# Patient Record
Sex: FEMALE | Race: WHITE | Employment: OTHER | URBAN - METROPOLITAN AREA
[De-identification: names, ages, dates, MRNs, and addresses within clinical notes are randomized per-mention and may not be internally consistent; named-entity substitution may affect disease eponyms.]

---

## 2018-03-26 ENCOUNTER — HOSPITAL ENCOUNTER (OUTPATIENT)
Dept: MAMMOGRAPHY | Age: 78
Discharge: HOME | End: 2018-03-28
Payer: MEDICARE

## 2018-03-26 DIAGNOSIS — Z12.39 BREAST CANCER SCREENING: ICD-10-CM

## 2018-03-26 DIAGNOSIS — C50.919 MALIGNANT NEOPLASM OF FEMALE BREAST, UNSPECIFIED ESTROGEN RECEPTOR STATUS, UNSPECIFIED LATERALITY, UNSPECIFIED SITE OF BREAST (HCC): ICD-10-CM

## 2018-03-26 PROCEDURE — 77063 BREAST TOMOSYNTHESIS BI: CPT

## 2018-12-06 ENCOUNTER — HOSPITAL ENCOUNTER (OUTPATIENT)
Age: 78
Discharge: HOME | End: 2018-12-08
Payer: MEDICARE

## 2018-12-06 LAB
ALBUMIN SERPL-MCNC: 3.4 G/DL (ref 3.5–5.2)
ALP BLD-CCNC: 128 U/L (ref 35–104)
ALT SERPL-CCNC: 17 U/L (ref 0–32)
ANION GAP SERPL CALCULATED.3IONS-SCNC: 14 MMOL/L (ref 7–16)
AST SERPL-CCNC: 32 U/L (ref 0–31)
BASOPHILS ABSOLUTE: 0.03 E9/L (ref 0–0.2)
BASOPHILS RELATIVE PERCENT: 0.7 % (ref 0–2)
BILIRUB SERPL-MCNC: 0.6 MG/DL (ref 0–1.2)
BUN BLDV-MCNC: 15 MG/DL (ref 8–23)
CALCIUM SERPL-MCNC: 9 MG/DL (ref 8.6–10.2)
CHLORIDE BLD-SCNC: 106 MMOL/L (ref 98–107)
CHOLESTEROL, TOTAL: 162 MG/DL (ref 0–199)
CO2: 22 MMOL/L (ref 22–29)
CREAT SERPL-MCNC: 0.9 MG/DL (ref 0.5–1)
EOSINOPHILS ABSOLUTE: 0.12 E9/L (ref 0.05–0.5)
EOSINOPHILS RELATIVE PERCENT: 2.7 % (ref 0–6)
GFR AFRICAN AMERICAN: >60
GFR NON-AFRICAN AMERICAN: >60 ML/MIN/1.73
GLUCOSE BLD-MCNC: 159 MG/DL (ref 74–99)
HCT VFR BLD CALC: 44.5 % (ref 34–48)
HDLC SERPL-MCNC: 52 MG/DL
HEMOGLOBIN: 14.7 G/DL (ref 11.5–15.5)
IMMATURE GRANULOCYTES #: 0.02 E9/L
IMMATURE GRANULOCYTES %: 0.4 % (ref 0–5)
LDL CHOLESTEROL CALCULATED: 96 MG/DL (ref 0–99)
LYMPHOCYTES ABSOLUTE: 1.25 E9/L (ref 1.5–4)
LYMPHOCYTES RELATIVE PERCENT: 27.7 % (ref 20–42)
MCH RBC QN AUTO: 31.9 PG (ref 26–35)
MCHC RBC AUTO-ENTMCNC: 33 % (ref 32–34.5)
MCV RBC AUTO: 96.5 FL (ref 80–99.9)
MONOCYTES ABSOLUTE: 0.42 E9/L (ref 0.1–0.95)
MONOCYTES RELATIVE PERCENT: 9.3 % (ref 2–12)
NEUTROPHILS ABSOLUTE: 2.67 E9/L (ref 1.8–7.3)
NEUTROPHILS RELATIVE PERCENT: 59.2 % (ref 43–80)
PDW BLD-RTO: 12.8 FL (ref 11.5–15)
PLATELET # BLD: 151 E9/L (ref 130–450)
PMV BLD AUTO: 10.6 FL (ref 7–12)
POTASSIUM SERPL-SCNC: 4.4 MMOL/L (ref 3.5–5)
RBC # BLD: 4.61 E12/L (ref 3.5–5.5)
SODIUM BLD-SCNC: 142 MMOL/L (ref 132–146)
TOTAL PROTEIN: 7.3 G/DL (ref 6.4–8.3)
TRIGL SERPL-MCNC: 71 MG/DL (ref 0–149)
TSH SERPL DL<=0.05 MIU/L-ACNC: 2.11 UIU/ML (ref 0.27–4.2)
VLDLC SERPL CALC-MCNC: 14 MG/DL
WBC # BLD: 4.5 E9/L (ref 4.5–11.5)

## 2018-12-06 PROCEDURE — 84443 ASSAY THYROID STIM HORMONE: CPT

## 2018-12-06 PROCEDURE — 80061 LIPID PANEL: CPT

## 2018-12-06 PROCEDURE — 80053 COMPREHEN METABOLIC PANEL: CPT

## 2018-12-06 PROCEDURE — 85025 COMPLETE CBC W/AUTO DIFF WBC: CPT

## 2019-04-03 ENCOUNTER — HOSPITAL ENCOUNTER (OUTPATIENT)
Dept: MAMMOGRAPHY | Age: 79
Discharge: HOME | End: 2019-04-05
Payer: MEDICARE

## 2019-04-03 DIAGNOSIS — Z12.31 SCREENING MAMMOGRAM, ENCOUNTER FOR: ICD-10-CM

## 2019-04-03 PROCEDURE — 77067 SCR MAMMO BI INCL CAD: CPT

## 2019-05-06 ENCOUNTER — HOSPITAL ENCOUNTER (OUTPATIENT)
Age: 79
Discharge: HOME | End: 2019-05-08
Payer: MEDICARE

## 2019-05-06 LAB
BASOPHILS ABSOLUTE: 0.05 E9/L (ref 0–0.2)
BASOPHILS RELATIVE PERCENT: 1 % (ref 0–2)
C-REACTIVE PROTEIN: 0.1 MG/DL (ref 0–0.4)
EOSINOPHILS ABSOLUTE: 0.12 E9/L (ref 0.05–0.5)
EOSINOPHILS RELATIVE PERCENT: 2.3 % (ref 0–6)
HCT VFR BLD CALC: 44.9 % (ref 34–48)
HEMOGLOBIN: 14.9 G/DL (ref 11.5–15.5)
IMMATURE GRANULOCYTES #: 0.01 E9/L
IMMATURE GRANULOCYTES %: 0.2 % (ref 0–5)
LYMPHOCYTES ABSOLUTE: 1.49 E9/L (ref 1.5–4)
LYMPHOCYTES RELATIVE PERCENT: 29.2 % (ref 20–42)
MCH RBC QN AUTO: 32.6 PG (ref 26–35)
MCHC RBC AUTO-ENTMCNC: 33.2 % (ref 32–34.5)
MCV RBC AUTO: 98.2 FL (ref 80–99.9)
MONOCYTES ABSOLUTE: 0.59 E9/L (ref 0.1–0.95)
MONOCYTES RELATIVE PERCENT: 11.5 % (ref 2–12)
NEUTROPHILS ABSOLUTE: 2.85 E9/L (ref 1.8–7.3)
NEUTROPHILS RELATIVE PERCENT: 55.8 % (ref 43–80)
PDW BLD-RTO: 12.9 FL (ref 11.5–15)
PLATELET # BLD: 194 E9/L (ref 130–450)
PMV BLD AUTO: 10.9 FL (ref 7–12)
RBC # BLD: 4.57 E12/L (ref 3.5–5.5)
WBC # BLD: 5.1 E9/L (ref 4.5–11.5)

## 2019-05-06 PROCEDURE — 80053 COMPREHEN METABOLIC PANEL: CPT

## 2019-05-06 PROCEDURE — 85025 COMPLETE CBC W/AUTO DIFF WBC: CPT

## 2019-05-06 PROCEDURE — 86140 C-REACTIVE PROTEIN: CPT

## 2019-05-06 PROCEDURE — 84439 ASSAY OF FREE THYROXINE: CPT

## 2019-05-06 PROCEDURE — 80061 LIPID PANEL: CPT

## 2019-05-06 PROCEDURE — 86592 SYPHILIS TEST NON-TREP QUAL: CPT

## 2019-05-06 PROCEDURE — 82306 VITAMIN D 25 HYDROXY: CPT

## 2019-05-06 PROCEDURE — 84443 ASSAY THYROID STIM HORMONE: CPT

## 2019-05-06 PROCEDURE — 86703 HIV-1/HIV-2 1 RESULT ANTBDY: CPT

## 2019-05-06 PROCEDURE — 85651 RBC SED RATE NONAUTOMATED: CPT

## 2019-05-07 LAB
ALBUMIN SERPL-MCNC: 3.5 G/DL (ref 3.5–5.2)
ALP BLD-CCNC: 108 U/L (ref 35–104)
ALT SERPL-CCNC: 13 U/L (ref 0–32)
ANION GAP SERPL CALCULATED.3IONS-SCNC: 21 MMOL/L (ref 7–16)
AST SERPL-CCNC: 36 U/L (ref 0–31)
BILIRUB SERPL-MCNC: 0.9 MG/DL (ref 0–1.2)
BUN BLDV-MCNC: 18 MG/DL (ref 8–23)
CALCIUM SERPL-MCNC: 9.4 MG/DL (ref 8.6–10.2)
CHLORIDE BLD-SCNC: 101 MMOL/L (ref 98–107)
CO2: 24 MMOL/L (ref 22–29)
CREAT SERPL-MCNC: 1.3 MG/DL (ref 0.5–1)
GFR AFRICAN AMERICAN: 48
GFR NON-AFRICAN AMERICAN: 40 ML/MIN/1.73
GLUCOSE BLD-MCNC: 124 MG/DL (ref 74–99)
POTASSIUM SERPL-SCNC: 4.1 MMOL/L (ref 3.5–5)
SEDIMENTATION RATE, ERYTHROCYTE: 58 MM/HR (ref 0–20)
SODIUM BLD-SCNC: 146 MMOL/L (ref 132–146)
T4 FREE: 1.89 NG/DL (ref 0.93–1.7)
TOTAL PROTEIN: 7.9 G/DL (ref 6.4–8.3)
TSH SERPL DL<=0.05 MIU/L-ACNC: 1.74 UIU/ML (ref 0.27–4.2)

## 2019-06-09 ENCOUNTER — APPOINTMENT (OUTPATIENT)
Dept: CT IMAGING | Age: 79
DRG: 443 | End: 2019-06-09
Payer: MEDICARE

## 2019-06-09 ENCOUNTER — APPOINTMENT (OUTPATIENT)
Dept: GENERAL RADIOLOGY | Age: 79
DRG: 443 | End: 2019-06-09
Payer: MEDICARE

## 2019-06-09 ENCOUNTER — HOSPITAL ENCOUNTER (INPATIENT)
Age: 79
LOS: 2 days | Discharge: HOME | DRG: 443 | End: 2019-06-11
Attending: EMERGENCY MEDICINE | Admitting: HOSPITALIST
Payer: MEDICARE

## 2019-06-09 DIAGNOSIS — K76.82 HEPATIC ENCEPHALOPATHY: Primary | ICD-10-CM

## 2019-06-09 DIAGNOSIS — I10 POORLY-CONTROLLED HYPERTENSION: ICD-10-CM

## 2019-06-09 PROBLEM — G93.40 ENCEPHALOPATHY: Status: ACTIVE | Noted: 2019-06-09

## 2019-06-09 LAB
ALBUMIN SERPL-MCNC: 3.5 G/DL (ref 3.5–5.2)
ALP BLD-CCNC: 165 U/L (ref 35–104)
ALT SERPL-CCNC: 19 U/L (ref 0–32)
AMMONIA: 108 UMOL/L (ref 11–51)
ANION GAP SERPL CALCULATED.3IONS-SCNC: 11 MMOL/L (ref 7–16)
APTT: 33.1 SEC (ref 24.5–35.1)
AST SERPL-CCNC: 35 U/L (ref 0–31)
BACTERIA: ABNORMAL /HPF
BASOPHILS ABSOLUTE: 0.02 E9/L (ref 0–0.2)
BASOPHILS RELATIVE PERCENT: 0.5 % (ref 0–2)
BILIRUB SERPL-MCNC: 0.7 MG/DL (ref 0–1.2)
BILIRUBIN URINE: NEGATIVE
BLOOD, URINE: ABNORMAL
BUN BLDV-MCNC: 19 MG/DL (ref 8–23)
CALCIUM SERPL-MCNC: 9.6 MG/DL (ref 8.6–10.2)
CHLORIDE BLD-SCNC: 107 MMOL/L (ref 98–107)
CHP ED QC CHECK: YES
CLARITY: CLEAR
CO2: 27 MMOL/L (ref 22–29)
COLOR: YELLOW
CREAT SERPL-MCNC: 1 MG/DL (ref 0.5–1)
EKG ATRIAL RATE: 95 BPM
EKG P AXIS: 28 DEGREES
EKG Q-T INTERVAL: 354 MS
EKG QRS DURATION: 80 MS
EKG QTC CALCULATION (BAZETT): 444 MS
EKG R AXIS: 14 DEGREES
EKG T AXIS: 6 DEGREES
EKG VENTRICULAR RATE: 95 BPM
EOSINOPHILS ABSOLUTE: 0.08 E9/L (ref 0.05–0.5)
EOSINOPHILS RELATIVE PERCENT: 2 % (ref 0–6)
EPITHELIAL CELLS, UA: ABNORMAL /HPF
GFR AFRICAN AMERICAN: >60
GFR NON-AFRICAN AMERICAN: 54 ML/MIN/1.73
GLUCOSE BLD-MCNC: 194 MG/DL (ref 74–99)
GLUCOSE BLD-MCNC: 214 MG/DL
GLUCOSE URINE: 100 MG/DL
HCT VFR BLD CALC: 43 % (ref 34–48)
HEMOGLOBIN: 14.6 G/DL (ref 11.5–15.5)
IMMATURE GRANULOCYTES #: 0.02 E9/L
IMMATURE GRANULOCYTES %: 0.5 % (ref 0–5)
INR BLD: 1.2
KETONES, URINE: NEGATIVE MG/DL
LEUKOCYTE ESTERASE, URINE: ABNORMAL
LYMPHOCYTES ABSOLUTE: 0.75 E9/L (ref 1.5–4)
LYMPHOCYTES RELATIVE PERCENT: 18.6 % (ref 20–42)
MAGNESIUM: 1.9 MG/DL (ref 1.6–2.6)
MCH RBC QN AUTO: 32.2 PG (ref 26–35)
MCHC RBC AUTO-ENTMCNC: 34 % (ref 32–34.5)
MCV RBC AUTO: 94.7 FL (ref 80–99.9)
METER GLUCOSE: 214 MG/DL (ref 74–99)
MONOCYTES ABSOLUTE: 0.34 E9/L (ref 0.1–0.95)
MONOCYTES RELATIVE PERCENT: 8.4 % (ref 2–12)
NEUTROPHILS ABSOLUTE: 2.83 E9/L (ref 1.8–7.3)
NEUTROPHILS RELATIVE PERCENT: 70 % (ref 43–80)
NITRITE, URINE: NEGATIVE
PDW BLD-RTO: 11.9 FL (ref 11.5–15)
PH UA: 6.5 (ref 5–9)
PLATELET # BLD: 111 E9/L (ref 130–450)
PMV BLD AUTO: 10 FL (ref 7–12)
POTASSIUM SERPL-SCNC: 3.9 MMOL/L (ref 3.5–5)
PROTEIN UA: ABNORMAL MG/DL
PROTHROMBIN TIME: 13.5 SEC (ref 9.3–12.4)
RBC # BLD: 4.54 E12/L (ref 3.5–5.5)
RBC UA: ABNORMAL /HPF (ref 0–2)
SODIUM BLD-SCNC: 145 MMOL/L (ref 132–146)
SPECIFIC GRAVITY UA: 1.02 (ref 1–1.03)
T4 FREE: 1.36 NG/DL (ref 0.93–1.7)
TOTAL PROTEIN: 7.7 G/DL (ref 6.4–8.3)
TROPONIN: <0.01 NG/ML (ref 0–0.03)
TSH SERPL DL<=0.05 MIU/L-ACNC: 1.03 UIU/ML (ref 0.27–4.2)
UROBILINOGEN, URINE: 1 E.U./DL
WBC # BLD: 4 E9/L (ref 4.5–11.5)
WBC UA: ABNORMAL /HPF (ref 0–5)

## 2019-06-09 PROCEDURE — 74176 CT ABD & PELVIS W/O CONTRAST: CPT

## 2019-06-09 PROCEDURE — 82962 GLUCOSE BLOOD TEST: CPT

## 2019-06-09 PROCEDURE — 84439 ASSAY OF FREE THYROXINE: CPT

## 2019-06-09 PROCEDURE — 93010 ELECTROCARDIOGRAM REPORT: CPT | Performed by: INTERNAL MEDICINE

## 2019-06-09 PROCEDURE — 84484 ASSAY OF TROPONIN QUANT: CPT

## 2019-06-09 PROCEDURE — 36415 COLL VENOUS BLD VENIPUNCTURE: CPT

## 2019-06-09 PROCEDURE — 6370000000 HC RX 637 (ALT 250 FOR IP): Performed by: HOSPITALIST

## 2019-06-09 PROCEDURE — 99285 EMERGENCY DEPT VISIT HI MDM: CPT

## 2019-06-09 PROCEDURE — 85025 COMPLETE CBC W/AUTO DIFF WBC: CPT

## 2019-06-09 PROCEDURE — 80053 COMPREHEN METABOLIC PANEL: CPT

## 2019-06-09 PROCEDURE — 6360000004 HC RX CONTRAST MEDICATION: Performed by: RADIOLOGY

## 2019-06-09 PROCEDURE — 85730 THROMBOPLASTIN TIME PARTIAL: CPT

## 2019-06-09 PROCEDURE — 99223 1ST HOSP IP/OBS HIGH 75: CPT | Performed by: HOSPITALIST

## 2019-06-09 PROCEDURE — 1200000000 HC SEMI PRIVATE

## 2019-06-09 PROCEDURE — 6360000002 HC RX W HCPCS: Performed by: HOSPITALIST

## 2019-06-09 PROCEDURE — 70450 CT HEAD/BRAIN W/O DYE: CPT

## 2019-06-09 PROCEDURE — 6370000000 HC RX 637 (ALT 250 FOR IP): Performed by: NURSE PRACTITIONER

## 2019-06-09 PROCEDURE — 82140 ASSAY OF AMMONIA: CPT

## 2019-06-09 PROCEDURE — 87088 URINE BACTERIA CULTURE: CPT

## 2019-06-09 PROCEDURE — 85610 PROTHROMBIN TIME: CPT

## 2019-06-09 PROCEDURE — 2580000003 HC RX 258: Performed by: HOSPITALIST

## 2019-06-09 PROCEDURE — 93005 ELECTROCARDIOGRAM TRACING: CPT | Performed by: NURSE PRACTITIONER

## 2019-06-09 PROCEDURE — 83735 ASSAY OF MAGNESIUM: CPT

## 2019-06-09 PROCEDURE — 71045 X-RAY EXAM CHEST 1 VIEW: CPT

## 2019-06-09 PROCEDURE — 84443 ASSAY THYROID STIM HORMONE: CPT

## 2019-06-09 PROCEDURE — 81001 URINALYSIS AUTO W/SCOPE: CPT

## 2019-06-09 RX ORDER — MONTELUKAST SODIUM 10 MG/1
10 TABLET ORAL NIGHTLY
Status: DISCONTINUED | OUTPATIENT
Start: 2019-06-09 | End: 2019-06-11 | Stop reason: HOSPADM

## 2019-06-09 RX ORDER — LEVOTHYROXINE SODIUM 0.05 MG/1
50 TABLET ORAL DAILY
COMMUNITY

## 2019-06-09 RX ORDER — LACTULOSE 10 G/15ML
15 SOLUTION ORAL ONCE
Status: COMPLETED | OUTPATIENT
Start: 2019-06-09 | End: 2019-06-09

## 2019-06-09 RX ORDER — FUROSEMIDE 40 MG/1
40 TABLET ORAL 2 TIMES DAILY
COMMUNITY
End: 2021-01-01

## 2019-06-09 RX ORDER — ANASTROZOLE 1 MG/1
1 TABLET ORAL DAILY
Status: DISCONTINUED | OUTPATIENT
Start: 2019-06-09 | End: 2019-06-11 | Stop reason: HOSPADM

## 2019-06-09 RX ORDER — LEVOTHYROXINE SODIUM 0.05 MG/1
50 TABLET ORAL DAILY
Status: DISCONTINUED | OUTPATIENT
Start: 2019-06-10 | End: 2019-06-11 | Stop reason: HOSPADM

## 2019-06-09 RX ORDER — FUROSEMIDE 40 MG/1
40 TABLET ORAL 2 TIMES DAILY
Status: DISCONTINUED | OUTPATIENT
Start: 2019-06-09 | End: 2019-06-11 | Stop reason: HOSPADM

## 2019-06-09 RX ORDER — PANTOPRAZOLE SODIUM 40 MG/1
40 TABLET, DELAYED RELEASE ORAL
Status: DISCONTINUED | OUTPATIENT
Start: 2019-06-10 | End: 2019-06-11 | Stop reason: HOSPADM

## 2019-06-09 RX ORDER — OMEPRAZOLE 20 MG/1
40 CAPSULE, DELAYED RELEASE ORAL DAILY
COMMUNITY

## 2019-06-09 RX ORDER — SODIUM CHLORIDE 9 MG/ML
INJECTION, SOLUTION INTRAVENOUS CONTINUOUS
Status: DISCONTINUED | OUTPATIENT
Start: 2019-06-09 | End: 2019-06-10

## 2019-06-09 RX ORDER — SODIUM CHLORIDE 0.9 % (FLUSH) 0.9 %
10 SYRINGE (ML) INJECTION PRN
Status: DISCONTINUED | OUTPATIENT
Start: 2019-06-09 | End: 2019-06-11 | Stop reason: HOSPADM

## 2019-06-09 RX ORDER — ONDANSETRON 2 MG/ML
4 INJECTION INTRAMUSCULAR; INTRAVENOUS EVERY 6 HOURS PRN
Status: DISCONTINUED | OUTPATIENT
Start: 2019-06-09 | End: 2019-06-11 | Stop reason: HOSPADM

## 2019-06-09 RX ORDER — LACTULOSE 10 G/15ML
20 SOLUTION ORAL
Status: DISCONTINUED | OUTPATIENT
Start: 2019-06-09 | End: 2019-06-10

## 2019-06-09 RX ORDER — BIOTIN 1 MG
TABLET ORAL
COMMUNITY

## 2019-06-09 RX ORDER — SODIUM CHLORIDE 0.9 % (FLUSH) 0.9 %
10 SYRINGE (ML) INJECTION EVERY 12 HOURS SCHEDULED
Status: DISCONTINUED | OUTPATIENT
Start: 2019-06-09 | End: 2019-06-11 | Stop reason: HOSPADM

## 2019-06-09 RX ORDER — MELOXICAM 15 MG/1
15 TABLET ORAL DAILY
Status: ON HOLD | COMMUNITY
End: 2019-06-11 | Stop reason: HOSPADM

## 2019-06-09 RX ORDER — LISINOPRIL 20 MG/1
20 TABLET ORAL DAILY
Status: DISCONTINUED | OUTPATIENT
Start: 2019-06-09 | End: 2019-06-11 | Stop reason: HOSPADM

## 2019-06-09 RX ADMIN — ENOXAPARIN SODIUM 40 MG: 40 INJECTION SUBCUTANEOUS at 18:10

## 2019-06-09 RX ADMIN — ANASTROZOLE 1 MG: 1 TABLET ORAL at 20:33

## 2019-06-09 RX ADMIN — LISINOPRIL 20 MG: 20 TABLET ORAL at 18:09

## 2019-06-09 RX ADMIN — IOHEXOL 50 ML: 240 INJECTION, SOLUTION INTRATHECAL; INTRAVASCULAR; INTRAVENOUS; ORAL at 21:15

## 2019-06-09 RX ADMIN — LACTULOSE 20 G: 20 SOLUTION ORAL at 21:26

## 2019-06-09 RX ADMIN — MONTELUKAST 10 MG: 10 TABLET, FILM COATED ORAL at 21:26

## 2019-06-09 RX ADMIN — SODIUM CHLORIDE: 9 INJECTION, SOLUTION INTRAVENOUS at 18:10

## 2019-06-09 RX ADMIN — LACTULOSE 15.33 G: 20 SOLUTION ORAL at 15:49

## 2019-06-09 RX ADMIN — FUROSEMIDE 40 MG: 40 TABLET ORAL at 18:09

## 2019-06-09 ASSESSMENT — PAIN SCALES - GENERAL: PAINLEVEL_OUTOF10: 0

## 2019-06-09 NOTE — ED PROVIDER NOTES
ED Attending  CC: Sharla     Department of Emergency Medicine   ED  Provider Note  Admit Date/RoomTime: 6/9/2019 11:42 AM  ED Room: 8003/6791-65  MRN: [id number]  Chief Complaint:   Shaking (Intermittent for past week, worse today, c/o poor balance)       History of Present Illness   Source of history provided by:  patient and relative(s). History/Exam Limitations: none. Mayur Espinosa is a 70 y.o. female who has a past medical history of:   Past Medical History:   Diagnosis Date    Anxiety     Cancer [facility]) 2/11    right breast    COPD (chronic obstructive [facility] disease) ([facility] [address]     Hypertension     presents to the ED by private vehicle for shakiness for 1 week. This has become more bothersome and progressively becoming more frequent. She has been having issues with gait instability for at least 6 months and has been falling frequently. She has not suffered any significant head injuries or other traumatic injuries as a result. Last fall that resulted in the patient hitting the ground was over one week ago. She states the shakiness is present at rest but does seem to worsen with intention most of the time. The shakiness is in all 4 extremities, not worse in any particular extremity or any unilateral distribution. Patient states that she has had no changes in strength or sensation to the extremities but does have a sensation of generalized fatigue and weakness. She denies any headache, neck pain or stiffness. She voices no acute changes in her vision in regards to blurriness, loss or double in. She denies any acute changes in her speech or her ability to express things that she wishes to say. She denies any change in her hearing. No issues with swallowing. The ambulance and issues are discussed above. She states that she was recently taken off of verapamil due to lower extremity edema and weight gain, took 3 days worth of increased Lasix, and now this has resolved.  No other medication changes, addition soreness continuation the reported. Denies any significant change in intake of fluid or food recently. She denies any symptoms of illness at this time in regards to fever, chills, anorexia. She has not been treated recently for infection with antibiotics or antivirals. She denies any pains in her chest, fluttering chest with a sensation of palpitations. She admits to mild shortness of breath with exertional component of worsening but denies orthopnea, return of lower extremity edema, paroxysmal nocturnal dyspnea. She denies abdominal pain, emesis but admits to a change in bowel pattern. She states her stools have been looser and more pale in color. She denies hematochezia or melena. She denies any change in the urinary frequency or volume, dysuria, fadia hematuria, suprapubic or flank discomfort. ROS   Pertinent positives and negatives are stated within HPI, all other systems reviewed and are negative. Past Surgical History:   Procedure Laterality Date    BREAST SURGERY  2011    right lumpectomy    HERNIA REPAIR      HYSTERECTOMY      SHOULDER ARTHROSCOPY  09 09 2011    arthroscopy left shoulder rotator cuff repair, subacromial decompression, debridement labrum, synovectomy    TONSILLECTOMY     Social History:  reports that she has never smoked. She has never used smokeless tobacco. She reports that she drinks alcohol. She reports that she does not use drugs. Family History: family history includes Alzheimer's Disease in her sister; COPD in her father and mother; Cancer in her brother and mother; Diabetes in her father; Heart Failure in her father and mother. Allergies: Penicillins; Sulfa antibiotics; Theophyllines; Codeine;  Iodine; Tape [adhesive tape]; and Vicodin [hydrocodone-acetaminophen]    Physical Exam Section   Oxygen Saturation Interpretation: Normal.   ED Triage Vitals [06/09/19 1147]   BP Temp Temp Source Pulse Resp SpO2 Height Weight   (!) 199/75 97.6 °F (36.4 °C) Oral 102 24 96 % 5' 4\" (1.626 m) 232 lb (105.2 kg)       Physical Exam  Constitutional:  Alert, development consistent with age. HEENT:  NC/NT. PERRLA. EOMI, sclera anicteric,  Airway patent. Sclera was intact without icterus. Oral mucosa is pink, moist and intact without lesion. Neck:  Normal ROM. No posterior midline tenderness. Supple. No meningeal signs/ negative Kernig and Brudzinski signs. Respiratory:  Clear to auscultation and breath sounds equal. Respiratory pattern regular easy and unlabored. Air entry is mildly diminished throughout  CV:  Tachycardic rate, regular rhythm. S1 and S2 noted. Hypertension is noted. Distal pulses are present and palpable in all 4 extremities and equal intensity. GI:  Abdomen Soft, round and obese with no organomegaly appreciated Nontender, good bowel sounds. No firm or pulsatile mass. Back:  No costovertebral tenderness. Extremities: No tenderness or edema noted. Full active and passive range of motion is appreciated in all 4 extremities with no visible or palpable findings to suggest traumatic injury. Mild fine tremors are noted on intention of bilateral upper and lower extremities. No discrimination is appreciated. Changes suggestive of asterixis are noted on wrist flexion bilaterally. Integument:  Normal turgor. Warm, dry, without visible rash, unless noted elsewhere. Overall pallor is appreciated without jaundice. Lymphatics: No lymphangitis or adenopathy noted. Neurological:       Orientation: person, place and time. Memory:             short term impairment: No.             Long term impairment: No.       CN II-XII: cranial nerves II-XII are grossly intact. Motor function:            Arm(s): Bilateral normal.            Leg(s): Bilateral normal.       Cerebellar function:            Tremor: Yes.               Past-pointing: No.             Limb Ataxia: No.       Reflexes: Bilateral knee,ankle,biceps normal.       Sensory function:            Arm(s): Bilateral normal. Leg(s): Bilateral normal.            Face: Bilateral normal.       Gait:  Not observed.     Lab / Imaging Results   (All laboratory and radiology results have been personally reviewed by myself)  Labs:  Results for orders placed or performed during the hospital encounter of 06/09/19   CBC auto differential   Result Value Ref Range    WBC 4.0 (L) 4.5 - 11.5 E9/L    RBC 4.54 3.50 - 5.50 E12/L    Hemoglobin 14.6 11.5 - 15.5 g/dL    Hematocrit 43.0 34.0 - 48.0 %    MCV 94.7 80.0 - 99.9 fL    MCH 32.2 26.0 - 35.0 pg    MCHC 34.0 32.0 - 34.5 %    RDW 11.9 11.5 - 15.0 fL    Platelets 184 (L) 622 - 450 E9/L    MPV 10.0 7.0 - 12.0 fL    Neutrophils % 70.0 43.0 - 80.0 %    Immature Granulocytes % 0.5 0.0 - 5.0 %    Lymphocytes % 18.6 (L) 20.0 - 42.0 %    Monocytes % 8.4 2.0 - 12.0 %    Eosinophils % 2.0 0.0 - 6.0 %    Basophils % 0.5 0.0 - 2.0 %    Neutrophils # 2.83 1.80 - 7.30 E9/L    Immature Granulocytes # 0.02 E9/L    Lymphocytes # 0.75 (L) 1.50 - 4.00 E9/L    Monocytes # 0.34 0.10 - 0.95 E9/L    Eosinophils # 0.08 0.05 - 0.50 E9/L    Basophils # 0.02 0.00 - 0.20 E9/L   Comprehensive Metabolic Panel   Result Value Ref Range    Sodium 145 132 - 146 mmol/L    Potassium 3.9 3.5 - 5.0 mmol/L    Chloride 107 98 - 107 mmol/L    CO2 27 22 - 29 mmol/L    Anion Gap 11 7 - 16 mmol/L    Glucose 194 (H) 74 - 99 mg/dL    BUN 19 8 - 23 mg/dL    CREATININE 1.0 0.5 - 1.0 mg/dL    GFR Non-African American 54 >=60 mL/min/1.73    GFR African American >60     Calcium 9.6 8.6 - 10.2 mg/dL    Total Protein 7.7 6.4 - 8.3 g/dL    Alb 3.5 3.5 - 5.2 g/dL    Total Bilirubin 0.7 0.0 - 1.2 mg/dL    Alkaline Phosphatase 165 (H) 35 - 104 U/L    ALT 19 0 - 32 U/L    AST 35 (H) 0 - 31 U/L   Magnesium   Result Value Ref Range    Magnesium 1.9 1.6 - 2.6 mg/dL   Troponin   Result Value Ref Range    Troponin <0.01 0.00 - 0.03 ng/mL   TSH without Reflex   Result Value Ref Range    TSH 1.030 0.270 - 4.200 uIU/mL   T4, FREE   Result Value Ref Range T4 Free 1.36 0.93 - 1.70 ng/dL   Urinalysis   Result Value Ref Range    Color, UA Yellow Straw/Yellow    Clarity, UA Clear Clear    Glucose, Ur 100 (A) Negative mg/dL    Bilirubin Urine Negative Negative    Ketones, Urine Negative Negative mg/dL    Specific Gravity, UA 1.020 1.005 - 1.030    Blood, Urine SMALL (A) Negative    pH, UA 6.5 5.0 - 9.0    Protein, UA TRACE Negative mg/dL    Urobilinogen, Urine 1.0 <2.0 E.U./dL    Nitrite, Urine Negative Negative    Leukocyte Esterase, Urine SMALL (A) Negative   Protime-INR   Result Value Ref Range    Protime 13.5 (H) 9.3 - 12.4 sec    INR 1.2    APTT   Result Value Ref Range    aPTT 33.1 24.5 - 35.1 sec   Ammonia   Result Value Ref Range    Ammonia 108.0 (H) 11.0 - 51.0 umol/L   Microscopic Urinalysis   Result Value Ref Range    WBC, UA 2-5 0 - 5 /HPF    RBC, UA 1-3 0 - 2 /HPF    Epi Cells FEW /HPF    Bacteria, UA FEW (A) /HPF   POCT Glucose   Result Value Ref Range    Glucose 214 mg/dL    QC OK? yes    POCT Glucose   Result Value Ref Range    Meter Glucose 214 (H) 74 - 99 mg/dL   EKG 12 Lead   Result Value Ref Range    Ventricular Rate 95 BPM    Atrial Rate 95 BPM    QRS Duration 80 ms    Q-T Interval 354 ms    QTc Calculation (Bazett) 444 ms    P Axis 28 degrees    R Axis 14 degrees    T Axis 6 degrees     Imaging: All Radiology results interpreted by Radiologist unless otherwise noted. CT ABDOMEN PELVIS WO CONTRAST Additional Contrast? Oral   Final Result   1. No acute abnormality. 2. Gallstones or gallbladder sludge. 3. Probable cirrhotic liver. 4. Right renal calculus      CT Head WO Contrast   Final Result   1. No acute intracranial abnormality. 2. Age-appropriate diffuse atrophy and mild small vessel white matter   ischemic changes      XR CHEST PORTABLE   Final Result   1. Negative portable chest.          EKG #1:  Interpreted by emergency department physician unless otherwise noted. Time:  12:39:34    Rate: 95  Rhythm: Sinus.   Interpretation: Sinus rhythm with first-degree AV block, rate 95, ANTONI is 240 ms, QRS duration normal at 80 ms, QTc is borderline 4 and 44 ms. Normal axis. T-wave inversion is noted in the inferior leads III and aVF, with flattening noted in the anterior lateral leads V4 through V6. No other findings are noted to suggest acute infarction or acute ischemia. Poor R-wave progression is noted. No previous EKG available for comparison. ED Course / Medical Decision Making     Medications   anastrozole (ARIMIDEX) tablet 1 mg (1 mg Oral Given 6/9/19 2033)   furosemide (LASIX) tablet 40 mg (40 mg Oral Given 6/9/19 1809)   levothyroxine (SYNTHROID) tablet 50 mcg (has no administration in time range)   lisinopril (PRINIVIL;ZESTRIL) tablet 20 mg (20 mg Oral Given 6/9/19 1809)   montelukast (SINGULAIR) tablet 10 mg (10 mg Oral Given 6/9/19 2126)   pantoprazole (PROTONIX) tablet 40 mg (has no administration in time range)   sodium chloride flush 0.9 % injection 10 mL (10 mLs Intravenous Not Given 6/9/19 2130)   sodium chloride flush 0.9 % injection 10 mL (has no administration in time range)   magnesium hydroxide (MILK OF MAGNESIA) 400 MG/5ML suspension 30 mL (has no administration in time range)   ondansetron (ZOFRAN) injection 4 mg (has no administration in time range)   enoxaparin (LOVENOX) injection 40 mg (40 mg Subcutaneous Given 6/9/19 1810)   0.9 % sodium chloride infusion ( Intravenous New Bag 6/9/19 1810)   lactulose (CHRONULAC) 10 GM/15ML solution 20 g (20 g Oral Given 6/9/19 2126)   lactulose (CHRONULAC) 10 GM/15ML solution 15.3333 g (15.3333 g Oral Given 6/9/19 1549)   iohexol (OMNIPAQUE 240) injection 50 mL (50 mLs Oral Given 6/9/19 2115)     ED Course as of Jun 09 2244   Sun Jun 09, 2019   1515 ATTENDING PROVIDER ATTESTATION:     I have personally performed and/or participated in the history, exam, medical decision making, and procedures and agree with all pertinent clinical information unless otherwise noted.     I have also reviewed and agree with the past medical, family and social history unless otherwise noted. My findings/plan: Patient here complaining of feeling slow, feeling slightly confused at times with slow thought process and has developed a fine intermittent mild tremor to the bilateral upper extremities. Admits to drinking alcohol, one beer about once a week or once every other week and denies any other alcohol intake. No new medication changes. No fevers. No vomiting or diarrhea. No weakness. On exam she is seeing the bed resting comfortable in no distress patient is oriented ×3 with no focal neurologic deficit. Mild bilateral upper extremity asterixis or tremor worsened with intense. She has no seizure-like activity. Abdomen is soft and nontender. No jaundice or icterus. Heart rate regular. Lungs are clear and equal. Workup results review, patient to be admitted. [NC]      ED Course User Index  [NC] Herson Wang, DO     Re-Evaluations:  6/9/19      Time: 3:05 pm    Patients symptoms show no change or worsening. Patient's exam remained without focal deficits. The case is discussed with the hospitalist covering for the patient's PCP, they will admit. Consultations:             None    Procedures:   none    MDM:  70-year-old female presenting to ER with complaints of generalized fatigue and tremors beginning a week ago. She had some frequent falls for the last 6 months as well as some mild confusion. Exam is consistent with these findings with no unilateral or focal deficits noted, only fine tremors noted worse with intention, but bilateral upper 70s did display asterixis on exam. She has no jaundice or icterus. She is afebrile and nontoxic otherwise. She has no abdominal tenderness or organomegaly appreciated on exam. Diagnostic evaluation in the ER consisting of CT head, chest x-ray, EKG, labs and urinalysis showed remarkable findings only for transaminitis and elevated serum ammonia.  Hepatic encephalopathy appears to be the cause however the etiology of this is unknown if she has no known history of liver disease or cirrhosis, she has not a regular heavy alcohol user, and she has no identified hepatotoxic medications. Patient will be started on lactulose and admitted to internal medicine hospitalist for further evaluation and management. Counseling:   I have spoken with the patient and daughter and discussed todays results, in addition to providing specific details for the plan of care and counseling regarding the diagnosis and prognosis and are agreeable with the plan. Assessment      1. Hepatic encephalopathy ([address]    2. Poorly-controlled hypertension      This patient's ED course included: a personal history and physicial examination, re-evaluation prior to disposition, multiple bedside re-evaluations, IV medications, cardiac monitoring and complex medical decision making and emergency management  This patient has remained hemodynamically stable, improved and been closely monitored during their ED course. Plan   Admit to [address] [place] [address]. Patient condition is stable. New Medications     Current Discharge Medication List        Electronically signed by TAMARA Dumas CNP   DD: 6/9/19  **This report was transcribed using voice recognition software. Every effort was made to ensure accuracy; however, inadvertent computerized transcription errors may be present.   END OF PROVIDER NOTE        TAMARA Dumas CNP  06/09/19 [address], APRN - CNP  06/09/19 0216

## 2019-06-09 NOTE — H&P
Hospitalist  History and Physical      CHIEF COMPLAINT:  Jerking and unsteadiness    History of Present Illness:    70year old woman with history of COPD, and HTN. She presented to the ER with 3 weeks of  intermittent jerky movement and unsteadiness. Symptoms has been progressive, especially in the last week, and worse overnight. No new medications. She lives alone, drives and is independent with ADLs    Informant(s) for H&P:* patient and daughter    REVIEW OF SYSTEMS:  10 point review unremarkable, unless mentioned above    PMH:  Past Medical History:   Diagnosis Date    Anxiety     Cancer ([facility] [address] 2/11    right breast    COPD (chronic obstructive pulmonary disease) ([facility] [address]     Hypertension        Surgical History:  Past Surgical History:   Procedure Laterality Date    BREAST SURGERY  2011    right lumpectomy    HERNIA REPAIR      HYSTERECTOMY      SHOULDER ARTHROSCOPY  09 09 2011    arthroscopy left shoulder rotator cuff repair, subacromial decompression, debridement labrum, synovectomy    TONSILLECTOMY         Medications Prior to Admission:    Prior to Admission medications    Medication Sig Start Date End Date Taking? Authorizing Provider   levothyroxine (SYNTHROID) 50 MCG tablet Take 50 mcg by mouth Daily   Yes Historical Provider, MD   furosemide (LASIX) 40 MG tablet Take 40 mg by mouth 2 times daily   Yes Historical Provider, MD   meloxicam (MOBIC) 15 MG tablet Take 15 mg by mouth daily   Yes Historical Provider, MD   omeprazole (PRILOSEC) 20 MG delayed release capsule Take 40 mg by mouth daily   Yes Historical Provider, MD   Biotin 1000 MCG TABS Take by mouth   Yes Historical Provider, MD   loratadine (CLARITIN) 10 MG tablet Take 10 mg by mouth daily. Historical Provider, MD   Cholecalciferol (VITAMIN D3) 2000 UNIT CAPS Take  by mouth daily. Historical Provider, MD   lisinopril (PRINIVIL;ZESTRIL) 20 MG tablet Take 20 mg by mouth daily.  5/26/11   Historical Provider, MD montelukast (SINGULAIR) 10 MG tablet Take 10 mg by mouth nightly. Historical Provider, MD   anastrozole (ARIMIDEX) 1 MG tablet Take 1 mg by mouth daily. LPelonDPelon 9/2/11    Historical Provider, MD       Allergies:    Penicillins; Sulfa antibiotics; Theophyllines; Codeine; Iodine; Tape [adhesive tape]; and Vicodin [hydrocodone-acetaminophen]    Social History:    reports that she has never smoked. She has never used smokeless tobacco. She reports that she drinks alcohol. She reports that she does not use drugs. Family History:   family history includes Alzheimer's Disease in her sister; COPD in her father and mother; Cancer in her brother and mother; Diabetes in her father; Heart Failure in her father and mother.       PHYSICAL EXAM:  Vitals:  BP (!) 140/89   Pulse 91   Temp 97.6 °F (36.4 °C) (Oral)   Resp 16   Ht 5' 4\" (1.626 m)   Wt 232 lb (105.2 kg)   SpO2 95%   BMI 39.82 kg/m²     General Appearance: alert and oriented to person, place and time, well-developed and well-nourished, in no acute distress  Skin: warm and dry, no rash or erythema  Head: normocephalic and atraumatic  Eyes: pupils equal, round, and reactive to light, extraocular eye movements intact, conjunctivae normal  Neck: neck supple and non tender without mass, no thyromegaly or thyroid nodules, no cervical lymphadenopathy   Pulmonary/Chest: clear to auscultation bilaterally- no wheezes, rales or rhonchi, normal air movement, no respiratory distress  Cardiovascular: normal rate, normal S1 and S2, no gallops, intact distal pulses and no carotid bruits  Abdomen: soft, RUQ and epigastric tenderness  Extremities: no cyanosis, clubbing or edema  Neurologic: asterexis      LABS:  Recent Labs     06/09/19  1235 06/09/19  1305   NA  --  145   K  --  3.9   CL  --  107   CO2  --  27   BUN  --  19   CREATININE  --  1.0   GLUCOSE 214 194*   CALCIUM  --  9.6       Recent Labs     06/09/19  1305   WBC 4.0*   RBC 4.54   HGB 14.6   HCT 43.0   MCV 94.7 MCH 32.2   MCHC 34.0   RDW 11.9   *   MPV 10.0       Radiology:   CT Head WO Contrast   Final Result   1. No acute intracranial abnormality. 2. Age-appropriate diffuse atrophy and mild small vessel white matter   ischemic changes      XR CHEST PORTABLE   Final Result   1. Negative portable chest.              ASSESSMENT:      1. Asterixis and myoclonus  2. Unsteadiness likely 2/2 above  3. Hyperammonemic encephalopathy  4. RUQ tenderness   5. Elevated LFT  6. Possible cirrhosis  7.  HTN      PLAN:  - lactulose  - CT abdomen  - hepatitis panel  - monitor LFT    Code Status:  Full    DVT prophylaxis:  lovenox      Electronically signed by Leon Zee MD on 6/9/2019 at 4:53 PM

## 2019-06-09 NOTE — ED NOTES
Pt. C/o intermittent shakiness for the past week, worse today.      Pamella Recinos RN  06/09/19 [id number]

## 2019-06-10 LAB
ALBUMIN SERPL-MCNC: 3.1 G/DL (ref 3.5–5.2)
ALP BLD-CCNC: 135 U/L (ref 35–104)
ALT SERPL-CCNC: 17 U/L (ref 0–32)
AMMONIA: 64 UMOL/L (ref 11–51)
ANION GAP SERPL CALCULATED.3IONS-SCNC: 11 MMOL/L (ref 7–16)
AST SERPL-CCNC: 33 U/L (ref 0–31)
BASOPHILS ABSOLUTE: 0.03 E9/L (ref 0–0.2)
BASOPHILS RELATIVE PERCENT: 0.8 % (ref 0–2)
BILIRUB SERPL-MCNC: 0.8 MG/DL (ref 0–1.2)
BILIRUBIN DIRECT: 0.2 MG/DL (ref 0–0.3)
BILIRUBIN, INDIRECT: 0.6 MG/DL (ref 0–1)
BUN BLDV-MCNC: 14 MG/DL (ref 8–23)
CALCIUM SERPL-MCNC: 8.9 MG/DL (ref 8.6–10.2)
CHLORIDE BLD-SCNC: 109 MMOL/L (ref 98–107)
CO2: 23 MMOL/L (ref 22–29)
CREAT SERPL-MCNC: 0.9 MG/DL (ref 0.5–1)
EOSINOPHILS ABSOLUTE: 0.08 E9/L (ref 0.05–0.5)
EOSINOPHILS RELATIVE PERCENT: 2.2 % (ref 0–6)
GFR AFRICAN AMERICAN: >60
GFR NON-AFRICAN AMERICAN: >60 ML/MIN/1.73
GLUCOSE BLD-MCNC: 135 MG/DL (ref 74–99)
HCT VFR BLD CALC: 40.2 % (ref 34–48)
HEMOGLOBIN: 13.4 G/DL (ref 11.5–15.5)
IMMATURE GRANULOCYTES #: 0.01 E9/L
IMMATURE GRANULOCYTES %: 0.3 % (ref 0–5)
LYMPHOCYTES ABSOLUTE: 0.95 E9/L (ref 1.5–4)
LYMPHOCYTES RELATIVE PERCENT: 25.6 % (ref 20–42)
MAGNESIUM: 2 MG/DL (ref 1.6–2.6)
MCH RBC QN AUTO: 32.1 PG (ref 26–35)
MCHC RBC AUTO-ENTMCNC: 33.3 % (ref 32–34.5)
MCV RBC AUTO: 96.4 FL (ref 80–99.9)
MONOCYTES ABSOLUTE: 0.38 E9/L (ref 0.1–0.95)
MONOCYTES RELATIVE PERCENT: 10.2 % (ref 2–12)
NEUTROPHILS ABSOLUTE: 2.26 E9/L (ref 1.8–7.3)
NEUTROPHILS RELATIVE PERCENT: 60.9 % (ref 43–80)
PDW BLD-RTO: 12.5 FL (ref 11.5–15)
PLATELET # BLD: 117 E9/L (ref 130–450)
PMV BLD AUTO: 10.3 FL (ref 7–12)
POTASSIUM REFLEX MAGNESIUM: 4 MMOL/L (ref 3.5–5)
RBC # BLD: 4.17 E12/L (ref 3.5–5.5)
SODIUM BLD-SCNC: 143 MMOL/L (ref 132–146)
TOTAL PROTEIN: 7.2 G/DL (ref 6.4–8.3)
URINE CULTURE, ROUTINE: NORMAL
WBC # BLD: 3.7 E9/L (ref 4.5–11.5)

## 2019-06-10 PROCEDURE — 97165 OT EVAL LOW COMPLEX 30 MIN: CPT

## 2019-06-10 PROCEDURE — 6370000000 HC RX 637 (ALT 250 FOR IP): Performed by: HOSPITALIST

## 2019-06-10 PROCEDURE — 99233 SBSQ HOSP IP/OBS HIGH 50: CPT | Performed by: HOSPITALIST

## 2019-06-10 PROCEDURE — 85025 COMPLETE CBC W/AUTO DIFF WBC: CPT

## 2019-06-10 PROCEDURE — 97530 THERAPEUTIC ACTIVITIES: CPT

## 2019-06-10 PROCEDURE — 80048 BASIC METABOLIC PNL TOTAL CA: CPT

## 2019-06-10 PROCEDURE — 97161 PT EVAL LOW COMPLEX 20 MIN: CPT | Performed by: PHYSICAL THERAPIST

## 2019-06-10 PROCEDURE — 83735 ASSAY OF MAGNESIUM: CPT

## 2019-06-10 PROCEDURE — 97116 GAIT TRAINING THERAPY: CPT | Performed by: PHYSICAL THERAPIST

## 2019-06-10 PROCEDURE — 80074 ACUTE HEPATITIS PANEL: CPT

## 2019-06-10 PROCEDURE — 80076 HEPATIC FUNCTION PANEL: CPT

## 2019-06-10 PROCEDURE — 36415 COLL VENOUS BLD VENIPUNCTURE: CPT

## 2019-06-10 PROCEDURE — 6360000002 HC RX W HCPCS: Performed by: HOSPITALIST

## 2019-06-10 PROCEDURE — 1200000000 HC SEMI PRIVATE

## 2019-06-10 PROCEDURE — 82140 ASSAY OF AMMONIA: CPT

## 2019-06-10 RX ORDER — LACTULOSE 10 G/15ML
20 SOLUTION ORAL 2 TIMES DAILY
Status: DISCONTINUED | OUTPATIENT
Start: 2019-06-10 | End: 2019-06-11 | Stop reason: HOSPADM

## 2019-06-10 RX ORDER — HYDROXYZINE PAMOATE 25 MG/1
25 CAPSULE ORAL NIGHTLY PRN
Status: DISCONTINUED | OUTPATIENT
Start: 2019-06-11 | End: 2019-06-10

## 2019-06-10 RX ORDER — HYDROXYZINE PAMOATE 25 MG/1
25 CAPSULE ORAL NIGHTLY PRN
Status: DISCONTINUED | OUTPATIENT
Start: 2019-06-10 | End: 2019-06-11 | Stop reason: HOSPADM

## 2019-06-10 RX ADMIN — ANASTROZOLE 1 MG: 1 TABLET ORAL at 12:18

## 2019-06-10 RX ADMIN — LACTULOSE 20 G: 20 SOLUTION ORAL at 00:41

## 2019-06-10 RX ADMIN — FUROSEMIDE 40 MG: 40 TABLET ORAL at 09:27

## 2019-06-10 RX ADMIN — RIFAXIMIN 550 MG: 550 TABLET ORAL at 19:42

## 2019-06-10 RX ADMIN — LEVOTHYROXINE SODIUM 50 MCG: 50 TABLET ORAL at 05:57

## 2019-06-10 RX ADMIN — LACTULOSE 20 G: 20 SOLUTION ORAL at 12:18

## 2019-06-10 RX ADMIN — LACTULOSE 20 G: 20 SOLUTION ORAL at 08:26

## 2019-06-10 RX ADMIN — LISINOPRIL 20 MG: 20 TABLET ORAL at 09:27

## 2019-06-10 RX ADMIN — ENOXAPARIN SODIUM 40 MG: 40 INJECTION SUBCUTANEOUS at 09:26

## 2019-06-10 RX ADMIN — MONTELUKAST 10 MG: 10 TABLET, FILM COATED ORAL at 19:42

## 2019-06-10 RX ADMIN — LACTULOSE 20 G: 20 SOLUTION ORAL at 04:00

## 2019-06-10 RX ADMIN — FUROSEMIDE 40 MG: 40 TABLET ORAL at 17:57

## 2019-06-10 RX ADMIN — PANTOPRAZOLE SODIUM 40 MG: 40 TABLET, DELAYED RELEASE ORAL at 05:56

## 2019-06-10 RX ADMIN — LACTULOSE 20 G: 20 SOLUTION ORAL at 19:43

## 2019-06-10 ASSESSMENT — PAIN SCALES - GENERAL
PAINLEVEL_OUTOF10: 0

## 2019-06-10 NOTE — PROGRESS NOTES
Physical Therapy      Room #:   6457/5319-29  Patient Name: Maryellen King    PHYSICAL THERAPY INITIAL EVALUATION    Tentative placement recommendation: HHPT with family assist    Equipment recommendation: Patient has needed equipment       Plan of care: Patient will be seen    daily  for therapeutic exercise, functional retraining, endurance activities, balance exercises, family and patient education. Nursing to ambulate patient in hallway; order entered. AM-PAC Basic Mobility        AM-[facility] Mobility Inpatient   How much difficulty turning over in bed?: None  How much difficulty sitting down on / standing up from a chair with arms?: A Little  How much difficulty moving from lying on back to sitting on side of bed?: A Little  How much help from another person moving to and from a bed to a chair?: None  How much help from another person needed to walk in hospital room?: None  How much help from another person for climbing 3-5 steps with a railing?: A Little  AM-[facility] Inpatient Mobility Raw Score : 21  AM-PAC Inpatient T-Scale Score : 50.25  Mobility Inpatient CMS 0-100% Score: 28.97  Mobility Inpatient CMS G-Code Modifier : CJ    Order:  EVALUATE AND TREAT    Diagnosis/Problem list:  Pt has fallen up steps d/t weakness; \"I feel much stronger now\"   1. Hepatic encephalopathy ([address]    2.  Poorly-controlled hypertension        Patient Active Problem List   Diagnosis    Glenoid labral tear    Synovitis    Biceps tendon tear    Rotator cuff tear    Subacromial impingement    Encephalopathy       Past medical history:       Diagnosis Date    Anxiety     Cancer [facility]) 2/11    right breast    [facility]ch[facility] obstructive [facility]) [facility] [address]     Hypertension    ;      Procedure Laterality Date    BREAST SURGERY  2011    right lumpectomy    HERNIA REPAIR      HYSTERECTOMY      SHOULDER ARTHROSCOPY  09 09 2011    arthroscopy left shoulder rotator cuff repair, subacromial decompression, debridement labrum, synovectomy    TONSILLECTOMY         The admitting diagnosis and active problem list as listed above have been reviewed prior to the initiation of this evaluation. Last time out of bed: yesterday    Precautions: falls ,   Social history: Patient lives alone  in a ranch home  with 2  steps and ramp to enter with Rail    Prior Level of Function: Patient ambulated independently with cane  Equipment owned: Cane and Wheeled Walker,       Mentation: alert, cooperative, oriented x 3  and follows directions,      ROM: wfl    STRENGTH: wfl with exception of right quad 4/5; left quad 4+/5  PAIN: (measured on a visual analog scale with 0=no pain and 10=excruciating pain) 0/10. FUNCTIONAL ASSESSMENT   Bed Mobility- Supine to sit- Minimal assist          Scooting- Supervision        Sit to supine-  not assessed the patient is up in the chair; nursing aware       Transfers-Sit to stand- Supervision  with cane or wheeled walker    Gait:  Patient ambulated 100 feet with wheeled walker and 100 feet using cane with Supervision      Steps:  Not assessed      Balance: sit-good         stand fair  with device    Edema: no  Endurance: fair  slight shortness of breath end of gait    Treatment:    Therapist educated and facilitated patient on techniques to increase safety and independence with bed mobility, balance, functional transfers, and functional mobility. Sat EOB x 10 minutes to increase dynamic sitting balance and activity tolerance. Patient demonstrating good   understanding of education/techniques, requiring additional training/education. At end of session, patient in chair with nursing  call light and phone within reach,   all lines and tubes intact, nursing notified. Pt would benefit from continued skilled PT to increase functional independence and quality of life.        Rehab Potential: good  for baseline  Patients Goal: home      ASSESSMENT  Patient exhibits decreased strength, balance, coordination

## 2019-06-10 NOTE — CONSULTS
Mila Hardin M.D. [facility]  Dr. Kristi Gregory M.D.,  Dr. Timothy Fernandez M.D.,  Dr. Maite Jaimes D.O.,  Dr Kristin Garza D.O. ,  Dr Lucia Tolentino M.D. Lito Layton  70 y.o.  female      Re: New cirrhosis  Requesting physician: Dr. Remi Borges  Date:3:46 PM 6/10/2019          HPI: 51-year-old female patient presenting to the hospital because of dizziness and confusion. Patient is still somewhat confused but able to provide reasonable history which is corroborated and supplemented by her granddaughter present and by her daughter on the phone. Apparently  she has been having progressively weak and unsteady. Patient reports yesterday being also somewhat confused which is corroborated by her daughter. Usually patient lives alone and is independent with driving/ADL. On presentation she was noted to have acute ammonia level of 108. CT scan of the abdomen is reported as changes consistent with cirrhosis. Patient denies history of hepatitis or jaundice. She denies any significant alcohol abuse currently or in the past.  Patient denies diabetes. She thinks she may have hyperlipidemia. Patient reports no previous knowledge of liver disease. She reports that she follows with Dr. Nay Robert from our group and had colonoscopy approximately 5 years ago. Currently patient complains of some abdominal discomfort which is mostly in the mid bilateral abdomen. She denies nausea vomiting but according to her and her daughter sometimes become nauseated after a meal.  Patient denies hematemesis emesis of coffee-ground. No blood in the stool or melena reported. According to the family and patient no previous history of upper endoscopy recently. Patient reports chronic gastroesophageal reflux for which she takes omeprazole.     Information sources:   -Patient  -family (daughter and granddaughter)  -medical record  -health care team    PMHx:  Past Medical History:   Diagnosis Date    Anxiety  Cancer ([facility] [address] 2/11    right breast    COPD (chronic obstructive pulmonary disease) ([facility] [address]     Hypertension        PSHx:  Past Surgical History:   Procedure Laterality Date    BREAST SURGERY  2011    right lumpectomy    HERNIA REPAIR      HYSTERECTOMY      SHOULDER ARTHROSCOPY  09 09 2011    arthroscopy left shoulder rotator cuff repair, subacromial decompression, debridement labrum, synovectomy    TONSILLECTOMY         Meds:  Current Facility-Administered Medications   Medication Dose Route Frequency Provider Last Rate Last Dose    lactulose (CHRONULAC) 10 GM/15ML solution 20 g  20 g Oral BID Monica Ca MD        anastrozole (ARIMIDEX) tablet 1 mg  1 mg Oral Daily Monica Ca MD   1 mg at 06/10/19 1218    furosemide (LASIX) tablet 40 mg  40 mg Oral BID Monica Ca MD   40 mg at 06/10/19 0927    levothyroxine (SYNTHROID) tablet 50 mcg  50 mcg Oral Daily Monica Ca MD   50 mcg at 06/10/19 0557    lisinopril (PRINIVIL;ZESTRIL) tablet 20 mg  20 mg Oral Daily Monica Ca MD   20 mg at 06/10/19 0927    montelukast (SINGULAIR) tablet 10 mg  10 mg Oral Nightly Monica Ca MD   10 mg at 06/09/19 2126    pantoprazole (PROTONIX) tablet 40 mg  40 mg Oral QAM AC Monica Ca MD   40 mg at 06/10/19 0556    sodium chloride flush 0.9 % injection 10 mL  10 mL Intravenous 2 times per day Monica Ca MD        sodium chloride flush 0.9 % injection 10 mL  10 mL Intravenous PRN Monica Ca MD        magnesium hydroxide (MILK OF MAGNESIA) 400 MG/5ML suspension 30 mL  30 mL Oral Daily PRN Monica Ca MD        ondansetron (ZOFRAN) injection 4 mg  4 mg Intravenous Q6H PRN Monica Ca MD        enoxaparin (LOVENOX) injection 40 mg  40 mg Subcutaneous Daily Monica Ca MD   40 mg at 06/10/19 0997       SocHx:  Social History     Socioeconomic History    Marital status:      Spouse name: Not on file    Number of children: Not on file    Years of education: Not on file    Highest education level: Not on file   Occupational History    Not on file   Social Needs    Financial resource strain: Not on file    Food insecurity:     Worry: Not on file     Inability: Not on file    Transportation needs:     Medical: Not on file     Non-medical: Not on file   Tobacco Use    Smoking status: Never Smoker    Smokeless tobacco: Never Used   Substance and Sexual Activity    Alcohol use: Yes     Comment: social    Drug use: Never    Sexual activity: Not Currently   Lifestyle    Physical activity:     Days per week: Not on file     Minutes per session: Not on file    Stress: Not on file   Relationships    Social connections:     Talks on phone: Not on file     Gets together: Not on file     Attends Taoism service: Not on file     Active member of club or organization: Not on file     Attends meetings of clubs or organizations: Not on file     Relationship status: Not on file    Intimate partner violence:     Fear of current or ex partner: Not on file     Emotionally abused: Not on file     Physically abused: Not on file     Forced sexual activity: Not on file   Other Topics Concern    Not on file   Social History Narrative    Not on file       FamHx:  Family History   Problem Relation Age of Onset    COPD Mother     Heart Failure Mother     Cancer Mother         leukemia    Heart Failure Father     COPD Father     Diabetes Father     Alzheimer's Disease Sister     Cancer Brother         esophageal       Allergy:  Allergies   Allergen Reactions    Penicillins Anaphylaxis    Sulfa Antibiotics Anaphylaxis    Theophyllines Anaphylaxis     Liquid only    Codeine      Climb the walls    Iodine      IVP iodine  i get real hot    Tape [Adhesive Tape]      Pulls my skin off    Vicodin [Hydrocodone-Acetaminophen]      Climb the walls         ROS: As described in the HPI and in addition is negative upon detailed review of systems or unobtainable unless otherwise stated in this dictation. PE:  BP (!) 148/64   Pulse 90   Temp 97.6 °F (36.4 °C) (Oral)   Resp 18   Ht 5' 4\" (1.626 m)   Wt 232 lb (105.2 kg)   SpO2 96%   BMI 39.82 kg/m²     Gen. : Obese  female  Head: Atraumatic/normocephalic  Eyes: EOMI/anicteric sclera  ENT: Moist oral mucosa/no discharge from nose or ears  Neck: Supple/trachea is midline  Chest: Symmetrical excursions/nonlabored respiration/CTA B  Cor: RRR/S1-S2 appreciated without gallop. Abd.: Soft and obese. Appears nontender. No rebound tenderness or guarding. Extr.:  No peripheral edema  Muscles: Slightly decreased tone and bulk, consistent with age and condition  Skin: Warm and dry/anicteric      DATA:     Lab Results   Component Value Date    WBC 3.7 06/10/2019    RBC 4.17 06/10/2019    HGB 13.4 06/10/2019    HCT 40.2 06/10/2019    MCV 96.4 06/10/2019    MCH 32.1 06/10/2019    MCHC 33.3 06/10/2019    RDW 12.5 06/10/2019     06/10/2019    MPV 10.3 06/10/2019     Lab Results   Component Value Date     06/10/2019    K 4.0 06/10/2019     06/10/2019    CO2 23 06/10/2019    BUN 14 06/10/2019    CREATININE 0.9 06/10/2019    CALCIUM 8.9 06/10/2019    PROT 7.2 06/10/2019    LABALBU 3.1 06/10/2019    LABALBU 4.1 05/30/2012    BILITOT 0.8 06/10/2019    ALKPHOS 135 06/10/2019    AST 33 06/10/2019    ALT 17 06/10/2019     No results found for: LIPASE  No results found for: AMYLASE    Monitor reviewed -sinus rhythm noted    ASSESSMENT/PLAN:  1. Cirrhosis  -New diagnosis of cirrhosis  - MELD 8  -Most likely nonalcoholic  -Obtain liver work-up  -Obtain AFP  -Agree with lactulose  -Add rifaximin  -Patient will require EGD for variceal screening    Above discussed with patient/family at bedside and over the phone -all questions answered to their satisfaction and they are agreeable with the plan as delineated.     Thank you for the opportunity to see this patient in consultation    NOTE:  This report was transcribed using voice recognition software. Every effort was made to ensure accuracy; however, inadvertent computerized transcription errors may be present.     Kyrei John MD  6/10/2019  3:46 PM

## 2019-06-10 NOTE — PROGRESS NOTES
[facility] Quality Flow/Interdisciplinary Rounds Progress Note        Quality Flow Rounds held on Emmy 10, 2019    Disciplines Attending:  Bedside Nurse, ,  and Nursing Unit Leadership    Yanira Deng was admitted on 6/9/2019 11:42 AM    Anticipated Discharge Date:  Expected Discharge Date: 06/13/19    Disposition:    Rolando Score:  Rolando Scale Score: 21    Readmission Risk              Risk of Unplanned Readmission:        7           Discussed patient goal for the day, patient clinical progression, and barriers to discharge.   The following Goal(s) of the Day/Commitment(s) have been identified:  Activity progression, RA, IV F, falling at Home, PT/OT      Sanjuanita Davis  Emmy 10, 2019

## 2019-06-10 NOTE — PROGRESS NOTES
Hospitalist Progress Note    Subjective:    Pt feels better today. Discussed   CT findings suggestive of cirrhosis. Still has some asterixis    ROS: denies fever, chills, cp, sob, n/v, HA unless stated above.  anastrozole  1 mg Oral Daily    furosemide  40 mg Oral BID    levothyroxine  50 mcg Oral Daily    lisinopril  20 mg Oral Daily    montelukast  10 mg Oral Nightly    pantoprazole  40 mg Oral QAM AC    sodium chloride flush  10 mL Intravenous 2 times per day    enoxaparin  40 mg Subcutaneous Daily    lactulose  20 g Oral 6 times per day       sodium chloride flush 10 mL PRN   magnesium hydroxide 30 mL Daily PRN   ondansetron 4 mg Q6H PRN        Objective:    BP (!) 148/64   Pulse 90   Temp 97.6 °F (36.4 °C) (Oral)   Resp 18   Ht 5' 4\" (1.626 m)   Wt 232 lb (105.2 kg)   SpO2 96%   BMI 39.82 kg/m²   General Appearance: AOX3, in no acute distress  Head: normocephalic and atraumatic  Eyes: JOZEF, EOMI , conjunctivae normal  Neck: neck supple and non tender, No JVD  Pulmonary/Chest: CTAB  Cardiovascular: NRR, S1 and S2  Abdomen: soft, non-tender, non-distended, normal bowel sounds,  Extremities: no cyanosis, clubbing or edema, pulses intact  Neurologic: non focal  Skin: warm and dry, no rash or erythema      Recent Labs     06/09/19  1235 06/09/19  1305 06/10/19  0728   NA  --  145 143   K  --  3.9 4.0   CL  --  107 109*   CO2  --  27 23   BUN  --  19 14   CREATININE  --  1.0 0.9   GLUCOSE 214 194* 135*   CALCIUM  --  9.6 8.9       Recent Labs     06/09/19  1305 06/10/19  0728   WBC 4.0* 3.7*   RBC 4.54 4.17   HGB 14.6 13.4   HCT 43.0 40.2   MCV 94.7 96.4   MCH 32.2 32.1   MCHC 34.0 33.3   RDW 11.9 12.5   * 117*   MPV 10.0 10.3         Summary  Admitted for unsteadiness and tremors    Assessment:  1. Acute  Hepatic encephalopathy  2. Hyperammonemia  3. Asterixis and unsteadiness 2/2 above  4. New cirrhosis  5.  HTN  6. Obesity    Plan:  - GI consult  - reduce lactulose  -

## 2019-06-10 NOTE — PLAN OF CARE
Problem: Falls - Risk of:  Goal: Will remain free from falls  Description AMB WITH ASSIST TO BR FOR BM'S  Will remain free from falls  Outcome: Met This Shift

## 2019-06-10 NOTE — PROGRESS NOTES
Occupational Therapy  Occupational Therapy Initial Assessment      Date:6/10/2019  Patient Name: Mago Canada  MRN: [id number]  : 1940  Room: [address] [place] [address]      Evaluating OT: MEGHANN Hadley/ Jelani Maza #[id number]      Recommendation for Placement: HHOT  Recommended Adaptive Equipment:  TBD   AM-PAC Daily Activity Raw Score: 21/24        Diagnosis: Encephalopathy    Pertinent Medical History:    Past Medical History:   Diagnosis Date    Anxiety     Cancer [facility])     right breast    COPD (chronic obstructive pulmonary disease) ([facility] [address]     Hypertension       Precautions:  Falls,      Home Living: Pt lives alone  single family home, 1 story, 2 steps to enter with rail, tub shower. Equipment owned: walker, cane, shower chair, rollater    Prior Level of Function: Independent with ADLs , Independent with IADLs; ambulated cane  Driving: yes    Pain Level: 0/10 c/o pain;   Cognition: A&O: 4/4; Follows 1 step directions   Memory:  good     Sequencing:  fair    Problem solving:  fair    Judgement/safety:  good       Functional Assessment:   Initial Eval Status  Date: 6/10/19 Treatment Status  Date: Short Term Goals  Treatment frequency: PRN 1-3x/week   Feeding Independent   Independent    Grooming Independent   Independent    UB Dressing Independent   Independent    LB Dressing Supervision   Independent    Bathing Supervision  Independent    Toileting Supervision   Independent    Bed Mobility  Up in chair upon arrival   Supine to sit:  Independent   Sit to supine: Independent    Functional Transfers Supervision for sit to stand from chair level  Independent    Functional Mobility Supervision with cane functional distance within romm  Modified [place]    Balance Sitting:     Static:  good    Dynamic:good  Standing: fair     Activity Tolerance fair     Visual/  Perceptual Glasses: yes, macular degeneration                   Strength ROM Additional Info:    RUE  4-/5  WFL good  and wfl FMC/dexterity noted during ADL tasks     LUE 4-/5  WFL good  and wfl FMC/dexterity noted during ADL tasks         Hearing:  WFL   Sensation:   No c/o numbness or tingling   Tone:  WFL  Edema: none noted                            Comments/Treatment: Upon arrival, patient sitting up in bed side chair visiting with daughter. OT eval completed. Therapist facilitated: transfer training with verbal cues for hand placement, static standing balance with supervision and cane, functional mobility with supervision and cane, verbal cues for sequence and safety, pt and family instructed in fall prevention in addition to bathroom safety,  pt seated in bed side chair at end of eval. All needs within reach. Pt would benefit from continued skilled OT to increase safety and independence with completion of ADL/IADL tasks for functional independence and quality of life. Eval Complexity: Low      Assessment of current deficits   Functional mobility [x]  ADLs [x] Strength [x]  Cognition []  Functional transfers  [x] IADLs [x] Safety Awareness [x]  Endurance [x]  Fine Motor Coordination [] Balance [x] Vision/perception [] Sensation []   Gross Motor Coordination [] ROM [] Delirium []                  Motor Control []    Plan of Care:   ADL retraining [x]   Equipment needs [x]   Neuromuscular re-education [x] Energy Conservation Techniques [x]  Functional Transfer training [x] Patient and/or Family Education [x]  Functional Mobility training [x]  Environmental Modifications [x]  Cognitive re-training []   Compensatory techniques for ADLs [x]  Splinting Needs []   Positioning to improve overall function [x]   Therapeutic Activity [x]  Therapeutic Exercise  [x]  Visual/Perceptual: []    Delirium prevention/treatment  []   Other:  []    Rehab Potential: Good for established goals     Patient / Family Goal: not stated      Patient and/or family were instructed diagnosis, prognosis/goals and plan of care. Demonstrated good- understanding.     Time in: 330  Time out: 400  Evaluation + 10 treatment time    Soha OTR/L [id number]

## 2019-06-11 VITALS
TEMPERATURE: 98.1 F | HEIGHT: 64 IN | RESPIRATION RATE: 17 BRPM | BODY MASS INDEX: 39.61 KG/M2 | DIASTOLIC BLOOD PRESSURE: 60 MMHG | OXYGEN SATURATION: 96 % | HEART RATE: 86 BPM | WEIGHT: 232 LBS | SYSTOLIC BLOOD PRESSURE: 140 MMHG

## 2019-06-11 LAB
ALBUMIN SERPL-MCNC: 2.7 G/DL (ref 3.5–5.2)
ALP BLD-CCNC: 116 U/L (ref 35–104)
ALT SERPL-CCNC: 12 U/L (ref 0–32)
AMMONIA: 58 UMOL/L (ref 11–51)
ANION GAP SERPL CALCULATED.3IONS-SCNC: 10 MMOL/L (ref 7–16)
AST SERPL-CCNC: 25 U/L (ref 0–31)
BASOPHILS ABSOLUTE: 0.03 E9/L (ref 0–0.2)
BASOPHILS RELATIVE PERCENT: 0.8 % (ref 0–2)
BILIRUB SERPL-MCNC: 0.9 MG/DL (ref 0–1.2)
BUN BLDV-MCNC: 12 MG/DL (ref 8–23)
CALCIUM SERPL-MCNC: 8.5 MG/DL (ref 8.6–10.2)
CHLORIDE BLD-SCNC: 105 MMOL/L (ref 98–107)
CHOLESTEROL, FASTING: 116 MG/DL (ref 0–199)
CO2: 24 MMOL/L (ref 22–29)
CREAT SERPL-MCNC: 0.9 MG/DL (ref 0.5–1)
EOSINOPHILS ABSOLUTE: 0.16 E9/L (ref 0.05–0.5)
EOSINOPHILS RELATIVE PERCENT: 4.4 % (ref 0–6)
FERRITIN: 64 NG/ML
GFR AFRICAN AMERICAN: >60
GFR NON-AFRICAN AMERICAN: >60 ML/MIN/1.73
GLUCOSE BLD-MCNC: 113 MG/DL (ref 74–99)
HAV IGM SER IA-ACNC: NORMAL
HCT VFR BLD CALC: 36.3 % (ref 34–48)
HDLC SERPL-MCNC: 37 MG/DL
HEMOGLOBIN: 12.5 G/DL (ref 11.5–15.5)
HEPATITIS B CORE IGM ANTIBODY: NORMAL
HEPATITIS B SURFACE ANTIGEN INTERPRETATION: NORMAL
HEPATITIS C ANTIBODY INTERPRETATION: NORMAL
IMMATURE GRANULOCYTES #: 0.01 E9/L
IMMATURE GRANULOCYTES %: 0.3 % (ref 0–5)
INR BLD: 1.3
IRON SATURATION: 53 % (ref 15–50)
IRON: 149 MCG/DL (ref 37–145)
LDL CHOLESTEROL CALCULATED: 65 MG/DL (ref 0–99)
LYMPHOCYTES ABSOLUTE: 1.15 E9/L (ref 1.5–4)
LYMPHOCYTES RELATIVE PERCENT: 31.4 % (ref 20–42)
MCH RBC QN AUTO: 32.6 PG (ref 26–35)
MCHC RBC AUTO-ENTMCNC: 34.4 % (ref 32–34.5)
MCV RBC AUTO: 94.8 FL (ref 80–99.9)
MONOCYTES ABSOLUTE: 0.45 E9/L (ref 0.1–0.95)
MONOCYTES RELATIVE PERCENT: 12.3 % (ref 2–12)
NEUTROPHILS ABSOLUTE: 1.86 E9/L (ref 1.8–7.3)
NEUTROPHILS RELATIVE PERCENT: 50.8 % (ref 43–80)
PDW BLD-RTO: 11.9 FL (ref 11.5–15)
PLATELET # BLD: 102 E9/L (ref 130–450)
PMV BLD AUTO: 10.3 FL (ref 7–12)
POTASSIUM SERPL-SCNC: 3.5 MMOL/L (ref 3.5–5)
PROTHROMBIN TIME: 14.8 SEC (ref 9.3–12.4)
RBC # BLD: 3.83 E12/L (ref 3.5–5.5)
SODIUM BLD-SCNC: 139 MMOL/L (ref 132–146)
TOTAL IRON BINDING CAPACITY: 281 MCG/DL (ref 250–450)
TOTAL PROTEIN: 6.2 G/DL (ref 6.4–8.3)
TRIGLYCERIDE, FASTING: 71 MG/DL (ref 0–149)
VLDLC SERPL CALC-MCNC: 14 MG/DL
WBC # BLD: 3.7 E9/L (ref 4.5–11.5)

## 2019-06-11 PROCEDURE — 85610 PROTHROMBIN TIME: CPT

## 2019-06-11 PROCEDURE — 36415 COLL VENOUS BLD VENIPUNCTURE: CPT

## 2019-06-11 PROCEDURE — 6370000000 HC RX 637 (ALT 250 FOR IP): Performed by: NURSE PRACTITIONER

## 2019-06-11 PROCEDURE — 80061 LIPID PANEL: CPT

## 2019-06-11 PROCEDURE — 82140 ASSAY OF AMMONIA: CPT

## 2019-06-11 PROCEDURE — 6370000000 HC RX 637 (ALT 250 FOR IP): Performed by: HOSPITALIST

## 2019-06-11 PROCEDURE — 86255 FLUORESCENT ANTIBODY SCREEN: CPT

## 2019-06-11 PROCEDURE — 83550 IRON BINDING TEST: CPT

## 2019-06-11 PROCEDURE — 99239 HOSP IP/OBS DSCHRG MGMT >30: CPT | Performed by: HOSPITALIST

## 2019-06-11 PROCEDURE — 82105 ALPHA-FETOPROTEIN SERUM: CPT

## 2019-06-11 PROCEDURE — 85025 COMPLETE CBC W/AUTO DIFF WBC: CPT

## 2019-06-11 PROCEDURE — 6360000002 HC RX W HCPCS: Performed by: HOSPITALIST

## 2019-06-11 PROCEDURE — 83540 ASSAY OF IRON: CPT

## 2019-06-11 PROCEDURE — 82728 ASSAY OF FERRITIN: CPT

## 2019-06-11 PROCEDURE — 86038 ANTINUCLEAR ANTIBODIES: CPT

## 2019-06-11 PROCEDURE — 2580000003 HC RX 258: Performed by: HOSPITALIST

## 2019-06-11 PROCEDURE — 80053 COMPREHEN METABOLIC PANEL: CPT

## 2019-06-11 RX ORDER — LACTULOSE 10 G/15ML
20 SOLUTION ORAL 3 TIMES DAILY
Qty: 1892 ML | Refills: 1 | Status: SHIPPED | OUTPATIENT
Start: 2019-06-11

## 2019-06-11 RX ORDER — LACTULOSE 10 G/15ML
20 SOLUTION ORAL 3 TIMES DAILY
Qty: 2 BOTTLE | Refills: 1 | Status: SHIPPED | OUTPATIENT
Start: 2019-06-11 | End: 2019-06-11

## 2019-06-11 RX ADMIN — LISINOPRIL 20 MG: 20 TABLET ORAL at 09:24

## 2019-06-11 RX ADMIN — HYDROXYZINE PAMOATE 25 MG: 25 CAPSULE ORAL at 01:49

## 2019-06-11 RX ADMIN — ENOXAPARIN SODIUM 40 MG: 40 INJECTION SUBCUTANEOUS at 09:24

## 2019-06-11 RX ADMIN — PANTOPRAZOLE SODIUM 40 MG: 40 TABLET, DELAYED RELEASE ORAL at 05:51

## 2019-06-11 RX ADMIN — FUROSEMIDE 40 MG: 40 TABLET ORAL at 09:24

## 2019-06-11 RX ADMIN — LACTULOSE 20 G: 20 SOLUTION ORAL at 09:23

## 2019-06-11 RX ADMIN — RIFAXIMIN 550 MG: 550 TABLET ORAL at 09:24

## 2019-06-11 RX ADMIN — LEVOTHYROXINE SODIUM 50 MCG: 50 TABLET ORAL at 05:51

## 2019-06-11 RX ADMIN — Medication 10 ML: at 09:23

## 2019-06-11 ASSESSMENT — PAIN SCALES - GENERAL
PAINLEVEL_OUTOF10: 0
PAINLEVEL_OUTOF10: 0

## 2019-06-11 NOTE — DISCHARGE SUMMARY
Hospitalist  Discharge Summary     Patient ID:  Davonte Gould  [id number]  89 y.o.  1940    Admit date: 6/9/2019    Discharge date and time:  6/11/2019  10:29 AM    Discharge Diagnoses:  1. Acute  Hepatic encephalopathy  2. New cirrhosis  3. Hyperammonemia  4. Unsteadiness and asterixis 2/2 above  5. HTN  6. Obesity    Consults:  IP CONSULT TO GI      Hospital Course:  70year old woman with history of COPD and HTN. She presented to the ER with 3 weeks history of progressive jerking limb movements and unsteadiness. In the ER, exam significant for asterixis. Lab significant for hyperammoniemia of 108. She was hospitalized and started on lactulose with good response. She responded well to treatment, and is currently at her baseline. CT abdomen consistent with cirrhosis and gallbladder sludge. She was seen by GI, who added rifaximin. Plan is for outpatient EGD to evaluate for varices. Discharge Medications     Medication List      START taking these medications    lactulose 10 GM/15ML solution  Commonly known as:  CHRONULAC  Take 30 mLs by mouth 3 times daily Take to achieve 2-3 bowel movement every day.   Do not take if more than 3 bowel movement in a day     rifaximin 550 MG tablet  Commonly known as:  XIFAXAN  Take 1 tablet by mouth 2 times daily        CONTINUE taking these medications    ARIMIDEX 1 MG tablet  Generic drug:  anastrozole     Biotin 1000 MCG Tabs     furosemide 40 MG tablet  Commonly known as:  LASIX     levothyroxine 50 MCG tablet  Commonly known as:  SYNTHROID     lisinopril 20 MG tablet  Commonly known as:  PRINIVIL;ZESTRIL     loratadine 10 MG tablet  Commonly known as:  CLARITIN     omeprazole 20 MG delayed release capsule  Commonly known as:  PRILOSEC     SINGULAIR 10 MG tablet  Generic drug:  montelukast     Vitamin D3 2000 units Caps        STOP taking these medications    ADVAIR DISKUS 250-50 MCG/DOSE Aepb  Generic drug:  fluticasone-salmeterol     ALPRAZolam 0.5 MG

## 2019-06-11 NOTE — PLAN OF CARE
Problem: Falls - Risk of:  Goal: Will remain free from falls  Description  Will remain free from falls  6/11/2019 0505 by Cecilio Barber RN  Outcome: Met This Shift    Goal: Absence of physical injury  Description  Absence of physical injury  6/11/2019 0505 by Cecilio Barber RN  Outcome: Met This Shift

## 2019-06-11 NOTE — CARE COORDINATION
Discharge Planning:  met with pt and daughter Rosita Taylor, stated pt resides in a one story home, two steps to enter home and a ramp entry. Stated was independent prior to admission although pt did use cane for ambulation, also owns a wheeled walker. Stated no past SNF or HHC. Patient has prescription coverage, uses [employer] or [employer]. Daughter stated plan is for pt to stay with same daughter in her one story home upon dc, denies any needs including HHC.   Electronically signed by EVERARDO Christopher on 6/11/2019 at 10:27 AM

## 2019-06-11 NOTE — PROGRESS NOTES
CLINICAL PHARMACY NOTE: MEDS TO [address] Select Patient?: No  Total # of Prescriptions Filled: 2   The following medications were delivered to the patient:  · xifaxan 550mg  Lactulose 10gm/15ml soln  Total # of Interventions Completed: 3  Time Spent (min): 15    Additional Documentation:

## 2019-06-12 LAB
AFP-TUMOR MARKER: 3 NG/ML (ref 0–9)
ANTI-MITOCHONDRIAL AB, IFA: NEGATIVE
ANTI-NUCLEAR ANTIBODY (ANA): NEGATIVE
SMOOTH MUSCLE ANTIBODY: NEGATIVE

## 2019-06-17 ENCOUNTER — HOSPITAL ENCOUNTER (OUTPATIENT)
Age: 79
Discharge: HOME | End: 2019-06-19
Payer: MEDICARE

## 2019-06-17 LAB
ALBUMIN SERPL-MCNC: 3.4 G/DL (ref 3.5–5.2)
ALP BLD-CCNC: 114 U/L (ref 35–104)
ALT SERPL-CCNC: 17 U/L (ref 0–32)
ANION GAP SERPL CALCULATED.3IONS-SCNC: 14 MMOL/L (ref 7–16)
AST SERPL-CCNC: 44 U/L (ref 0–31)
BASOPHILS ABSOLUTE: 0.04 E9/L (ref 0–0.2)
BASOPHILS RELATIVE PERCENT: 1 % (ref 0–2)
BILIRUB SERPL-MCNC: 0.9 MG/DL (ref 0–1.2)
BUN BLDV-MCNC: 16 MG/DL (ref 8–23)
CALCIUM SERPL-MCNC: 9.4 MG/DL (ref 8.6–10.2)
CHLORIDE BLD-SCNC: 100 MMOL/L (ref 98–107)
CO2: 28 MMOL/L (ref 22–29)
CREAT SERPL-MCNC: 1.1 MG/DL (ref 0.5–1)
EOSINOPHILS ABSOLUTE: 0.11 E9/L (ref 0.05–0.5)
EOSINOPHILS RELATIVE PERCENT: 2.7 % (ref 0–6)
GFR AFRICAN AMERICAN: 58
GFR NON-AFRICAN AMERICAN: 48 ML/MIN/1.73
GLUCOSE BLD-MCNC: 138 MG/DL (ref 74–99)
HCT VFR BLD CALC: 41.3 % (ref 34–48)
HEMOGLOBIN: 14 G/DL (ref 11.5–15.5)
IMMATURE GRANULOCYTES #: 0.02 E9/L
IMMATURE GRANULOCYTES %: 0.5 % (ref 0–5)
LYMPHOCYTES ABSOLUTE: 1.13 E9/L (ref 1.5–4)
LYMPHOCYTES RELATIVE PERCENT: 27.5 % (ref 20–42)
MCH RBC QN AUTO: 32.5 PG (ref 26–35)
MCHC RBC AUTO-ENTMCNC: 33.9 % (ref 32–34.5)
MCV RBC AUTO: 95.8 FL (ref 80–99.9)
MONOCYTES ABSOLUTE: 0.56 E9/L (ref 0.1–0.95)
MONOCYTES RELATIVE PERCENT: 13.6 % (ref 2–12)
NEUTROPHILS ABSOLUTE: 2.25 E9/L (ref 1.8–7.3)
NEUTROPHILS RELATIVE PERCENT: 54.7 % (ref 43–80)
PDW BLD-RTO: 12.4 FL (ref 11.5–15)
PLATELET # BLD: 156 E9/L (ref 130–450)
PMV BLD AUTO: 10.8 FL (ref 7–12)
POTASSIUM SERPL-SCNC: 3.8 MMOL/L (ref 3.5–5)
RBC # BLD: 4.31 E12/L (ref 3.5–5.5)
SODIUM BLD-SCNC: 142 MMOL/L (ref 132–146)
TOTAL PROTEIN: 7.4 G/DL (ref 6.4–8.3)
WBC # BLD: 4.1 E9/L (ref 4.5–11.5)

## 2019-06-17 PROCEDURE — 80074 ACUTE HEPATITIS PANEL: CPT

## 2019-06-17 PROCEDURE — 85025 COMPLETE CBC W/AUTO DIFF WBC: CPT

## 2019-06-17 PROCEDURE — 82140 ASSAY OF AMMONIA: CPT

## 2019-06-17 PROCEDURE — 80053 COMPREHEN METABOLIC PANEL: CPT

## 2019-06-18 LAB
HAV IGM SER IA-ACNC: NORMAL
HEPATITIS B CORE IGM ANTIBODY: NORMAL
HEPATITIS B SURFACE ANTIGEN INTERPRETATION: NORMAL
HEPATITIS C ANTIBODY INTERPRETATION: NORMAL

## 2019-12-07 ENCOUNTER — APPOINTMENT (OUTPATIENT)
Dept: GENERAL RADIOLOGY | Age: 79
End: 2019-12-07
Payer: MEDICARE

## 2019-12-07 ENCOUNTER — APPOINTMENT (OUTPATIENT)
Dept: CT IMAGING | Age: 79
End: 2019-12-07
Payer: MEDICARE

## 2019-12-07 ENCOUNTER — HOSPITAL ENCOUNTER (EMERGENCY)
Age: 79
Discharge: HOME | End: 2019-12-07
Attending: EMERGENCY MEDICINE
Payer: MEDICARE

## 2019-12-07 VITALS
BODY MASS INDEX: 35.85 KG/M2 | RESPIRATION RATE: 18 BRPM | DIASTOLIC BLOOD PRESSURE: 116 MMHG | HEIGHT: 64 IN | HEART RATE: 86 BPM | TEMPERATURE: 97.9 F | OXYGEN SATURATION: 97 % | SYSTOLIC BLOOD PRESSURE: 141 MMHG | WEIGHT: 210 LBS

## 2019-12-07 DIAGNOSIS — K59.00 CONSTIPATION, UNSPECIFIED CONSTIPATION TYPE: ICD-10-CM

## 2019-12-07 DIAGNOSIS — R10.84 GENERALIZED ABDOMINAL PAIN: ICD-10-CM

## 2019-12-07 DIAGNOSIS — N20.0 KIDNEY STONE: Primary | ICD-10-CM

## 2019-12-07 LAB
ALBUMIN SERPL-MCNC: 3.4 G/DL (ref 3.5–5.2)
ALP BLD-CCNC: 153 U/L (ref 35–104)
ALT SERPL-CCNC: 14 U/L (ref 0–32)
ANION GAP SERPL CALCULATED.3IONS-SCNC: 12 MMOL/L (ref 7–16)
AST SERPL-CCNC: 53 U/L (ref 0–31)
BACTERIA: ABNORMAL /HPF
BASOPHILS ABSOLUTE: 0 E9/L (ref 0–0.2)
BASOPHILS RELATIVE PERCENT: 0.5 % (ref 0–2)
BILIRUB SERPL-MCNC: 1 MG/DL (ref 0–1.2)
BILIRUBIN URINE: NEGATIVE
BLOOD, URINE: ABNORMAL
BUN BLDV-MCNC: 12 MG/DL (ref 8–23)
CALCIUM SERPL-MCNC: 9.2 MG/DL (ref 8.6–10.2)
CHLORIDE BLD-SCNC: 103 MMOL/L (ref 98–107)
CLARITY: ABNORMAL
CO2: 26 MMOL/L (ref 22–29)
COLOR: ABNORMAL
CREAT SERPL-MCNC: 0.8 MG/DL (ref 0.5–1)
EOSINOPHILS ABSOLUTE: 0 E9/L (ref 0.05–0.5)
EOSINOPHILS RELATIVE PERCENT: 0.3 % (ref 0–6)
EPITHELIAL CELLS, UA: ABNORMAL /HPF
GFR AFRICAN AMERICAN: >60
GFR NON-AFRICAN AMERICAN: >60 ML/MIN/1.73
GLUCOSE BLD-MCNC: 176 MG/DL (ref 74–99)
GLUCOSE URINE: NEGATIVE MG/DL
HCT VFR BLD CALC: 40 % (ref 34–48)
HEMOGLOBIN: 13.7 G/DL (ref 11.5–15.5)
KETONES, URINE: NEGATIVE MG/DL
LACTIC ACID: 2.6 MMOL/L (ref 0.5–2.2)
LEUKOCYTE ESTERASE, URINE: ABNORMAL
LIPASE: 47 U/L (ref 13–60)
LYMPHOCYTES ABSOLUTE: 0.35 E9/L (ref 1.5–4)
LYMPHOCYTES RELATIVE PERCENT: 8.7 % (ref 20–42)
MCH RBC QN AUTO: 33.3 PG (ref 26–35)
MCHC RBC AUTO-ENTMCNC: 34.3 % (ref 32–34.5)
MCV RBC AUTO: 97.1 FL (ref 80–99.9)
METAMYELOCYTES RELATIVE PERCENT: 0.9 % (ref 0–1)
MONOCYTES ABSOLUTE: 0.23 E9/L (ref 0.1–0.95)
MONOCYTES RELATIVE PERCENT: 6.1 % (ref 2–12)
NEUTROPHILS ABSOLUTE: 3.32 E9/L (ref 1.8–7.3)
NEUTROPHILS RELATIVE PERCENT: 84.3 % (ref 43–80)
NITRITE, URINE: NEGATIVE
PDW BLD-RTO: 13.1 FL (ref 11.5–15)
PH UA: 6 (ref 5–9)
PLATELET # BLD: 116 E9/L (ref 130–450)
PMV BLD AUTO: 10.2 FL (ref 7–12)
POTASSIUM SERPL-SCNC: 4.6 MMOL/L (ref 3.5–5)
PROTEIN UA: 100 MG/DL
RBC # BLD: 4.12 E12/L (ref 3.5–5.5)
RBC UA: >20 /HPF (ref 0–2)
SODIUM BLD-SCNC: 141 MMOL/L (ref 132–146)
SPECIFIC GRAVITY UA: 1.02 (ref 1–1.03)
TOTAL CK: 77 U/L (ref 20–180)
TOTAL PROTEIN: 7.4 G/DL (ref 6.4–8.3)
UROBILINOGEN, URINE: 0.2 E.U./DL
WBC # BLD: 3.9 E9/L (ref 4.5–11.5)
WBC UA: ABNORMAL /HPF (ref 0–5)

## 2019-12-07 PROCEDURE — 83605 ASSAY OF LACTIC ACID: CPT

## 2019-12-07 PROCEDURE — 82550 ASSAY OF CK (CPK): CPT

## 2019-12-07 PROCEDURE — 81001 URINALYSIS AUTO W/SCOPE: CPT

## 2019-12-07 PROCEDURE — 74018 RADEX ABDOMEN 1 VIEW: CPT

## 2019-12-07 PROCEDURE — 74176 CT ABD & PELVIS W/O CONTRAST: CPT

## 2019-12-07 PROCEDURE — 80053 COMPREHEN METABOLIC PANEL: CPT

## 2019-12-07 PROCEDURE — 99284 EMERGENCY DEPT VISIT MOD MDM: CPT

## 2019-12-07 PROCEDURE — 85025 COMPLETE CBC W/AUTO DIFF WBC: CPT

## 2019-12-07 PROCEDURE — 36415 COLL VENOUS BLD VENIPUNCTURE: CPT

## 2019-12-07 PROCEDURE — 87088 URINE BACTERIA CULTURE: CPT

## 2019-12-07 PROCEDURE — 83690 ASSAY OF LIPASE: CPT

## 2019-12-07 RX ORDER — CIPROFLOXACIN 500 MG/1
500 TABLET, FILM COATED ORAL 2 TIMES DAILY
Qty: 20 TABLET | Refills: 0 | Status: SHIPPED | OUTPATIENT
Start: 2019-12-07 | End: 2019-12-14

## 2019-12-07 RX ORDER — TAMSULOSIN HYDROCHLORIDE 0.4 MG/1
0.4 CAPSULE ORAL DAILY
Qty: 14 CAPSULE | Refills: 0 | Status: SHIPPED | OUTPATIENT
Start: 2019-12-07 | End: 2019-12-07

## 2019-12-07 ASSESSMENT — ENCOUNTER SYMPTOMS
NAUSEA: 1
ABDOMINAL PAIN: 0
DIARRHEA: 0
CONSTIPATION: 1
BLOOD IN STOOL: 0
FLATUS: 0
SHORTNESS OF BREATH: 0
VOMITING: 0

## 2019-12-07 ASSESSMENT — PAIN DESCRIPTION - LOCATION: LOCATION: ABDOMEN

## 2019-12-07 ASSESSMENT — PAIN SCALES - GENERAL: PAINLEVEL_OUTOF10: 5

## 2019-12-07 ASSESSMENT — PAIN DESCRIPTION - ORIENTATION: ORIENTATION: RIGHT;LOWER

## 2019-12-07 ASSESSMENT — PAIN DESCRIPTION - DESCRIPTORS: DESCRIPTORS: PRESSURE

## 2019-12-09 LAB — URINE CULTURE, ROUTINE: NORMAL

## 2020-01-01 ENCOUNTER — APPOINTMENT (OUTPATIENT)
Dept: GENERAL RADIOLOGY | Age: 80
End: 2020-01-01
Payer: MEDICARE

## 2020-01-01 ENCOUNTER — HOSPITAL ENCOUNTER (OUTPATIENT)
Dept: ULTRASOUND IMAGING | Age: 80
Discharge: HOME | End: 2020-10-26
Payer: MEDICARE

## 2020-01-01 ENCOUNTER — HOSPITAL ENCOUNTER (OUTPATIENT)
Age: 80
Discharge: HOME | End: 2020-10-28
Payer: MEDICARE

## 2020-01-01 ENCOUNTER — OFFICE VISIT (OUTPATIENT)
Dept: CARDIOLOGY CLINIC | Age: 80
End: 2020-01-01
Payer: MEDICARE

## 2020-01-01 ENCOUNTER — HOSPITAL ENCOUNTER (OUTPATIENT)
Age: 80
Discharge: HOME | End: 2020-08-05
Payer: MEDICARE

## 2020-01-01 ENCOUNTER — HOSPITAL ENCOUNTER (EMERGENCY)
Age: 80
Discharge: HOME | End: 2020-07-26
Attending: EMERGENCY MEDICINE
Payer: MEDICARE

## 2020-01-01 ENCOUNTER — HOSPITAL ENCOUNTER (OUTPATIENT)
Dept: ULTRASOUND IMAGING | Age: 80
Discharge: HOME | End: 2020-08-05
Payer: MEDICARE

## 2020-01-01 VITALS
OXYGEN SATURATION: 97 % | SYSTOLIC BLOOD PRESSURE: 166 MMHG | TEMPERATURE: 98.2 F | BODY MASS INDEX: 32.95 KG/M2 | HEART RATE: 88 BPM | DIASTOLIC BLOOD PRESSURE: 68 MMHG | HEIGHT: 66 IN | RESPIRATION RATE: 16 BRPM | WEIGHT: 205 LBS

## 2020-01-01 VITALS
SYSTOLIC BLOOD PRESSURE: 132 MMHG | BODY MASS INDEX: 35.85 KG/M2 | WEIGHT: 210 LBS | HEIGHT: 64 IN | DIASTOLIC BLOOD PRESSURE: 64 MMHG | RESPIRATION RATE: 16 BRPM | HEART RATE: 80 BPM

## 2020-01-01 LAB
ALBUMIN SERPL-MCNC: 2.9 G/DL (ref 3.5–5.2)
ALBUMIN SERPL-MCNC: 3.1 G/DL (ref 3.5–5.2)
ALP BLD-CCNC: 128 U/L (ref 35–104)
ALP BLD-CCNC: 142 U/L (ref 35–104)
ALT SERPL-CCNC: 14 U/L (ref 0–32)
ALT SERPL-CCNC: 15 U/L (ref 0–32)
AMMONIA: 115 UMOL/L (ref 11–51)
AMMONIA: 45 UMOL/L (ref 11–51)
ANION GAP SERPL CALCULATED.3IONS-SCNC: 11 MMOL/L (ref 7–16)
ANION GAP SERPL CALCULATED.3IONS-SCNC: 12 MMOL/L (ref 7–16)
APTT: 29.6 SEC (ref 24.5–35.1)
AST SERPL-CCNC: 33 U/L (ref 0–31)
AST SERPL-CCNC: 43 U/L (ref 0–31)
BASOPHILS ABSOLUTE: 0 E9/L (ref 0–0.2)
BASOPHILS RELATIVE PERCENT: 0.7 % (ref 0–2)
BILIRUB SERPL-MCNC: 0.9 MG/DL (ref 0–1.2)
BILIRUB SERPL-MCNC: 1.4 MG/DL (ref 0–1.2)
BILIRUBIN DIRECT: 0.4 MG/DL (ref 0–0.3)
BILIRUBIN URINE: NEGATIVE
BILIRUBIN, INDIRECT: 1 MG/DL (ref 0–1)
BLOOD, URINE: NEGATIVE
BUN BLDV-MCNC: 14 MG/DL (ref 8–23)
BUN BLDV-MCNC: 15 MG/DL (ref 8–23)
CALCIUM SERPL-MCNC: 8.6 MG/DL (ref 8.6–10.2)
CALCIUM SERPL-MCNC: 9.2 MG/DL (ref 8.6–10.2)
CHLORIDE BLD-SCNC: 103 MMOL/L (ref 98–107)
CHLORIDE BLD-SCNC: 104 MMOL/L (ref 98–107)
CLARITY: CLEAR
CO2: 24 MMOL/L (ref 22–29)
CO2: 24 MMOL/L (ref 22–29)
COLOR: YELLOW
CREAT SERPL-MCNC: 1 MG/DL (ref 0.5–1)
CREAT SERPL-MCNC: 1 MG/DL (ref 0.5–1)
EKG ATRIAL RATE: 80 BPM
EKG P AXIS: 68 DEGREES
EKG P-R INTERVAL: 180 MS
EKG Q-T INTERVAL: 400 MS
EKG QRS DURATION: 84 MS
EKG QTC CALCULATION (BAZETT): 461 MS
EKG R AXIS: 25 DEGREES
EKG T AXIS: 14 DEGREES
EKG VENTRICULAR RATE: 80 BPM
EOSINOPHILS ABSOLUTE: 0.1 E9/L (ref 0.05–0.5)
EOSINOPHILS RELATIVE PERCENT: 3.5 % (ref 0–6)
GFR AFRICAN AMERICAN: >60
GFR AFRICAN AMERICAN: >60
GFR NON-AFRICAN AMERICAN: 54 ML/MIN/1.73
GFR NON-AFRICAN AMERICAN: 54 ML/MIN/1.73
GLUCOSE BLD-MCNC: 113 MG/DL (ref 74–99)
GLUCOSE BLD-MCNC: 141 MG/DL (ref 74–99)
GLUCOSE URINE: NEGATIVE MG/DL
HCT VFR BLD CALC: 36.4 % (ref 34–48)
HEMOGLOBIN: 12.4 G/DL (ref 11.5–15.5)
INR BLD: 1.2
INR BLD: 1.3
KETONES, URINE: NEGATIVE MG/DL
LACTIC ACID: 1.9 MMOL/L (ref 0.5–2.2)
LEUKOCYTE ESTERASE, URINE: NEGATIVE
LIPASE: 47 U/L (ref 13–60)
LYMPHOCYTES ABSOLUTE: 0.45 E9/L (ref 1.5–4)
LYMPHOCYTES RELATIVE PERCENT: 15.7 % (ref 20–42)
MCH RBC QN AUTO: 33.2 PG (ref 26–35)
MCHC RBC AUTO-ENTMCNC: 34.1 % (ref 32–34.5)
MCV RBC AUTO: 97.3 FL (ref 80–99.9)
MONOCYTES ABSOLUTE: 0.11 E9/L (ref 0.1–0.95)
MONOCYTES RELATIVE PERCENT: 4.3 % (ref 2–12)
MYELOCYTE PERCENT: 0.9 % (ref 0–0)
NEUTROPHILS ABSOLUTE: 2.16 E9/L (ref 1.8–7.3)
NEUTROPHILS RELATIVE PERCENT: 75.7 % (ref 43–80)
NITRITE, URINE: NEGATIVE
PDW BLD-RTO: 12.2 FL (ref 11.5–15)
PH UA: 5.5 (ref 5–9)
PLATELET # BLD: 114 E9/L (ref 130–450)
PMV BLD AUTO: 10.5 FL (ref 7–12)
POTASSIUM REFLEX MAGNESIUM: 4.1 MMOL/L (ref 3.5–5)
POTASSIUM SERPL-SCNC: 4 MMOL/L (ref 3.5–5)
PROTEIN UA: NEGATIVE MG/DL
PROTHROMBIN TIME: 13.4 SEC (ref 9.3–12.4)
PROTHROMBIN TIME: 14.7 SEC (ref 9.3–12.4)
RBC # BLD: 3.74 E12/L (ref 3.5–5.5)
SODIUM BLD-SCNC: 138 MMOL/L (ref 132–146)
SODIUM BLD-SCNC: 140 MMOL/L (ref 132–146)
SPECIFIC GRAVITY UA: 1.01 (ref 1–1.03)
TOTAL PROTEIN: 6.7 G/DL (ref 6.4–8.3)
TOTAL PROTEIN: 7.3 G/DL (ref 6.4–8.3)
TROPONIN: <0.01 NG/ML (ref 0–0.03)
URINE CULTURE, ROUTINE: NORMAL
UROBILINOGEN, URINE: 0.2 E.U./DL
WBC # BLD: 2.8 E9/L (ref 4.5–11.5)

## 2020-01-01 PROCEDURE — 2580000003 HC RX 258: Performed by: EMERGENCY MEDICINE

## 2020-01-01 PROCEDURE — 85025 COMPLETE CBC W/AUTO DIFF WBC: CPT

## 2020-01-01 PROCEDURE — 84484 ASSAY OF TROPONIN QUANT: CPT

## 2020-01-01 PROCEDURE — 93005 ELECTROCARDIOGRAM TRACING: CPT | Performed by: EMERGENCY MEDICINE

## 2020-01-01 PROCEDURE — 82140 ASSAY OF AMMONIA: CPT

## 2020-01-01 PROCEDURE — 99213 OFFICE O/P EST LOW 20 MIN: CPT | Performed by: INTERNAL MEDICINE

## 2020-01-01 PROCEDURE — 85730 THROMBOPLASTIN TIME PARTIAL: CPT

## 2020-01-01 PROCEDURE — 76705 ECHO EXAM OF ABDOMEN: CPT

## 2020-01-01 PROCEDURE — 83605 ASSAY OF LACTIC ACID: CPT

## 2020-01-01 PROCEDURE — 81003 URINALYSIS AUTO W/O SCOPE: CPT

## 2020-01-01 PROCEDURE — 6370000000 HC RX 637 (ALT 250 FOR IP): Performed by: EMERGENCY MEDICINE

## 2020-01-01 PROCEDURE — 83690 ASSAY OF LIPASE: CPT

## 2020-01-01 PROCEDURE — 80076 HEPATIC FUNCTION PANEL: CPT

## 2020-01-01 PROCEDURE — 99284 EMERGENCY DEPT VISIT MOD MDM: CPT

## 2020-01-01 PROCEDURE — 93000 ELECTROCARDIOGRAM COMPLETE: CPT | Performed by: INTERNAL MEDICINE

## 2020-01-01 PROCEDURE — 87088 URINE BACTERIA CULTURE: CPT

## 2020-01-01 PROCEDURE — 36415 COLL VENOUS BLD VENIPUNCTURE: CPT

## 2020-01-01 PROCEDURE — 85610 PROTHROMBIN TIME: CPT

## 2020-01-01 PROCEDURE — 93970 EXTREMITY STUDY: CPT

## 2020-01-01 PROCEDURE — 71045 X-RAY EXAM CHEST 1 VIEW: CPT

## 2020-01-01 PROCEDURE — 80048 BASIC METABOLIC PNL TOTAL CA: CPT

## 2020-01-01 PROCEDURE — 80053 COMPREHEN METABOLIC PANEL: CPT

## 2020-01-01 RX ORDER — 0.9 % SODIUM CHLORIDE 0.9 %
1000 INTRAVENOUS SOLUTION INTRAVENOUS ONCE
Status: COMPLETED | OUTPATIENT
Start: 2020-01-01 | End: 2020-01-01

## 2020-01-01 RX ORDER — LACTULOSE 10 G/15ML
30 SOLUTION ORAL ONCE
Status: COMPLETED | OUTPATIENT
Start: 2020-01-01 | End: 2020-01-01

## 2020-01-01 RX ADMIN — SODIUM CHLORIDE 1000 ML: 9 INJECTION, SOLUTION INTRAVENOUS at 12:06

## 2020-01-01 RX ADMIN — LACTULOSE 30 G: 20 SOLUTION ORAL at 14:26

## 2020-01-01 ASSESSMENT — ENCOUNTER SYMPTOMS
COUGH: 0
ABDOMINAL PAIN: 0
SHORTNESS OF BREATH: 1
NAUSEA: 0
DIARRHEA: 0
BLOOD IN STOOL: 0
COLOR CHANGE: 0
RHINORRHEA: 0
VOMITING: 0

## 2020-01-01 ASSESSMENT — PAIN DESCRIPTION - ONSET: ONSET: ON-GOING

## 2020-01-01 ASSESSMENT — PAIN DESCRIPTION - LOCATION: LOCATION: GENERALIZED

## 2020-01-01 ASSESSMENT — PAIN DESCRIPTION - PAIN TYPE: TYPE: CHRONIC PAIN

## 2020-01-01 ASSESSMENT — PAIN DESCRIPTION - PROGRESSION: CLINICAL_PROGRESSION: NOT CHANGED

## 2020-01-28 ENCOUNTER — HOSPITAL ENCOUNTER (OUTPATIENT)
Dept: ULTRASOUND IMAGING | Age: 80
Discharge: HOME | End: 2020-01-28
Payer: MEDICARE

## 2020-01-28 PROCEDURE — 76705 ECHO EXAM OF ABDOMEN: CPT

## 2020-02-03 ENCOUNTER — HOSPITAL ENCOUNTER (OUTPATIENT)
Age: 80
Discharge: HOME | End: 2020-02-05
Payer: MEDICARE

## 2020-02-03 LAB
ALBUMIN SERPL-MCNC: 3.3 G/DL (ref 3.5–5.2)
ALP BLD-CCNC: 141 U/L (ref 35–104)
ALT SERPL-CCNC: 13 U/L (ref 0–32)
ANION GAP SERPL CALCULATED.3IONS-SCNC: 13 MMOL/L (ref 7–16)
AST SERPL-CCNC: 35 U/L (ref 0–31)
BASOPHILS ABSOLUTE: 0.02 E9/L (ref 0–0.2)
BASOPHILS RELATIVE PERCENT: 0.6 % (ref 0–2)
BILIRUB SERPL-MCNC: 0.9 MG/DL (ref 0–1.2)
BUN BLDV-MCNC: 11 MG/DL (ref 8–23)
CALCIUM SERPL-MCNC: 9.8 MG/DL (ref 8.6–10.2)
CHLORIDE BLD-SCNC: 106 MMOL/L (ref 98–107)
CHOLESTEROL, TOTAL: 171 MG/DL (ref 0–199)
CO2: 26 MMOL/L (ref 22–29)
CREAT SERPL-MCNC: 0.8 MG/DL (ref 0.5–1)
EOSINOPHILS ABSOLUTE: 0.14 E9/L (ref 0.05–0.5)
EOSINOPHILS RELATIVE PERCENT: 4.2 % (ref 0–6)
GFR AFRICAN AMERICAN: >60
GFR NON-AFRICAN AMERICAN: >60 ML/MIN/1.73
GLUCOSE BLD-MCNC: 127 MG/DL (ref 74–99)
HBA1C MFR BLD: 5.5 % (ref 4–5.6)
HCT VFR BLD CALC: 40.6 % (ref 34–48)
HDLC SERPL-MCNC: 46 MG/DL
HEMOGLOBIN: 12.8 G/DL (ref 11.5–15.5)
IMMATURE GRANULOCYTES #: 0.01 E9/L
IMMATURE GRANULOCYTES %: 0.3 % (ref 0–5)
LDL CHOLESTEROL CALCULATED: 108 MG/DL (ref 0–99)
LYMPHOCYTES ABSOLUTE: 1.19 E9/L (ref 1.5–4)
LYMPHOCYTES RELATIVE PERCENT: 35.8 % (ref 20–42)
MCH RBC QN AUTO: 32.4 PG (ref 26–35)
MCHC RBC AUTO-ENTMCNC: 31.5 % (ref 32–34.5)
MCV RBC AUTO: 102.8 FL (ref 80–99.9)
MONOCYTES ABSOLUTE: 0.38 E9/L (ref 0.1–0.95)
MONOCYTES RELATIVE PERCENT: 11.4 % (ref 2–12)
NEUTROPHILS ABSOLUTE: 1.58 E9/L (ref 1.8–7.3)
NEUTROPHILS RELATIVE PERCENT: 47.7 % (ref 43–80)
PDW BLD-RTO: 12.4 FL (ref 11.5–15)
PLATELET # BLD: 124 E9/L (ref 130–450)
PMV BLD AUTO: 10.8 FL (ref 7–12)
POTASSIUM SERPL-SCNC: 4.4 MMOL/L (ref 3.5–5)
RBC # BLD: 3.95 E12/L (ref 3.5–5.5)
SODIUM BLD-SCNC: 145 MMOL/L (ref 132–146)
TOTAL PROTEIN: 7.4 G/DL (ref 6.4–8.3)
TRIGL SERPL-MCNC: 87 MG/DL (ref 0–149)
TSH SERPL DL<=0.05 MIU/L-ACNC: 2.06 UIU/ML (ref 0.27–4.2)
VLDLC SERPL CALC-MCNC: 17 MG/DL
WBC # BLD: 3.3 E9/L (ref 4.5–11.5)

## 2020-02-03 PROCEDURE — 80053 COMPREHEN METABOLIC PANEL: CPT

## 2020-02-03 PROCEDURE — 84443 ASSAY THYROID STIM HORMONE: CPT

## 2020-02-03 PROCEDURE — 83036 HEMOGLOBIN GLYCOSYLATED A1C: CPT

## 2020-02-03 PROCEDURE — 80061 LIPID PANEL: CPT

## 2020-02-03 PROCEDURE — 85025 COMPLETE CBC W/AUTO DIFF WBC: CPT

## 2020-02-19 VITALS
SYSTOLIC BLOOD PRESSURE: 130 MMHG | DIASTOLIC BLOOD PRESSURE: 70 MMHG | BODY MASS INDEX: 37.73 KG/M2 | WEIGHT: 221 LBS | HEART RATE: 68 BPM | HEIGHT: 64 IN

## 2020-06-19 ENCOUNTER — HOSPITAL ENCOUNTER (OUTPATIENT)
Dept: MAMMOGRAPHY | Age: 80
Discharge: HOME | End: 2020-06-21
Payer: MEDICARE

## 2020-06-19 PROCEDURE — 77067 SCR MAMMO BI INCL CAD: CPT

## 2020-07-26 NOTE — ED NOTES
Patient verbalized understanding of discharge instructions and will follow up with PCP/GI as needed or return to ED if symptoms worsen.      Lore Servin RN  07/26/20 [id number]

## 2020-07-26 NOTE — ED PROVIDER NOTES
Patient presents the ED with multiple complaints. She has been having difficulty sleeping as well as cramps in her legs. She also reports occasional body tingling. Denies any actual weakness. She is also noticed that she is had increased confusion. Occasional slurred speech. She states this is similar to when she was admitted for hepatic encephalopathy. She admits that she has been taking her lactulose as prescribed. Patient has a known history of nonalcoholic cirrhosis. She does follow with local gastroenterology as well. Denies any associated abdominal pain but states it is mildly tender if you push on it. She also denies the presence of any nausea, vomiting, diarrhea, blood in the urine or stool. She also denies having any chest pain or increasing shortness of breath. She states she has mild shortness of breath at baseline. She has known history of COPD. Review of Systems   Constitutional: Positive for chills, fatigue and fever ( Subjective). Negative for diaphoresis. HENT: Negative for congestion and rhinorrhea. Eyes: Negative for visual disturbance. Respiratory: Positive for shortness of breath. Negative for cough. Cardiovascular: Negative for chest pain, palpitations and leg swelling. Gastrointestinal: Negative for abdominal pain, blood in stool, diarrhea, nausea and vomiting. Genitourinary: Negative for difficulty urinating, dysuria, frequency and hematuria. Musculoskeletal: Positive for myalgias. Skin: Negative for color change. Neurological: Positive for speech difficulty. Negative for dizziness, syncope, light-headedness, numbness and headaches. Hematological: Does not bruise/bleed easily. Psychiatric/Behavioral: Positive for confusion. Physical Exam  Vitals signs and nursing note reviewed. Constitutional:       General: She is not in acute distress. Appearance: She is well-developed and normal weight.  She is not toxic-appearing or diaphoretic. Comments: Patient walking to the bathroom and sits on the bed. She appears to be in no distress at all. HENT:      Head: Normocephalic and atraumatic. Eyes:      General: No scleral icterus. Conjunctiva/sclera: Conjunctivae normal.   Neck:      Musculoskeletal: Normal range of motion and neck supple. Cardiovascular:      Rate and Rhythm: Normal rate and regular rhythm. Heart sounds: Normal heart sounds. No murmur. Pulmonary:      Effort: Pulmonary effort is normal. No respiratory distress ( No conversational dyspnea or accessory muscle use. ). Breath sounds: Normal breath sounds. No wheezing or rales. Abdominal:      General: Bowel sounds are normal. There is no distension. Palpations: Abdomen is soft. Tenderness: There is no abdominal tenderness ( Mild tenderness with palpation in the epigastric region and right upper quadrant. Negative Bryant sign. ). There is no guarding or rebound. Musculoskeletal:         General: No tenderness. Comments: Trace edema bilateral lower extremities, nonpitting. Skin:     General: Skin is warm and dry. Coloration: Skin is not jaundiced or pale. Neurological:      Mental Status: She is alert and oriented to person, place, and time. Procedures     MDM     EKG Interpretation    Interpreted by emergency department physician    Rhythm: Normal sinus with sinus arrhythmia  Rate: normal  Axis: normal  Ectopy: none  Conduction: normal  ST Segments: no acute change  T Waves: no acute change  Q Waves: none    Clinical Impression: Normal sinus rhythm with a sinus arrhythmia. Similar to previous EKG. Yusuf Gant    ED Course as of Jul 26 1409   Sun Jul 26, 2020   1356 Patient is resting in bed in no distress. Discussed results of labs with her. Discussed that they are all unremarkable except for a elevated ammonia level of 115. She states she has been as high as in the 300s before.   She seems pretty alert and oriented and I will discuss these findings with Dr. Silvia Mcdowell to see if they are okay with her being treated as an outpatient. I did order her lactulose in the ED. [MS]   [id number] with Dr. Kirit Ziegler for Dr Silvia Mcdowell (Gastroenterology). Discussed case. He agrees with the patient is safe for discharge home. He would like her to call the office in the morning and 1 of the GI docs there will see her tomorrow. I discussed this conversation with the patient as well as her daughter on the phone and they will call tomorrow for an appointment. She understands if she has worsening symptoms or new concerns that she can return to the ED for further evaluation. [MS]      ED Course User Index  [MS] Lincoln Weaver, DO       --------------------------------------------- PAST HISTORY ---------------------------------------------  Past Medical History:  has a past medical history of Anxiety, Cancer ([facility] [address] Cirrhosis, non-alcoholic ([facility] [address] COPD (chronic obstructive pulmonary disease) ([facility] [address] and Hypertension. Past Surgical History:  has a past surgical history that includes hernia repair; Breast surgery (2011); Hysterectomy; Tonsillectomy; and Shoulder arthroscopy (09 09 2011). Social History:  reports that she has never smoked. She has never used smokeless tobacco. She reports current alcohol use. She reports that she does not use drugs. Family History: family history includes Alzheimer's Disease in her sister; COPD in her father and mother; Cancer in her brother and mother; Diabetes in her father; Heart Failure in her father and mother. The patients home medications have been reviewed. Allergies: Penicillins; Sulfa antibiotics; Theophyllines; Codeine;  Iodine; Tape [adhesive tape]; and Vicodin [hydrocodone-acetaminophen]    -------------------------------------------------- RESULTS -------------------------------------------------  Labs:  Results for orders placed or performed during the hospital encounter of 07/26/20   CBC Auto Differential   Result Value Ref Range    WBC 2.8 (L) 4.5 - 11.5 E9/L    RBC 3.74 3.50 - 5.50 E12/L    Hemoglobin 12.4 11.5 - 15.5 g/dL    Hematocrit 36.4 34.0 - 48.0 %    MCV 97.3 80.0 - 99.9 fL    MCH 33.2 26.0 - 35.0 pg    MCHC 34.1 32.0 - 34.5 %    RDW 12.2 11.5 - 15.0 fL    Platelets 280 (L) 171 - 450 E9/L    MPV 10.5 7.0 - 12.0 fL    Neutrophils % 75.7 43.0 - 80.0 %    Lymphocytes % 15.7 (L) 20.0 - 42.0 %    Monocytes % 4.3 2.0 - 12.0 %    Eosinophils % 3.5 0.0 - 6.0 %    Basophils % 0.7 0.0 - 2.0 %    Neutrophils Absolute 2.16 1.80 - 7.30 E9/L    Lymphocytes Absolute 0.45 (L) 1.50 - 4.00 E9/L    Monocytes Absolute 0.11 0.10 - 0.95 E9/L    Eosinophils Absolute 0.10 0.05 - 0.50 E9/L    Basophils Absolute 0.00 0.00 - 0.20 E9/L    Myelocyte Percent 0.9 0 - 0 %   Basic Metabolic Panel w/ Reflex to MG   Result Value Ref Range    Sodium 140 132 - 146 mmol/L    Potassium reflex Magnesium 4.1 3.5 - 5.0 mmol/L    Chloride 104 98 - 107 mmol/L    CO2 24 22 - 29 mmol/L    Anion Gap 12 7 - 16 mmol/L    Glucose 141 (H) 74 - 99 mg/dL    BUN 15 8 - 23 mg/dL    CREATININE 1.0 0.5 - 1.0 mg/dL    GFR Non-African American 54 >=60 mL/min/1.73    GFR African American >60     Calcium 9.2 8.6 - 10.2 mg/dL   Hepatic Function Panel   Result Value Ref Range    Total Protein 7.3 6.4 - 8.3 g/dL    Alb 3.1 (L) 3.5 - 5.2 g/dL    Alkaline Phosphatase 128 (H) 35 - 104 U/L    ALT 15 0 - 32 U/L    AST 43 (H) 0 - 31 U/L    Total Bilirubin 1.4 (H) 0.0 - 1.2 mg/dL    Bilirubin, Direct 0.4 (H) 0.0 - 0.3 mg/dL    Bilirubin, Indirect 1.0 0.0 - 1.0 mg/dL   Lipase   Result Value Ref Range    Lipase 47 13 - 60 U/L   Lactic Acid, Plasma   Result Value Ref Range    Lactic Acid 1.9 0.5 - 2.2 mmol/L   Urinalysis, reflex to microscopic   Result Value Ref Range    Color, UA Yellow Straw/Yellow    Clarity, UA Clear Clear    Glucose, Ur Negative Negative mg/dL    Bilirubin Urine Negative Negative    Ketones, Urine Negative Negative mg/dL Specific Gravity, UA 1.010 1.005 - 1.030    Blood, Urine Negative Negative    pH, UA 5.5 5.0 - 9.0    Protein, UA Negative Negative mg/dL    Urobilinogen, Urine 0.2 <2.0 E.U./dL    Nitrite, Urine Negative Negative    Leukocyte Esterase, Urine Negative Negative   Ammonia   Result Value Ref Range    Ammonia 115.0 (H) 11.0 - 51.0 umol/L   Protime-INR   Result Value Ref Range    Protime 14.7 (H) 9.3 - 12.4 sec    INR 1.3    Troponin   Result Value Ref Range    Troponin <0.01 0.00 - 0.03 ng/mL   EKG 12 Lead   Result Value Ref Range    Ventricular Rate 80 BPM    Atrial Rate 80 BPM    P-R Interval 180 ms    QRS Duration 84 ms    Q-T Interval 400 ms    QTc Calculation (Bazett) 461 ms    P Axis 68 degrees    R Axis 25 degrees    T Axis 14 degrees       Radiology:  XR CHEST PORTABLE   Final Result   No acute cardiopulmonary process. ------------------------- NURSING NOTES AND VITALS REVIEWED ---------------------------  Date / Time Roomed:  7/26/2020 11:03 AM  ED Bed Assignment:  10/10    The nursing notes within the ED encounter and vital signs as below have been reviewed. BP (!) 172/71   Pulse 94   Temp 98.2 °F (36.8 °C) (Temporal)   Resp 16   Ht 5' 6\" (1.676 m)   Wt 205 lb (93 kg)   SpO2 97%   BMI 33.09 kg/m²   Oxygen Saturation Interpretation: Normal      ------------------------------------------ PROGRESS NOTES ------------------------------------------  I have spoken with the patient and daughter and discussed todays results, in addition to providing specific details for the plan of care and counseling regarding the diagnosis and prognosis. Their questions are answered at this time and they are agreeable with the plan. I discussed at length with them reasons for immediate return here for re evaluation. They will followup with primary care by calling their office tomorrow.       --------------------------------- ADDITIONAL PROVIDER NOTES ---------------------------------  At this time the patient is without objective evidence of an acute process requiring hospitalization or inpatient management. They have remained hemodynamically stable throughout their entire ED visit and are stable for discharge with outpatient follow-up. The plan has been discussed in detail and they are aware of the specific conditions for emergent return, as well as the importance of follow-up. New Prescriptions    No medications on file       Diagnosis:  1. Hyperammonemia ([facility] [address]        Disposition:  Patient's disposition: Discharge to home  Patient's condition is stable.          Singh Elizondo DO  07/26/20 1406

## 2020-07-28 NOTE — PROGRESS NOTES
[facility] Cardiology Progress Note  Dr. Mel Palm      Referring Physician: Tyree Ortiz MD  CHIEF COMPLAINT:   Chief Complaint   Patient presents with    Heart Problem       HISTORY OF PRESENT ILLNESS:   Patient is 70year old female with history of sinus arrhythmia, is here for follow-up appointment. .   Shortness of breath, at rest, with exertion, chronic, no significant alleviating or aggravating factors, denies any palpitations, no pedal edema, no PND, no orthopnea, no syncope, no presyncopal episodes. Functional capacity is at baseline      Past Medical History:   Diagnosis Date    Anxiety     Cancer [facility]) 2/11    right breast    Cirrhosis, non-alcoholic (HCC)     COPD (chronic obstructive pulmonary disease) ([facility] [address]     Hypertension          Past Surgical History:   Procedure Laterality Date    BREAST SURGERY  2011    right lumpectomy    HERNIA REPAIR      HYSTERECTOMY      SHOULDER ARTHROSCOPY  09 09 2011    arthroscopy left shoulder rotator cuff repair, subacromial decompression, debridement labrum, synovectomy    TONSILLECTOMY           Current Outpatient Medications   Medication Sig Dispense Refill    rifaximin (XIFAXAN) 550 MG tablet Take 1 tablet by mouth 2 times daily 60 tablet 0    lactulose (CHRONULAC) 10 GM/15ML solution Take 30 mLs by mouth 3 times daily Take to achieve 2-3 bowel movement every day. Do not take if more than 3 bowel movement in a day 1892 mL 1    levothyroxine (SYNTHROID) 50 MCG tablet Take 50 mcg by mouth Daily      furosemide (LASIX) 40 MG tablet Take 40 mg by mouth 2 times daily      omeprazole (PRILOSEC) 20 MG delayed release capsule Take 40 mg by mouth daily      Biotin 1000 MCG TABS Take by mouth      loratadine (CLARITIN) 10 MG tablet Take 10 mg by mouth daily.  Cholecalciferol (VITAMIN D3) 2000 UNIT CAPS Take  by mouth daily.  lisinopril (PRINIVIL;ZESTRIL) 20 MG tablet Take 20 mg by mouth daily.       montelukast (SINGULAIR) 10 MG tablet Take 10 mg by mouth nightly.  anastrozole (ARIMIDEX) 1 MG tablet Take 1 mg by mouth daily. L.D. 9/2/11       No current facility-administered medications for this visit.           Allergies as of 07/28/2020 - Review Complete 07/28/2020   Allergen Reaction Noted    Penicillins Anaphylaxis 08/02/2011    Sulfa antibiotics Anaphylaxis 08/02/2011    Theophyllines Anaphylaxis 09/09/2011    Codeine  08/02/2011    Iodine  09/06/2011    Tape [adhesive tape]  09/06/2011    Vicodin [hydrocodone-acetaminophen]  08/02/2011       Social History     Socioeconomic History    Marital status:      Spouse name: Not on file    Number of children: Not on file    Years of education: Not on file    Highest education level: Not on file   Occupational History    Not on file   Social Needs    Financial resource strain: Not on file    Food insecurity     Worry: Not on file     Inability: Not on file   [employer] needs     Medical: Not on file     Non-medical: Not on file   Tobacco Use    Smoking status: Never Smoker    Smokeless tobacco: Never Used   Substance and Sexual Activity    Alcohol use: Yes     Comment: social    Drug use: Never    Sexual activity: Not Currently   Lifestyle    Physical activity     Days per week: Not on file     Minutes per session: Not on file    Stress: Not on file   Relationships    Social connections     Talks on phone: Not on file     Gets together: Not on file     Attends Oriental orthodox service: Not on file     Active member of club or organization: Not on file     Attends meetings of clubs or organizations: Not on file     Relationship status: Not on file    Intimate partner violence     Fear of current or ex partner: Not on file     Emotionally abused: Not on file     Physically abused: Not on file     Forced sexual activity: Not on file   Other Topics Concern    Not on file   Social History Narrative    Not on file       Family History   Problem Relation Age of Onset  COPD Mother     Heart Failure Mother     Cancer Mother         leukemia    Heart Failure Father     COPD Father     Diabetes Father     Alzheimer's Disease Sister     Cancer Brother         esophageal       REVIEW OF SYSTEMS:     CONSTITUTIONAL:  negative for  fevers, chills, sweats, + fatigue  HEENT:  negative for  tinnitus, earaches, nasal congestion and epistaxis  RESPIRATORY:  negative for  dry cough, cough with sputum,wheezing and hemoptysis  GASTROINTESTINAL:  negative for nausea, vomiting, diarrhea, constipation, pruritus and jaundice  HEMATOLOGIC/LYMPHATIC:  negative for easy bruising, bleeding, lymphadenopathy and petechiae  ENDOCRINE:  negative for heat intolerance, cold intolerance, tremor, hair loss and diabetic symptoms including neither polyuria nor polydipsia nor blurred vision  MUSCULOSKELETAL:  negative for  myalgias, arthralgias, joint swelling, stiff joints and decreased range of motion  NEUROLOGICAL:  negative for memory problems, speech problems, visual disturbance, dysphagia, weakness and numbness      PHYSICAL EXAM:   Constitutional:  Awake, alert cooperative, no apparent distress, and appears stated age. HEENT:  Moist and pink mucous membranes, normocephalic, without obvious abnormality, atraumatic, normal ears and nose. NECK:  Supple, symmetrical, trachea midline, no JVD, no adenopathy, thyroid symmetric, not enlarged and no tenderness, good carotid upstroke bilaterally, no carotid bruit, skin normal.  LUNGS: No increased work of breathing, good air exchange, clear to auscultation bilaterally, no crackles or wheezing. Cardiovascular: Normal apical impulse, regular rate and rhythm, normal S1 and S2, no S3 or S4, no murmur, no pedal edema, good carotid upstroke bilaterally, no carotid bruit, no JVD, no abdominal pulsating masses. ABDOMEN: Soft, nontender, no hepatomegaly, no splenomegaly, bowel sound positive.    CHEST:  Expands symmetrically, nontender to palpation. Musculoskeletal:  No clubbing or cyanosis. No redness, warmth, or swelling of the joints. Neurological: Alert, awake, and oriented X3. SKIN: No bruises, no bleeding, normal skin color, texture, turgor and no redness, warmth or swelling. /64   Pulse 80   Resp 16   Ht 5' 4\" (1.626 m)   Wt 210 lb (95.3 kg)   BMI 36.05 kg/m²     DATA:   I personally reviewed the visit EKG with the following interpretation: Sinus rhythm with sinus arrhythmia, old inferior wall MI age undetermined    ECHO: 8/23/19 Normal left ventricular systolic function with stage II diastolic dysfunction. EF 55%. Mild aortic stenosis. Trace mitral and tricuspid regurgitation with mild pulmonary hypertension. Stress Test: 8/23/19 . Negative Lexiscan stress test for ischemic symptoms or ischemic EKG changes  2. Probably normal Cardiolite perfusion scan showing decreased uptake of the radioactive tracer in the anterior wall on rest and stress images with normal wall motion most likely secondary to breast attenuation artifact  3. Normal left ventricular systolic function with normal wall motion  4. There is no comparison study  5.   The results of the study predict low probability for significant coronary artery disease or future cardiac events    Cardiology Labs: BMP:    Lab Results   Component Value Date     08/05/2020    K 4.0 08/05/2020    K 4.1 07/26/2020     08/05/2020    CO2 24 08/05/2020    BUN 14 08/05/2020    CREATININE 1.0 08/05/2020     CMP:    Lab Results   Component Value Date     08/05/2020    K 4.0 08/05/2020    K 4.1 07/26/2020     08/05/2020    CO2 24 08/05/2020    BUN 14 08/05/2020    CREATININE 1.0 08/05/2020    PROT 6.7 08/05/2020     CBC:    Lab Results   Component Value Date    WBC 2.8 07/26/2020    RBC 3.74 07/26/2020    HGB 12.4 07/26/2020    HCT 36.4 07/26/2020    MCV 97.3 07/26/2020    RDW 12.2 07/26/2020     07/26/2020     PT/INR:  No results found for: PTINR  PT/INR Warfarin:  No components found for: PTPATWAR, PTINRWAR  PTT:    Lab Results   Component Value Date    APTT 29.6 08/05/2020     PTT Heparin:  No components found for: APTTHEP  Magnesium:    Lab Results   Component Value Date    MG 2.0 06/10/2019     TSH:    Lab Results   Component Value Date    TSH 2.060 02/03/2020     TROPONIN:  No components found for: TROP  BNP:  No results found for: BNP  FASTING LIPID PANEL:    Lab Results   Component Value Date    CHOL 171 02/03/2020    HDL 46 02/03/2020    TRIG 87 02/03/2020     No orders to display     I have personally reviewed the laboratory, cardiac diagnostic and radiographic testing as outlined above:      IMPRESSION:  1. Cardiac arrhythmia, unspecified   2. Shortness of breath: Chronic  3. Liver disease    RECOMMENDATIONS:   1. Continue current treatment  2. Increase activities as tolerated  3. Follow-up with Dr. Simon Roland as scheduled  4. Follow-up with Dr. Keagan Gary in 1 year, sooner if symptomatic for any reason    I have reviewed my findings and recommendations with patient    Electronically signed by Maximo Ball MD on 9/6/2020 at 5:17 PM    NOTE: This report was transcribed using voice recognition software.  Every effort was made to ensure accuracy; however, inadvertent computerized transcription errors may be present

## 2021-01-01 ENCOUNTER — APPOINTMENT (OUTPATIENT)
Dept: GENERAL RADIOLOGY | Age: 81
DRG: 853 | End: 2021-01-01
Payer: MEDICARE

## 2021-01-01 ENCOUNTER — APPOINTMENT (OUTPATIENT)
Dept: INTERVENTIONAL RADIOLOGY/VASCULAR | Age: 81
DRG: 853 | End: 2021-01-01
Payer: MEDICARE

## 2021-01-01 ENCOUNTER — OFFICE VISIT (OUTPATIENT)
Dept: CARDIOLOGY CLINIC | Age: 81
End: 2021-01-01
Payer: MEDICARE

## 2021-01-01 ENCOUNTER — HOSPITAL ENCOUNTER (OUTPATIENT)
Dept: POSTOP/PACU | Age: 81
Discharge: HOME | DRG: 308 | End: 2021-04-07
Payer: MEDICARE

## 2021-01-01 ENCOUNTER — HOSPITAL ENCOUNTER (INPATIENT)
Age: 81
LOS: 25 days | Discharge: NURSING, REHAB OR LONG-TERM CARE | DRG: 853 | End: 2021-06-03
Attending: EMERGENCY MEDICINE | Admitting: SURGERY
Payer: MEDICARE

## 2021-01-01 ENCOUNTER — ANESTHESIA EVENT (OUTPATIENT)
Dept: OPERATING ROOM | Age: 81
DRG: 853 | End: 2021-01-01
Payer: MEDICARE

## 2021-01-01 ENCOUNTER — APPOINTMENT (OUTPATIENT)
Dept: CT IMAGING | Age: 81
DRG: 308 | End: 2021-01-01
Payer: MEDICARE

## 2021-01-01 ENCOUNTER — APPOINTMENT (OUTPATIENT)
Dept: CT IMAGING | Age: 81
DRG: 853 | End: 2021-01-01
Payer: MEDICARE

## 2021-01-01 ENCOUNTER — ANESTHESIA (OUTPATIENT)
Dept: ENDOSCOPY | Age: 81
DRG: 853 | End: 2021-01-01
Payer: MEDICARE

## 2021-01-01 ENCOUNTER — APPOINTMENT (OUTPATIENT)
Dept: ULTRASOUND IMAGING | Age: 81
DRG: 853 | End: 2021-01-01
Payer: MEDICARE

## 2021-01-01 ENCOUNTER — TELEPHONE (OUTPATIENT)
Dept: CARDIOLOGY CLINIC | Age: 81
End: 2021-01-01

## 2021-01-01 ENCOUNTER — ANESTHESIA EVENT (OUTPATIENT)
Dept: ENDOSCOPY | Age: 81
DRG: 853 | End: 2021-01-01
Payer: MEDICARE

## 2021-01-01 ENCOUNTER — ANESTHESIA (OUTPATIENT)
Dept: OPERATING ROOM | Age: 81
DRG: 853 | End: 2021-01-01
Payer: MEDICARE

## 2021-01-01 ENCOUNTER — HOSPITAL ENCOUNTER (OUTPATIENT)
Dept: ULTRASOUND IMAGING | Age: 81
Discharge: HOME | End: 2021-02-11
Payer: MEDICARE

## 2021-01-01 ENCOUNTER — ANESTHESIA (OUTPATIENT)
Dept: POSTOP/PACU | Age: 81
DRG: 308 | End: 2021-01-01
Payer: MEDICARE

## 2021-01-01 ENCOUNTER — ANESTHESIA EVENT (OUTPATIENT)
Dept: POSTOP/PACU | Age: 81
DRG: 308 | End: 2021-01-01
Payer: MEDICARE

## 2021-01-01 ENCOUNTER — APPOINTMENT (OUTPATIENT)
Dept: GENERAL RADIOLOGY | Age: 81
DRG: 308 | End: 2021-01-01
Payer: MEDICARE

## 2021-01-01 ENCOUNTER — HOSPITAL ENCOUNTER (OUTPATIENT)
Age: 81
Discharge: HOME | DRG: 853 | End: 2021-05-07
Payer: MEDICARE

## 2021-01-01 ENCOUNTER — HOSPITAL ENCOUNTER (INPATIENT)
Age: 81
LOS: 3 days | Discharge: HOME | DRG: 308 | End: 2021-04-08
Attending: EMERGENCY MEDICINE | Admitting: INTERNAL MEDICINE
Payer: MEDICARE

## 2021-01-01 VITALS
TEMPERATURE: 98.6 F | DIASTOLIC BLOOD PRESSURE: 62 MMHG | SYSTOLIC BLOOD PRESSURE: 124 MMHG | RESPIRATION RATE: 21 BRPM | OXYGEN SATURATION: 99 %

## 2021-01-01 VITALS
DIASTOLIC BLOOD PRESSURE: 51 MMHG | OXYGEN SATURATION: 92 % | RESPIRATION RATE: 27 BRPM | HEART RATE: 68 BPM | SYSTOLIC BLOOD PRESSURE: 110 MMHG

## 2021-01-01 VITALS
BODY MASS INDEX: 33.57 KG/M2 | OXYGEN SATURATION: 99 % | WEIGHT: 196.6 LBS | SYSTOLIC BLOOD PRESSURE: 120 MMHG | HEART RATE: 109 BPM | DIASTOLIC BLOOD PRESSURE: 56 MMHG | TEMPERATURE: 97.7 F | RESPIRATION RATE: 20 BRPM | HEIGHT: 64 IN

## 2021-01-01 VITALS
HEART RATE: 125 BPM | BODY MASS INDEX: 36.54 KG/M2 | SYSTOLIC BLOOD PRESSURE: 108 MMHG | DIASTOLIC BLOOD PRESSURE: 52 MMHG | WEIGHT: 214 LBS | HEIGHT: 64 IN

## 2021-01-01 VITALS
OXYGEN SATURATION: 96 % | HEIGHT: 64 IN | DIASTOLIC BLOOD PRESSURE: 50 MMHG | HEART RATE: 100 BPM | WEIGHT: 216.5 LBS | TEMPERATURE: 97.5 F | RESPIRATION RATE: 16 BRPM | SYSTOLIC BLOOD PRESSURE: 91 MMHG | BODY MASS INDEX: 36.96 KG/M2

## 2021-01-01 VITALS
DIASTOLIC BLOOD PRESSURE: 52 MMHG | WEIGHT: 211 LBS | HEIGHT: 64 IN | HEART RATE: 122 BPM | SYSTOLIC BLOOD PRESSURE: 86 MMHG | BODY MASS INDEX: 36.02 KG/M2

## 2021-01-01 VITALS
DIASTOLIC BLOOD PRESSURE: 70 MMHG | SYSTOLIC BLOOD PRESSURE: 101 MMHG | OXYGEN SATURATION: 100 % | RESPIRATION RATE: 16 BRPM | TEMPERATURE: 97.7 F

## 2021-01-01 VITALS
HEART RATE: 79 BPM | HEIGHT: 64 IN | SYSTOLIC BLOOD PRESSURE: 110 MMHG | BODY MASS INDEX: 35.51 KG/M2 | DIASTOLIC BLOOD PRESSURE: 70 MMHG | WEIGHT: 208 LBS | OXYGEN SATURATION: 99 %

## 2021-01-01 VITALS — DIASTOLIC BLOOD PRESSURE: 65 MMHG | OXYGEN SATURATION: 100 % | SYSTOLIC BLOOD PRESSURE: 100 MMHG

## 2021-01-01 DIAGNOSIS — K74.60 HEPATIC CIRRHOSIS, UNSPECIFIED HEPATIC CIRRHOSIS TYPE, UNSPECIFIED WHETHER ASCITES PRESENT (HCC): ICD-10-CM

## 2021-01-01 DIAGNOSIS — E87.6 HYPOKALEMIA: ICD-10-CM

## 2021-01-01 DIAGNOSIS — I50.33 ACUTE ON CHRONIC DIASTOLIC CONGESTIVE HEART FAILURE (HCC): ICD-10-CM

## 2021-01-01 DIAGNOSIS — N18.2 CHRONIC RENAL INSUFFICIENCY, STAGE 2 (MILD): ICD-10-CM

## 2021-01-01 DIAGNOSIS — I48.11 LONGSTANDING PERSISTENT ATRIAL FIBRILLATION (HCC): ICD-10-CM

## 2021-01-01 DIAGNOSIS — I48.0 PAROXYSMAL ATRIAL FIBRILLATION (HCC): ICD-10-CM

## 2021-01-01 DIAGNOSIS — I51.9 HEART PROBLEM: Primary | ICD-10-CM

## 2021-01-01 DIAGNOSIS — R06.02 SHORTNESS OF BREATH: Primary | ICD-10-CM

## 2021-01-01 DIAGNOSIS — E83.42 HYPOMAGNESEMIA: ICD-10-CM

## 2021-01-01 DIAGNOSIS — I48.91 ATRIAL FIBRILLATION WITH RVR (HCC): Primary | ICD-10-CM

## 2021-01-01 DIAGNOSIS — R19.8 PERFORATED ABDOMINAL VISCUS: Primary | ICD-10-CM

## 2021-01-01 DIAGNOSIS — I50.33 ACUTE ON CHRONIC DIASTOLIC CONGESTIVE HEART FAILURE (HCC): Primary | ICD-10-CM

## 2021-01-01 LAB
ABO/RH: NORMAL
ACANTHOCYTES: ABNORMAL
ACINETOBACTER BAUMANNII BY PCR: NOT DETECTED
ALBUMIN SERPL-MCNC: 1.7 G/DL (ref 3.5–5.2)
ALBUMIN SERPL-MCNC: 1.8 G/DL (ref 3.5–5.2)
ALBUMIN SERPL-MCNC: 1.8 G/DL (ref 3.5–5.2)
ALBUMIN SERPL-MCNC: 2.1 G/DL (ref 3.5–5.2)
ALBUMIN SERPL-MCNC: 2.2 G/DL (ref 3.5–5.2)
ALBUMIN SERPL-MCNC: 2.2 G/DL (ref 3.5–5.2)
ALBUMIN SERPL-MCNC: 2.3 G/DL (ref 3.5–5.2)
ALBUMIN SERPL-MCNC: 2.4 G/DL (ref 3.5–5.2)
ALBUMIN SERPL-MCNC: 2.4 G/DL (ref 3.5–5.2)
ALBUMIN SERPL-MCNC: 2.5 G/DL (ref 3.5–5.2)
ALBUMIN SERPL-MCNC: 2.5 G/DL (ref 3.5–5.2)
ALBUMIN SERPL-MCNC: 2.6 G/DL (ref 3.5–5.2)
ALBUMIN SERPL-MCNC: 2.7 G/DL (ref 3.5–5.2)
ALBUMIN SERPL-MCNC: 2.8 G/DL (ref 3.5–5.2)
ALBUMIN SERPL-MCNC: 2.9 G/DL (ref 3.5–5.2)
ALBUMIN SERPL-MCNC: 3 G/DL (ref 3.5–5.2)
ALBUMIN SERPL-MCNC: 3.2 G/DL (ref 3.5–5.2)
ALBUMIN SERPL-MCNC: 3.4 G/DL (ref 3.5–5.2)
ALBUMIN SERPL-MCNC: 3.6 G/DL (ref 3.5–5.2)
ALP BLD-CCNC: 100 U/L (ref 35–104)
ALP BLD-CCNC: 100 U/L (ref 35–104)
ALP BLD-CCNC: 103 U/L (ref 35–104)
ALP BLD-CCNC: 109 U/L (ref 35–104)
ALP BLD-CCNC: 111 U/L (ref 35–104)
ALP BLD-CCNC: 113 U/L (ref 35–104)
ALP BLD-CCNC: 116 U/L (ref 35–104)
ALP BLD-CCNC: 123 U/L (ref 35–104)
ALP BLD-CCNC: 152 U/L (ref 35–104)
ALP BLD-CCNC: 159 U/L (ref 35–104)
ALP BLD-CCNC: 74 U/L (ref 35–104)
ALP BLD-CCNC: 74 U/L (ref 35–104)
ALP BLD-CCNC: 76 U/L (ref 35–104)
ALP BLD-CCNC: 76 U/L (ref 35–104)
ALP BLD-CCNC: 83 U/L (ref 35–104)
ALP BLD-CCNC: 84 U/L (ref 35–104)
ALP BLD-CCNC: 85 U/L (ref 35–104)
ALP BLD-CCNC: 87 U/L (ref 35–104)
ALP BLD-CCNC: 89 U/L (ref 35–104)
ALP BLD-CCNC: 90 U/L (ref 35–104)
ALP BLD-CCNC: 90 U/L (ref 35–104)
ALP BLD-CCNC: 92 U/L (ref 35–104)
ALP BLD-CCNC: 96 U/L (ref 35–104)
ALT SERPL-CCNC: 10 U/L (ref 0–32)
ALT SERPL-CCNC: 10 U/L (ref 0–32)
ALT SERPL-CCNC: 11 U/L (ref 0–32)
ALT SERPL-CCNC: 13 U/L (ref 0–32)
ALT SERPL-CCNC: 18 U/L (ref 0–32)
ALT SERPL-CCNC: 6 U/L (ref 0–32)
ALT SERPL-CCNC: 7 U/L (ref 0–32)
ALT SERPL-CCNC: 8 U/L (ref 0–32)
ALT SERPL-CCNC: 9 U/L (ref 0–32)
ALT SERPL-CCNC: 9 U/L (ref 0–32)
ANION GAP SERPL CALCULATED.3IONS-SCNC: 10 MMOL/L (ref 7–16)
ANION GAP SERPL CALCULATED.3IONS-SCNC: 10 MMOL/L (ref 7–16)
ANION GAP SERPL CALCULATED.3IONS-SCNC: 11 MMOL/L (ref 7–16)
ANION GAP SERPL CALCULATED.3IONS-SCNC: 11 MMOL/L (ref 7–16)
ANION GAP SERPL CALCULATED.3IONS-SCNC: 12 MMOL/L (ref 7–16)
ANION GAP SERPL CALCULATED.3IONS-SCNC: 7 MMOL/L (ref 7–16)
ANION GAP SERPL CALCULATED.3IONS-SCNC: 8 MMOL/L (ref 7–16)
ANION GAP SERPL CALCULATED.3IONS-SCNC: 9 MMOL/L (ref 7–16)
ANISOCYTOSIS: ABNORMAL
ANTIBODY SCREEN: NORMAL
APTT: 30.3 SEC (ref 24.5–35.1)
APTT: 31 SEC (ref 24.5–35.1)
APTT: 35 SEC (ref 24.5–35.1)
APTT: 36.5 SEC (ref 24.5–35.1)
AST SERPL-CCNC: 13 U/L (ref 0–31)
AST SERPL-CCNC: 15 U/L (ref 0–31)
AST SERPL-CCNC: 16 U/L (ref 0–31)
AST SERPL-CCNC: 17 U/L (ref 0–31)
AST SERPL-CCNC: 18 U/L (ref 0–31)
AST SERPL-CCNC: 18 U/L (ref 0–31)
AST SERPL-CCNC: 19 U/L (ref 0–31)
AST SERPL-CCNC: 20 U/L (ref 0–31)
AST SERPL-CCNC: 21 U/L (ref 0–31)
AST SERPL-CCNC: 21 U/L (ref 0–31)
AST SERPL-CCNC: 22 U/L (ref 0–31)
AST SERPL-CCNC: 24 U/L (ref 0–31)
AST SERPL-CCNC: 25 U/L (ref 0–31)
AST SERPL-CCNC: 25 U/L (ref 0–31)
AST SERPL-CCNC: 27 U/L (ref 0–31)
AST SERPL-CCNC: 32 U/L (ref 0–31)
AST SERPL-CCNC: 38 U/L (ref 0–31)
BACTERIA: ABNORMAL /HPF
BASOPHILIC STIPPLING: ABNORMAL
BASOPHILS ABSOLUTE: 0 E9/L (ref 0–0.2)
BASOPHILS ABSOLUTE: 0.01 E9/L (ref 0–0.2)
BASOPHILS ABSOLUTE: 0.02 E9/L (ref 0–0.2)
BASOPHILS ABSOLUTE: 0.04 E9/L (ref 0–0.2)
BASOPHILS ABSOLUTE: 0.05 E9/L (ref 0–0.2)
BASOPHILS ABSOLUTE: ABNORMAL E9/L (ref 0–0.2)
BASOPHILS RELATIVE PERCENT: 0 % (ref 0–2)
BASOPHILS RELATIVE PERCENT: 0.1 % (ref 0–2)
BASOPHILS RELATIVE PERCENT: 0.2 % (ref 0–2)
BASOPHILS RELATIVE PERCENT: 0.3 % (ref 0–2)
BASOPHILS RELATIVE PERCENT: 0.4 % (ref 0–2)
BASOPHILS RELATIVE PERCENT: 0.7 % (ref 0–2)
BASOPHILS RELATIVE PERCENT: 0.7 % (ref 0–2)
BASOPHILS RELATIVE PERCENT: 0.9 % (ref 0–2)
BASOPHILS RELATIVE PERCENT: 1 % (ref 0–2)
BASOPHILS RELATIVE PERCENT: ABNORMAL % (ref 0–2)
BILIRUB SERPL-MCNC: 0.8 MG/DL (ref 0–1.2)
BILIRUB SERPL-MCNC: 0.8 MG/DL (ref 0–1.2)
BILIRUB SERPL-MCNC: 0.9 MG/DL (ref 0–1.2)
BILIRUB SERPL-MCNC: 1 MG/DL (ref 0–1.2)
BILIRUB SERPL-MCNC: 1.1 MG/DL (ref 0–1.2)
BILIRUB SERPL-MCNC: 1.1 MG/DL (ref 0–1.2)
BILIRUB SERPL-MCNC: 1.2 MG/DL (ref 0–1.2)
BILIRUB SERPL-MCNC: 1.3 MG/DL (ref 0–1.2)
BILIRUB SERPL-MCNC: 1.4 MG/DL (ref 0–1.2)
BILIRUB SERPL-MCNC: 1.5 MG/DL (ref 0–1.2)
BILIRUB SERPL-MCNC: 1.6 MG/DL (ref 0–1.2)
BILIRUB SERPL-MCNC: 1.6 MG/DL (ref 0–1.2)
BILIRUB SERPL-MCNC: 1.8 MG/DL (ref 0–1.2)
BILIRUB SERPL-MCNC: 1.9 MG/DL (ref 0–1.2)
BILIRUB SERPL-MCNC: 2 MG/DL (ref 0–1.2)
BILIRUBIN URINE: NEGATIVE
BILIRUBIN URINE: NEGATIVE
BLOOD BANK DISPENSE STATUS: NORMAL
BLOOD BANK PRODUCT CODE: NORMAL
BLOOD CULTURE, ROUTINE: ABNORMAL
BLOOD CULTURE, ROUTINE: ABNORMAL
BLOOD CULTURE, ROUTINE: NORMAL
BLOOD, URINE: ABNORMAL
BLOOD, URINE: NEGATIVE
BODY FLUID CULTURE, STERILE: NORMAL
BOTTLE TYPE: NORMAL
BPU ID: NORMAL
BUN BLDV-MCNC: 10 MG/DL (ref 8–23)
BUN BLDV-MCNC: 11 MG/DL (ref 8–23)
BUN BLDV-MCNC: 16 MG/DL (ref 8–23)
BUN BLDV-MCNC: 21 MG/DL (ref 6–23)
BUN BLDV-MCNC: 21 MG/DL (ref 6–23)
BUN BLDV-MCNC: 24 MG/DL (ref 6–23)
BUN BLDV-MCNC: 27 MG/DL (ref 6–23)
BUN BLDV-MCNC: 28 MG/DL (ref 6–23)
BUN BLDV-MCNC: 32 MG/DL (ref 6–23)
BUN BLDV-MCNC: 32 MG/DL (ref 8–23)
BUN BLDV-MCNC: 33 MG/DL (ref 6–23)
BUN BLDV-MCNC: 34 MG/DL (ref 6–23)
BUN BLDV-MCNC: 46 MG/DL (ref 6–23)
BUN BLDV-MCNC: 47 MG/DL (ref 6–23)
BUN BLDV-MCNC: 48 MG/DL (ref 6–23)
BUN BLDV-MCNC: 48 MG/DL (ref 6–23)
BUN BLDV-MCNC: 52 MG/DL (ref 6–23)
BUN BLDV-MCNC: 59 MG/DL (ref 6–23)
BUN BLDV-MCNC: 59 MG/DL (ref 6–23)
BUN BLDV-MCNC: 60 MG/DL (ref 6–23)
BUN BLDV-MCNC: 61 MG/DL (ref 6–23)
BUN BLDV-MCNC: 62 MG/DL (ref 6–23)
BUN BLDV-MCNC: 64 MG/DL (ref 6–23)
BUN BLDV-MCNC: 64 MG/DL (ref 6–23)
BUN BLDV-MCNC: 65 MG/DL (ref 6–23)
BUN BLDV-MCNC: 66 MG/DL (ref 6–23)
BUN BLDV-MCNC: 67 MG/DL (ref 6–23)
BUN BLDV-MCNC: 70 MG/DL (ref 6–23)
BUN BLDV-MCNC: 75 MG/DL (ref 6–23)
BUN BLDV-MCNC: 76 MG/DL (ref 6–23)
BUN BLDV-MCNC: 80 MG/DL (ref 6–23)
BUN BLDV-MCNC: 82 MG/DL (ref 6–23)
BURR CELLS: ABNORMAL
CALCIUM IONIZED: 1.22 MMOL/L (ref 1.15–1.33)
CALCIUM IONIZED: 1.24 MMOL/L (ref 1.15–1.33)
CALCIUM SERPL-MCNC: 8 MG/DL (ref 8.6–10.2)
CALCIUM SERPL-MCNC: 8.1 MG/DL (ref 8.6–10.2)
CALCIUM SERPL-MCNC: 8.2 MG/DL (ref 8.6–10.2)
CALCIUM SERPL-MCNC: 8.3 MG/DL (ref 8.6–10.2)
CALCIUM SERPL-MCNC: 8.3 MG/DL (ref 8.6–10.2)
CALCIUM SERPL-MCNC: 8.4 MG/DL (ref 8.6–10.2)
CALCIUM SERPL-MCNC: 8.4 MG/DL (ref 8.6–10.2)
CALCIUM SERPL-MCNC: 8.5 MG/DL (ref 8.6–10.2)
CALCIUM SERPL-MCNC: 8.6 MG/DL (ref 8.6–10.2)
CALCIUM SERPL-MCNC: 8.6 MG/DL (ref 8.6–10.2)
CALCIUM SERPL-MCNC: 8.7 MG/DL (ref 8.6–10.2)
CALCIUM SERPL-MCNC: 8.8 MG/DL (ref 8.6–10.2)
CALCIUM SERPL-MCNC: 8.9 MG/DL (ref 8.6–10.2)
CALCIUM SERPL-MCNC: 8.9 MG/DL (ref 8.6–10.2)
CALCIUM SERPL-MCNC: 9 MG/DL (ref 8.6–10.2)
CALCIUM SERPL-MCNC: 9 MG/DL (ref 8.6–10.2)
CALCIUM SERPL-MCNC: 9.1 MG/DL (ref 8.6–10.2)
CANDIDA ALBICANS BY PCR: NOT DETECTED
CANDIDA GLABRATA BY PCR: NOT DETECTED
CANDIDA KRUSEI BY PCR: NOT DETECTED
CANDIDA PARAPSILOSIS BY PCR: NOT DETECTED
CANDIDA TROPICALIS BY PCR: NOT DETECTED
CHLORIDE BLD-SCNC: 100 MMOL/L (ref 98–107)
CHLORIDE BLD-SCNC: 101 MMOL/L (ref 98–107)
CHLORIDE BLD-SCNC: 102 MMOL/L (ref 98–107)
CHLORIDE BLD-SCNC: 103 MMOL/L (ref 98–107)
CHLORIDE BLD-SCNC: 104 MMOL/L (ref 98–107)
CHLORIDE BLD-SCNC: 105 MMOL/L (ref 98–107)
CHLORIDE BLD-SCNC: 97 MMOL/L (ref 98–107)
CHLORIDE BLD-SCNC: 98 MMOL/L (ref 98–107)
CHLORIDE BLD-SCNC: 98 MMOL/L (ref 98–107)
CHLORIDE BLD-SCNC: 99 MMOL/L (ref 98–107)
CHLORIDE URINE RANDOM: <20 MMOL/L
CHLORIDE URINE RANDOM: <20 MMOL/L
CLARITY: CLEAR
CLARITY: CLEAR
CO2: 21 MMOL/L (ref 22–29)
CO2: 23 MMOL/L (ref 22–29)
CO2: 24 MMOL/L (ref 22–29)
CO2: 25 MMOL/L (ref 22–29)
CO2: 26 MMOL/L (ref 22–29)
CO2: 27 MMOL/L (ref 22–29)
CO2: 28 MMOL/L (ref 22–29)
CO2: 28 MMOL/L (ref 22–29)
CO2: 29 MMOL/L (ref 22–29)
CO2: 32 MMOL/L (ref 22–29)
CO2: 33 MMOL/L (ref 22–29)
CO2: 33 MMOL/L (ref 22–29)
COLOR: YELLOW
COLOR: YELLOW
CREAT SERPL-MCNC: 0.9 MG/DL (ref 0.5–1)
CREAT SERPL-MCNC: 0.9 MG/DL (ref 0.5–1)
CREAT SERPL-MCNC: 1.1 MG/DL (ref 0.5–1)
CREAT SERPL-MCNC: 1.9 MG/DL (ref 0.5–1)
CREAT SERPL-MCNC: 2 MG/DL (ref 0.5–1)
CREAT SERPL-MCNC: 2.1 MG/DL (ref 0.5–1)
CREAT SERPL-MCNC: 2.1 MG/DL (ref 0.5–1)
CREAT SERPL-MCNC: 2.4 MG/DL (ref 0.5–1)
CREAT SERPL-MCNC: 2.4 MG/DL (ref 0.5–1)
CREAT SERPL-MCNC: 2.5 MG/DL (ref 0.5–1)
CREAT SERPL-MCNC: 2.6 MG/DL (ref 0.5–1)
CREAT SERPL-MCNC: 2.7 MG/DL (ref 0.5–1)
CREAT SERPL-MCNC: 2.8 MG/DL (ref 0.5–1)
CREAT SERPL-MCNC: 2.8 MG/DL (ref 0.5–1)
CREAT SERPL-MCNC: 3 MG/DL (ref 0.5–1)
CREAT SERPL-MCNC: 3.1 MG/DL (ref 0.5–1)
CREAT SERPL-MCNC: 3.1 MG/DL (ref 0.5–1)
CREAT SERPL-MCNC: 3.2 MG/DL (ref 0.5–1)
CREAT SERPL-MCNC: 3.3 MG/DL (ref 0.5–1)
CREAT SERPL-MCNC: 3.4 MG/DL (ref 0.5–1)
CREAT SERPL-MCNC: 3.4 MG/DL (ref 0.5–1)
CREAT SERPL-MCNC: 3.5 MG/DL (ref 0.5–1)
CREAT SERPL-MCNC: 3.5 MG/DL (ref 0.5–1)
CREAT SERPL-MCNC: 3.6 MG/DL (ref 0.5–1)
CREAT SERPL-MCNC: 4 MG/DL (ref 0.5–1)
CREAT SERPL-MCNC: 4.4 MG/DL (ref 0.5–1)
CREATININE URINE: 103 MG/DL (ref 29–226)
CREATININE URINE: 125 MG/DL (ref 29–226)
CREATININE URINE: 143 MG/DL (ref 29–226)
CULTURE, BLOOD 2: NORMAL
DESCRIPTION BLOOD BANK: NORMAL
EKG ATRIAL RATE: 117 BPM
EKG ATRIAL RATE: 156 BPM
EKG ATRIAL RATE: 197 BPM
EKG ATRIAL RATE: 220 BPM
EKG Q-T INTERVAL: 262 MS
EKG Q-T INTERVAL: 278 MS
EKG Q-T INTERVAL: 366 MS
EKG Q-T INTERVAL: 372 MS
EKG QRS DURATION: 80 MS
EKG QRS DURATION: 84 MS
EKG QRS DURATION: 86 MS
EKG QRS DURATION: 98 MS
EKG QTC CALCULATION (BAZETT): 362 MS
EKG QTC CALCULATION (BAZETT): 419 MS
EKG QTC CALCULATION (BAZETT): 435 MS
EKG QTC CALCULATION (BAZETT): 479 MS
EKG R AXIS: 105 DEGREES
EKG R AXIS: 74 DEGREES
EKG R AXIS: 85 DEGREES
EKG R AXIS: 96 DEGREES
EKG T AXIS: -154 DEGREES
EKG T AXIS: -44 DEGREES
EKG T AXIS: -59 DEGREES
EKG T AXIS: -78 DEGREES
EKG VENTRICULAR RATE: 100 BPM
EKG VENTRICULAR RATE: 115 BPM
EKG VENTRICULAR RATE: 147 BPM
EKG VENTRICULAR RATE: 79 BPM
ENTEROBACTER CLOACAE COMPLEX BY PCR: NOT DETECTED
ENTEROBACTERALES BY PCR: NOT DETECTED
ENTEROCOCCUS BY PCR: NOT DETECTED
EOSINOPHIL, URINE: 0 % (ref 0–1)
EOSINOPHILS ABSOLUTE: 0 E9/L (ref 0.05–0.5)
EOSINOPHILS ABSOLUTE: 0.03 E9/L (ref 0.05–0.5)
EOSINOPHILS ABSOLUTE: 0.04 E9/L (ref 0.05–0.5)
EOSINOPHILS ABSOLUTE: 0.04 E9/L (ref 0.05–0.5)
EOSINOPHILS ABSOLUTE: 0.05 E9/L (ref 0.05–0.5)
EOSINOPHILS ABSOLUTE: 0.05 E9/L (ref 0.05–0.5)
EOSINOPHILS ABSOLUTE: 0.08 E9/L (ref 0.05–0.5)
EOSINOPHILS ABSOLUTE: 0.1 E9/L (ref 0.05–0.5)
EOSINOPHILS ABSOLUTE: 0.11 E9/L (ref 0.05–0.5)
EOSINOPHILS ABSOLUTE: 0.14 E9/L (ref 0.05–0.5)
EOSINOPHILS ABSOLUTE: 0.15 E9/L (ref 0.05–0.5)
EOSINOPHILS ABSOLUTE: 0.17 E9/L (ref 0.05–0.5)
EOSINOPHILS ABSOLUTE: 0.17 E9/L (ref 0.05–0.5)
EOSINOPHILS ABSOLUTE: 0.18 E9/L (ref 0.05–0.5)
EOSINOPHILS ABSOLUTE: 0.22 E9/L (ref 0.05–0.5)
EOSINOPHILS ABSOLUTE: 0.22 E9/L (ref 0.05–0.5)
EOSINOPHILS ABSOLUTE: 0.23 E9/L (ref 0.05–0.5)
EOSINOPHILS ABSOLUTE: 0.23 E9/L (ref 0.05–0.5)
EOSINOPHILS ABSOLUTE: 0.24 E9/L (ref 0.05–0.5)
EOSINOPHILS ABSOLUTE: 0.25 E9/L (ref 0.05–0.5)
EOSINOPHILS ABSOLUTE: 0.27 E9/L (ref 0.05–0.5)
EOSINOPHILS ABSOLUTE: 0.3 E9/L (ref 0.05–0.5)
EOSINOPHILS ABSOLUTE: 0.34 E9/L (ref 0.05–0.5)
EOSINOPHILS ABSOLUTE: ABNORMAL E9/L (ref 0.05–0.5)
EOSINOPHILS RELATIVE PERCENT: 0 % (ref 0–6)
EOSINOPHILS RELATIVE PERCENT: 0.3 % (ref 0–6)
EOSINOPHILS RELATIVE PERCENT: 0.3 % (ref 0–6)
EOSINOPHILS RELATIVE PERCENT: 0.9 % (ref 0–6)
EOSINOPHILS RELATIVE PERCENT: 1 % (ref 0–6)
EOSINOPHILS RELATIVE PERCENT: 1.7 % (ref 0–6)
EOSINOPHILS RELATIVE PERCENT: 2 % (ref 0–6)
EOSINOPHILS RELATIVE PERCENT: 2.6 % (ref 0–6)
EOSINOPHILS RELATIVE PERCENT: 2.6 % (ref 0–6)
EOSINOPHILS RELATIVE PERCENT: 2.7 % (ref 0–6)
EOSINOPHILS RELATIVE PERCENT: 2.8 % (ref 0–6)
EOSINOPHILS RELATIVE PERCENT: 3 % (ref 0–6)
EOSINOPHILS RELATIVE PERCENT: 3 % (ref 0–6)
EOSINOPHILS RELATIVE PERCENT: 3.2 % (ref 0–6)
EOSINOPHILS RELATIVE PERCENT: 3.4 % (ref 0–6)
EOSINOPHILS RELATIVE PERCENT: 3.5 % (ref 0–6)
EOSINOPHILS RELATIVE PERCENT: 3.8 % (ref 0–6)
EOSINOPHILS RELATIVE PERCENT: 4 % (ref 0–6)
EOSINOPHILS RELATIVE PERCENT: 4.3 % (ref 0–6)
EOSINOPHILS RELATIVE PERCENT: 4.4 % (ref 0–6)
EOSINOPHILS RELATIVE PERCENT: 5.2 % (ref 0–6)
EOSINOPHILS RELATIVE PERCENT: 5.3 % (ref 0–6)
EOSINOPHILS RELATIVE PERCENT: 6.1 % (ref 0–6)
EOSINOPHILS RELATIVE PERCENT: ABNORMAL % (ref 0–6)
EPITHELIAL CELLS, UA: ABNORMAL /HPF
ESCHERICHIA COLI BY PCR: NOT DETECTED
FERRITIN: 84 NG/ML
FUNGUS (MYCOLOGY) CULTURE: NORMAL
FUNGUS STAIN: NORMAL
GFR AFRICAN AMERICAN: 12
GFR AFRICAN AMERICAN: 13
GFR AFRICAN AMERICAN: 15
GFR AFRICAN AMERICAN: 16
GFR AFRICAN AMERICAN: 17
GFR AFRICAN AMERICAN: 18
GFR AFRICAN AMERICAN: 20
GFR AFRICAN AMERICAN: 20
GFR AFRICAN AMERICAN: 21
GFR AFRICAN AMERICAN: 22
GFR AFRICAN AMERICAN: 24
GFR AFRICAN AMERICAN: 24
GFR AFRICAN AMERICAN: 28
GFR AFRICAN AMERICAN: 28
GFR AFRICAN AMERICAN: 29
GFR AFRICAN AMERICAN: 31
GFR AFRICAN AMERICAN: 58
GFR AFRICAN AMERICAN: >60
GFR AFRICAN AMERICAN: >60
GFR NON-AFRICAN AMERICAN: 10 ML/MIN/1.73
GFR NON-AFRICAN AMERICAN: 11 ML/MIN/1.73
GFR NON-AFRICAN AMERICAN: 12 ML/MIN/1.73
GFR NON-AFRICAN AMERICAN: 13 ML/MIN/1.73
GFR NON-AFRICAN AMERICAN: 14 ML/MIN/1.73
GFR NON-AFRICAN AMERICAN: 15 ML/MIN/1.73
GFR NON-AFRICAN AMERICAN: 16 ML/MIN/1.73
GFR NON-AFRICAN AMERICAN: 16 ML/MIN/1.73
GFR NON-AFRICAN AMERICAN: 17 ML/MIN/1.73
GFR NON-AFRICAN AMERICAN: 18 ML/MIN/1.73
GFR NON-AFRICAN AMERICAN: 19 ML/MIN/1.73
GFR NON-AFRICAN AMERICAN: 23 ML/MIN/1.73
GFR NON-AFRICAN AMERICAN: 23 ML/MIN/1.73
GFR NON-AFRICAN AMERICAN: 24 ML/MIN/1.73
GFR NON-AFRICAN AMERICAN: 26 ML/MIN/1.73
GFR NON-AFRICAN AMERICAN: 48 ML/MIN/1.73
GFR NON-AFRICAN AMERICAN: >60 ML/MIN/1.73
GFR NON-AFRICAN AMERICAN: >60 ML/MIN/1.73
GLUCOSE BLD-MCNC: 102 MG/DL (ref 74–99)
GLUCOSE BLD-MCNC: 103 MG/DL (ref 74–99)
GLUCOSE BLD-MCNC: 104 MG/DL (ref 74–99)
GLUCOSE BLD-MCNC: 105 MG/DL (ref 74–99)
GLUCOSE BLD-MCNC: 106 MG/DL (ref 74–99)
GLUCOSE BLD-MCNC: 106 MG/DL (ref 74–99)
GLUCOSE BLD-MCNC: 110 MG/DL (ref 74–99)
GLUCOSE BLD-MCNC: 110 MG/DL (ref 74–99)
GLUCOSE BLD-MCNC: 111 MG/DL (ref 74–99)
GLUCOSE BLD-MCNC: 114 MG/DL (ref 74–99)
GLUCOSE BLD-MCNC: 115 MG/DL (ref 74–99)
GLUCOSE BLD-MCNC: 115 MG/DL (ref 74–99)
GLUCOSE BLD-MCNC: 118 MG/DL (ref 74–99)
GLUCOSE BLD-MCNC: 118 MG/DL (ref 74–99)
GLUCOSE BLD-MCNC: 119 MG/DL (ref 74–99)
GLUCOSE BLD-MCNC: 120 MG/DL (ref 74–99)
GLUCOSE BLD-MCNC: 122 MG/DL (ref 74–99)
GLUCOSE BLD-MCNC: 128 MG/DL (ref 74–99)
GLUCOSE BLD-MCNC: 130 MG/DL (ref 74–99)
GLUCOSE BLD-MCNC: 143 MG/DL (ref 74–99)
GLUCOSE BLD-MCNC: 145 MG/DL (ref 74–99)
GLUCOSE BLD-MCNC: 148 MG/DL (ref 74–99)
GLUCOSE BLD-MCNC: 155 MG/DL (ref 74–99)
GLUCOSE BLD-MCNC: 157 MG/DL (ref 74–99)
GLUCOSE BLD-MCNC: 160 MG/DL (ref 74–99)
GLUCOSE BLD-MCNC: 162 MG/DL (ref 74–99)
GLUCOSE BLD-MCNC: 202 MG/DL (ref 74–99)
GLUCOSE BLD-MCNC: 99 MG/DL (ref 74–99)
GLUCOSE URINE: NEGATIVE MG/DL
GLUCOSE URINE: NEGATIVE MG/DL
GRAM STAIN ORDERABLE: NORMAL
GRAM STAIN RESULT: NORMAL
HAEMOPHILUS INFLUENZAE BY PCR: NOT DETECTED
HAV IGM SER IA-ACNC: NORMAL
HCT VFR BLD CALC: 23.1 % (ref 34–48)
HCT VFR BLD CALC: 23.2 % (ref 34–48)
HCT VFR BLD CALC: 23.7 % (ref 34–48)
HCT VFR BLD CALC: 23.8 % (ref 34–48)
HCT VFR BLD CALC: 23.8 % (ref 34–48)
HCT VFR BLD CALC: 24.2 % (ref 34–48)
HCT VFR BLD CALC: 24.7 % (ref 34–48)
HCT VFR BLD CALC: 25.5 % (ref 34–48)
HCT VFR BLD CALC: 25.6 % (ref 34–48)
HCT VFR BLD CALC: 25.8 % (ref 34–48)
HCT VFR BLD CALC: 25.9 % (ref 34–48)
HCT VFR BLD CALC: 26.1 % (ref 34–48)
HCT VFR BLD CALC: 26.3 % (ref 34–48)
HCT VFR BLD CALC: 26.6 % (ref 34–48)
HCT VFR BLD CALC: 26.7 % (ref 34–48)
HCT VFR BLD CALC: 26.8 % (ref 34–48)
HCT VFR BLD CALC: 27.2 % (ref 34–48)
HCT VFR BLD CALC: 28.1 % (ref 34–48)
HCT VFR BLD CALC: 28.3 % (ref 34–48)
HCT VFR BLD CALC: 28.3 % (ref 34–48)
HCT VFR BLD CALC: 28.8 % (ref 34–48)
HCT VFR BLD CALC: 28.9 % (ref 34–48)
HCT VFR BLD CALC: 29 % (ref 34–48)
HCT VFR BLD CALC: 29.1 % (ref 34–48)
HCT VFR BLD CALC: 29.3 % (ref 34–48)
HCT VFR BLD CALC: 29.5 % (ref 34–48)
HCT VFR BLD CALC: 29.6 % (ref 34–48)
HCT VFR BLD CALC: 29.7 % (ref 34–48)
HCT VFR BLD CALC: 29.8 % (ref 34–48)
HCT VFR BLD CALC: 31 % (ref 34–48)
HCT VFR BLD CALC: 31.9 % (ref 34–48)
HCT VFR BLD CALC: 33.1 % (ref 34–48)
HCT VFR BLD CALC: 35.5 % (ref 34–48)
HCT VFR BLD CALC: 37.9 % (ref 34–48)
HCT VFR BLD CALC: 39.7 % (ref 34–48)
HCT VFR BLD CALC: 42.6 % (ref 34–48)
HCT VFR BLD CALC: ABNORMAL % (ref 34–48)
HEMOGLOBIN: 10.3 G/DL (ref 11.5–15.5)
HEMOGLOBIN: 10.3 G/DL (ref 11.5–15.5)
HEMOGLOBIN: 10.6 G/DL (ref 11.5–15.5)
HEMOGLOBIN: 11.2 G/DL (ref 11.5–15.5)
HEMOGLOBIN: 12.1 G/DL (ref 11.5–15.5)
HEMOGLOBIN: 12.8 G/DL (ref 11.5–15.5)
HEMOGLOBIN: 13.6 G/DL (ref 11.5–15.5)
HEMOGLOBIN: 7.4 G/DL (ref 11.5–15.5)
HEMOGLOBIN: 7.5 G/DL (ref 11.5–15.5)
HEMOGLOBIN: 7.7 G/DL (ref 11.5–15.5)
HEMOGLOBIN: 7.9 G/DL (ref 11.5–15.5)
HEMOGLOBIN: 8 G/DL (ref 11.5–15.5)
HEMOGLOBIN: 8.2 G/DL (ref 11.5–15.5)
HEMOGLOBIN: 8.3 G/DL (ref 11.5–15.5)
HEMOGLOBIN: 8.4 G/DL (ref 11.5–15.5)
HEMOGLOBIN: 8.8 G/DL (ref 11.5–15.5)
HEMOGLOBIN: 9.1 G/DL (ref 11.5–15.5)
HEMOGLOBIN: 9.2 G/DL (ref 11.5–15.5)
HEMOGLOBIN: 9.3 G/DL (ref 11.5–15.5)
HEMOGLOBIN: 9.4 G/DL (ref 11.5–15.5)
HEMOGLOBIN: 9.4 G/DL (ref 11.5–15.5)
HEMOGLOBIN: 9.5 G/DL (ref 11.5–15.5)
HEMOGLOBIN: 9.6 G/DL (ref 11.5–15.5)
HEMOGLOBIN: 9.6 G/DL (ref 11.5–15.5)
HEMOGLOBIN: 9.7 G/DL (ref 11.5–15.5)
HEMOGLOBIN: 9.8 G/DL (ref 11.5–15.5)
HEMOGLOBIN: ABNORMAL G/DL (ref 11.5–15.5)
HEPATITIS B CORE IGM ANTIBODY: NORMAL
HEPATITIS B SURFACE ANTIGEN INTERPRETATION: NORMAL
HEPATITIS C ANTIBODY INTERPRETATION: NORMAL
HYPOCHROMIA: ABNORMAL
IMMATURE GRANULOCYTES #: 0.02 E9/L
IMMATURE GRANULOCYTES #: 0.03 E9/L
IMMATURE GRANULOCYTES #: 0.04 E9/L
IMMATURE GRANULOCYTES #: 0.05 E9/L
IMMATURE GRANULOCYTES #: 0.05 E9/L
IMMATURE GRANULOCYTES #: 0.13 E9/L
IMMATURE GRANULOCYTES %: 0.2 % (ref 0–5)
IMMATURE GRANULOCYTES %: 0.4 % (ref 0–5)
IMMATURE GRANULOCYTES %: 0.5 % (ref 0–5)
IMMATURE GRANULOCYTES %: 0.5 % (ref 0–5)
IMMATURE GRANULOCYTES %: 0.6 % (ref 0–5)
IMMATURE GRANULOCYTES %: 0.7 % (ref 0–5)
IMMATURE GRANULOCYTES %: 0.9 % (ref 0–5)
IMMATURE GRANULOCYTES %: 1.1 % (ref 0–5)
INR BLD: 1.4
INR BLD: 1.6
INR BLD: 2
INR BLD: 2.1
INR BLD: 3
INR BLD: 3.1
INR BLD: 3.2
INR BLD: 4.3
IRON SATURATION: 24 % (ref 15–50)
IRON: 33 MCG/DL (ref 37–145)
KETONES, URINE: NEGATIVE MG/DL
KETONES, URINE: NEGATIVE MG/DL
KLEBSIELLA OXYTOCA BY PCR: NOT DETECTED
KLEBSIELLA PNEUMONIAE GROUP BY PCR: NOT DETECTED
LACTIC ACID, SEPSIS: 2.1 MMOL/L (ref 0.5–1.9)
LACTIC ACID, SEPSIS: 2.4 MMOL/L (ref 0.5–1.9)
LACTIC ACID, SEPSIS: 2.8 MMOL/L (ref 0.5–1.9)
LACTIC ACID, SEPSIS: 3.3 MMOL/L (ref 0.5–1.9)
LACTIC ACID: 1.8 MMOL/L (ref 0.5–2.2)
LEUKOCYTE ESTERASE, URINE: ABNORMAL
LEUKOCYTE ESTERASE, URINE: NEGATIVE
LIPASE: 43 U/L (ref 13–60)
LISTERIA MONOCYTOGENES BY PCR: NOT DETECTED
LV EF: 63 %
LVEF MODALITY: NORMAL
LYMPHOCYTES ABSOLUTE: 0 E9/L (ref 1.5–4)
LYMPHOCYTES ABSOLUTE: 0.06 E9/L (ref 1.5–4)
LYMPHOCYTES ABSOLUTE: 0.07 E9/L (ref 1.5–4)
LYMPHOCYTES ABSOLUTE: 0.09 E9/L (ref 1.5–4)
LYMPHOCYTES ABSOLUTE: 0.11 E9/L (ref 1.5–4)
LYMPHOCYTES ABSOLUTE: 0.13 E9/L (ref 1.5–4)
LYMPHOCYTES ABSOLUTE: 0.13 E9/L (ref 1.5–4)
LYMPHOCYTES ABSOLUTE: 0.16 E9/L (ref 1.5–4)
LYMPHOCYTES ABSOLUTE: 0.18 E9/L (ref 1.5–4)
LYMPHOCYTES ABSOLUTE: 0.19 E9/L (ref 1.5–4)
LYMPHOCYTES ABSOLUTE: 0.2 E9/L (ref 1.5–4)
LYMPHOCYTES ABSOLUTE: 0.21 E9/L (ref 1.5–4)
LYMPHOCYTES ABSOLUTE: 0.22 E9/L (ref 1.5–4)
LYMPHOCYTES ABSOLUTE: 0.23 E9/L (ref 1.5–4)
LYMPHOCYTES ABSOLUTE: 0.23 E9/L (ref 1.5–4)
LYMPHOCYTES ABSOLUTE: 0.26 E9/L (ref 1.5–4)
LYMPHOCYTES ABSOLUTE: 0.31 E9/L (ref 1.5–4)
LYMPHOCYTES ABSOLUTE: 0.32 E9/L (ref 1.5–4)
LYMPHOCYTES ABSOLUTE: 0.33 E9/L (ref 1.5–4)
LYMPHOCYTES ABSOLUTE: 0.34 E9/L (ref 1.5–4)
LYMPHOCYTES ABSOLUTE: 0.36 E9/L (ref 1.5–4)
LYMPHOCYTES ABSOLUTE: 0.37 E9/L (ref 1.5–4)
LYMPHOCYTES ABSOLUTE: 0.37 E9/L (ref 1.5–4)
LYMPHOCYTES ABSOLUTE: 0.38 E9/L (ref 1.5–4)
LYMPHOCYTES ABSOLUTE: 0.42 E9/L (ref 1.5–4)
LYMPHOCYTES ABSOLUTE: 0.46 E9/L (ref 1.5–4)
LYMPHOCYTES ABSOLUTE: 0.51 E9/L (ref 1.5–4)
LYMPHOCYTES ABSOLUTE: 0.6 E9/L (ref 1.5–4)
LYMPHOCYTES ABSOLUTE: 0.76 E9/L (ref 1.5–4)
LYMPHOCYTES ABSOLUTE: 0.77 E9/L (ref 1.5–4)
LYMPHOCYTES ABSOLUTE: ABNORMAL E9/L (ref 1.5–4)
LYMPHOCYTES RELATIVE PERCENT: 0.9 % (ref 20–42)
LYMPHOCYTES RELATIVE PERCENT: 1.7 % (ref 20–42)
LYMPHOCYTES RELATIVE PERCENT: 11 % (ref 20–42)
LYMPHOCYTES RELATIVE PERCENT: 12.4 % (ref 20–42)
LYMPHOCYTES RELATIVE PERCENT: 13 % (ref 20–42)
LYMPHOCYTES RELATIVE PERCENT: 2.1 % (ref 20–42)
LYMPHOCYTES RELATIVE PERCENT: 2.6 % (ref 20–42)
LYMPHOCYTES RELATIVE PERCENT: 3.4 % (ref 20–42)
LYMPHOCYTES RELATIVE PERCENT: 3.5 % (ref 20–42)
LYMPHOCYTES RELATIVE PERCENT: 4 % (ref 20–42)
LYMPHOCYTES RELATIVE PERCENT: 4.3 % (ref 20–42)
LYMPHOCYTES RELATIVE PERCENT: 5.1 % (ref 20–42)
LYMPHOCYTES RELATIVE PERCENT: 5.2 % (ref 20–42)
LYMPHOCYTES RELATIVE PERCENT: 6 % (ref 20–42)
LYMPHOCYTES RELATIVE PERCENT: 6.1 % (ref 20–42)
LYMPHOCYTES RELATIVE PERCENT: 6.2 % (ref 20–42)
LYMPHOCYTES RELATIVE PERCENT: 6.5 % (ref 20–42)
LYMPHOCYTES RELATIVE PERCENT: 7 % (ref 20–42)
LYMPHOCYTES RELATIVE PERCENT: 7.1 % (ref 20–42)
LYMPHOCYTES RELATIVE PERCENT: 8.1 % (ref 20–42)
LYMPHOCYTES RELATIVE PERCENT: 8.8 % (ref 20–42)
LYMPHOCYTES RELATIVE PERCENT: ABNORMAL % (ref 20–42)
MACRO-OVALOCYTES: ABNORMAL
MAGNESIUM: 1.5 MG/DL (ref 1.6–2.6)
MAGNESIUM: 1.7 MG/DL (ref 1.6–2.6)
MAGNESIUM: 1.8 MG/DL (ref 1.6–2.6)
MAGNESIUM: 1.8 MG/DL (ref 1.6–2.6)
MAGNESIUM: 1.9 MG/DL (ref 1.6–2.6)
MAGNESIUM: 1.9 MG/DL (ref 1.6–2.6)
MAGNESIUM: 2 MG/DL (ref 1.6–2.6)
MAGNESIUM: 2.1 MG/DL (ref 1.6–2.6)
MAGNESIUM: 2.1 MG/DL (ref 1.6–2.6)
MAGNESIUM: 2.2 MG/DL (ref 1.6–2.6)
MCH RBC QN AUTO: 31.7 PG (ref 26–35)
MCH RBC QN AUTO: 31.7 PG (ref 26–35)
MCH RBC QN AUTO: 31.8 PG (ref 26–35)
MCH RBC QN AUTO: 31.8 PG (ref 26–35)
MCH RBC QN AUTO: 32 PG (ref 26–35)
MCH RBC QN AUTO: 32.1 PG (ref 26–35)
MCH RBC QN AUTO: 32.2 PG (ref 26–35)
MCH RBC QN AUTO: 32.3 PG (ref 26–35)
MCH RBC QN AUTO: 32.3 PG (ref 26–35)
MCH RBC QN AUTO: 32.4 PG (ref 26–35)
MCH RBC QN AUTO: 32.5 PG (ref 26–35)
MCH RBC QN AUTO: 32.6 PG (ref 26–35)
MCH RBC QN AUTO: 32.7 PG (ref 26–35)
MCH RBC QN AUTO: 32.9 PG (ref 26–35)
MCH RBC QN AUTO: 33.1 PG (ref 26–35)
MCH RBC QN AUTO: ABNORMAL PG (ref 26–35)
MCHC RBC AUTO-ENTMCNC: 30.4 % (ref 32–34.5)
MCHC RBC AUTO-ENTMCNC: 30.5 % (ref 32–34.5)
MCHC RBC AUTO-ENTMCNC: 30.5 % (ref 32–34.5)
MCHC RBC AUTO-ENTMCNC: 30.6 % (ref 32–34.5)
MCHC RBC AUTO-ENTMCNC: 30.7 % (ref 32–34.5)
MCHC RBC AUTO-ENTMCNC: 30.8 % (ref 32–34.5)
MCHC RBC AUTO-ENTMCNC: 31.1 % (ref 32–34.5)
MCHC RBC AUTO-ENTMCNC: 31.3 % (ref 32–34.5)
MCHC RBC AUTO-ENTMCNC: 31.4 % (ref 32–34.5)
MCHC RBC AUTO-ENTMCNC: 31.5 % (ref 32–34.5)
MCHC RBC AUTO-ENTMCNC: 31.8 % (ref 32–34.5)
MCHC RBC AUTO-ENTMCNC: 31.9 % (ref 32–34.5)
MCHC RBC AUTO-ENTMCNC: 31.9 % (ref 32–34.5)
MCHC RBC AUTO-ENTMCNC: 32 % (ref 32–34.5)
MCHC RBC AUTO-ENTMCNC: 32.1 % (ref 32–34.5)
MCHC RBC AUTO-ENTMCNC: 32.1 % (ref 32–34.5)
MCHC RBC AUTO-ENTMCNC: 32.2 % (ref 32–34.5)
MCHC RBC AUTO-ENTMCNC: 32.2 % (ref 32–34.5)
MCHC RBC AUTO-ENTMCNC: 32.3 % (ref 32–34.5)
MCHC RBC AUTO-ENTMCNC: 32.4 % (ref 32–34.5)
MCHC RBC AUTO-ENTMCNC: 32.5 % (ref 32–34.5)
MCHC RBC AUTO-ENTMCNC: 32.5 % (ref 32–34.5)
MCHC RBC AUTO-ENTMCNC: 32.6 % (ref 32–34.5)
MCHC RBC AUTO-ENTMCNC: 32.6 % (ref 32–34.5)
MCHC RBC AUTO-ENTMCNC: 32.7 % (ref 32–34.5)
MCHC RBC AUTO-ENTMCNC: 32.8 % (ref 32–34.5)
MCHC RBC AUTO-ENTMCNC: 32.9 % (ref 32–34.5)
MCHC RBC AUTO-ENTMCNC: 33 % (ref 32–34.5)
MCHC RBC AUTO-ENTMCNC: 33.2 % (ref 32–34.5)
MCHC RBC AUTO-ENTMCNC: 33.6 % (ref 32–34.5)
MCHC RBC AUTO-ENTMCNC: ABNORMAL % (ref 32–34.5)
MCV RBC AUTO: 100 FL (ref 80–99.9)
MCV RBC AUTO: 100.4 FL (ref 80–99.9)
MCV RBC AUTO: 100.4 FL (ref 80–99.9)
MCV RBC AUTO: 100.7 FL (ref 80–99.9)
MCV RBC AUTO: 100.7 FL (ref 80–99.9)
MCV RBC AUTO: 100.8 FL (ref 80–99.9)
MCV RBC AUTO: 100.9 FL (ref 80–99.9)
MCV RBC AUTO: 101.2 FL (ref 80–99.9)
MCV RBC AUTO: 101.3 FL (ref 80–99.9)
MCV RBC AUTO: 101.3 FL (ref 80–99.9)
MCV RBC AUTO: 101.6 FL (ref 80–99.9)
MCV RBC AUTO: 101.7 FL (ref 80–99.9)
MCV RBC AUTO: 101.8 FL (ref 80–99.9)
MCV RBC AUTO: 101.8 FL (ref 80–99.9)
MCV RBC AUTO: 101.9 FL (ref 80–99.9)
MCV RBC AUTO: 102.3 FL (ref 80–99.9)
MCV RBC AUTO: 102.4 FL (ref 80–99.9)
MCV RBC AUTO: 103.8 FL (ref 80–99.9)
MCV RBC AUTO: 104.2 FL (ref 80–99.9)
MCV RBC AUTO: 104.3 FL (ref 80–99.9)
MCV RBC AUTO: 104.9 FL (ref 80–99.9)
MCV RBC AUTO: 105.2 FL (ref 80–99.9)
MCV RBC AUTO: 105.2 FL (ref 80–99.9)
MCV RBC AUTO: 97.3 FL (ref 80–99.9)
MCV RBC AUTO: 98 FL (ref 80–99.9)
MCV RBC AUTO: 98.3 FL (ref 80–99.9)
MCV RBC AUTO: 98.6 FL (ref 80–99.9)
MCV RBC AUTO: 98.8 FL (ref 80–99.9)
MCV RBC AUTO: 98.9 FL (ref 80–99.9)
MCV RBC AUTO: 99 FL (ref 80–99.9)
MCV RBC AUTO: 99 FL (ref 80–99.9)
MCV RBC AUTO: 99.3 FL (ref 80–99.9)
MCV RBC AUTO: 99.6 FL (ref 80–99.9)
MCV RBC AUTO: 99.7 FL (ref 80–99.9)
MCV RBC AUTO: ABNORMAL FL (ref 80–99.9)
MONOCYTES ABSOLUTE: 0.06 E9/L (ref 0.1–0.95)
MONOCYTES ABSOLUTE: 0.11 E9/L (ref 0.1–0.95)
MONOCYTES ABSOLUTE: 0.15 E9/L (ref 0.1–0.95)
MONOCYTES ABSOLUTE: 0.16 E9/L (ref 0.1–0.95)
MONOCYTES ABSOLUTE: 0.17 E9/L (ref 0.1–0.95)
MONOCYTES ABSOLUTE: 0.2 E9/L (ref 0.1–0.95)
MONOCYTES ABSOLUTE: 0.21 E9/L (ref 0.1–0.95)
MONOCYTES ABSOLUTE: 0.22 E9/L (ref 0.1–0.95)
MONOCYTES ABSOLUTE: 0.23 E9/L (ref 0.1–0.95)
MONOCYTES ABSOLUTE: 0.23 E9/L (ref 0.1–0.95)
MONOCYTES ABSOLUTE: 0.27 E9/L (ref 0.1–0.95)
MONOCYTES ABSOLUTE: 0.28 E9/L (ref 0.1–0.95)
MONOCYTES ABSOLUTE: 0.29 E9/L (ref 0.1–0.95)
MONOCYTES ABSOLUTE: 0.31 E9/L (ref 0.1–0.95)
MONOCYTES ABSOLUTE: 0.33 E9/L (ref 0.1–0.95)
MONOCYTES ABSOLUTE: 0.36 E9/L (ref 0.1–0.95)
MONOCYTES ABSOLUTE: 0.37 E9/L (ref 0.1–0.95)
MONOCYTES ABSOLUTE: 0.38 E9/L (ref 0.1–0.95)
MONOCYTES ABSOLUTE: 0.38 E9/L (ref 0.1–0.95)
MONOCYTES ABSOLUTE: 0.39 E9/L (ref 0.1–0.95)
MONOCYTES ABSOLUTE: 0.41 E9/L (ref 0.1–0.95)
MONOCYTES ABSOLUTE: 0.42 E9/L (ref 0.1–0.95)
MONOCYTES ABSOLUTE: 0.42 E9/L (ref 0.1–0.95)
MONOCYTES ABSOLUTE: 0.5 E9/L (ref 0.1–0.95)
MONOCYTES ABSOLUTE: 0.54 E9/L (ref 0.1–0.95)
MONOCYTES ABSOLUTE: 0.54 E9/L (ref 0.1–0.95)
MONOCYTES ABSOLUTE: 0.55 E9/L (ref 0.1–0.95)
MONOCYTES ABSOLUTE: 0.55 E9/L (ref 0.1–0.95)
MONOCYTES ABSOLUTE: 0.62 E9/L (ref 0.1–0.95)
MONOCYTES ABSOLUTE: 0.64 E9/L (ref 0.1–0.95)
MONOCYTES ABSOLUTE: 0.7 E9/L (ref 0.1–0.95)
MONOCYTES ABSOLUTE: 0.74 E9/L (ref 0.1–0.95)
MONOCYTES ABSOLUTE: 0.85 E9/L (ref 0.1–0.95)
MONOCYTES ABSOLUTE: ABNORMAL E9/L (ref 0.1–0.95)
MONOCYTES RELATIVE PERCENT: 1.7 % (ref 2–12)
MONOCYTES RELATIVE PERCENT: 1.8 % (ref 2–12)
MONOCYTES RELATIVE PERCENT: 1.8 % (ref 2–12)
MONOCYTES RELATIVE PERCENT: 10.6 % (ref 2–12)
MONOCYTES RELATIVE PERCENT: 11.3 % (ref 2–12)
MONOCYTES RELATIVE PERCENT: 11.8 % (ref 2–12)
MONOCYTES RELATIVE PERCENT: 12.1 % (ref 2–12)
MONOCYTES RELATIVE PERCENT: 2.6 % (ref 2–12)
MONOCYTES RELATIVE PERCENT: 3.5 % (ref 2–12)
MONOCYTES RELATIVE PERCENT: 4.2 % (ref 2–12)
MONOCYTES RELATIVE PERCENT: 4.3 % (ref 2–12)
MONOCYTES RELATIVE PERCENT: 5.1 % (ref 2–12)
MONOCYTES RELATIVE PERCENT: 5.3 % (ref 2–12)
MONOCYTES RELATIVE PERCENT: 5.6 % (ref 2–12)
MONOCYTES RELATIVE PERCENT: 6.1 % (ref 2–12)
MONOCYTES RELATIVE PERCENT: 6.3 % (ref 2–12)
MONOCYTES RELATIVE PERCENT: 7 % (ref 2–12)
MONOCYTES RELATIVE PERCENT: 7.6 % (ref 2–12)
MONOCYTES RELATIVE PERCENT: 7.8 % (ref 2–12)
MONOCYTES RELATIVE PERCENT: 8.7 % (ref 2–12)
MONOCYTES RELATIVE PERCENT: 9 % (ref 2–12)
MONOCYTES RELATIVE PERCENT: 9 % (ref 2–12)
MONOCYTES RELATIVE PERCENT: 9.6 % (ref 2–12)
MONOCYTES RELATIVE PERCENT: ABNORMAL % (ref 2–12)
MRSA CULTURE ONLY: NORMAL
NEISSERIA MENINGITIDIS BY PCR: NOT DETECTED
NEUTROPHILS ABSOLUTE: 10.96 E9/L (ref 1.8–7.3)
NEUTROPHILS ABSOLUTE: 11.35 E9/L (ref 1.8–7.3)
NEUTROPHILS ABSOLUTE: 19.02 E9/L (ref 1.8–7.3)
NEUTROPHILS ABSOLUTE: 2.66 E9/L (ref 1.8–7.3)
NEUTROPHILS ABSOLUTE: 2.94 E9/L (ref 1.8–7.3)
NEUTROPHILS ABSOLUTE: 20.76 E9/L (ref 1.8–7.3)
NEUTROPHILS ABSOLUTE: 3.2 E9/L (ref 1.8–7.3)
NEUTROPHILS ABSOLUTE: 3.28 E9/L (ref 1.8–7.3)
NEUTROPHILS ABSOLUTE: 3.36 E9/L (ref 1.8–7.3)
NEUTROPHILS ABSOLUTE: 3.49 E9/L (ref 1.8–7.3)
NEUTROPHILS ABSOLUTE: 3.74 E9/L (ref 1.8–7.3)
NEUTROPHILS ABSOLUTE: 4.13 E9/L (ref 1.8–7.3)
NEUTROPHILS ABSOLUTE: 4.21 E9/L (ref 1.8–7.3)
NEUTROPHILS ABSOLUTE: 4.27 E9/L (ref 1.8–7.3)
NEUTROPHILS ABSOLUTE: 4.35 E9/L (ref 1.8–7.3)
NEUTROPHILS ABSOLUTE: 4.37 E9/L (ref 1.8–7.3)
NEUTROPHILS ABSOLUTE: 4.4 E9/L (ref 1.8–7.3)
NEUTROPHILS ABSOLUTE: 4.41 E9/L (ref 1.8–7.3)
NEUTROPHILS ABSOLUTE: 4.47 E9/L (ref 1.8–7.3)
NEUTROPHILS ABSOLUTE: 4.51 E9/L (ref 1.8–7.3)
NEUTROPHILS ABSOLUTE: 4.56 E9/L (ref 1.8–7.3)
NEUTROPHILS ABSOLUTE: 4.75 E9/L (ref 1.8–7.3)
NEUTROPHILS ABSOLUTE: 4.75 E9/L (ref 1.8–7.3)
NEUTROPHILS ABSOLUTE: 4.77 E9/L (ref 1.8–7.3)
NEUTROPHILS ABSOLUTE: 4.94 E9/L (ref 1.8–7.3)
NEUTROPHILS ABSOLUTE: 4.96 E9/L (ref 1.8–7.3)
NEUTROPHILS ABSOLUTE: 4.96 E9/L (ref 1.8–7.3)
NEUTROPHILS ABSOLUTE: 5.16 E9/L (ref 1.8–7.3)
NEUTROPHILS ABSOLUTE: 5.17 E9/L (ref 1.8–7.3)
NEUTROPHILS ABSOLUTE: 5.5 E9/L (ref 1.8–7.3)
NEUTROPHILS ABSOLUTE: 6.79 E9/L (ref 1.8–7.3)
NEUTROPHILS ABSOLUTE: 6.92 E9/L (ref 1.8–7.3)
NEUTROPHILS ABSOLUTE: 7.64 E9/L (ref 1.8–7.3)
NEUTROPHILS ABSOLUTE: 7.99 E9/L (ref 1.8–7.3)
NEUTROPHILS ABSOLUTE: 8.51 E9/L (ref 1.8–7.3)
NEUTROPHILS ABSOLUTE: ABNORMAL E9/L (ref 1.8–7.3)
NEUTROPHILS RELATIVE PERCENT: 71.2 % (ref 43–80)
NEUTROPHILS RELATIVE PERCENT: 71.9 % (ref 43–80)
NEUTROPHILS RELATIVE PERCENT: 76.1 % (ref 43–80)
NEUTROPHILS RELATIVE PERCENT: 78 % (ref 43–80)
NEUTROPHILS RELATIVE PERCENT: 78 % (ref 43–80)
NEUTROPHILS RELATIVE PERCENT: 78.1 % (ref 43–80)
NEUTROPHILS RELATIVE PERCENT: 80.7 % (ref 43–80)
NEUTROPHILS RELATIVE PERCENT: 81 % (ref 43–80)
NEUTROPHILS RELATIVE PERCENT: 81.7 % (ref 43–80)
NEUTROPHILS RELATIVE PERCENT: 81.7 % (ref 43–80)
NEUTROPHILS RELATIVE PERCENT: 83 % (ref 43–80)
NEUTROPHILS RELATIVE PERCENT: 83.2 % (ref 43–80)
NEUTROPHILS RELATIVE PERCENT: 83.9 % (ref 43–80)
NEUTROPHILS RELATIVE PERCENT: 85.2 % (ref 43–80)
NEUTROPHILS RELATIVE PERCENT: 86.1 % (ref 43–80)
NEUTROPHILS RELATIVE PERCENT: 86.8 % (ref 43–80)
NEUTROPHILS RELATIVE PERCENT: 86.8 % (ref 43–80)
NEUTROPHILS RELATIVE PERCENT: 87.3 % (ref 43–80)
NEUTROPHILS RELATIVE PERCENT: 87.6 % (ref 43–80)
NEUTROPHILS RELATIVE PERCENT: 88.7 % (ref 43–80)
NEUTROPHILS RELATIVE PERCENT: 89.4 % (ref 43–80)
NEUTROPHILS RELATIVE PERCENT: 89.6 % (ref 43–80)
NEUTROPHILS RELATIVE PERCENT: 89.6 % (ref 43–80)
NEUTROPHILS RELATIVE PERCENT: 90.9 % (ref 43–80)
NEUTROPHILS RELATIVE PERCENT: 92.2 % (ref 43–80)
NEUTROPHILS RELATIVE PERCENT: 92.9 % (ref 43–80)
NEUTROPHILS RELATIVE PERCENT: 93.9 % (ref 43–80)
NEUTROPHILS RELATIVE PERCENT: 93.9 % (ref 43–80)
NEUTROPHILS RELATIVE PERCENT: 96.5 % (ref 43–80)
NEUTROPHILS RELATIVE PERCENT: ABNORMAL % (ref 43–80)
NITRITE, URINE: NEGATIVE
NITRITE, URINE: NEGATIVE
NUCLEATED RED BLOOD CELLS: 0.9 /100 WBC
ORDER NUMBER: NORMAL
ORGANISM: ABNORMAL
OVALOCYTES: ABNORMAL
PARATHYROID HORMONE INTACT: 81 PG/ML (ref 15–65)
PDW BLD-RTO: 14 FL (ref 11.5–15)
PDW BLD-RTO: 14.1 FL (ref 11.5–15)
PDW BLD-RTO: 14.1 FL (ref 11.5–15)
PDW BLD-RTO: 15 FL (ref 11.5–15)
PDW BLD-RTO: 15 FL (ref 11.5–15)
PDW BLD-RTO: 15.1 FL (ref 11.5–15)
PDW BLD-RTO: 15.1 FL (ref 11.5–15)
PDW BLD-RTO: 15.2 FL (ref 11.5–15)
PDW BLD-RTO: 15.4 FL (ref 11.5–15)
PDW BLD-RTO: 15.8 FL (ref 11.5–15)
PDW BLD-RTO: 15.9 FL (ref 11.5–15)
PDW BLD-RTO: 16 FL (ref 11.5–15)
PDW BLD-RTO: 16.2 FL (ref 11.5–15)
PDW BLD-RTO: 16.4 FL (ref 11.5–15)
PDW BLD-RTO: 16.7 FL (ref 11.5–15)
PDW BLD-RTO: 17.1 FL (ref 11.5–15)
PDW BLD-RTO: 17.2 FL (ref 11.5–15)
PDW BLD-RTO: 17.7 FL (ref 11.5–15)
PDW BLD-RTO: 17.9 FL (ref 11.5–15)
PDW BLD-RTO: 17.9 FL (ref 11.5–15)
PDW BLD-RTO: 18.1 FL (ref 11.5–15)
PDW BLD-RTO: 18.2 FL (ref 11.5–15)
PDW BLD-RTO: 18.4 FL (ref 11.5–15)
PDW BLD-RTO: 18.6 FL (ref 11.5–15)
PDW BLD-RTO: 18.7 FL (ref 11.5–15)
PDW BLD-RTO: 18.7 FL (ref 11.5–15)
PDW BLD-RTO: 18.8 FL (ref 11.5–15)
PDW BLD-RTO: 18.9 FL (ref 11.5–15)
PDW BLD-RTO: 18.9 FL (ref 11.5–15)
PDW BLD-RTO: ABNORMAL FL (ref 11.5–15)
PH UA: 5.5 (ref 5–9)
PH UA: 5.5 (ref 5–9)
PHOSPHORUS: 2.8 MG/DL (ref 2.5–4.5)
PHOSPHORUS: 2.9 MG/DL (ref 2.5–4.5)
PHOSPHORUS: 3.2 MG/DL (ref 2.5–4.5)
PHOSPHORUS: 3.4 MG/DL (ref 2.5–4.5)
PHOSPHORUS: 3.6 MG/DL (ref 2.5–4.5)
PHOSPHORUS: 3.9 MG/DL (ref 2.5–4.5)
PHOSPHORUS: 4.2 MG/DL (ref 2.5–4.5)
PHOSPHORUS: 4.3 MG/DL (ref 2.5–4.5)
PHOSPHORUS: 4.4 MG/DL (ref 2.5–4.5)
PHOSPHORUS: 4.6 MG/DL (ref 2.5–4.5)
PHOSPHORUS: 4.6 MG/DL (ref 2.5–4.5)
PHOSPHORUS: 4.7 MG/DL (ref 2.5–4.5)
PHOSPHORUS: 4.7 MG/DL (ref 2.5–4.5)
PHOSPHORUS: 4.8 MG/DL (ref 2.5–4.5)
PHOSPHORUS: 4.9 MG/DL (ref 2.5–4.5)
PLATELET # BLD: 101 E9/L (ref 130–450)
PLATELET # BLD: 106 E9/L (ref 130–450)
PLATELET # BLD: 106 E9/L (ref 130–450)
PLATELET # BLD: 109 E9/L (ref 130–450)
PLATELET # BLD: 110 E9/L (ref 130–450)
PLATELET # BLD: 111 E9/L (ref 130–450)
PLATELET # BLD: 112 E9/L (ref 130–450)
PLATELET # BLD: 114 E9/L (ref 130–450)
PLATELET # BLD: 119 E9/L (ref 130–450)
PLATELET # BLD: 135 E9/L (ref 130–450)
PLATELET # BLD: 137 E9/L (ref 130–450)
PLATELET # BLD: 142 E9/L (ref 130–450)
PLATELET # BLD: 142 E9/L (ref 130–450)
PLATELET # BLD: 150 E9/L (ref 130–450)
PLATELET # BLD: 168 E9/L (ref 130–450)
PLATELET # BLD: 171 E9/L (ref 130–450)
PLATELET # BLD: 175 E9/L (ref 130–450)
PLATELET # BLD: 176 E9/L (ref 130–450)
PLATELET # BLD: 182 E9/L (ref 130–450)
PLATELET # BLD: 68 E9/L (ref 130–450)
PLATELET # BLD: 74 E9/L (ref 130–450)
PLATELET # BLD: 74 E9/L (ref 130–450)
PLATELET # BLD: 75 E9/L (ref 130–450)
PLATELET # BLD: 75 E9/L (ref 130–450)
PLATELET # BLD: 79 E9/L (ref 130–450)
PLATELET # BLD: 81 E9/L (ref 130–450)
PLATELET # BLD: 83 E9/L (ref 130–450)
PLATELET # BLD: 84 E9/L (ref 130–450)
PLATELET # BLD: 86 E9/L (ref 130–450)
PLATELET # BLD: 86 E9/L (ref 130–450)
PLATELET # BLD: 89 E9/L (ref 130–450)
PLATELET # BLD: 92 E9/L (ref 130–450)
PLATELET # BLD: 94 E9/L (ref 130–450)
PLATELET # BLD: 97 E9/L (ref 130–450)
PLATELET # BLD: 98 E9/L (ref 130–450)
PLATELET # BLD: ABNORMAL E9/L (ref 130–450)
PLATELET CONFIRMATION: NORMAL
PMV BLD AUTO: 10 FL (ref 7–12)
PMV BLD AUTO: 10.1 FL (ref 7–12)
PMV BLD AUTO: 10.2 FL (ref 7–12)
PMV BLD AUTO: 10.3 FL (ref 7–12)
PMV BLD AUTO: 10.4 FL (ref 7–12)
PMV BLD AUTO: 10.5 FL (ref 7–12)
PMV BLD AUTO: 10.6 FL (ref 7–12)
PMV BLD AUTO: 10.8 FL (ref 7–12)
PMV BLD AUTO: 11.1 FL (ref 7–12)
PMV BLD AUTO: 11.1 FL (ref 7–12)
PMV BLD AUTO: 9.8 FL (ref 7–12)
PMV BLD AUTO: 9.9 FL (ref 7–12)
PMV BLD AUTO: ABNORMAL FL (ref 7–12)
POIKILOCYTES: ABNORMAL
POLYCHROMASIA: ABNORMAL
POTASSIUM REFLEX MAGNESIUM: 3.1 MMOL/L (ref 3.5–5)
POTASSIUM REFLEX MAGNESIUM: 3.2 MMOL/L (ref 3.5–5)
POTASSIUM REFLEX MAGNESIUM: 3.4 MMOL/L (ref 3.5–5)
POTASSIUM REFLEX MAGNESIUM: 3.5 MMOL/L (ref 3.5–5)
POTASSIUM REFLEX MAGNESIUM: 3.6 MMOL/L (ref 3.5–5)
POTASSIUM REFLEX MAGNESIUM: 3.7 MMOL/L (ref 3.5–5)
POTASSIUM REFLEX MAGNESIUM: 3.8 MMOL/L (ref 3.5–5)
POTASSIUM REFLEX MAGNESIUM: 4 MMOL/L (ref 3.5–5)
POTASSIUM REFLEX MAGNESIUM: 4 MMOL/L (ref 3.5–5)
POTASSIUM REFLEX MAGNESIUM: 4.1 MMOL/L (ref 3.5–5)
POTASSIUM REFLEX MAGNESIUM: 4.2 MMOL/L (ref 3.5–5)
POTASSIUM REFLEX MAGNESIUM: 4.2 MMOL/L (ref 3.5–5)
POTASSIUM REFLEX MAGNESIUM: 4.3 MMOL/L (ref 3.5–5)
POTASSIUM SERPL-SCNC: 3.1 MMOL/L (ref 3.5–5)
POTASSIUM SERPL-SCNC: 3.2 MMOL/L (ref 3.5–5)
POTASSIUM SERPL-SCNC: 3.4 MMOL/L (ref 3.5–5)
POTASSIUM SERPL-SCNC: 3.5 MMOL/L (ref 3.5–5)
POTASSIUM SERPL-SCNC: 3.5 MMOL/L (ref 3.5–5)
POTASSIUM SERPL-SCNC: 3.6 MMOL/L (ref 3.5–5)
POTASSIUM SERPL-SCNC: 3.7 MMOL/L (ref 3.5–5)
POTASSIUM SERPL-SCNC: 3.8 MMOL/L (ref 3.5–5)
POTASSIUM SERPL-SCNC: 3.8 MMOL/L (ref 3.5–5)
POTASSIUM SERPL-SCNC: 3.9 MMOL/L (ref 3.5–5)
POTASSIUM SERPL-SCNC: 4 MMOL/L (ref 3.5–5)
POTASSIUM SERPL-SCNC: 4 MMOL/L (ref 3.5–5)
POTASSIUM SERPL-SCNC: 4.1 MMOL/L (ref 3.5–5)
POTASSIUM SERPL-SCNC: 4.3 MMOL/L (ref 3.5–5)
POTASSIUM, UR: 23.7 MMOL/L
PRO-BNP: 2669 PG/ML (ref 0–450)
PRO-BNP: 4255 PG/ML (ref 0–450)
PRO-BNP: 946 PG/ML (ref 0–450)
PRO-BNP: 959 PG/ML (ref 0–450)
PROCALCITONIN: 0.24 NG/ML (ref 0–0.08)
PROMYELOCYTES PERCENT: 0.9 % (ref 0–0)
PROTEIN UA: ABNORMAL MG/DL
PROTEIN UA: NEGATIVE MG/DL
PROTEUS SPECIES BY PCR: NOT DETECTED
PROTHROMBIN TIME: 16.5 SEC (ref 9.3–12.4)
PROTHROMBIN TIME: 19.1 SEC (ref 9.3–12.4)
PROTHROMBIN TIME: 23.1 SEC (ref 9.3–12.4)
PROTHROMBIN TIME: 24.8 SEC (ref 9.3–12.4)
PROTHROMBIN TIME: 35.7 SEC (ref 9.3–12.4)
PROTHROMBIN TIME: 36.9 SEC (ref 9.3–12.4)
PROTHROMBIN TIME: 37.8 SEC (ref 9.3–12.4)
PROTHROMBIN TIME: 51.2 SEC (ref 9.3–12.4)
PSEUDOMONAS AERUGINOSA BY PCR: NOT DETECTED
RBC # BLD: 2.31 E12/L (ref 3.5–5.5)
RBC # BLD: 2.31 E12/L (ref 3.5–5.5)
RBC # BLD: 2.37 E12/L (ref 3.5–5.5)
RBC # BLD: 2.38 E12/L (ref 3.5–5.5)
RBC # BLD: 2.39 E12/L (ref 3.5–5.5)
RBC # BLD: 2.41 E12/L (ref 3.5–5.5)
RBC # BLD: 2.46 E12/L (ref 3.5–5.5)
RBC # BLD: 2.46 E12/L (ref 3.5–5.5)
RBC # BLD: 2.48 E12/L (ref 3.5–5.5)
RBC # BLD: 2.49 E12/L (ref 3.5–5.5)
RBC # BLD: 2.5 E12/L (ref 3.5–5.5)
RBC # BLD: 2.54 E12/L (ref 3.5–5.5)
RBC # BLD: 2.55 E12/L (ref 3.5–5.5)
RBC # BLD: 2.61 E12/L (ref 3.5–5.5)
RBC # BLD: 2.62 E12/L (ref 3.5–5.5)
RBC # BLD: 2.73 E12/L (ref 3.5–5.5)
RBC # BLD: 2.78 E12/L (ref 3.5–5.5)
RBC # BLD: 2.84 E12/L (ref 3.5–5.5)
RBC # BLD: 2.85 E12/L (ref 3.5–5.5)
RBC # BLD: 2.87 E12/L (ref 3.5–5.5)
RBC # BLD: 2.91 E12/L (ref 3.5–5.5)
RBC # BLD: 2.91 E12/L (ref 3.5–5.5)
RBC # BLD: 2.92 E12/L (ref 3.5–5.5)
RBC # BLD: 2.93 E12/L (ref 3.5–5.5)
RBC # BLD: 2.98 E12/L (ref 3.5–5.5)
RBC # BLD: 2.99 E12/L (ref 3.5–5.5)
RBC # BLD: 3 E12/L (ref 3.5–5.5)
RBC # BLD: 3 E12/L (ref 3.5–5.5)
RBC # BLD: 3.13 E12/L (ref 3.5–5.5)
RBC # BLD: 3.2 E12/L (ref 3.5–5.5)
RBC # BLD: 3.27 E12/L (ref 3.5–5.5)
RBC # BLD: 3.52 E12/L (ref 3.5–5.5)
RBC # BLD: 3.73 E12/L (ref 3.5–5.5)
RBC # BLD: 3.92 E12/L (ref 3.5–5.5)
RBC # BLD: 4.23 E12/L (ref 3.5–5.5)
RBC # BLD: ABNORMAL E12/L (ref 3.5–5.5)
RBC UA: ABNORMAL /HPF (ref 0–2)
REASON FOR REJECTION: NORMAL
REJECTED TEST: NORMAL
SARS-COV-2, NAAT: NOT DETECTED
SARS-COV-2, PCR: NOT DETECTED
SARS-COV-2, PCR: NOT DETECTED
SCHISTOCYTES: ABNORMAL
SEDIMENTATION RATE, ERYTHROCYTE: 65 MM/HR (ref 0–20)
SERRATIA MARCESCENS BY PCR: NOT DETECTED
SODIUM BLD-SCNC: 135 MMOL/L (ref 132–146)
SODIUM BLD-SCNC: 136 MMOL/L (ref 132–146)
SODIUM BLD-SCNC: 137 MMOL/L (ref 132–146)
SODIUM BLD-SCNC: 138 MMOL/L (ref 132–146)
SODIUM BLD-SCNC: 139 MMOL/L (ref 132–146)
SODIUM BLD-SCNC: 140 MMOL/L (ref 132–146)
SODIUM BLD-SCNC: 141 MMOL/L (ref 132–146)
SODIUM URINE: <20 MMOL/L
SOURCE OF BLOOD CULTURE: NORMAL
SPECIFIC GRAVITY UA: 1.02 (ref 1–1.03)
SPECIFIC GRAVITY UA: >=1.03 (ref 1–1.03)
SPHEROCYTES: ABNORMAL
STAPHYLOCOCCUS AUREUS BY PCR: NOT DETECTED
STAPHYLOCOCCUS SPECIES BY PCR: NOT DETECTED
STREPTOCOCCUS AGALACTIAE BY PCR: NOT DETECTED
STREPTOCOCCUS PNEUMONIAE BY PCR: NOT DETECTED
STREPTOCOCCUS PYOGENES  BY PCR: NOT DETECTED
STREPTOCOCCUS SPECIES BY PCR: NOT DETECTED
T4 FREE: 1.64 NG/DL (ref 0.93–1.7)
TARGET CELLS: ABNORMAL
TEAR DROP CELLS: ABNORMAL
TOTAL IRON BINDING CAPACITY: 140 MCG/DL (ref 250–450)
TOTAL PROTEIN: 4.7 G/DL (ref 6.4–8.3)
TOTAL PROTEIN: 4.9 G/DL (ref 6.4–8.3)
TOTAL PROTEIN: 5 G/DL (ref 6.4–8.3)
TOTAL PROTEIN: 5 G/DL (ref 6.4–8.3)
TOTAL PROTEIN: 5.1 G/DL (ref 6.4–8.3)
TOTAL PROTEIN: 5.2 G/DL (ref 6.4–8.3)
TOTAL PROTEIN: 5.3 G/DL (ref 6.4–8.3)
TOTAL PROTEIN: 5.4 G/DL (ref 6.4–8.3)
TOTAL PROTEIN: 5.5 G/DL (ref 6.4–8.3)
TOTAL PROTEIN: 5.5 G/DL (ref 6.4–8.3)
TOTAL PROTEIN: 5.6 G/DL (ref 6.4–8.3)
TOTAL PROTEIN: 5.6 G/DL (ref 6.4–8.3)
TOTAL PROTEIN: 6 G/DL (ref 6.4–8.3)
TOTAL PROTEIN: 6.8 G/DL (ref 6.4–8.3)
TOTAL PROTEIN: 6.9 G/DL (ref 6.4–8.3)
TROPONIN: 0.01 NG/ML (ref 0–0.03)
TROPONIN: <0.01 NG/ML (ref 0–0.03)
TSH SERPL DL<=0.05 MIU/L-ACNC: 1.66 UIU/ML (ref 0.27–4.2)
TSH SERPL DL<=0.05 MIU/L-ACNC: 2.71 UIU/ML (ref 0.27–4.2)
UREA NITROGEN, UR: 572 MG/DL (ref 800–1666)
UROBILINOGEN, URINE: 0.2 E.U./DL
UROBILINOGEN, URINE: 0.2 E.U./DL
VITAMIN B-12: 753 PG/ML (ref 211–946)
VITAMIN D 25-HYDROXY: 57 NG/ML (ref 30–100)
VITAMIN D 25-HYDROXY: 72 NG/ML (ref 30–100)
WBC # BLD: 11.3 E9/L (ref 4.5–11.5)
WBC # BLD: 11.7 E9/L (ref 4.5–11.5)
WBC # BLD: 13.8 E9/L (ref 4.5–11.5)
WBC # BLD: 20.5 E9/L (ref 4.5–11.5)
WBC # BLD: 21.4 E9/L (ref 4.5–11.5)
WBC # BLD: 3.2 E9/L (ref 4.5–11.5)
WBC # BLD: 3.2 E9/L (ref 4.5–11.5)
WBC # BLD: 3.3 E9/L (ref 4.5–11.5)
WBC # BLD: 4.1 E9/L (ref 4.5–11.5)
WBC # BLD: 4.2 E9/L (ref 4.5–11.5)
WBC # BLD: 4.2 E9/L (ref 4.5–11.5)
WBC # BLD: 4.4 E9/L (ref 4.5–11.5)
WBC # BLD: 4.4 E9/L (ref 4.5–11.5)
WBC # BLD: 4.8 E9/L (ref 4.5–11.5)
WBC # BLD: 4.9 E9/L (ref 4.5–11.5)
WBC # BLD: 4.9 E9/L (ref 4.5–11.5)
WBC # BLD: 5.1 E9/L (ref 4.5–11.5)
WBC # BLD: 5.2 E9/L (ref 4.5–11.5)
WBC # BLD: 5.2 E9/L (ref 4.5–11.5)
WBC # BLD: 5.3 E9/L (ref 4.5–11.5)
WBC # BLD: 5.3 E9/L (ref 4.5–11.5)
WBC # BLD: 5.4 E9/L (ref 4.5–11.5)
WBC # BLD: 5.5 E9/L (ref 4.5–11.5)
WBC # BLD: 5.5 E9/L (ref 4.5–11.5)
WBC # BLD: 5.7 E9/L (ref 4.5–11.5)
WBC # BLD: 5.7 E9/L (ref 4.5–11.5)
WBC # BLD: 5.8 E9/L (ref 4.5–11.5)
WBC # BLD: 5.9 E9/L (ref 4.5–11.5)
WBC # BLD: 6 E9/L (ref 4.5–11.5)
WBC # BLD: 6.1 E9/L (ref 4.5–11.5)
WBC # BLD: 6.1 E9/L (ref 4.5–11.5)
WBC # BLD: 6.4 E9/L (ref 4.5–11.5)
WBC # BLD: 7 E9/L (ref 4.5–11.5)
WBC # BLD: 7.6 E9/L (ref 4.5–11.5)
WBC # BLD: 8.5 E9/L (ref 4.5–11.5)
WBC # BLD: 8.6 E9/L (ref 4.5–11.5)
WBC # BLD: 9.8 E9/L (ref 4.5–11.5)
WBC # BLD: ABNORMAL E9/L (ref 4.5–11.5)
WBC UA: ABNORMAL /HPF (ref 0–5)

## 2021-01-01 PROCEDURE — 96367 TX/PROPH/DG ADDL SEQ IV INF: CPT

## 2021-01-01 PROCEDURE — 71045 X-RAY EXAM CHEST 1 VIEW: CPT

## 2021-01-01 PROCEDURE — 6370000000 HC RX 637 (ALT 250 FOR IP): Performed by: SPECIALIST

## 2021-01-01 PROCEDURE — 6360000002 HC RX W HCPCS: Performed by: EMERGENCY MEDICINE

## 2021-01-01 PROCEDURE — 90935 HEMODIALYSIS ONE EVALUATION: CPT

## 2021-01-01 PROCEDURE — 97110 THERAPEUTIC EXERCISES: CPT

## 2021-01-01 PROCEDURE — 2580000003 HC RX 258: Performed by: SURGERY

## 2021-01-01 PROCEDURE — 2580000003 HC RX 258: Performed by: INTERNAL MEDICINE

## 2021-01-01 PROCEDURE — 36415 COLL VENOUS BLD VENIPUNCTURE: CPT

## 2021-01-01 PROCEDURE — 84100 ASSAY OF PHOSPHORUS: CPT

## 2021-01-01 PROCEDURE — 36573 INSJ PICC RS&I 5 YR+: CPT | Performed by: RADIOLOGY

## 2021-01-01 PROCEDURE — 6370000000 HC RX 637 (ALT 250 FOR IP): Performed by: INTERNAL MEDICINE

## 2021-01-01 PROCEDURE — 6370000000 HC RX 637 (ALT 250 FOR IP): Performed by: SURGERY

## 2021-01-01 PROCEDURE — 3609012400 HC EGD TRANSORAL BIOPSY SINGLE/MULTIPLE: Performed by: INTERNAL MEDICINE

## 2021-01-01 PROCEDURE — 6360000002 HC RX W HCPCS: Performed by: INTERNAL MEDICINE

## 2021-01-01 PROCEDURE — 85730 THROMBOPLASTIN TIME PARTIAL: CPT

## 2021-01-01 PROCEDURE — 1036F TOBACCO NON-USER: CPT | Performed by: INTERNAL MEDICINE

## 2021-01-01 PROCEDURE — 76937 US GUIDE VASCULAR ACCESS: CPT

## 2021-01-01 PROCEDURE — 80053 COMPREHEN METABOLIC PANEL: CPT

## 2021-01-01 PROCEDURE — 2500000003 HC RX 250 WO HCPCS: Performed by: SPECIALIST

## 2021-01-01 PROCEDURE — 83735 ASSAY OF MAGNESIUM: CPT

## 2021-01-01 PROCEDURE — 6360000002 HC RX W HCPCS: Performed by: SURGERY

## 2021-01-01 PROCEDURE — 2000000000 HC ICU R&B

## 2021-01-01 PROCEDURE — 44139 MOBILIZATION OF COLON: CPT | Performed by: SURGERY

## 2021-01-01 PROCEDURE — 74230 X-RAY XM SWLNG FUNCJ C+: CPT

## 2021-01-01 PROCEDURE — 4040F PNEUMOC VAC/ADMIN/RCVD: CPT | Performed by: INTERNAL MEDICINE

## 2021-01-01 PROCEDURE — 93325 DOPPLER ECHO COLOR FLOW MAPG: CPT

## 2021-01-01 PROCEDURE — 2580000003 HC RX 258: Performed by: NURSE ANESTHETIST, CERTIFIED REGISTERED

## 2021-01-01 PROCEDURE — 2500000003 HC RX 250 WO HCPCS

## 2021-01-01 PROCEDURE — U0005 INFEC AGEN DETEC AMPLI PROBE: HCPCS

## 2021-01-01 PROCEDURE — C1751 CATH, INF, PER/CENT/MIDLINE: HCPCS | Performed by: SURGERY

## 2021-01-01 PROCEDURE — 6360000002 HC RX W HCPCS: Performed by: SPECIALIST

## 2021-01-01 PROCEDURE — 5A2204Z RESTORATION OF CARDIAC RHYTHM, SINGLE: ICD-10-PCS | Performed by: INTERNAL MEDICINE

## 2021-01-01 PROCEDURE — 93005 ELECTROCARDIOGRAM TRACING: CPT | Performed by: NURSE PRACTITIONER

## 2021-01-01 PROCEDURE — 36592 COLLECT BLOOD FROM PICC: CPT

## 2021-01-01 PROCEDURE — 83690 ASSAY OF LIPASE: CPT

## 2021-01-01 PROCEDURE — 97530 THERAPEUTIC ACTIVITIES: CPT

## 2021-01-01 PROCEDURE — 97116 GAIT TRAINING THERAPY: CPT | Performed by: PHYSICAL THERAPIST

## 2021-01-01 PROCEDURE — 85027 COMPLETE CBC AUTOMATED: CPT

## 2021-01-01 PROCEDURE — 2700000000 HC OXYGEN THERAPY PER DAY

## 2021-01-01 PROCEDURE — 97110 THERAPEUTIC EXERCISES: CPT | Performed by: PHYSICAL THERAPIST

## 2021-01-01 PROCEDURE — 85025 COMPLETE CBC W/AUTO DIFF WBC: CPT

## 2021-01-01 PROCEDURE — 82607 VITAMIN B-12: CPT

## 2021-01-01 PROCEDURE — 02HV33Z INSERTION OF INFUSION DEVICE INTO SUPERIOR VENA CAVA, PERCUTANEOUS APPROACH: ICD-10-PCS | Performed by: INTERNAL MEDICINE

## 2021-01-01 PROCEDURE — 85610 PROTHROMBIN TIME: CPT

## 2021-01-01 PROCEDURE — 99214 OFFICE O/P EST MOD 30 MIN: CPT | Performed by: INTERNAL MEDICINE

## 2021-01-01 PROCEDURE — 74176 CT ABD & PELVIS W/O CONTRAST: CPT

## 2021-01-01 PROCEDURE — 2580000003 HC RX 258: Performed by: RADIOLOGY

## 2021-01-01 PROCEDURE — 99024 POSTOP FOLLOW-UP VISIT: CPT | Performed by: SURGERY

## 2021-01-01 PROCEDURE — 82306 VITAMIN D 25 HYDROXY: CPT

## 2021-01-01 PROCEDURE — 2060000000 HC ICU INTERMEDIATE R&B

## 2021-01-01 PROCEDURE — 83605 ASSAY OF LACTIC ACID: CPT

## 2021-01-01 PROCEDURE — 84133 ASSAY OF URINE POTASSIUM: CPT

## 2021-01-01 PROCEDURE — 80048 BASIC METABOLIC PNL TOTAL CA: CPT

## 2021-01-01 PROCEDURE — 2500000003 HC RX 250 WO HCPCS: Performed by: SURGERY

## 2021-01-01 PROCEDURE — 2720000010 HC SURG SUPPLY STERILE: Performed by: SURGERY

## 2021-01-01 PROCEDURE — 93971 EXTREMITY STUDY: CPT

## 2021-01-01 PROCEDURE — 6370000000 HC RX 637 (ALT 250 FOR IP): Performed by: NURSE PRACTITIONER

## 2021-01-01 PROCEDURE — 81001 URINALYSIS AUTO W/SCOPE: CPT

## 2021-01-01 PROCEDURE — 2500000003 HC RX 250 WO HCPCS: Performed by: EMERGENCY MEDICINE

## 2021-01-01 PROCEDURE — 2580000003 HC RX 258: Performed by: SPECIALIST

## 2021-01-01 PROCEDURE — 94640 AIRWAY INHALATION TREATMENT: CPT

## 2021-01-01 PROCEDURE — 1200000000 HC SEMI PRIVATE

## 2021-01-01 PROCEDURE — 3700000001 HC ADD 15 MINUTES (ANESTHESIA): Performed by: SURGERY

## 2021-01-01 PROCEDURE — 82436 ASSAY OF URINE CHLORIDE: CPT

## 2021-01-01 PROCEDURE — 82570 ASSAY OF URINE CREATININE: CPT

## 2021-01-01 PROCEDURE — 92611 MOTION FLUOROSCOPY/SWALLOW: CPT | Performed by: SPEECH-LANGUAGE PATHOLOGIST

## 2021-01-01 PROCEDURE — 0W9J0ZZ DRAINAGE OF PELVIC CAVITY, OPEN APPROACH: ICD-10-PCS | Performed by: SURGERY

## 2021-01-01 PROCEDURE — 77001 FLUOROGUIDE FOR VEIN DEVICE: CPT

## 2021-01-01 PROCEDURE — 3600000004 HC SURGERY LEVEL 4 BASE: Performed by: SURGERY

## 2021-01-01 PROCEDURE — 6360000004 HC RX CONTRAST MEDICATION: Performed by: RADIOLOGY

## 2021-01-01 PROCEDURE — 80074 ACUTE HEPATITIS PANEL: CPT

## 2021-01-01 PROCEDURE — 7100000011 HC PHASE II RECOVERY - ADDTL 15 MIN: Performed by: INTERNAL MEDICINE

## 2021-01-01 PROCEDURE — 92960 CARDIOVERSION ELECTRIC EXT: CPT | Performed by: INTERNAL MEDICINE

## 2021-01-01 PROCEDURE — 97161 PT EVAL LOW COMPLEX 20 MIN: CPT | Performed by: PHYSICAL THERAPIST

## 2021-01-01 PROCEDURE — 2580000003 HC RX 258

## 2021-01-01 PROCEDURE — C1751 CATH, INF, PER/CENT/MIDLINE: HCPCS

## 2021-01-01 PROCEDURE — 97535 SELF CARE MNGMENT TRAINING: CPT

## 2021-01-01 PROCEDURE — 94150 VITAL CAPACITY TEST: CPT

## 2021-01-01 PROCEDURE — 88305 TISSUE EXAM BY PATHOLOGIST: CPT

## 2021-01-01 PROCEDURE — 2709999900 HC NON-CHARGEABLE SUPPLY: Performed by: INTERNAL MEDICINE

## 2021-01-01 PROCEDURE — U0003 INFECTIOUS AGENT DETECTION BY NUCLEIC ACID (DNA OR RNA); SEVERE ACUTE RESPIRATORY SYNDROME CORONAVIRUS 2 (SARS-COV-2) (CORONAVIRUS DISEASE [COVID-19]), AMPLIFIED PROBE TECHNIQUE, MAKING USE OF HIGH THROUGHPUT TECHNOLOGIES AS DESCRIBED BY CMS-2020-01-R: HCPCS

## 2021-01-01 PROCEDURE — 87635 SARS-COV-2 COVID-19 AMP PRB: CPT

## 2021-01-01 PROCEDURE — 36556 INSERT NON-TUNNEL CV CATH: CPT

## 2021-01-01 PROCEDURE — 88342 IMHCHEM/IMCYTCHM 1ST ANTB: CPT

## 2021-01-01 PROCEDURE — P9047 ALBUMIN (HUMAN), 25%, 50ML: HCPCS | Performed by: INTERNAL MEDICINE

## 2021-01-01 PROCEDURE — 0DJ08ZZ INSPECTION OF UPPER INTESTINAL TRACT, VIA NATURAL OR ARTIFICIAL OPENING ENDOSCOPIC: ICD-10-PCS | Performed by: INTERNAL MEDICINE

## 2021-01-01 PROCEDURE — 97168 OT RE-EVAL EST PLAN CARE: CPT

## 2021-01-01 PROCEDURE — 87040 BLOOD CULTURE FOR BACTERIA: CPT

## 2021-01-01 PROCEDURE — 74018 RADEX ABDOMEN 1 VIEW: CPT

## 2021-01-01 PROCEDURE — APPSS60 APP SPLIT SHARED TIME 46-60 MINUTES: Performed by: NURSE PRACTITIONER

## 2021-01-01 PROCEDURE — 86901 BLOOD TYPING SEROLOGIC RH(D): CPT

## 2021-01-01 PROCEDURE — 1090F PRES/ABSN URINE INCON ASSESS: CPT | Performed by: INTERNAL MEDICINE

## 2021-01-01 PROCEDURE — 84145 PROCALCITONIN (PCT): CPT

## 2021-01-01 PROCEDURE — 3700000000 HC ANESTHESIA ATTENDED CARE: Performed by: INTERNAL MEDICINE

## 2021-01-01 PROCEDURE — 1111F DSCHRG MED/CURRENT MED MERGE: CPT | Performed by: INTERNAL MEDICINE

## 2021-01-01 PROCEDURE — 83880 ASSAY OF NATRIURETIC PEPTIDE: CPT

## 2021-01-01 PROCEDURE — 2500000003 HC RX 250 WO HCPCS: Performed by: INTERNAL MEDICINE

## 2021-01-01 PROCEDURE — 97530 THERAPEUTIC ACTIVITIES: CPT | Performed by: PHYSICAL THERAPIST

## 2021-01-01 PROCEDURE — 93306 TTE W/DOPPLER COMPLETE: CPT

## 2021-01-01 PROCEDURE — 99285 EMERGENCY DEPT VISIT HI MDM: CPT

## 2021-01-01 PROCEDURE — 93312 ECHO TRANSESOPHAGEAL: CPT

## 2021-01-01 PROCEDURE — 6360000002 HC RX W HCPCS

## 2021-01-01 PROCEDURE — 71250 CT THORAX DX C-: CPT

## 2021-01-01 PROCEDURE — G8417 CALC BMI ABV UP PARAM F/U: HCPCS | Performed by: INTERNAL MEDICINE

## 2021-01-01 PROCEDURE — 49020 DRAINAGE ABDOM ABSCESS OPEN: CPT | Performed by: SURGERY

## 2021-01-01 PROCEDURE — 7100000010 HC PHASE II RECOVERY - FIRST 15 MIN: Performed by: INTERNAL MEDICINE

## 2021-01-01 PROCEDURE — G8427 DOCREV CUR MEDS BY ELIG CLIN: HCPCS | Performed by: INTERNAL MEDICINE

## 2021-01-01 PROCEDURE — 96365 THER/PROPH/DIAG IV INF INIT: CPT

## 2021-01-01 PROCEDURE — 83970 ASSAY OF PARATHORMONE: CPT

## 2021-01-01 PROCEDURE — 93010 ELECTROCARDIOGRAM REPORT: CPT | Performed by: INTERNAL MEDICINE

## 2021-01-01 PROCEDURE — 87205 SMEAR GRAM STAIN: CPT

## 2021-01-01 PROCEDURE — 93325 DOPPLER ECHO COLOR FLOW MAPG: CPT | Performed by: INTERNAL MEDICINE

## 2021-01-01 PROCEDURE — 36556 INSERT NON-TUNNEL CV CATH: CPT | Performed by: SURGERY

## 2021-01-01 PROCEDURE — 2709999900 HC NON-CHARGEABLE SUPPLY: Performed by: SURGERY

## 2021-01-01 PROCEDURE — 3700000000 HC ANESTHESIA ATTENDED CARE: Performed by: SURGERY

## 2021-01-01 PROCEDURE — 36430 TRANSFUSION BLD/BLD COMPNT: CPT

## 2021-01-01 PROCEDURE — C9113 INJ PANTOPRAZOLE SODIUM, VIA: HCPCS | Performed by: INTERNAL MEDICINE

## 2021-01-01 PROCEDURE — 87070 CULTURE OTHR SPECIMN AEROBIC: CPT

## 2021-01-01 PROCEDURE — 2500000003 HC RX 250 WO HCPCS: Performed by: CLINICAL NURSE SPECIALIST

## 2021-01-01 PROCEDURE — 88307 TISSUE EXAM BY PATHOLOGIST: CPT

## 2021-01-01 PROCEDURE — 6370000000 HC RX 637 (ALT 250 FOR IP): Performed by: EMERGENCY MEDICINE

## 2021-01-01 PROCEDURE — 70450 CT HEAD/BRAIN W/O DYE: CPT

## 2021-01-01 PROCEDURE — 99284 EMERGENCY DEPT VISIT MOD MDM: CPT

## 2021-01-01 PROCEDURE — 94664 DEMO&/EVAL PT USE INHALER: CPT

## 2021-01-01 PROCEDURE — 99222 1ST HOSP IP/OBS MODERATE 55: CPT | Performed by: INTERNAL MEDICINE

## 2021-01-01 PROCEDURE — G8400 PT W/DXA NO RESULTS DOC: HCPCS | Performed by: INTERNAL MEDICINE

## 2021-01-01 PROCEDURE — 7100000000 HC PACU RECOVERY - FIRST 15 MIN

## 2021-01-01 PROCEDURE — 86923 COMPATIBILITY TEST ELECTRIC: CPT

## 2021-01-01 PROCEDURE — 36558 INSERT TUNNELED CV CATH: CPT

## 2021-01-01 PROCEDURE — 92960 CARDIOVERSION ELECTRIC EXT: CPT

## 2021-01-01 PROCEDURE — 3600000014 HC SURGERY LEVEL 4 ADDTL 15MIN: Performed by: SURGERY

## 2021-01-01 PROCEDURE — 5A1D70Z PERFORMANCE OF URINARY FILTRATION, INTERMITTENT, LESS THAN 6 HOURS PER DAY: ICD-10-PCS | Performed by: INTERNAL MEDICINE

## 2021-01-01 PROCEDURE — 1123F ACP DISCUSS/DSCN MKR DOCD: CPT | Performed by: INTERNAL MEDICINE

## 2021-01-01 PROCEDURE — 97165 OT EVAL LOW COMPLEX 30 MIN: CPT

## 2021-01-01 PROCEDURE — 85651 RBC SED RATE NONAUTOMATED: CPT

## 2021-01-01 PROCEDURE — 3700000000 HC ANESTHESIA ATTENDED CARE

## 2021-01-01 PROCEDURE — 84484 ASSAY OF TROPONIN QUANT: CPT

## 2021-01-01 PROCEDURE — 76705 ECHO EXAM OF ABDOMEN: CPT

## 2021-01-01 PROCEDURE — P9059 PLASMA, FRZ BETWEEN 8-24HOUR: HCPCS

## 2021-01-01 PROCEDURE — 93000 ELECTROCARDIOGRAM COMPLETE: CPT | Performed by: INTERNAL MEDICINE

## 2021-01-01 PROCEDURE — 90935 HEMODIALYSIS ONE EVALUATION: CPT | Performed by: INTERNAL MEDICINE

## 2021-01-01 PROCEDURE — 3700000001 HC ADD 15 MINUTES (ANESTHESIA)

## 2021-01-01 PROCEDURE — 99232 SBSQ HOSP IP/OBS MODERATE 35: CPT | Performed by: INTERNAL MEDICINE

## 2021-01-01 PROCEDURE — 82330 ASSAY OF CALCIUM: CPT

## 2021-01-01 PROCEDURE — C1769 GUIDE WIRE: HCPCS

## 2021-01-01 PROCEDURE — P9045 ALBUMIN (HUMAN), 5%, 250 ML: HCPCS | Performed by: NURSE ANESTHETIST, CERTIFIED REGISTERED

## 2021-01-01 PROCEDURE — 2580000003 HC RX 258: Performed by: EMERGENCY MEDICINE

## 2021-01-01 PROCEDURE — 84439 ASSAY OF FREE THYROXINE: CPT

## 2021-01-01 PROCEDURE — 7100000001 HC PACU RECOVERY - ADDTL 15 MIN

## 2021-01-01 PROCEDURE — 96375 TX/PRO/DX INJ NEW DRUG ADDON: CPT

## 2021-01-01 PROCEDURE — B24BZZ4 ULTRASONOGRAPHY OF HEART WITH AORTA, TRANSESOPHAGEAL: ICD-10-PCS | Performed by: INTERNAL MEDICINE

## 2021-01-01 PROCEDURE — 83550 IRON BINDING TEST: CPT

## 2021-01-01 PROCEDURE — 84540 ASSAY OF URINE/UREA-N: CPT

## 2021-01-01 PROCEDURE — 84443 ASSAY THYROID STIM HORMONE: CPT

## 2021-01-01 PROCEDURE — 93005 ELECTROCARDIOGRAM TRACING: CPT | Performed by: EMERGENCY MEDICINE

## 2021-01-01 PROCEDURE — 93312 ECHO TRANSESOPHAGEAL: CPT | Performed by: INTERNAL MEDICINE

## 2021-01-01 PROCEDURE — 96366 THER/PROPH/DIAG IV INF ADDON: CPT

## 2021-01-01 PROCEDURE — 83540 ASSAY OF IRON: CPT

## 2021-01-01 PROCEDURE — 85018 HEMOGLOBIN: CPT

## 2021-01-01 PROCEDURE — 87102 FUNGUS ISOLATION CULTURE: CPT

## 2021-01-01 PROCEDURE — 85014 HEMATOCRIT: CPT

## 2021-01-01 PROCEDURE — 96374 THER/PROPH/DIAG INJ IV PUSH: CPT

## 2021-01-01 PROCEDURE — 99223 1ST HOSP IP/OBS HIGH 75: CPT | Performed by: SURGERY

## 2021-01-01 PROCEDURE — 6360000002 HC RX W HCPCS: Performed by: RADIOLOGY

## 2021-01-01 PROCEDURE — 92610 EVALUATE SWALLOWING FUNCTION: CPT | Performed by: SPEECH-LANGUAGE PATHOLOGIST

## 2021-01-01 PROCEDURE — 86900 BLOOD TYPING SEROLOGIC ABO: CPT

## 2021-01-01 PROCEDURE — 99213 OFFICE O/P EST LOW 20 MIN: CPT | Performed by: INTERNAL MEDICINE

## 2021-01-01 PROCEDURE — 82728 ASSAY OF FERRITIN: CPT

## 2021-01-01 PROCEDURE — 97164 PT RE-EVAL EST PLAN CARE: CPT | Performed by: PHYSICAL THERAPIST

## 2021-01-01 PROCEDURE — 6360000002 HC RX W HCPCS: Performed by: NURSE PRACTITIONER

## 2021-01-01 PROCEDURE — 2500000003 HC RX 250 WO HCPCS: Performed by: NURSE ANESTHETIST, CERTIFIED REGISTERED

## 2021-01-01 PROCEDURE — 76937 US GUIDE VASCULAR ACCESS: CPT | Performed by: SURGERY

## 2021-01-01 PROCEDURE — 87081 CULTURE SCREEN ONLY: CPT

## 2021-01-01 PROCEDURE — 0DTN0ZZ RESECTION OF SIGMOID COLON, OPEN APPROACH: ICD-10-PCS | Performed by: SURGERY

## 2021-01-01 PROCEDURE — 84300 ASSAY OF URINE SODIUM: CPT

## 2021-01-01 PROCEDURE — 92526 ORAL FUNCTION THERAPY: CPT | Performed by: SPEECH-LANGUAGE PATHOLOGIST

## 2021-01-01 PROCEDURE — 87150 DNA/RNA AMPLIFIED PROBE: CPT

## 2021-01-01 PROCEDURE — 86850 RBC ANTIBODY SCREEN: CPT

## 2021-01-01 PROCEDURE — 3609017700 HC EGD DILATION GASTRIC/DUODENAL STRICTURE: Performed by: INTERNAL MEDICINE

## 2021-01-01 PROCEDURE — 6360000002 HC RX W HCPCS: Performed by: NURSE ANESTHETIST, CERTIFIED REGISTERED

## 2021-01-01 PROCEDURE — 44143 PARTIAL REMOVAL OF COLON: CPT | Performed by: SURGERY

## 2021-01-01 PROCEDURE — 84132 ASSAY OF SERUM POTASSIUM: CPT

## 2021-01-01 PROCEDURE — 81003 URINALYSIS AUTO W/O SCOPE: CPT

## 2021-01-01 PROCEDURE — 0D1N0Z4 BYPASS SIGMOID COLON TO CUTANEOUS, OPEN APPROACH: ICD-10-PCS | Performed by: SURGERY

## 2021-01-01 RX ORDER — MONTELUKAST SODIUM 10 MG/1
10 TABLET ORAL NIGHTLY
Status: DISCONTINUED | OUTPATIENT
Start: 2021-01-01 | End: 2021-01-01 | Stop reason: HOSPADM

## 2021-01-01 RX ORDER — SODIUM CHLORIDE 9 MG/ML
INJECTION, SOLUTION INTRAVENOUS CONTINUOUS
Status: ACTIVE | OUTPATIENT
Start: 2021-01-01 | End: 2021-01-01

## 2021-01-01 RX ORDER — OXYCODONE HYDROCHLORIDE 5 MG/1
5 TABLET ORAL EVERY 4 HOURS PRN
Status: DISCONTINUED | OUTPATIENT
Start: 2021-01-01 | End: 2021-01-01 | Stop reason: HOSPADM

## 2021-01-01 RX ORDER — DILTIAZEM HYDROCHLORIDE 120 MG/1
120 CAPSULE, COATED, EXTENDED RELEASE ORAL 2 TIMES DAILY
Status: DISCONTINUED | OUTPATIENT
Start: 2021-01-01 | End: 2021-01-01 | Stop reason: HOSPADM

## 2021-01-01 RX ORDER — SODIUM CHLORIDE 0.9 % (FLUSH) 0.9 %
10 SYRINGE (ML) INJECTION PRN
Status: DISCONTINUED | OUTPATIENT
Start: 2021-01-01 | End: 2021-01-01 | Stop reason: HOSPADM

## 2021-01-01 RX ORDER — SODIUM CHLORIDE 9 MG/ML
INJECTION, SOLUTION INTRAVENOUS CONTINUOUS
Status: DISCONTINUED | OUTPATIENT
Start: 2021-01-01 | End: 2021-01-01

## 2021-01-01 RX ORDER — GLYCOPYRROLATE 1 MG/5 ML
SYRINGE (ML) INTRAVENOUS PRN
Status: DISCONTINUED | OUTPATIENT
Start: 2021-01-01 | End: 2021-01-01 | Stop reason: SDUPTHER

## 2021-01-01 RX ORDER — LIDOCAINE HYDROCHLORIDE 10 MG/ML
5 INJECTION, SOLUTION INFILTRATION; PERINEURAL ONCE
Status: DISCONTINUED | OUTPATIENT
Start: 2021-01-01 | End: 2021-01-01 | Stop reason: HOSPADM

## 2021-01-01 RX ORDER — DEXTROSE, SODIUM CHLORIDE, SODIUM LACTATE, POTASSIUM CHLORIDE, AND CALCIUM CHLORIDE 5; .6; .31; .03; .02 G/100ML; G/100ML; G/100ML; G/100ML; G/100ML
250 INJECTION, SOLUTION INTRAVENOUS ONCE
Status: COMPLETED | OUTPATIENT
Start: 2021-01-01 | End: 2021-01-01

## 2021-01-01 RX ORDER — METOLAZONE 2.5 MG/1
5 TABLET ORAL DAILY
Status: DISCONTINUED | OUTPATIENT
Start: 2021-01-01 | End: 2021-01-01

## 2021-01-01 RX ORDER — METOCLOPRAMIDE 5 MG/1
5 TABLET ORAL
Status: DISCONTINUED | OUTPATIENT
Start: 2021-01-01 | End: 2021-01-01

## 2021-01-01 RX ORDER — LORAZEPAM 0.5 MG/1
0.5 TABLET ORAL EVERY 8 HOURS PRN
Status: DISCONTINUED | OUTPATIENT
Start: 2021-01-01 | End: 2021-01-01 | Stop reason: HOSPADM

## 2021-01-01 RX ORDER — ONDANSETRON 4 MG/1
4 TABLET, ORALLY DISINTEGRATING ORAL 3 TIMES DAILY PRN
Status: DISCONTINUED | OUTPATIENT
Start: 2021-01-01 | End: 2021-01-01

## 2021-01-01 RX ORDER — BENZONATATE 100 MG/1
200 CAPSULE ORAL 3 TIMES DAILY
Status: DISCONTINUED | OUTPATIENT
Start: 2021-01-01 | End: 2021-01-01 | Stop reason: HOSPADM

## 2021-01-01 RX ORDER — MIDAZOLAM HYDROCHLORIDE 1 MG/ML
INJECTION INTRAMUSCULAR; INTRAVENOUS PRN
Status: DISCONTINUED | OUTPATIENT
Start: 2021-01-01 | End: 2021-01-01 | Stop reason: SDUPTHER

## 2021-01-01 RX ORDER — POTASSIUM CHLORIDE 20 MEQ/1
20 TABLET, EXTENDED RELEASE ORAL 3 TIMES DAILY
Status: DISCONTINUED | OUTPATIENT
Start: 2021-01-01 | End: 2021-01-01 | Stop reason: HOSPADM

## 2021-01-01 RX ORDER — FLUCONAZOLE 100 MG/1
200 TABLET ORAL DAILY
Status: DISCONTINUED | OUTPATIENT
Start: 2021-01-01 | End: 2021-01-01

## 2021-01-01 RX ORDER — SODIUM CHLORIDE 9 MG/ML
1000 INJECTION, SOLUTION INTRAVENOUS CONTINUOUS
Status: ACTIVE | OUTPATIENT
Start: 2021-01-01 | End: 2021-01-01

## 2021-01-01 RX ORDER — LISINOPRIL 5 MG/1
5 TABLET ORAL DAILY
Qty: 30 TABLET | Refills: 3 | Status: SHIPPED | OUTPATIENT
Start: 2021-01-01 | End: 2021-01-01

## 2021-01-01 RX ORDER — DILTIAZEM HYDROCHLORIDE 120 MG/1
120 CAPSULE, COATED, EXTENDED RELEASE ORAL 2 TIMES DAILY
Qty: 30 CAPSULE | Refills: 3 | Status: SHIPPED | OUTPATIENT
Start: 2021-01-01 | End: 2021-01-01

## 2021-01-01 RX ORDER — 0.9 % SODIUM CHLORIDE 0.9 %
500 INTRAVENOUS SOLUTION INTRAVENOUS ONCE
Status: COMPLETED | OUTPATIENT
Start: 2021-01-01 | End: 2021-01-01

## 2021-01-01 RX ORDER — METOPROLOL TARTRATE 100 MG/1
100 TABLET ORAL 2 TIMES DAILY
Qty: 180 TABLET | Refills: 2 | Status: SHIPPED | OUTPATIENT
Start: 2021-01-01

## 2021-01-01 RX ORDER — SODIUM CHLORIDE, SODIUM LACTATE, POTASSIUM CHLORIDE, CALCIUM CHLORIDE 600; 310; 30; 20 MG/100ML; MG/100ML; MG/100ML; MG/100ML
INJECTION, SOLUTION INTRAVENOUS CONTINUOUS PRN
Status: DISCONTINUED | OUTPATIENT
Start: 2021-01-01 | End: 2021-01-01 | Stop reason: SDUPTHER

## 2021-01-01 RX ORDER — DIPHENHYDRAMINE HYDROCHLORIDE 50 MG/ML
12.5 INJECTION INTRAMUSCULAR; INTRAVENOUS
Status: DISCONTINUED | OUTPATIENT
Start: 2021-01-01 | End: 2021-01-01 | Stop reason: HOSPADM

## 2021-01-01 RX ORDER — DILTIAZEM HYDROCHLORIDE 5 MG/ML
20 INJECTION INTRAVENOUS ONCE
Status: DISCONTINUED | OUTPATIENT
Start: 2021-01-01 | End: 2021-01-01

## 2021-01-01 RX ORDER — ANASTROZOLE 1 MG/1
1 TABLET ORAL DAILY
Status: DISCONTINUED | OUTPATIENT
Start: 2021-01-01 | End: 2021-01-01

## 2021-01-01 RX ORDER — PREDNISONE 10 MG/1
10 TABLET ORAL DAILY
Status: ON HOLD | COMMUNITY
End: 2021-01-01 | Stop reason: ALTCHOICE

## 2021-01-01 RX ORDER — MAGNESIUM OXIDE 400 MG/1
400 TABLET ORAL ONCE
Status: COMPLETED | OUTPATIENT
Start: 2021-01-01 | End: 2021-01-01

## 2021-01-01 RX ORDER — SODIUM CHLORIDE 9 MG/ML
INJECTION, SOLUTION INTRAVENOUS PRN
Status: DISCONTINUED | OUTPATIENT
Start: 2021-01-01 | End: 2021-01-01

## 2021-01-01 RX ORDER — DIGOXIN 250 MCG
250 TABLET ORAL SEE ADMIN INSTRUCTIONS
Status: DISCONTINUED | OUTPATIENT
Start: 2021-01-01 | End: 2021-01-01

## 2021-01-01 RX ORDER — LABETALOL HYDROCHLORIDE 5 MG/ML
5 INJECTION, SOLUTION INTRAVENOUS EVERY 10 MIN PRN
Status: DISCONTINUED | OUTPATIENT
Start: 2021-01-01 | End: 2021-01-01 | Stop reason: HOSPADM

## 2021-01-01 RX ORDER — LEVOFLOXACIN 500 MG/1
500 TABLET, FILM COATED ORAL DAILY
Status: DISCONTINUED | OUTPATIENT
Start: 2021-01-01 | End: 2021-01-01 | Stop reason: DRUGHIGH

## 2021-01-01 RX ORDER — ALBUMIN, HUMAN INJ 5% 5 %
SOLUTION INTRAVENOUS PRN
Status: DISCONTINUED | OUTPATIENT
Start: 2021-01-01 | End: 2021-01-01 | Stop reason: SDUPTHER

## 2021-01-01 RX ORDER — ALBUMIN (HUMAN) 12.5 G/50ML
25 SOLUTION INTRAVENOUS 3 TIMES DAILY
Status: DISCONTINUED | OUTPATIENT
Start: 2021-01-01 | End: 2021-01-01

## 2021-01-01 RX ORDER — PANTOPRAZOLE SODIUM 40 MG/1
40 TABLET, DELAYED RELEASE ORAL
Status: DISCONTINUED | OUTPATIENT
Start: 2021-01-01 | End: 2021-01-01 | Stop reason: HOSPADM

## 2021-01-01 RX ORDER — ONDANSETRON 2 MG/ML
4 INJECTION INTRAMUSCULAR; INTRAVENOUS ONCE
Status: COMPLETED | OUTPATIENT
Start: 2021-01-01 | End: 2021-01-01

## 2021-01-01 RX ORDER — CALCIUM POLYCARBOPHIL 625 MG 625 MG/1
625 TABLET ORAL DAILY
Status: DISCONTINUED | OUTPATIENT
Start: 2021-01-01 | End: 2021-01-01 | Stop reason: HOSPADM

## 2021-01-01 RX ORDER — ANTICOAGULANT CITRATE DEXTROSE SOLUTION FORMULA A 12.25; 11; 3.65 G/500ML; G/500ML; G/500ML
3.1 SOLUTION INTRAVENOUS PRN
Status: DISCONTINUED | OUTPATIENT
Start: 2021-01-01 | End: 2021-01-01 | Stop reason: HOSPADM

## 2021-01-01 RX ORDER — PREDNISONE 10 MG/1
10 TABLET ORAL DAILY
Status: DISCONTINUED | OUTPATIENT
Start: 2021-01-01 | End: 2021-01-01

## 2021-01-01 RX ORDER — FERROUS SULFATE 325(65) MG
325 TABLET ORAL 2 TIMES DAILY WITH MEALS
Status: DISCONTINUED | OUTPATIENT
Start: 2021-01-01 | End: 2021-01-01 | Stop reason: HOSPADM

## 2021-01-01 RX ORDER — ANASTROZOLE 1 MG/1
1 TABLET ORAL DAILY
Status: DISCONTINUED | OUTPATIENT
Start: 2021-01-01 | End: 2021-01-01 | Stop reason: HOSPADM

## 2021-01-01 RX ORDER — ONDANSETRON 4 MG/1
4 TABLET, ORALLY DISINTEGRATING ORAL EVERY 8 HOURS PRN
Status: DISCONTINUED | OUTPATIENT
Start: 2021-01-01 | End: 2021-01-01

## 2021-01-01 RX ORDER — PREDNISONE 10 MG/1
10 TABLET ORAL DAILY
Status: DISCONTINUED | OUTPATIENT
Start: 2021-01-01 | End: 2021-01-01 | Stop reason: HOSPADM

## 2021-01-01 RX ORDER — LEVOFLOXACIN 500 MG/1
500 TABLET, FILM COATED ORAL EVERY OTHER DAY
Status: DISCONTINUED | OUTPATIENT
Start: 2021-01-01 | End: 2021-01-01 | Stop reason: HOSPADM

## 2021-01-01 RX ORDER — HEPARIN SODIUM 5000 [USP'U]/ML
5000 INJECTION, SOLUTION INTRAVENOUS; SUBCUTANEOUS EVERY 8 HOURS SCHEDULED
Status: DISCONTINUED | OUTPATIENT
Start: 2021-01-01 | End: 2021-01-01

## 2021-01-01 RX ORDER — ONDANSETRON 2 MG/ML
INJECTION INTRAMUSCULAR; INTRAVENOUS
Status: COMPLETED
Start: 2021-01-01 | End: 2021-01-01

## 2021-01-01 RX ORDER — POTASSIUM CHLORIDE 29.8 MG/ML
20 INJECTION INTRAVENOUS
Status: COMPLETED | OUTPATIENT
Start: 2021-01-01 | End: 2021-01-01

## 2021-01-01 RX ORDER — ALBUMIN (HUMAN) 12.5 G/50ML
25 SOLUTION INTRAVENOUS PRN
Status: DISCONTINUED | OUTPATIENT
Start: 2021-01-01 | End: 2021-01-01 | Stop reason: HOSPADM

## 2021-01-01 RX ORDER — SODIUM CHLORIDE 9 MG/ML
25 INJECTION, SOLUTION INTRAVENOUS PRN
Status: DISCONTINUED | OUTPATIENT
Start: 2021-01-01 | End: 2021-01-01 | Stop reason: HOSPADM

## 2021-01-01 RX ORDER — METOLAZONE 5 MG/1
5 TABLET ORAL DAILY
Qty: 30 TABLET | Refills: 0 | Status: ON HOLD
Start: 2021-01-01 | End: 2021-01-01

## 2021-01-01 RX ORDER — IPRATROPIUM BROMIDE AND ALBUTEROL SULFATE 2.5; .5 MG/3ML; MG/3ML
1 SOLUTION RESPIRATORY (INHALATION) 3 TIMES DAILY
Status: DISCONTINUED | OUTPATIENT
Start: 2021-01-01 | End: 2021-01-01

## 2021-01-01 RX ORDER — METHYLPREDNISOLONE SODIUM SUCCINATE 40 MG/ML
40 INJECTION, POWDER, LYOPHILIZED, FOR SOLUTION INTRAMUSCULAR; INTRAVENOUS EVERY 12 HOURS
Status: DISCONTINUED | OUTPATIENT
Start: 2021-01-01 | End: 2021-01-01

## 2021-01-01 RX ORDER — LACTULOSE 10 G/15ML
20 SOLUTION ORAL 2 TIMES DAILY
Status: DISCONTINUED | OUTPATIENT
Start: 2021-01-01 | End: 2021-01-01

## 2021-01-01 RX ORDER — OXYMETAZOLINE HYDROCHLORIDE 0.05 G/100ML
2 SPRAY NASAL 2 TIMES DAILY
Status: COMPLETED | OUTPATIENT
Start: 2021-01-01 | End: 2021-01-01

## 2021-01-01 RX ORDER — LISINOPRIL 5 MG/1
5 TABLET ORAL DAILY
Status: DISCONTINUED | OUTPATIENT
Start: 2021-01-01 | End: 2021-01-01 | Stop reason: HOSPADM

## 2021-01-01 RX ORDER — SUCCINYLCHOLINE/SOD CL,ISO/PF 100 MG/5ML
SYRINGE (ML) INTRAVENOUS PRN
Status: DISCONTINUED | OUTPATIENT
Start: 2021-01-01 | End: 2021-01-01 | Stop reason: SDUPTHER

## 2021-01-01 RX ORDER — POTASSIUM BICARBONATE 25 MEQ/1
50 TABLET, EFFERVESCENT ORAL ONCE
Status: DISCONTINUED | OUTPATIENT
Start: 2021-01-01 | End: 2021-01-01 | Stop reason: CLARIF

## 2021-01-01 RX ORDER — AMIODARONE HYDROCHLORIDE 200 MG/1
200 TABLET ORAL DAILY
COMMUNITY
End: 2021-01-01

## 2021-01-01 RX ORDER — AMIODARONE HYDROCHLORIDE 200 MG/1
200 TABLET ORAL 2 TIMES DAILY
Status: DISCONTINUED | OUTPATIENT
Start: 2021-01-01 | End: 2021-01-01 | Stop reason: HOSPADM

## 2021-01-01 RX ORDER — LACTULOSE 10 G/15ML
20 SOLUTION ORAL 3 TIMES DAILY
Status: DISCONTINUED | OUTPATIENT
Start: 2021-01-01 | End: 2021-01-01

## 2021-01-01 RX ORDER — FUROSEMIDE 10 MG/ML
60 INJECTION INTRAMUSCULAR; INTRAVENOUS 3 TIMES DAILY
Status: DISCONTINUED | OUTPATIENT
Start: 2021-01-01 | End: 2021-01-01

## 2021-01-01 RX ORDER — FENTANYL CITRATE 50 UG/ML
50 INJECTION, SOLUTION INTRAMUSCULAR; INTRAVENOUS
Status: DISCONTINUED | OUTPATIENT
Start: 2021-01-01 | End: 2021-01-01 | Stop reason: HOSPADM

## 2021-01-01 RX ORDER — BIOTIN 1 MG
1 TABLET ORAL DAILY
Status: DISCONTINUED | OUTPATIENT
Start: 2021-01-01 | End: 2021-01-01 | Stop reason: CLARIF

## 2021-01-01 RX ORDER — LORAZEPAM 0.5 MG/1
0.5 TABLET ORAL EVERY 8 HOURS PRN
COMMUNITY

## 2021-01-01 RX ORDER — NEOSTIGMINE METHYLSULFATE 1 MG/ML
INJECTION, SOLUTION INTRAVENOUS PRN
Status: DISCONTINUED | OUTPATIENT
Start: 2021-01-01 | End: 2021-01-01 | Stop reason: SDUPTHER

## 2021-01-01 RX ORDER — FUROSEMIDE 10 MG/ML
40 INJECTION INTRAMUSCULAR; INTRAVENOUS 3 TIMES DAILY
Status: DISCONTINUED | OUTPATIENT
Start: 2021-01-01 | End: 2021-01-01

## 2021-01-01 RX ORDER — AMIODARONE HYDROCHLORIDE 200 MG/1
200 TABLET ORAL SEE ADMIN INSTRUCTIONS
Qty: 180 TABLET | Refills: 0 | Status: SHIPPED
Start: 2021-01-01 | End: 2021-01-01

## 2021-01-01 RX ORDER — METOLAZONE 5 MG/1
TABLET ORAL
Qty: 30 TABLET | Refills: 0 | Status: SHIPPED | OUTPATIENT
Start: 2021-01-01

## 2021-01-01 RX ORDER — PANTOPRAZOLE SODIUM 40 MG/10ML
40 INJECTION, POWDER, LYOPHILIZED, FOR SOLUTION INTRAVENOUS ONCE
Status: COMPLETED | OUTPATIENT
Start: 2021-01-01 | End: 2021-01-01

## 2021-01-01 RX ORDER — AMIODARONE HYDROCHLORIDE 200 MG/1
200 TABLET ORAL DAILY
COMMUNITY

## 2021-01-01 RX ORDER — DEXTROSE, SODIUM CHLORIDE, SODIUM LACTATE, POTASSIUM CHLORIDE, AND CALCIUM CHLORIDE 5; .6; .31; .03; .02 G/100ML; G/100ML; G/100ML; G/100ML; G/100ML
INJECTION, SOLUTION INTRAVENOUS CONTINUOUS
Status: DISCONTINUED | OUTPATIENT
Start: 2021-01-01 | End: 2021-01-01

## 2021-01-01 RX ORDER — VASOPRESSIN 20 U/ML
INJECTION PARENTERAL PRN
Status: DISCONTINUED | OUTPATIENT
Start: 2021-01-01 | End: 2021-01-01 | Stop reason: SDUPTHER

## 2021-01-01 RX ORDER — IPRATROPIUM BROMIDE AND ALBUTEROL SULFATE 2.5; .5 MG/3ML; MG/3ML
1 SOLUTION RESPIRATORY (INHALATION) 2 TIMES DAILY
Status: DISCONTINUED | OUTPATIENT
Start: 2021-01-01 | End: 2021-01-01 | Stop reason: HOSPADM

## 2021-01-01 RX ORDER — BENZONATATE 200 MG/1
200 CAPSULE ORAL 3 TIMES DAILY
Qty: 21 CAPSULE | Refills: 0 | Status: SHIPPED | OUTPATIENT
Start: 2021-01-01 | End: 2021-01-01

## 2021-01-01 RX ORDER — AMIODARONE HYDROCHLORIDE 200 MG/1
TABLET ORAL
COMMUNITY
Start: 2021-01-01 | End: 2021-01-01

## 2021-01-01 RX ORDER — LACTULOSE 10 G/15ML
20 SOLUTION ORAL DAILY
Status: DISCONTINUED | OUTPATIENT
Start: 2021-01-01 | End: 2021-01-01 | Stop reason: HOSPADM

## 2021-01-01 RX ORDER — SODIUM CHLORIDE 0.9 % (FLUSH) 0.9 %
SYRINGE (ML) INJECTION
Status: COMPLETED
Start: 2021-01-01 | End: 2021-01-01

## 2021-01-01 RX ORDER — TRAMADOL HYDROCHLORIDE 50 MG/1
50 TABLET ORAL EVERY 6 HOURS PRN
Status: DISCONTINUED | OUTPATIENT
Start: 2021-01-01 | End: 2021-01-01 | Stop reason: HOSPADM

## 2021-01-01 RX ORDER — ONDANSETRON 2 MG/ML
4 INJECTION INTRAMUSCULAR; INTRAVENOUS EVERY 6 HOURS PRN
Status: DISCONTINUED | OUTPATIENT
Start: 2021-01-01 | End: 2021-01-01

## 2021-01-01 RX ORDER — PROCHLORPERAZINE EDISYLATE 5 MG/ML
5 INJECTION INTRAMUSCULAR; INTRAVENOUS
Status: DISCONTINUED | OUTPATIENT
Start: 2021-01-01 | End: 2021-01-01 | Stop reason: HOSPADM

## 2021-01-01 RX ORDER — FUROSEMIDE 40 MG/1
40 TABLET ORAL 2 TIMES DAILY
Status: DISCONTINUED | OUTPATIENT
Start: 2021-01-01 | End: 2021-01-01 | Stop reason: HOSPADM

## 2021-01-01 RX ORDER — DIPHENHYDRAMINE HYDROCHLORIDE 50 MG/ML
25 INJECTION INTRAMUSCULAR; INTRAVENOUS EVERY 6 HOURS PRN
Status: DISCONTINUED | OUTPATIENT
Start: 2021-01-01 | End: 2021-01-01 | Stop reason: HOSPADM

## 2021-01-01 RX ORDER — SODIUM CHLORIDE 9 MG/ML
INJECTION, SOLUTION INTRAVENOUS CONTINUOUS PRN
Status: DISCONTINUED | OUTPATIENT
Start: 2021-01-01 | End: 2021-01-01 | Stop reason: SDUPTHER

## 2021-01-01 RX ORDER — POTASSIUM CHLORIDE 20 MEQ/1
20 TABLET, EXTENDED RELEASE ORAL 3 TIMES DAILY
Qty: 60 TABLET | Refills: 3 | Status: SHIPPED | OUTPATIENT
Start: 2021-01-01

## 2021-01-01 RX ORDER — DIGOXIN 0.25 MG/ML
500 INJECTION INTRAMUSCULAR; INTRAVENOUS DAILY
Status: DISCONTINUED | OUTPATIENT
Start: 2021-01-01 | End: 2021-01-01

## 2021-01-01 RX ORDER — MORPHINE SULFATE 2 MG/ML
2 INJECTION, SOLUTION INTRAMUSCULAR; INTRAVENOUS
Status: DISCONTINUED | OUTPATIENT
Start: 2021-01-01 | End: 2021-01-01

## 2021-01-01 RX ORDER — ROCURONIUM BROMIDE 10 MG/ML
INJECTION, SOLUTION INTRAVENOUS PRN
Status: DISCONTINUED | OUTPATIENT
Start: 2021-01-01 | End: 2021-01-01 | Stop reason: SDUPTHER

## 2021-01-01 RX ORDER — POTASSIUM CHLORIDE 29.8 MG/ML
20 INJECTION INTRAVENOUS ONCE
Status: COMPLETED | OUTPATIENT
Start: 2021-01-01 | End: 2021-01-01

## 2021-01-01 RX ORDER — SODIUM CHLORIDE 0.9 % (FLUSH) 0.9 %
5-40 SYRINGE (ML) INJECTION EVERY 12 HOURS SCHEDULED
Status: DISCONTINUED | OUTPATIENT
Start: 2021-01-01 | End: 2021-01-01 | Stop reason: HOSPADM

## 2021-01-01 RX ORDER — METRONIDAZOLE 500 MG/1
500 TABLET ORAL EVERY 8 HOURS SCHEDULED
Status: DISCONTINUED | OUTPATIENT
Start: 2021-01-01 | End: 2021-01-01

## 2021-01-01 RX ORDER — METRONIDAZOLE 500 MG/1
500 TABLET ORAL EVERY 12 HOURS SCHEDULED
Status: DISCONTINUED | OUTPATIENT
Start: 2021-01-01 | End: 2021-01-01

## 2021-01-01 RX ORDER — MEPERIDINE HYDROCHLORIDE 25 MG/ML
12.5 INJECTION INTRAMUSCULAR; INTRAVENOUS; SUBCUTANEOUS EVERY 5 MIN PRN
Status: DISCONTINUED | OUTPATIENT
Start: 2021-01-01 | End: 2021-01-01 | Stop reason: HOSPADM

## 2021-01-01 RX ORDER — FLUCONAZOLE 2 MG/ML
200 INJECTION, SOLUTION INTRAVENOUS EVERY 24 HOURS
Status: DISCONTINUED | OUTPATIENT
Start: 2021-01-01 | End: 2021-01-01

## 2021-01-01 RX ORDER — PROPOFOL 10 MG/ML
INJECTION, EMULSION INTRAVENOUS PRN
Status: DISCONTINUED | OUTPATIENT
Start: 2021-01-01 | End: 2021-01-01 | Stop reason: SDUPTHER

## 2021-01-01 RX ORDER — PROMETHAZINE HYDROCHLORIDE 25 MG/ML
6.25 INJECTION, SOLUTION INTRAMUSCULAR; INTRAVENOUS
Status: DISCONTINUED | OUTPATIENT
Start: 2021-01-01 | End: 2021-01-01 | Stop reason: HOSPADM

## 2021-01-01 RX ORDER — LISINOPRIL 20 MG/1
20 TABLET ORAL DAILY
Status: DISCONTINUED | OUTPATIENT
Start: 2021-01-01 | End: 2021-01-01

## 2021-01-01 RX ORDER — ALBUMIN (HUMAN) 12.5 G/50ML
25 SOLUTION INTRAVENOUS ONCE
Status: DISCONTINUED | OUTPATIENT
Start: 2021-01-01 | End: 2021-01-01 | Stop reason: HOSPADM

## 2021-01-01 RX ORDER — MAGNESIUM SULFATE IN WATER 40 MG/ML
2000 INJECTION, SOLUTION INTRAVENOUS ONCE
Status: COMPLETED | OUTPATIENT
Start: 2021-01-01 | End: 2021-01-01

## 2021-01-01 RX ORDER — PREDNISONE 10 MG/1
10 TABLET ORAL DAILY
Qty: 10 TABLET | Refills: 0 | Status: SHIPPED | OUTPATIENT
Start: 2021-01-01 | End: 2021-01-01

## 2021-01-01 RX ORDER — CETIRIZINE HYDROCHLORIDE 10 MG/1
10 TABLET ORAL DAILY
Status: DISCONTINUED | OUTPATIENT
Start: 2021-01-01 | End: 2021-01-01 | Stop reason: HOSPADM

## 2021-01-01 RX ORDER — LEVOTHYROXINE SODIUM 0.05 MG/1
50 TABLET ORAL DAILY
Status: DISCONTINUED | OUTPATIENT
Start: 2021-01-01 | End: 2021-01-01 | Stop reason: HOSPADM

## 2021-01-01 RX ORDER — POTASSIUM CHLORIDE 20 MEQ/1
40 TABLET, EXTENDED RELEASE ORAL ONCE
Status: COMPLETED | OUTPATIENT
Start: 2021-01-01 | End: 2021-01-01

## 2021-01-01 RX ORDER — DIGOXIN 0.25 MG/ML
500 INJECTION INTRAMUSCULAR; INTRAVENOUS ONCE
Status: COMPLETED | OUTPATIENT
Start: 2021-01-01 | End: 2021-01-01

## 2021-01-01 RX ORDER — ONDANSETRON 2 MG/ML
8 INJECTION INTRAMUSCULAR; INTRAVENOUS EVERY 6 HOURS PRN
Status: DISCONTINUED | OUTPATIENT
Start: 2021-01-01 | End: 2021-01-01

## 2021-01-01 RX ORDER — LACTULOSE 10 G/15ML
20 SOLUTION ORAL 3 TIMES DAILY
Status: DISCONTINUED | OUTPATIENT
Start: 2021-01-01 | End: 2021-01-01 | Stop reason: HOSPADM

## 2021-01-01 RX ORDER — ALBUMIN (HUMAN) 12.5 G/50ML
25 SOLUTION INTRAVENOUS ONCE
Status: COMPLETED | OUTPATIENT
Start: 2021-01-01 | End: 2021-01-01

## 2021-01-01 RX ORDER — BUMETANIDE 2 MG/1
2 TABLET ORAL 2 TIMES DAILY
Qty: 180 TABLET | Refills: 3 | Status: SHIPPED | OUTPATIENT
Start: 2021-01-01

## 2021-01-01 RX ORDER — CIPROFLOXACIN 2 MG/ML
400 INJECTION, SOLUTION INTRAVENOUS ONCE
Status: COMPLETED | OUTPATIENT
Start: 2021-01-01 | End: 2021-01-01

## 2021-01-01 RX ORDER — BUMETANIDE 1 MG/1
2 TABLET ORAL 2 TIMES DAILY
Status: DISCONTINUED | OUTPATIENT
Start: 2021-01-01 | End: 2021-01-01

## 2021-01-01 RX ORDER — MIDODRINE HYDROCHLORIDE 5 MG/1
5 TABLET ORAL
Status: DISCONTINUED | OUTPATIENT
Start: 2021-01-01 | End: 2021-01-01

## 2021-01-01 RX ORDER — MIDODRINE HYDROCHLORIDE 5 MG/1
2.5 TABLET ORAL
Status: DISCONTINUED | OUTPATIENT
Start: 2021-01-01 | End: 2021-01-01

## 2021-01-01 RX ORDER — DIGOXIN 250 MCG
250 TABLET ORAL SEE ADMIN INSTRUCTIONS
Qty: 90 TABLET | Refills: 1 | Status: SHIPPED | OUTPATIENT
Start: 2021-01-01

## 2021-01-01 RX ORDER — MIDODRINE HYDROCHLORIDE 5 MG/1
5 TABLET ORAL 2 TIMES DAILY WITH MEALS
Status: DISCONTINUED | OUTPATIENT
Start: 2021-01-01 | End: 2021-01-01 | Stop reason: HOSPADM

## 2021-01-01 RX ORDER — SODIUM CHLORIDE 0.9 % (FLUSH) 0.9 %
5-40 SYRINGE (ML) INJECTION PRN
Status: DISCONTINUED | OUTPATIENT
Start: 2021-01-01 | End: 2021-01-01 | Stop reason: HOSPADM

## 2021-01-01 RX ORDER — LIDOCAINE HYDROCHLORIDE 10 MG/ML
INJECTION, SOLUTION EPIDURAL; INFILTRATION; INTRACAUDAL; PERINEURAL PRN
Status: DISCONTINUED | OUTPATIENT
Start: 2021-01-01 | End: 2021-01-01 | Stop reason: SDUPTHER

## 2021-01-01 RX ORDER — CIPROFLOXACIN 2 MG/ML
400 INJECTION, SOLUTION INTRAVENOUS EVERY 24 HOURS
Status: DISCONTINUED | OUTPATIENT
Start: 2021-01-01 | End: 2021-01-01

## 2021-01-01 RX ORDER — VITAMIN B COMPLEX
2 TABLET ORAL DAILY
Status: DISCONTINUED | OUTPATIENT
Start: 2021-01-01 | End: 2021-01-01 | Stop reason: HOSPADM

## 2021-01-01 RX ORDER — DIPHENHYDRAMINE HYDROCHLORIDE 50 MG/ML
50 INJECTION INTRAMUSCULAR; INTRAVENOUS EVERY 6 HOURS PRN
Status: DISCONTINUED | OUTPATIENT
Start: 2021-01-01 | End: 2021-01-01 | Stop reason: HOSPADM

## 2021-01-01 RX ORDER — FENTANYL CITRATE 50 UG/ML
INJECTION, SOLUTION INTRAMUSCULAR; INTRAVENOUS PRN
Status: DISCONTINUED | OUTPATIENT
Start: 2021-01-01 | End: 2021-01-01 | Stop reason: SDUPTHER

## 2021-01-01 RX ORDER — BUMETANIDE 0.25 MG/ML
2 INJECTION, SOLUTION INTRAMUSCULAR; INTRAVENOUS 2 TIMES DAILY
Status: COMPLETED | OUTPATIENT
Start: 2021-01-01 | End: 2021-01-01

## 2021-01-01 RX ORDER — ONDANSETRON 2 MG/ML
INJECTION INTRAMUSCULAR; INTRAVENOUS PRN
Status: DISCONTINUED | OUTPATIENT
Start: 2021-01-01 | End: 2021-01-01 | Stop reason: SDUPTHER

## 2021-01-01 RX ORDER — ONDANSETRON 2 MG/ML
4 INJECTION INTRAMUSCULAR; INTRAVENOUS EVERY 6 HOURS PRN
Status: DISCONTINUED | OUTPATIENT
Start: 2021-01-01 | End: 2021-01-01 | Stop reason: HOSPADM

## 2021-01-01 RX ORDER — MIDAZOLAM HYDROCHLORIDE 1 MG/ML
INJECTION INTRAMUSCULAR; INTRAVENOUS
Status: COMPLETED | OUTPATIENT
Start: 2021-01-01 | End: 2021-01-01

## 2021-01-01 RX ORDER — SODIUM CHLORIDE 0.9 % (FLUSH) 0.9 %
10 SYRINGE (ML) INJECTION EVERY 12 HOURS SCHEDULED
Status: DISCONTINUED | OUTPATIENT
Start: 2021-01-01 | End: 2021-01-01 | Stop reason: HOSPADM

## 2021-01-01 RX ORDER — POTASSIUM CHLORIDE 29.8 MG/ML
40 INJECTION INTRAVENOUS ONCE
Status: COMPLETED | OUTPATIENT
Start: 2021-01-01 | End: 2021-01-01

## 2021-01-01 RX ORDER — AMIODARONE HYDROCHLORIDE 200 MG/1
200 TABLET ORAL DAILY
Status: DISCONTINUED | OUTPATIENT
Start: 2021-01-01 | End: 2021-01-01

## 2021-01-01 RX ORDER — TRIAMCINOLONE ACETONIDE 1 MG/G
CREAM TOPICAL 2 TIMES DAILY
Status: DISCONTINUED | OUTPATIENT
Start: 2021-01-01 | End: 2021-01-01 | Stop reason: HOSPADM

## 2021-01-01 RX ORDER — METOPROLOL TARTRATE 50 MG/1
100 TABLET, FILM COATED ORAL 2 TIMES DAILY
Status: DISCONTINUED | OUTPATIENT
Start: 2021-01-01 | End: 2021-01-01

## 2021-01-01 RX ORDER — MIDODRINE HYDROCHLORIDE 5 MG/1
10 TABLET ORAL
Status: DISCONTINUED | OUTPATIENT
Start: 2021-01-01 | End: 2021-01-01

## 2021-01-01 RX ORDER — FENTANYL CITRATE 50 UG/ML
INJECTION, SOLUTION INTRAMUSCULAR; INTRAVENOUS
Status: COMPLETED | OUTPATIENT
Start: 2021-01-01 | End: 2021-01-01

## 2021-01-01 RX ORDER — FUROSEMIDE 10 MG/ML
40 INJECTION INTRAMUSCULAR; INTRAVENOUS ONCE
Status: COMPLETED | OUTPATIENT
Start: 2021-01-01 | End: 2021-01-01

## 2021-01-01 RX ORDER — CIPROFLOXACIN 2 MG/ML
400 INJECTION, SOLUTION INTRAVENOUS EVERY 12 HOURS
Status: DISCONTINUED | OUTPATIENT
Start: 2021-01-01 | End: 2021-01-01 | Stop reason: DRUGHIGH

## 2021-01-01 RX ADMIN — LACTULOSE 20 G: 20 SOLUTION ORAL at 14:48

## 2021-01-01 RX ADMIN — PANTOPRAZOLE SODIUM 40 MG: 40 TABLET, DELAYED RELEASE ORAL at 06:12

## 2021-01-01 RX ADMIN — AZTREONAM 1000 MG: 1 INJECTION, POWDER, LYOPHILIZED, FOR SOLUTION INTRAMUSCULAR; INTRAVENOUS at 14:16

## 2021-01-01 RX ADMIN — AZTREONAM 1000 MG: 1 INJECTION, POWDER, LYOPHILIZED, FOR SOLUTION INTRAMUSCULAR; INTRAVENOUS at 22:39

## 2021-01-01 RX ADMIN — METRONIDAZOLE 500 MG: 500 INJECTION, SOLUTION INTRAVENOUS at 18:06

## 2021-01-01 RX ADMIN — FLUCONAZOLE IN SODIUM CHLORIDE 200 MG: 2 INJECTION, SOLUTION INTRAVENOUS at 09:25

## 2021-01-01 RX ADMIN — Medication 10 ML: at 21:09

## 2021-01-01 RX ADMIN — OXYMETAZOLINE HYDROCHLORIDE 2 SPRAY: 0.05 SPRAY NASAL at 10:59

## 2021-01-01 RX ADMIN — Medication 10 ML: at 09:00

## 2021-01-01 RX ADMIN — RIFAXIMIN 550 MG: 550 TABLET ORAL at 08:10

## 2021-01-01 RX ADMIN — MONTELUKAST SODIUM 10 MG: 10 TABLET, FILM COATED ORAL at 21:30

## 2021-01-01 RX ADMIN — Medication 10 ML: at 08:41

## 2021-01-01 RX ADMIN — BENZONATATE 200 MG: 100 CAPSULE ORAL at 14:48

## 2021-01-01 RX ADMIN — LEVOTHYROXINE SODIUM 50 MCG: 50 TABLET ORAL at 06:03

## 2021-01-01 RX ADMIN — ANTICOAGULANT CITRATE DEXTROSE SOLUTION FORMULA A 3.1 ML: 12.25; 11; 3.65 SOLUTION INTRAVENOUS at 07:13

## 2021-01-01 RX ADMIN — LACTULOSE 20 G: 20 SOLUTION ORAL at 16:49

## 2021-01-01 RX ADMIN — FERROUS SULFATE TAB 325 MG (65 MG ELEMENTAL FE) 325 MG: 325 (65 FE) TAB at 17:17

## 2021-01-01 RX ADMIN — LEVOTHYROXINE SODIUM 50 MCG: 50 TABLET ORAL at 06:34

## 2021-01-01 RX ADMIN — LACTULOSE 20 G: 20 SOLUTION ORAL at 16:30

## 2021-01-01 RX ADMIN — METOPROLOL TARTRATE 25 MG: 25 TABLET, FILM COATED ORAL at 21:05

## 2021-01-01 RX ADMIN — LEVOTHYROXINE SODIUM 50 MCG: 50 TABLET ORAL at 06:21

## 2021-01-01 RX ADMIN — FLUCONAZOLE IN SODIUM CHLORIDE 200 MG: 2 INJECTION, SOLUTION INTRAVENOUS at 08:28

## 2021-01-01 RX ADMIN — Medication 10 ML: at 21:33

## 2021-01-01 RX ADMIN — MIDODRINE HYDROCHLORIDE 5 MG: 5 TABLET ORAL at 17:18

## 2021-01-01 RX ADMIN — SODIUM CHLORIDE, SODIUM LACTATE, POTASSIUM CHLORIDE, CALCIUM CHLORIDE AND DEXTROSE MONOHYDRATE: 5; 600; 310; 30; 20 INJECTION, SOLUTION INTRAVENOUS at 20:45

## 2021-01-01 RX ADMIN — METOCLOPRAMIDE 5 MG: 5 TABLET ORAL at 11:27

## 2021-01-01 RX ADMIN — TRIAMCINOLONE ACETONIDE: 1 CREAM TOPICAL at 21:09

## 2021-01-01 RX ADMIN — DIPHENHYDRAMINE HYDROCHLORIDE 25 MG: 50 INJECTION, SOLUTION INTRAMUSCULAR; INTRAVENOUS at 00:49

## 2021-01-01 RX ADMIN — METOPROLOL TARTRATE 25 MG: 25 TABLET, FILM COATED ORAL at 10:09

## 2021-01-01 RX ADMIN — CALCIUM POLYCARBOPHIL 625 MG: 625 TABLET, FILM COATED ORAL at 11:29

## 2021-01-01 RX ADMIN — PANTOPRAZOLE SODIUM 40 MG: 40 TABLET, DELAYED RELEASE ORAL at 05:54

## 2021-01-01 RX ADMIN — AZTREONAM 500 MG: 1 INJECTION, POWDER, LYOPHILIZED, FOR SOLUTION INTRAMUSCULAR; INTRAVENOUS at 02:56

## 2021-01-01 RX ADMIN — LEVOTHYROXINE SODIUM 50 MCG: 50 TABLET ORAL at 05:51

## 2021-01-01 RX ADMIN — AZTREONAM 1000 MG: 1 INJECTION, POWDER, LYOPHILIZED, FOR SOLUTION INTRAMUSCULAR; INTRAVENOUS at 14:50

## 2021-01-01 RX ADMIN — RIFAXIMIN 550 MG: 550 TABLET ORAL at 08:25

## 2021-01-01 RX ADMIN — DIGOXIN 500 MCG: 250 INJECTION, SOLUTION INTRAMUSCULAR; INTRAVENOUS; PARENTERAL at 17:42

## 2021-01-01 RX ADMIN — RIFAXIMIN 550 MG: 550 TABLET ORAL at 21:09

## 2021-01-01 RX ADMIN — Medication 10 ML: at 21:27

## 2021-01-01 RX ADMIN — APIXABAN 5 MG: 5 TABLET, FILM COATED ORAL at 09:19

## 2021-01-01 RX ADMIN — Medication 10 ML: at 02:13

## 2021-01-01 RX ADMIN — AZTREONAM 500 MG: 1 INJECTION, POWDER, LYOPHILIZED, FOR SOLUTION INTRAMUSCULAR; INTRAVENOUS at 14:35

## 2021-01-01 RX ADMIN — DILTIAZEM HYDROCHLORIDE 120 MG: 120 CAPSULE, COATED, EXTENDED RELEASE ORAL at 20:52

## 2021-01-01 RX ADMIN — METRONIDAZOLE 500 MG: 500 INJECTION, SOLUTION INTRAVENOUS at 06:30

## 2021-01-01 RX ADMIN — MIDODRINE HYDROCHLORIDE 10 MG: 5 TABLET ORAL at 16:25

## 2021-01-01 RX ADMIN — AZTREONAM 1000 MG: 1 INJECTION, POWDER, LYOPHILIZED, FOR SOLUTION INTRAMUSCULAR; INTRAVENOUS at 02:55

## 2021-01-01 RX ADMIN — CETIRIZINE HYDROCHLORIDE 10 MG: 10 TABLET, FILM COATED ORAL at 11:02

## 2021-01-01 RX ADMIN — METOPROLOL TARTRATE 25 MG: 25 TABLET, FILM COATED ORAL at 12:25

## 2021-01-01 RX ADMIN — METOPROLOL TARTRATE 100 MG: 50 TABLET, FILM COATED ORAL at 08:59

## 2021-01-01 RX ADMIN — METRONIDAZOLE 500 MG: 500 INJECTION, SOLUTION INTRAVENOUS at 02:17

## 2021-01-01 RX ADMIN — RIFAXIMIN 550 MG: 550 TABLET ORAL at 20:59

## 2021-01-01 RX ADMIN — RIFAXIMIN 550 MG: 550 TABLET ORAL at 11:10

## 2021-01-01 RX ADMIN — ANASTROZOLE 1 MG: 1 TABLET, COATED ORAL at 08:41

## 2021-01-01 RX ADMIN — LACTULOSE 20 G: 20 SOLUTION ORAL at 16:06

## 2021-01-01 RX ADMIN — AZTREONAM 1000 MG: 1 INJECTION, POWDER, LYOPHILIZED, FOR SOLUTION INTRAMUSCULAR; INTRAVENOUS at 06:21

## 2021-01-01 RX ADMIN — TRIAMCINOLONE ACETONIDE: 1 CREAM TOPICAL at 20:42

## 2021-01-01 RX ADMIN — AMIODARONE HYDROCHLORIDE 200 MG: 200 TABLET ORAL at 08:25

## 2021-01-01 RX ADMIN — LACTULOSE 20 G: 20 SOLUTION ORAL at 08:24

## 2021-01-01 RX ADMIN — SODIUM CHLORIDE: 9 INJECTION, SOLUTION INTRAVENOUS at 08:53

## 2021-01-01 RX ADMIN — Medication 10 ML: at 10:12

## 2021-01-01 RX ADMIN — Medication 20 ML: at 09:00

## 2021-01-01 RX ADMIN — RIFAXIMIN 550 MG: 550 TABLET ORAL at 09:18

## 2021-01-01 RX ADMIN — FUROSEMIDE 40 MG: 10 INJECTION, SOLUTION INTRAMUSCULAR; INTRAVENOUS at 14:07

## 2021-01-01 RX ADMIN — RIFAXIMIN 550 MG: 550 TABLET ORAL at 20:34

## 2021-01-01 RX ADMIN — METOCLOPRAMIDE 5 MG: 5 TABLET ORAL at 10:36

## 2021-01-01 RX ADMIN — AZTREONAM 1000 MG: 1 INJECTION, POWDER, LYOPHILIZED, FOR SOLUTION INTRAMUSCULAR; INTRAVENOUS at 23:00

## 2021-01-01 RX ADMIN — RIFAXIMIN 550 MG: 550 TABLET ORAL at 20:48

## 2021-01-01 RX ADMIN — PANTOPRAZOLE SODIUM 40 MG: 40 TABLET, DELAYED RELEASE ORAL at 05:59

## 2021-01-01 RX ADMIN — LEVOTHYROXINE SODIUM 50 MCG: 50 TABLET ORAL at 06:23

## 2021-01-01 RX ADMIN — FERROUS SULFATE TAB 325 MG (65 MG ELEMENTAL FE) 325 MG: 325 (65 FE) TAB at 09:04

## 2021-01-01 RX ADMIN — CETIRIZINE HYDROCHLORIDE 10 MG: 10 TABLET, FILM COATED ORAL at 09:59

## 2021-01-01 RX ADMIN — RIFAXIMIN 550 MG: 550 TABLET ORAL at 10:37

## 2021-01-01 RX ADMIN — FUROSEMIDE 40 MG: 10 INJECTION, SOLUTION INTRAMUSCULAR; INTRAVENOUS at 21:23

## 2021-01-01 RX ADMIN — SODIUM CHLORIDE, SODIUM LACTATE, POTASSIUM CHLORIDE, CALCIUM CHLORIDE AND DEXTROSE MONOHYDRATE: 5; 600; 310; 30; 20 INJECTION, SOLUTION INTRAVENOUS at 20:37

## 2021-01-01 RX ADMIN — CETIRIZINE HYDROCHLORIDE 10 MG: 10 TABLET, FILM COATED ORAL at 08:41

## 2021-01-01 RX ADMIN — METOPROLOL TARTRATE 25 MG: 25 TABLET, FILM COATED ORAL at 13:04

## 2021-01-01 RX ADMIN — MORPHINE SULFATE 2 MG: 2 INJECTION, SOLUTION INTRAMUSCULAR; INTRAVENOUS at 20:14

## 2021-01-01 RX ADMIN — SODIUM CHLORIDE: 9 INJECTION, SOLUTION INTRAVENOUS at 06:50

## 2021-01-01 RX ADMIN — MAGNESIUM OXIDE 400 MG (241.3 MG MAGNESIUM) TABLET 400 MG: TABLET at 17:46

## 2021-01-01 RX ADMIN — AMIODARONE HYDROCHLORIDE 200 MG: 200 TABLET ORAL at 08:50

## 2021-01-01 RX ADMIN — METRONIDAZOLE 500 MG: 500 INJECTION, SOLUTION INTRAVENOUS at 19:46

## 2021-01-01 RX ADMIN — LACTULOSE 20 G: 20 SOLUTION ORAL at 05:35

## 2021-01-01 RX ADMIN — METRONIDAZOLE 500 MG: 500 TABLET ORAL at 22:39

## 2021-01-01 RX ADMIN — MIDODRINE HYDROCHLORIDE 5 MG: 5 TABLET ORAL at 07:51

## 2021-01-01 RX ADMIN — APIXABAN 5 MG: 5 TABLET, FILM COATED ORAL at 20:57

## 2021-01-01 RX ADMIN — HYDROCORTISONE SODIUM SUCCINATE 50 MG: 100 INJECTION, POWDER, FOR SOLUTION INTRAMUSCULAR; INTRAVENOUS at 12:00

## 2021-01-01 RX ADMIN — LACTULOSE 20 G: 20 SOLUTION ORAL at 07:58

## 2021-01-01 RX ADMIN — AMIODARONE HYDROCHLORIDE 200 MG: 200 TABLET ORAL at 21:09

## 2021-01-01 RX ADMIN — Medication 10 ML: at 21:00

## 2021-01-01 RX ADMIN — MIDODRINE HYDROCHLORIDE 10 MG: 5 TABLET ORAL at 06:21

## 2021-01-01 RX ADMIN — MIDODRINE HYDROCHLORIDE 5 MG: 5 TABLET ORAL at 16:41

## 2021-01-01 RX ADMIN — METRONIDAZOLE 500 MG: 500 TABLET ORAL at 14:18

## 2021-01-01 RX ADMIN — IPRATROPIUM BROMIDE AND ALBUTEROL SULFATE 1 AMPULE: .5; 3 SOLUTION RESPIRATORY (INHALATION) at 06:30

## 2021-01-01 RX ADMIN — FUROSEMIDE 40 MG: 10 INJECTION, SOLUTION INTRAMUSCULAR; INTRAVENOUS at 08:46

## 2021-01-01 RX ADMIN — METOPROLOL TARTRATE 25 MG: 25 TABLET, FILM COATED ORAL at 11:01

## 2021-01-01 RX ADMIN — AMIODARONE HYDROCHLORIDE 200 MG: 200 TABLET ORAL at 11:11

## 2021-01-01 RX ADMIN — FERROUS SULFATE TAB 325 MG (65 MG ELEMENTAL FE) 325 MG: 325 (65 FE) TAB at 09:41

## 2021-01-01 RX ADMIN — AZTREONAM 1000 MG: 1 INJECTION, POWDER, LYOPHILIZED, FOR SOLUTION INTRAMUSCULAR; INTRAVENOUS at 06:03

## 2021-01-01 RX ADMIN — RIFAXIMIN 550 MG: 550 TABLET ORAL at 22:35

## 2021-01-01 RX ADMIN — MIDODRINE HYDROCHLORIDE 5 MG: 5 TABLET ORAL at 12:14

## 2021-01-01 RX ADMIN — PANTOPRAZOLE SODIUM 40 MG: 40 TABLET, DELAYED RELEASE ORAL at 06:23

## 2021-01-01 RX ADMIN — METOPROLOL TARTRATE 25 MG: 25 TABLET, FILM COATED ORAL at 20:53

## 2021-01-01 RX ADMIN — Medication 10 ML: at 08:28

## 2021-01-01 RX ADMIN — POTASSIUM CHLORIDE 20 MEQ: 1500 TABLET, EXTENDED RELEASE ORAL at 11:05

## 2021-01-01 RX ADMIN — ANASTROZOLE 1 MG: 1 TABLET, COATED ORAL at 08:27

## 2021-01-01 RX ADMIN — PANTOPRAZOLE SODIUM 40 MG: 40 TABLET, DELAYED RELEASE ORAL at 06:24

## 2021-01-01 RX ADMIN — LACTULOSE 20 G: 20 SOLUTION ORAL at 05:27

## 2021-01-01 RX ADMIN — IPRATROPIUM BROMIDE AND ALBUTEROL SULFATE 1 AMPULE: .5; 3 SOLUTION RESPIRATORY (INHALATION) at 16:43

## 2021-01-01 RX ADMIN — ANASTROZOLE 1 MG: 1 TABLET, COATED ORAL at 10:09

## 2021-01-01 RX ADMIN — AZTREONAM 1000 MG: 1 INJECTION, POWDER, LYOPHILIZED, FOR SOLUTION INTRAMUSCULAR; INTRAVENOUS at 06:11

## 2021-01-01 RX ADMIN — AZTREONAM 500 MG: 1 INJECTION, POWDER, LYOPHILIZED, FOR SOLUTION INTRAMUSCULAR; INTRAVENOUS at 13:54

## 2021-01-01 RX ADMIN — LEVOTHYROXINE SODIUM 50 MCG: 50 TABLET ORAL at 06:31

## 2021-01-01 RX ADMIN — FLUCONAZOLE IN SODIUM CHLORIDE 200 MG: 2 INJECTION, SOLUTION INTRAVENOUS at 09:30

## 2021-01-01 RX ADMIN — PANTOPRAZOLE SODIUM 40 MG: 40 TABLET, DELAYED RELEASE ORAL at 05:27

## 2021-01-01 RX ADMIN — SODIUM CHLORIDE: 9 INJECTION, SOLUTION INTRAVENOUS at 11:15

## 2021-01-01 RX ADMIN — SODIUM CHLORIDE, SODIUM LACTATE, POTASSIUM CHLORIDE, CALCIUM CHLORIDE AND DEXTROSE MONOHYDRATE: 5; 600; 310; 30; 20 INJECTION, SOLUTION INTRAVENOUS at 18:41

## 2021-01-01 RX ADMIN — METOPROLOL TARTRATE 25 MG: 25 TABLET, FILM COATED ORAL at 21:32

## 2021-01-01 RX ADMIN — Medication 10 ML: at 21:26

## 2021-01-01 RX ADMIN — AZTREONAM 1000 MG: 1 INJECTION, POWDER, LYOPHILIZED, FOR SOLUTION INTRAMUSCULAR; INTRAVENOUS at 14:20

## 2021-01-01 RX ADMIN — MONTELUKAST SODIUM 10 MG: 10 TABLET, FILM COATED ORAL at 20:02

## 2021-01-01 RX ADMIN — FERROUS SULFATE TAB 325 MG (65 MG ELEMENTAL FE) 325 MG: 325 (65 FE) TAB at 11:27

## 2021-01-01 RX ADMIN — AZTREONAM 500 MG: 1 INJECTION, POWDER, LYOPHILIZED, FOR SOLUTION INTRAMUSCULAR; INTRAVENOUS at 07:02

## 2021-01-01 RX ADMIN — ROCURONIUM BROMIDE 30 MG: 10 INJECTION, SOLUTION INTRAVENOUS at 05:08

## 2021-01-01 RX ADMIN — ANASTROZOLE 1 MG: 1 TABLET, COATED ORAL at 08:51

## 2021-01-01 RX ADMIN — ROCURONIUM BROMIDE 10 MG: 10 INJECTION, SOLUTION INTRAVENOUS at 04:56

## 2021-01-01 RX ADMIN — PHENYLEPHRINE HYDROCHLORIDE 100 MCG: 10 INJECTION INTRAVENOUS at 05:19

## 2021-01-01 RX ADMIN — MIDODRINE HYDROCHLORIDE 5 MG: 5 TABLET ORAL at 16:46

## 2021-01-01 RX ADMIN — Medication 10 ML: at 10:41

## 2021-01-01 RX ADMIN — LACTULOSE 20 G: 20 SOLUTION ORAL at 09:41

## 2021-01-01 RX ADMIN — SODIUM CHLORIDE, SODIUM LACTATE, POTASSIUM CHLORIDE, CALCIUM CHLORIDE AND DEXTROSE MONOHYDRATE 250 ML: 5; 600; 310; 30; 20 INJECTION, SOLUTION INTRAVENOUS at 12:31

## 2021-01-01 RX ADMIN — CIPROFLOXACIN 400 MG: 2 INJECTION, SOLUTION INTRAVENOUS at 08:58

## 2021-01-01 RX ADMIN — PANTOPRAZOLE SODIUM 40 MG: 40 TABLET, DELAYED RELEASE ORAL at 06:39

## 2021-01-01 RX ADMIN — Medication 10 ML: at 20:42

## 2021-01-01 RX ADMIN — PANTOPRAZOLE SODIUM 40 MG: 40 TABLET, DELAYED RELEASE ORAL at 06:15

## 2021-01-01 RX ADMIN — ANASTROZOLE 1 MG: 1 TABLET, COATED ORAL at 10:38

## 2021-01-01 RX ADMIN — APIXABAN 5 MG: 5 TABLET, FILM COATED ORAL at 10:10

## 2021-01-01 RX ADMIN — METRONIDAZOLE 500 MG: 500 INJECTION, SOLUTION INTRAVENOUS at 01:30

## 2021-01-01 RX ADMIN — METRONIDAZOLE 500 MG: 500 INJECTION, SOLUTION INTRAVENOUS at 02:19

## 2021-01-01 RX ADMIN — MIDODRINE HYDROCHLORIDE 5 MG: 5 TABLET ORAL at 09:04

## 2021-01-01 RX ADMIN — Medication 10 ML: at 08:44

## 2021-01-01 RX ADMIN — POTASSIUM CHLORIDE 20 MEQ: 1500 TABLET, EXTENDED RELEASE ORAL at 14:51

## 2021-01-01 RX ADMIN — PROPOFOL 30 MG: 10 INJECTION, EMULSION INTRAVENOUS at 12:39

## 2021-01-01 RX ADMIN — MONTELUKAST SODIUM 10 MG: 10 TABLET, FILM COATED ORAL at 20:30

## 2021-01-01 RX ADMIN — ALBUMIN (HUMAN) 25 G: 0.25 INJECTION, SOLUTION INTRAVENOUS at 21:31

## 2021-01-01 RX ADMIN — AMIODARONE HYDROCHLORIDE 200 MG: 200 TABLET ORAL at 10:10

## 2021-01-01 RX ADMIN — CALCIUM POLYCARBOPHIL 625 MG: 625 TABLET, FILM COATED ORAL at 11:36

## 2021-01-01 RX ADMIN — APIXABAN 5 MG: 5 TABLET, FILM COATED ORAL at 22:39

## 2021-01-01 RX ADMIN — MONTELUKAST 10 MG: 10 TABLET, FILM COATED ORAL at 21:26

## 2021-01-01 RX ADMIN — LEVOTHYROXINE SODIUM 50 MCG: 50 TABLET ORAL at 08:59

## 2021-01-01 RX ADMIN — CIPROFLOXACIN 400 MG: 2 INJECTION, SOLUTION INTRAVENOUS at 08:27

## 2021-01-01 RX ADMIN — RIFAXIMIN 550 MG: 550 TABLET ORAL at 09:30

## 2021-01-01 RX ADMIN — METRONIDAZOLE 500 MG: 500 INJECTION, SOLUTION INTRAVENOUS at 16:37

## 2021-01-01 RX ADMIN — AMIODARONE HYDROCHLORIDE 200 MG: 200 TABLET ORAL at 08:26

## 2021-01-01 RX ADMIN — METRONIDAZOLE 500 MG: 500 INJECTION, SOLUTION INTRAVENOUS at 10:37

## 2021-01-01 RX ADMIN — APIXABAN 5 MG: 5 TABLET, FILM COATED ORAL at 09:30

## 2021-01-01 RX ADMIN — SODIUM CHLORIDE, PRESERVATIVE FREE 10 ML: 5 INJECTION INTRAVENOUS at 15:29

## 2021-01-01 RX ADMIN — MONTELUKAST SODIUM 10 MG: 10 TABLET, FILM COATED ORAL at 20:44

## 2021-01-01 RX ADMIN — ONDANSETRON 8 MG: 2 INJECTION INTRAMUSCULAR; INTRAVENOUS at 11:59

## 2021-01-01 RX ADMIN — Medication 10 ML: at 11:22

## 2021-01-01 RX ADMIN — MIDAZOLAM 0.5 MG: 1 INJECTION INTRAMUSCULAR; INTRAVENOUS at 13:01

## 2021-01-01 RX ADMIN — RIFAXIMIN 550 MG: 550 TABLET ORAL at 09:59

## 2021-01-01 RX ADMIN — RIFAXIMIN 550 MG: 550 TABLET ORAL at 20:56

## 2021-01-01 RX ADMIN — LACTULOSE 20 G: 20 SOLUTION ORAL at 06:20

## 2021-01-01 RX ADMIN — BENZONATATE 200 MG: 100 CAPSULE ORAL at 21:26

## 2021-01-01 RX ADMIN — Medication 10 ML: at 20:56

## 2021-01-01 RX ADMIN — IPRATROPIUM BROMIDE AND ALBUTEROL SULFATE 1 AMPULE: .5; 3 SOLUTION RESPIRATORY (INHALATION) at 15:38

## 2021-01-01 RX ADMIN — LACTULOSE 20 G: 20 SOLUTION ORAL at 16:46

## 2021-01-01 RX ADMIN — METOPROLOL TARTRATE 25 MG: 25 TABLET, FILM COATED ORAL at 21:09

## 2021-01-01 RX ADMIN — Medication 10 ML: at 09:05

## 2021-01-01 RX ADMIN — SODIUM CHLORIDE, SODIUM LACTATE, POTASSIUM CHLORIDE, CALCIUM CHLORIDE AND DEXTROSE MONOHYDRATE: 5; 600; 310; 30; 20 INJECTION, SOLUTION INTRAVENOUS at 18:14

## 2021-01-01 RX ADMIN — FERROUS SULFATE TAB 325 MG (65 MG ELEMENTAL FE) 325 MG: 325 (65 FE) TAB at 16:37

## 2021-01-01 RX ADMIN — PROPOFOL 80 MG: 10 INJECTION, EMULSION INTRAVENOUS at 08:58

## 2021-01-01 RX ADMIN — MONTELUKAST SODIUM 10 MG: 10 TABLET, FILM COATED ORAL at 20:52

## 2021-01-01 RX ADMIN — MIDODRINE HYDROCHLORIDE 5 MG: 5 TABLET ORAL at 14:18

## 2021-01-01 RX ADMIN — ALBUMIN HUMAN 250 ML: 0.05 INJECTION, SOLUTION INTRAVENOUS at 05:00

## 2021-01-01 RX ADMIN — RIFAXIMIN 550 MG: 550 TABLET ORAL at 09:14

## 2021-01-01 RX ADMIN — Medication 10 ML: at 10:00

## 2021-01-01 RX ADMIN — METHYLPREDNISOLONE SODIUM SUCCINATE 40 MG: 40 INJECTION, POWDER, FOR SOLUTION INTRAMUSCULAR; INTRAVENOUS at 09:14

## 2021-01-01 RX ADMIN — METOPROLOL TARTRATE 25 MG: 25 TABLET, FILM COATED ORAL at 11:36

## 2021-01-01 RX ADMIN — METRONIDAZOLE 500 MG: 500 INJECTION, SOLUTION INTRAVENOUS at 15:36

## 2021-01-01 RX ADMIN — CETIRIZINE HYDROCHLORIDE 10 MG: 10 TABLET, FILM COATED ORAL at 09:19

## 2021-01-01 RX ADMIN — SODIUM CHLORIDE 1000 ML: 9 INJECTION, SOLUTION INTRAVENOUS at 00:39

## 2021-01-01 RX ADMIN — METOCLOPRAMIDE 5 MG: 5 TABLET ORAL at 11:58

## 2021-01-01 RX ADMIN — POTASSIUM CHLORIDE 20 MEQ: 29.8 INJECTION, SOLUTION INTRAVENOUS at 13:54

## 2021-01-01 RX ADMIN — APIXABAN 5 MG: 5 TABLET, FILM COATED ORAL at 12:39

## 2021-01-01 RX ADMIN — PROPOFOL 10 MG: 10 INJECTION, EMULSION INTRAVENOUS at 09:02

## 2021-01-01 RX ADMIN — IPRATROPIUM BROMIDE AND ALBUTEROL SULFATE 1 AMPULE: .5; 3 SOLUTION RESPIRATORY (INHALATION) at 19:38

## 2021-01-01 RX ADMIN — APIXABAN 5 MG: 5 TABLET, FILM COATED ORAL at 09:58

## 2021-01-01 RX ADMIN — MAGNESIUM SULFATE HEPTAHYDRATE 2000 MG: 40 INJECTION, SOLUTION INTRAVENOUS at 12:09

## 2021-01-01 RX ADMIN — POTASSIUM CHLORIDE 20 MEQ: 1500 TABLET, EXTENDED RELEASE ORAL at 20:53

## 2021-01-01 RX ADMIN — Medication 10 ML: at 23:07

## 2021-01-01 RX ADMIN — TRIAMCINOLONE ACETONIDE: 1 CREAM TOPICAL at 09:08

## 2021-01-01 RX ADMIN — MIDODRINE HYDROCHLORIDE 2.5 MG: 5 TABLET ORAL at 10:36

## 2021-01-01 RX ADMIN — ANASTROZOLE 1 MG: 1 TABLET, COATED ORAL at 11:23

## 2021-01-01 RX ADMIN — RIFAXIMIN 550 MG: 550 TABLET ORAL at 08:50

## 2021-01-01 RX ADMIN — AZTREONAM 1000 MG: 1 INJECTION, POWDER, LYOPHILIZED, FOR SOLUTION INTRAMUSCULAR; INTRAVENOUS at 13:54

## 2021-01-01 RX ADMIN — FERROUS SULFATE TAB 325 MG (65 MG ELEMENTAL FE) 325 MG: 325 (65 FE) TAB at 16:41

## 2021-01-01 RX ADMIN — METRONIDAZOLE 500 MG: 500 INJECTION, SOLUTION INTRAVENOUS at 11:32

## 2021-01-01 RX ADMIN — Medication 10 ML: at 20:40

## 2021-01-01 RX ADMIN — LACTULOSE 20 G: 20 SOLUTION ORAL at 09:18

## 2021-01-01 RX ADMIN — BUMETANIDE 2 MG: 0.25 INJECTION INTRAMUSCULAR; INTRAVENOUS at 09:29

## 2021-01-01 RX ADMIN — AMIODARONE HYDROCHLORIDE 200 MG: 200 TABLET ORAL at 10:38

## 2021-01-01 RX ADMIN — SODIUM CHLORIDE, SODIUM LACTATE, POTASSIUM CHLORIDE, CALCIUM CHLORIDE AND DEXTROSE MONOHYDRATE: 5; 600; 310; 30; 20 INJECTION, SOLUTION INTRAVENOUS at 08:48

## 2021-01-01 RX ADMIN — FLUCONAZOLE IN SODIUM CHLORIDE 200 MG: 2 INJECTION, SOLUTION INTRAVENOUS at 11:05

## 2021-01-01 RX ADMIN — APIXABAN 5 MG: 5 TABLET, FILM COATED ORAL at 20:52

## 2021-01-01 RX ADMIN — ONDANSETRON 4 MG: 2 INJECTION INTRAMUSCULAR; INTRAVENOUS at 10:08

## 2021-01-01 RX ADMIN — APIXABAN 5 MG: 5 TABLET, FILM COATED ORAL at 08:26

## 2021-01-01 RX ADMIN — AZTREONAM 1000 MG: 1 INJECTION, POWDER, LYOPHILIZED, FOR SOLUTION INTRAMUSCULAR; INTRAVENOUS at 20:34

## 2021-01-01 RX ADMIN — TRIAMCINOLONE ACETONIDE: 1 CREAM TOPICAL at 16:25

## 2021-01-01 RX ADMIN — SODIUM CHLORIDE, PRESERVATIVE FREE 10 ML: 5 INJECTION INTRAVENOUS at 14:04

## 2021-01-01 RX ADMIN — MONTELUKAST SODIUM 10 MG: 10 TABLET, FILM COATED ORAL at 20:48

## 2021-01-01 RX ADMIN — FERROUS SULFATE TAB 325 MG (65 MG ELEMENTAL FE) 325 MG: 325 (65 FE) TAB at 16:58

## 2021-01-01 RX ADMIN — Medication 10 ML: at 09:42

## 2021-01-01 RX ADMIN — RIFAXIMIN 550 MG: 550 TABLET ORAL at 21:30

## 2021-01-01 RX ADMIN — METRONIDAZOLE 500 MG: 500 INJECTION, SOLUTION INTRAVENOUS at 02:10

## 2021-01-01 RX ADMIN — METOPROLOL TARTRATE 25 MG: 25 TABLET, FILM COATED ORAL at 11:11

## 2021-01-01 RX ADMIN — ALBUMIN HUMAN 250 ML: 0.05 INJECTION, SOLUTION INTRAVENOUS at 05:50

## 2021-01-01 RX ADMIN — AMIODARONE HYDROCHLORIDE 200 MG: 200 TABLET ORAL at 20:43

## 2021-01-01 RX ADMIN — METOPROLOL TARTRATE 25 MG: 25 TABLET, FILM COATED ORAL at 20:52

## 2021-01-01 RX ADMIN — MIDODRINE HYDROCHLORIDE 5 MG: 5 TABLET ORAL at 08:46

## 2021-01-01 RX ADMIN — PANTOPRAZOLE SODIUM 40 MG: 40 TABLET, DELAYED RELEASE ORAL at 06:05

## 2021-01-01 RX ADMIN — METHYLPREDNISOLONE SODIUM SUCCINATE 40 MG: 40 INJECTION, POWDER, FOR SOLUTION INTRAMUSCULAR; INTRAVENOUS at 08:24

## 2021-01-01 RX ADMIN — LEVOTHYROXINE SODIUM 50 MCG: 50 TABLET ORAL at 05:54

## 2021-01-01 RX ADMIN — LACTULOSE 20 G: 20 SOLUTION ORAL at 14:51

## 2021-01-01 RX ADMIN — CETIRIZINE HYDROCHLORIDE 10 MG: 10 TABLET, FILM COATED ORAL at 10:36

## 2021-01-01 RX ADMIN — MIDODRINE HYDROCHLORIDE 5 MG: 5 TABLET ORAL at 06:03

## 2021-01-01 RX ADMIN — TRAMADOL HYDROCHLORIDE 50 MG: 50 TABLET ORAL at 08:59

## 2021-01-01 RX ADMIN — ALBUMIN (HUMAN) 25 G: 0.25 INJECTION, SOLUTION INTRAVENOUS at 12:31

## 2021-01-01 RX ADMIN — MIDODRINE HYDROCHLORIDE 5 MG: 5 TABLET ORAL at 07:58

## 2021-01-01 RX ADMIN — APIXABAN 5 MG: 5 TABLET, FILM COATED ORAL at 10:39

## 2021-01-01 RX ADMIN — RIFAXIMIN 550 MG: 550 TABLET ORAL at 11:30

## 2021-01-01 RX ADMIN — Medication 2000 UNITS: at 09:15

## 2021-01-01 RX ADMIN — PANTOPRAZOLE SODIUM 40 MG: 40 TABLET, DELAYED RELEASE ORAL at 08:59

## 2021-01-01 RX ADMIN — DILTIAZEM HYDROCHLORIDE 2.5 MG/HR: 5 INJECTION INTRAVENOUS at 11:09

## 2021-01-01 RX ADMIN — METRONIDAZOLE 500 MG: 500 INJECTION, SOLUTION INTRAVENOUS at 08:58

## 2021-01-01 RX ADMIN — LEVOTHYROXINE SODIUM 50 MCG: 50 TABLET ORAL at 05:23

## 2021-01-01 RX ADMIN — RIFAXIMIN 550 MG: 550 TABLET ORAL at 20:52

## 2021-01-01 RX ADMIN — MIDODRINE HYDROCHLORIDE 10 MG: 5 TABLET ORAL at 09:57

## 2021-01-01 RX ADMIN — RIFAXIMIN 550 MG: 550 TABLET ORAL at 08:28

## 2021-01-01 RX ADMIN — Medication 10 ML: at 20:43

## 2021-01-01 RX ADMIN — Medication 10 ML: at 09:56

## 2021-01-01 RX ADMIN — RIFAXIMIN 550 MG: 550 TABLET ORAL at 20:41

## 2021-01-01 RX ADMIN — IOHEXOL 50 ML: 240 INJECTION, SOLUTION INTRATHECAL; INTRAVASCULAR; INTRAVENOUS; ORAL at 19:39

## 2021-01-01 RX ADMIN — METRONIDAZOLE 500 MG: 500 INJECTION, SOLUTION INTRAVENOUS at 13:32

## 2021-01-01 RX ADMIN — APIXABAN 5 MG: 5 TABLET, FILM COATED ORAL at 22:35

## 2021-01-01 RX ADMIN — SODIUM CHLORIDE: 9 INJECTION, SOLUTION INTRAVENOUS at 14:15

## 2021-01-01 RX ADMIN — ANASTROZOLE 1 MG: 1 TABLET, COATED ORAL at 08:18

## 2021-01-01 RX ADMIN — SODIUM CHLORIDE: 9 INJECTION, SOLUTION INTRAVENOUS at 08:42

## 2021-01-01 RX ADMIN — Medication 10 ML: at 08:18

## 2021-01-01 RX ADMIN — TRIAMCINOLONE ACETONIDE: 1 CREAM TOPICAL at 20:45

## 2021-01-01 RX ADMIN — CETIRIZINE HYDROCHLORIDE 10 MG: 10 TABLET, FILM COATED ORAL at 07:59

## 2021-01-01 RX ADMIN — FLUCONAZOLE IN SODIUM CHLORIDE 200 MG: 2 INJECTION, SOLUTION INTRAVENOUS at 08:10

## 2021-01-01 RX ADMIN — METRONIDAZOLE 500 MG: 500 INJECTION, SOLUTION INTRAVENOUS at 00:22

## 2021-01-01 RX ADMIN — LACTULOSE 20 G: 20 SOLUTION ORAL at 14:12

## 2021-01-01 RX ADMIN — Medication 10 ML: at 11:31

## 2021-01-01 RX ADMIN — SODIUM CHLORIDE, SODIUM LACTATE, POTASSIUM CHLORIDE, CALCIUM CHLORIDE AND DEXTROSE MONOHYDRATE: 5; 600; 310; 30; 20 INJECTION, SOLUTION INTRAVENOUS at 08:11

## 2021-01-01 RX ADMIN — TRIAMCINOLONE ACETONIDE: 1 CREAM TOPICAL at 21:26

## 2021-01-01 RX ADMIN — LEVOTHYROXINE SODIUM 50 MCG: 50 TABLET ORAL at 06:24

## 2021-01-01 RX ADMIN — METOPROLOL TARTRATE 25 MG: 25 TABLET, FILM COATED ORAL at 08:46

## 2021-01-01 RX ADMIN — IPRATROPIUM BROMIDE AND ALBUTEROL SULFATE 1 AMPULE: .5; 3 SOLUTION RESPIRATORY (INHALATION) at 18:26

## 2021-01-01 RX ADMIN — MONTELUKAST SODIUM 10 MG: 10 TABLET, FILM COATED ORAL at 20:38

## 2021-01-01 RX ADMIN — METRONIDAZOLE 500 MG: 500 INJECTION, SOLUTION INTRAVENOUS at 13:16

## 2021-01-01 RX ADMIN — POTASSIUM BICARBONATE 40 MEQ: 782 TABLET, EFFERVESCENT ORAL at 17:47

## 2021-01-01 RX ADMIN — PANTOPRAZOLE SODIUM 40 MG: 40 TABLET, DELAYED RELEASE ORAL at 06:27

## 2021-01-01 RX ADMIN — MIDODRINE HYDROCHLORIDE 5 MG: 5 TABLET ORAL at 08:36

## 2021-01-01 RX ADMIN — AMIODARONE HYDROCHLORIDE 200 MG: 200 TABLET ORAL at 09:46

## 2021-01-01 RX ADMIN — MONTELUKAST SODIUM 10 MG: 10 TABLET, FILM COATED ORAL at 20:58

## 2021-01-01 RX ADMIN — ANASTROZOLE 1 MG: 1 TABLET, COATED ORAL at 10:17

## 2021-01-01 RX ADMIN — Medication 10 ML: at 23:06

## 2021-01-01 RX ADMIN — PANTOPRAZOLE SODIUM 40 MG: 40 TABLET, DELAYED RELEASE ORAL at 06:11

## 2021-01-01 RX ADMIN — IPRATROPIUM BROMIDE AND ALBUTEROL SULFATE 1 AMPULE: .5; 3 SOLUTION RESPIRATORY (INHALATION) at 06:42

## 2021-01-01 RX ADMIN — METRONIDAZOLE 500 MG: 500 INJECTION, SOLUTION INTRAVENOUS at 00:25

## 2021-01-01 RX ADMIN — LACTULOSE 20 G: 20 SOLUTION ORAL at 09:04

## 2021-01-01 RX ADMIN — MIDODRINE HYDROCHLORIDE 5 MG: 5 TABLET ORAL at 18:06

## 2021-01-01 RX ADMIN — LEVOTHYROXINE SODIUM 50 MCG: 50 TABLET ORAL at 05:35

## 2021-01-01 RX ADMIN — APIXABAN 2.5 MG: 5 TABLET, FILM COATED ORAL at 09:55

## 2021-01-01 RX ADMIN — Medication 10 ML: at 21:01

## 2021-01-01 RX ADMIN — RIFAXIMIN 550 MG: 550 TABLET ORAL at 09:46

## 2021-01-01 RX ADMIN — METOCLOPRAMIDE 5 MG: 5 TABLET ORAL at 06:21

## 2021-01-01 RX ADMIN — AZTREONAM 500 MG: 1 INJECTION, POWDER, LYOPHILIZED, FOR SOLUTION INTRAMUSCULAR; INTRAVENOUS at 06:15

## 2021-01-01 RX ADMIN — Medication 10 ML: at 20:34

## 2021-01-01 RX ADMIN — DIPHENHYDRAMINE HYDROCHLORIDE 25 MG: 50 INJECTION, SOLUTION INTRAMUSCULAR; INTRAVENOUS at 21:32

## 2021-01-01 RX ADMIN — CETIRIZINE HYDROCHLORIDE 10 MG: 10 TABLET, FILM COATED ORAL at 11:27

## 2021-01-01 RX ADMIN — CETIRIZINE HYDROCHLORIDE 10 MG: 10 TABLET, FILM COATED ORAL at 09:30

## 2021-01-01 RX ADMIN — SODIUM CHLORIDE, SODIUM LACTATE, POTASSIUM CHLORIDE, CALCIUM CHLORIDE AND DEXTROSE MONOHYDRATE: 5; 600; 310; 30; 20 INJECTION, SOLUTION INTRAVENOUS at 00:46

## 2021-01-01 RX ADMIN — POTASSIUM CHLORIDE 20 MEQ: 29.8 INJECTION, SOLUTION INTRAVENOUS at 15:36

## 2021-01-01 RX ADMIN — TRIAMCINOLONE ACETONIDE: 1 CREAM TOPICAL at 11:35

## 2021-01-01 RX ADMIN — METRONIDAZOLE 500 MG: 500 INJECTION, SOLUTION INTRAVENOUS at 22:00

## 2021-01-01 RX ADMIN — METOPROLOL TARTRATE 25 MG: 25 TABLET, FILM COATED ORAL at 20:56

## 2021-01-01 RX ADMIN — FUROSEMIDE 40 MG: 40 TABLET ORAL at 22:35

## 2021-01-01 RX ADMIN — PANTOPRAZOLE SODIUM 40 MG: 40 TABLET, DELAYED RELEASE ORAL at 05:56

## 2021-01-01 RX ADMIN — Medication 10 ML: at 21:02

## 2021-01-01 RX ADMIN — PROPOFOL 10 MG: 10 INJECTION, EMULSION INTRAVENOUS at 09:01

## 2021-01-01 RX ADMIN — AZTREONAM 500 MG: 1 INJECTION, POWDER, LYOPHILIZED, FOR SOLUTION INTRAMUSCULAR; INTRAVENOUS at 18:44

## 2021-01-01 RX ADMIN — Medication 10 ML: at 20:53

## 2021-01-01 RX ADMIN — Medication 10 ML: at 08:34

## 2021-01-01 RX ADMIN — BENZONATATE 200 MG: 100 CAPSULE ORAL at 20:52

## 2021-01-01 RX ADMIN — Medication 10 ML: at 10:57

## 2021-01-01 RX ADMIN — ONDANSETRON 4 MG: 2 INJECTION INTRAMUSCULAR; INTRAVENOUS at 23:37

## 2021-01-01 RX ADMIN — ONDANSETRON 4 MG: 2 INJECTION INTRAMUSCULAR; INTRAVENOUS at 00:39

## 2021-01-01 RX ADMIN — RIFAXIMIN 550 MG: 550 TABLET ORAL at 10:10

## 2021-01-01 RX ADMIN — CALCIUM POLYCARBOPHIL 625 MG: 625 TABLET, FILM COATED ORAL at 09:09

## 2021-01-01 RX ADMIN — TRIAMCINOLONE ACETONIDE: 1 CREAM TOPICAL at 08:47

## 2021-01-01 RX ADMIN — FERROUS SULFATE TAB 325 MG (65 MG ELEMENTAL FE) 325 MG: 325 (65 FE) TAB at 16:51

## 2021-01-01 RX ADMIN — MONTELUKAST SODIUM 10 MG: 10 TABLET, FILM COATED ORAL at 20:36

## 2021-01-01 RX ADMIN — OXYMETAZOLINE HYDROCHLORIDE 2 SPRAY: 0.05 SPRAY NASAL at 21:26

## 2021-01-01 RX ADMIN — AZTREONAM 1000 MG: 1 INJECTION, POWDER, LYOPHILIZED, FOR SOLUTION INTRAMUSCULAR; INTRAVENOUS at 06:27

## 2021-01-01 RX ADMIN — CETIRIZINE HYDROCHLORIDE 10 MG: 10 TABLET, FILM COATED ORAL at 08:18

## 2021-01-01 RX ADMIN — METRONIDAZOLE 500 MG: 500 INJECTION, SOLUTION INTRAVENOUS at 06:06

## 2021-01-01 RX ADMIN — AZTREONAM 500 MG: 1 INJECTION, POWDER, LYOPHILIZED, FOR SOLUTION INTRAMUSCULAR; INTRAVENOUS at 03:00

## 2021-01-01 RX ADMIN — METRONIDAZOLE 500 MG: 500 INJECTION, SOLUTION INTRAVENOUS at 08:10

## 2021-01-01 RX ADMIN — RIFAXIMIN 550 MG: 550 TABLET ORAL at 20:38

## 2021-01-01 RX ADMIN — LEVOTHYROXINE SODIUM 50 MCG: 50 TABLET ORAL at 05:59

## 2021-01-01 RX ADMIN — Medication 10 ML: at 20:36

## 2021-01-01 RX ADMIN — METHYLPREDNISOLONE SODIUM SUCCINATE 40 MG: 40 INJECTION, POWDER, FOR SOLUTION INTRAMUSCULAR; INTRAVENOUS at 11:04

## 2021-01-01 RX ADMIN — PANTOPRAZOLE SODIUM 40 MG: 40 TABLET, DELAYED RELEASE ORAL at 05:51

## 2021-01-01 RX ADMIN — METOCLOPRAMIDE 5 MG: 5 TABLET ORAL at 16:25

## 2021-01-01 RX ADMIN — ANASTROZOLE 1 MG: 1 TABLET, COATED ORAL at 08:26

## 2021-01-01 RX ADMIN — METOPROLOL TARTRATE 25 MG: 25 TABLET, FILM COATED ORAL at 09:46

## 2021-01-01 RX ADMIN — POTASSIUM CHLORIDE 20 MEQ: 29.8 INJECTION, SOLUTION INTRAVENOUS at 13:08

## 2021-01-01 RX ADMIN — LEVOTHYROXINE SODIUM 50 MCG: 50 TABLET ORAL at 06:49

## 2021-01-01 RX ADMIN — SODIUM CHLORIDE, POTASSIUM CHLORIDE, SODIUM LACTATE AND CALCIUM CHLORIDE: 600; 310; 30; 20 INJECTION, SOLUTION INTRAVENOUS at 08:49

## 2021-01-01 RX ADMIN — HYDROCORTISONE SODIUM SUCCINATE 50 MG: 100 INJECTION, POWDER, FOR SOLUTION INTRAMUSCULAR; INTRAVENOUS at 20:05

## 2021-01-01 RX ADMIN — METRONIDAZOLE 500 MG: 500 INJECTION, SOLUTION INTRAVENOUS at 10:11

## 2021-01-01 RX ADMIN — AMIODARONE HYDROCHLORIDE 200 MG: 200 TABLET ORAL at 09:19

## 2021-01-01 RX ADMIN — AZTREONAM 1000 MG: 1 INJECTION, POWDER, LYOPHILIZED, FOR SOLUTION INTRAMUSCULAR; INTRAVENOUS at 14:03

## 2021-01-01 RX ADMIN — PHENYLEPHRINE HYDROCHLORIDE 200 MCG: 10 INJECTION INTRAVENOUS at 05:14

## 2021-01-01 RX ADMIN — METOCLOPRAMIDE 5 MG: 5 TABLET ORAL at 12:14

## 2021-01-01 RX ADMIN — HYDROCORTISONE SODIUM SUCCINATE 50 MG: 100 INJECTION, POWDER, FOR SOLUTION INTRAMUSCULAR; INTRAVENOUS at 20:36

## 2021-01-01 RX ADMIN — METRONIDAZOLE 500 MG: 500 INJECTION, SOLUTION INTRAVENOUS at 16:08

## 2021-01-01 RX ADMIN — FLUCONAZOLE 200 MG: 100 TABLET ORAL at 10:09

## 2021-01-01 RX ADMIN — FERROUS SULFATE TAB 325 MG (65 MG ELEMENTAL FE) 325 MG: 325 (65 FE) TAB at 16:38

## 2021-01-01 RX ADMIN — TRIAMCINOLONE ACETONIDE: 1 CREAM TOPICAL at 09:49

## 2021-01-01 RX ADMIN — LEVOTHYROXINE SODIUM 50 MCG: 50 TABLET ORAL at 06:17

## 2021-01-01 RX ADMIN — AZTREONAM 500 MG: 1 INJECTION, POWDER, LYOPHILIZED, FOR SOLUTION INTRAMUSCULAR; INTRAVENOUS at 02:12

## 2021-01-01 RX ADMIN — METRONIDAZOLE 500 MG: 500 INJECTION, SOLUTION INTRAVENOUS at 08:30

## 2021-01-01 RX ADMIN — MIDODRINE HYDROCHLORIDE 5 MG: 5 TABLET ORAL at 17:34

## 2021-01-01 RX ADMIN — IPRATROPIUM BROMIDE AND ALBUTEROL SULFATE 1 AMPULE: .5; 3 SOLUTION RESPIRATORY (INHALATION) at 10:03

## 2021-01-01 RX ADMIN — TRIAMCINOLONE ACETONIDE: 1 CREAM TOPICAL at 23:51

## 2021-01-01 RX ADMIN — LACTULOSE 20 G: 20 SOLUTION ORAL at 16:23

## 2021-01-01 RX ADMIN — CALCIUM POLYCARBOPHIL 625 MG: 625 TABLET, FILM COATED ORAL at 11:30

## 2021-01-01 RX ADMIN — METRONIDAZOLE 500 MG: 500 TABLET ORAL at 13:55

## 2021-01-01 RX ADMIN — AZTREONAM 1000 MG: 1 INJECTION, POWDER, LYOPHILIZED, FOR SOLUTION INTRAMUSCULAR; INTRAVENOUS at 02:34

## 2021-01-01 RX ADMIN — RIFAXIMIN 550 MG: 550 TABLET ORAL at 20:32

## 2021-01-01 RX ADMIN — ANASTROZOLE 1 MG: 1 TABLET, COATED ORAL at 09:47

## 2021-01-01 RX ADMIN — ANASTROZOLE 1 MG: 1 TABLET, COATED ORAL at 11:36

## 2021-01-01 RX ADMIN — LIDOCAINE HYDROCHLORIDE 20 MG: 10 INJECTION, SOLUTION EPIDURAL; INFILTRATION; INTRACAUDAL; PERINEURAL at 08:59

## 2021-01-01 RX ADMIN — MIDODRINE HYDROCHLORIDE 5 MG: 5 TABLET ORAL at 09:30

## 2021-01-01 RX ADMIN — SODIUM CHLORIDE, POTASSIUM CHLORIDE, SODIUM LACTATE AND CALCIUM CHLORIDE: 600; 310; 30; 20 INJECTION, SOLUTION INTRAVENOUS at 12:30

## 2021-01-01 RX ADMIN — PANTOPRAZOLE SODIUM 40 MG: 40 TABLET, DELAYED RELEASE ORAL at 06:16

## 2021-01-01 RX ADMIN — ENOXAPARIN SODIUM 40 MG: 40 INJECTION SUBCUTANEOUS at 08:58

## 2021-01-01 RX ADMIN — CIPROFLOXACIN 400 MG: 2 INJECTION, SOLUTION INTRAVENOUS at 08:12

## 2021-01-01 RX ADMIN — MIDAZOLAM 1 MG: 1 INJECTION INTRAMUSCULAR; INTRAVENOUS at 12:37

## 2021-01-01 RX ADMIN — AMIODARONE HYDROCHLORIDE 200 MG: 200 TABLET ORAL at 09:29

## 2021-01-01 RX ADMIN — ANASTROZOLE 1 MG: 1 TABLET, COATED ORAL at 08:46

## 2021-01-01 RX ADMIN — CIPROFLOXACIN 400 MG: 2 INJECTION, SOLUTION INTRAVENOUS at 09:25

## 2021-01-01 RX ADMIN — SODIUM CHLORIDE, PRESERVATIVE FREE 10 ML: 5 INJECTION INTRAVENOUS at 23:36

## 2021-01-01 RX ADMIN — MONTELUKAST SODIUM 10 MG: 10 TABLET, FILM COATED ORAL at 21:09

## 2021-01-01 RX ADMIN — LACTULOSE 20 G: 20 SOLUTION ORAL at 05:51

## 2021-01-01 RX ADMIN — ALBUMIN (HUMAN) 25 G: 0.25 INJECTION, SOLUTION INTRAVENOUS at 13:55

## 2021-01-01 RX ADMIN — LACTULOSE 20 G: 20 SOLUTION ORAL at 06:39

## 2021-01-01 RX ADMIN — LEVOFLOXACIN 500 MG: 500 TABLET, FILM COATED ORAL at 11:05

## 2021-01-01 RX ADMIN — ANASTROZOLE 1 MG: 1 TABLET, COATED ORAL at 09:18

## 2021-01-01 RX ADMIN — LACTULOSE 20 G: 20 SOLUTION ORAL at 06:48

## 2021-01-01 RX ADMIN — OXYMETAZOLINE HYDROCHLORIDE 2 SPRAY: 0.05 SPRAY NASAL at 20:58

## 2021-01-01 RX ADMIN — ANASTROZOLE 1 MG: 1 TABLET, COATED ORAL at 09:29

## 2021-01-01 RX ADMIN — Medication 10 ML: at 10:11

## 2021-01-01 RX ADMIN — PROPOFOL 10 MG: 10 INJECTION, EMULSION INTRAVENOUS at 08:59

## 2021-01-01 RX ADMIN — RIFAXIMIN 550 MG: 550 TABLET ORAL at 20:44

## 2021-01-01 RX ADMIN — FENTANYL CITRATE 50 MCG: 50 INJECTION, SOLUTION INTRAMUSCULAR; INTRAVENOUS at 04:56

## 2021-01-01 RX ADMIN — FENTANYL CITRATE 25 MCG: 50 INJECTION, SOLUTION INTRAMUSCULAR; INTRAVENOUS at 05:10

## 2021-01-01 RX ADMIN — MONTELUKAST SODIUM 10 MG: 10 TABLET, FILM COATED ORAL at 20:42

## 2021-01-01 RX ADMIN — FLUCONAZOLE 200 MG: 100 TABLET ORAL at 10:38

## 2021-01-01 RX ADMIN — METRONIDAZOLE 500 MG: 500 INJECTION, SOLUTION INTRAVENOUS at 00:17

## 2021-01-01 RX ADMIN — SODIUM CHLORIDE, PRESERVATIVE FREE 10 ML: 5 INJECTION INTRAVENOUS at 14:30

## 2021-01-01 RX ADMIN — CIPROFLOXACIN 400 MG: 2 INJECTION, SOLUTION INTRAVENOUS at 20:06

## 2021-01-01 RX ADMIN — IPRATROPIUM BROMIDE AND ALBUTEROL SULFATE 1 AMPULE: .5; 3 SOLUTION RESPIRATORY (INHALATION) at 05:32

## 2021-01-01 RX ADMIN — APIXABAN 5 MG: 5 TABLET, FILM COATED ORAL at 21:26

## 2021-01-01 RX ADMIN — MIDODRINE HYDROCHLORIDE 2.5 MG: 5 TABLET ORAL at 16:55

## 2021-01-01 RX ADMIN — ANTICOAGULANT CITRATE DEXTROSE SOLUTION FORMULA A 3.1 ML: 12.25; 11; 3.65 SOLUTION INTRAVENOUS at 09:04

## 2021-01-01 RX ADMIN — TRIAMCINOLONE ACETONIDE: 1 CREAM TOPICAL at 20:39

## 2021-01-01 RX ADMIN — LEVOFLOXACIN 500 MG: 500 TABLET, FILM COATED ORAL at 09:15

## 2021-01-01 RX ADMIN — Medication 40 ML: at 20:34

## 2021-01-01 RX ADMIN — TRIMETHOBENZAMIDE HYDROCHLORIDE 200 MG: 100 INJECTION INTRAMUSCULAR at 13:53

## 2021-01-01 RX ADMIN — POTASSIUM CHLORIDE 20 MEQ: 1500 TABLET, EXTENDED RELEASE ORAL at 14:12

## 2021-01-01 RX ADMIN — SODIUM CHLORIDE, SODIUM LACTATE, POTASSIUM CHLORIDE, CALCIUM CHLORIDE AND DEXTROSE MONOHYDRATE: 5; 600; 310; 30; 20 INJECTION, SOLUTION INTRAVENOUS at 11:30

## 2021-01-01 RX ADMIN — POTASSIUM CHLORIDE 20 MEQ: 1500 TABLET, EXTENDED RELEASE ORAL at 21:26

## 2021-01-01 RX ADMIN — FERROUS SULFATE TAB 325 MG (65 MG ELEMENTAL FE) 325 MG: 325 (65 FE) TAB at 11:36

## 2021-01-01 RX ADMIN — RIFAXIMIN 550 MG: 550 TABLET ORAL at 13:25

## 2021-01-01 RX ADMIN — TRAMADOL HYDROCHLORIDE 50 MG: 50 TABLET ORAL at 03:25

## 2021-01-01 RX ADMIN — METOCLOPRAMIDE 5 MG: 5 TABLET ORAL at 06:03

## 2021-01-01 RX ADMIN — MONTELUKAST 10 MG: 10 TABLET, FILM COATED ORAL at 20:52

## 2021-01-01 RX ADMIN — LACTULOSE 20 G: 20 SOLUTION ORAL at 07:57

## 2021-01-01 RX ADMIN — ALBUMIN (HUMAN) 25 G: 0.25 INJECTION, SOLUTION INTRAVENOUS at 20:31

## 2021-01-01 RX ADMIN — MONTELUKAST SODIUM 10 MG: 10 TABLET, FILM COATED ORAL at 21:32

## 2021-01-01 RX ADMIN — Medication 10 ML: at 20:57

## 2021-01-01 RX ADMIN — METRONIDAZOLE 500 MG: 500 INJECTION, SOLUTION INTRAVENOUS at 18:24

## 2021-01-01 RX ADMIN — METOPROLOL TARTRATE 25 MG: 25 TABLET, FILM COATED ORAL at 20:39

## 2021-01-01 RX ADMIN — SODIUM CHLORIDE: 9 INJECTION, SOLUTION INTRAVENOUS at 07:51

## 2021-01-01 RX ADMIN — ONDANSETRON 4 MG: 2 INJECTION INTRAMUSCULAR; INTRAVENOUS at 06:41

## 2021-01-01 RX ADMIN — ANASTROZOLE 1 MG: 1 TABLET, COATED ORAL at 21:36

## 2021-01-01 RX ADMIN — CETIRIZINE HYDROCHLORIDE 10 MG: 10 TABLET, FILM COATED ORAL at 10:10

## 2021-01-01 RX ADMIN — Medication 10 ML: at 09:30

## 2021-01-01 RX ADMIN — RIFAXIMIN 550 MG: 550 TABLET ORAL at 22:40

## 2021-01-01 RX ADMIN — ONDANSETRON 8 MG: 2 INJECTION INTRAMUSCULAR; INTRAVENOUS at 20:31

## 2021-01-01 RX ADMIN — TRIAMCINOLONE ACETONIDE: 1 CREAM TOPICAL at 21:15

## 2021-01-01 RX ADMIN — METRONIDAZOLE 500 MG: 500 INJECTION, SOLUTION INTRAVENOUS at 11:56

## 2021-01-01 RX ADMIN — ANASTROZOLE 1 MG: 1 TABLET, COATED ORAL at 11:31

## 2021-01-01 RX ADMIN — TRIAMCINOLONE ACETONIDE: 1 CREAM TOPICAL at 21:03

## 2021-01-01 RX ADMIN — CETIRIZINE HYDROCHLORIDE 10 MG: 10 TABLET, FILM COATED ORAL at 08:26

## 2021-01-01 RX ADMIN — CALCIUM POLYCARBOPHIL 625 MG: 625 TABLET, FILM COATED ORAL at 07:58

## 2021-01-01 RX ADMIN — RIFAXIMIN 550 MG: 550 TABLET ORAL at 11:01

## 2021-01-01 RX ADMIN — APIXABAN 5 MG: 5 TABLET, FILM COATED ORAL at 08:41

## 2021-01-01 RX ADMIN — METOPROLOL TARTRATE 25 MG: 25 TABLET, FILM COATED ORAL at 09:18

## 2021-01-01 RX ADMIN — CALCIUM POLYCARBOPHIL 625 MG: 625 TABLET, FILM COATED ORAL at 09:47

## 2021-01-01 RX ADMIN — AMIODARONE HYDROCHLORIDE 200 MG: 200 TABLET ORAL at 20:54

## 2021-01-01 RX ADMIN — ANASTROZOLE 1 MG: 1 TABLET, COATED ORAL at 08:11

## 2021-01-01 RX ADMIN — LACTULOSE 20 G: 20 SOLUTION ORAL at 17:00

## 2021-01-01 RX ADMIN — IPRATROPIUM BROMIDE AND ALBUTEROL SULFATE 1 AMPULE: .5; 3 SOLUTION RESPIRATORY (INHALATION) at 07:42

## 2021-01-01 RX ADMIN — TRIAMCINOLONE ACETONIDE: 1 CREAM TOPICAL at 11:09

## 2021-01-01 RX ADMIN — MONTELUKAST SODIUM 10 MG: 10 TABLET, FILM COATED ORAL at 20:54

## 2021-01-01 RX ADMIN — MIDAZOLAM 1 MG: 1 INJECTION INTRAMUSCULAR; INTRAVENOUS at 12:39

## 2021-01-01 RX ADMIN — METRONIDAZOLE 500 MG: 500 TABLET ORAL at 09:57

## 2021-01-01 RX ADMIN — MONTELUKAST 10 MG: 10 TABLET, FILM COATED ORAL at 22:35

## 2021-01-01 RX ADMIN — RIFAXIMIN 550 MG: 550 TABLET ORAL at 20:02

## 2021-01-01 RX ADMIN — APIXABAN 5 MG: 5 TABLET, FILM COATED ORAL at 20:42

## 2021-01-01 RX ADMIN — Medication 3 MG: at 06:53

## 2021-01-01 RX ADMIN — AMIODARONE HYDROCHLORIDE 200 MG: 200 TABLET ORAL at 07:58

## 2021-01-01 RX ADMIN — AMIODARONE HYDROCHLORIDE 200 MG: 200 TABLET ORAL at 09:58

## 2021-01-01 RX ADMIN — METRONIDAZOLE 500 MG: 500 INJECTION, SOLUTION INTRAVENOUS at 17:28

## 2021-01-01 RX ADMIN — BENZONATATE 200 MG: 100 CAPSULE ORAL at 14:51

## 2021-01-01 RX ADMIN — IPRATROPIUM BROMIDE AND ALBUTEROL SULFATE 1 AMPULE: .5; 3 SOLUTION RESPIRATORY (INHALATION) at 06:14

## 2021-01-01 RX ADMIN — Medication 10 ML: at 20:39

## 2021-01-01 RX ADMIN — CIPROFLOXACIN 400 MG: 2 INJECTION INTRAVENOUS at 02:47

## 2021-01-01 RX ADMIN — MIDODRINE HYDROCHLORIDE 5 MG: 5 TABLET ORAL at 16:51

## 2021-01-01 RX ADMIN — Medication 10 ML: at 08:47

## 2021-01-01 RX ADMIN — DILTIAZEM HYDROCHLORIDE 120 MG: 120 CAPSULE, COATED, EXTENDED RELEASE ORAL at 08:24

## 2021-01-01 RX ADMIN — ONDANSETRON 4 MG: 2 INJECTION INTRAMUSCULAR; INTRAVENOUS at 09:36

## 2021-01-01 RX ADMIN — CETIRIZINE HYDROCHLORIDE 10 MG: 10 TABLET, FILM COATED ORAL at 07:52

## 2021-01-01 RX ADMIN — ONDANSETRON 4 MG: 4 TABLET, ORALLY DISINTEGRATING ORAL at 11:02

## 2021-01-01 RX ADMIN — CETIRIZINE HYDROCHLORIDE 10 MG: 10 TABLET, FILM COATED ORAL at 08:59

## 2021-01-01 RX ADMIN — METOPROLOL TARTRATE 25 MG: 25 TABLET, FILM COATED ORAL at 20:41

## 2021-01-01 RX ADMIN — Medication 10 ML: at 08:55

## 2021-01-01 RX ADMIN — LEVOTHYROXINE SODIUM 50 MCG: 50 TABLET ORAL at 06:11

## 2021-01-01 RX ADMIN — SODIUM CHLORIDE, PRESERVATIVE FREE 10 ML: 5 INJECTION INTRAVENOUS at 13:07

## 2021-01-01 RX ADMIN — CETIRIZINE HYDROCHLORIDE 10 MG: 10 TABLET, FILM COATED ORAL at 11:10

## 2021-01-01 RX ADMIN — RIFAXIMIN 550 MG: 550 TABLET ORAL at 11:29

## 2021-01-01 RX ADMIN — MONTELUKAST SODIUM 10 MG: 10 TABLET, FILM COATED ORAL at 20:41

## 2021-01-01 RX ADMIN — VASOPRESSIN 2 UNITS: 20 INJECTION INTRAVENOUS at 06:06

## 2021-01-01 RX ADMIN — HYDROCORTISONE SODIUM SUCCINATE 50 MG: 100 INJECTION, POWDER, FOR SOLUTION INTRAMUSCULAR; INTRAVENOUS at 12:39

## 2021-01-01 RX ADMIN — CALCIUM POLYCARBOPHIL 625 MG: 625 TABLET, FILM COATED ORAL at 11:12

## 2021-01-01 RX ADMIN — RIFAXIMIN 550 MG: 550 TABLET ORAL at 09:04

## 2021-01-01 RX ADMIN — METRONIDAZOLE 500 MG: 500 INJECTION, SOLUTION INTRAVENOUS at 16:17

## 2021-01-01 RX ADMIN — METRONIDAZOLE 500 MG: 500 INJECTION, SOLUTION INTRAVENOUS at 02:23

## 2021-01-01 RX ADMIN — METHYLPREDNISOLONE SODIUM SUCCINATE 40 MG: 40 INJECTION, POWDER, FOR SOLUTION INTRAMUSCULAR; INTRAVENOUS at 21:26

## 2021-01-01 RX ADMIN — ALBUMIN (HUMAN) 25 G: 0.25 INJECTION, SOLUTION INTRAVENOUS at 11:15

## 2021-01-01 RX ADMIN — METOCLOPRAMIDE 5 MG: 5 TABLET ORAL at 06:39

## 2021-01-01 RX ADMIN — METRONIDAZOLE 500 MG: 500 INJECTION, SOLUTION INTRAVENOUS at 21:27

## 2021-01-01 RX ADMIN — METRONIDAZOLE 500 MG: 500 TABLET ORAL at 14:20

## 2021-01-01 RX ADMIN — OXYMETAZOLINE HYDROCHLORIDE 2 SPRAY: 0.05 SPRAY NASAL at 11:28

## 2021-01-01 RX ADMIN — CETIRIZINE HYDROCHLORIDE 10 MG: 10 TABLET, FILM COATED ORAL at 09:47

## 2021-01-01 RX ADMIN — AMIODARONE HYDROCHLORIDE 200 MG: 200 TABLET ORAL at 20:41

## 2021-01-01 RX ADMIN — ONDANSETRON 4 MG: 4 TABLET, ORALLY DISINTEGRATING ORAL at 15:26

## 2021-01-01 RX ADMIN — POTASSIUM CHLORIDE 20 MEQ: 1500 TABLET, EXTENDED RELEASE ORAL at 14:48

## 2021-01-01 RX ADMIN — LACTULOSE 20 G: 20 SOLUTION ORAL at 07:00

## 2021-01-01 RX ADMIN — MORPHINE SULFATE 2 MG: 2 INJECTION, SOLUTION INTRAMUSCULAR; INTRAVENOUS at 02:29

## 2021-01-01 RX ADMIN — PANTOPRAZOLE SODIUM 40 MG: 40 TABLET, DELAYED RELEASE ORAL at 06:49

## 2021-01-01 RX ADMIN — MONTELUKAST SODIUM 10 MG: 10 TABLET, FILM COATED ORAL at 20:56

## 2021-01-01 RX ADMIN — SODIUM CHLORIDE: 9 INJECTION, SOLUTION INTRAVENOUS at 04:43

## 2021-01-01 RX ADMIN — TRIAMCINOLONE ACETONIDE: 1 CREAM TOPICAL at 08:17

## 2021-01-01 RX ADMIN — AZTREONAM 1000 MG: 1 INJECTION, POWDER, LYOPHILIZED, FOR SOLUTION INTRAMUSCULAR; INTRAVENOUS at 06:22

## 2021-01-01 RX ADMIN — TRIAMCINOLONE ACETONIDE: 1 CREAM TOPICAL at 21:02

## 2021-01-01 RX ADMIN — MIDODRINE HYDROCHLORIDE 5 MG: 5 TABLET ORAL at 17:21

## 2021-01-01 RX ADMIN — METRONIDAZOLE 500 MG: 500 INJECTION, SOLUTION INTRAVENOUS at 10:44

## 2021-01-01 RX ADMIN — RIFAXIMIN 550 MG: 550 TABLET ORAL at 20:30

## 2021-01-01 RX ADMIN — METRONIDAZOLE 500 MG: 500 INJECTION, SOLUTION INTRAVENOUS at 17:04

## 2021-01-01 RX ADMIN — LACTULOSE 20 G: 20 SOLUTION ORAL at 11:08

## 2021-01-01 RX ADMIN — APIXABAN 5 MG: 5 TABLET, FILM COATED ORAL at 09:14

## 2021-01-01 RX ADMIN — FUROSEMIDE 40 MG: 40 TABLET ORAL at 08:24

## 2021-01-01 RX ADMIN — METRONIDAZOLE 500 MG: 500 TABLET ORAL at 06:21

## 2021-01-01 RX ADMIN — METRONIDAZOLE 500 MG: 500 TABLET ORAL at 21:31

## 2021-01-01 RX ADMIN — PANTOPRAZOLE SODIUM 40 MG: 40 TABLET, DELAYED RELEASE ORAL at 05:47

## 2021-01-01 RX ADMIN — APIXABAN 5 MG: 5 TABLET, FILM COATED ORAL at 21:05

## 2021-01-01 RX ADMIN — ANASTROZOLE 1 MG: 1 TABLET, COATED ORAL at 09:42

## 2021-01-01 RX ADMIN — BENZONATATE 200 MG: 100 CAPSULE ORAL at 09:19

## 2021-01-01 RX ADMIN — RIFAXIMIN 550 MG: 550 TABLET ORAL at 08:18

## 2021-01-01 RX ADMIN — LEVOTHYROXINE SODIUM 50 MCG: 50 TABLET ORAL at 06:05

## 2021-01-01 RX ADMIN — MONTELUKAST SODIUM 10 MG: 10 TABLET, FILM COATED ORAL at 20:06

## 2021-01-01 RX ADMIN — ALBUMIN (HUMAN) 25 G: 0.25 INJECTION, SOLUTION INTRAVENOUS at 08:45

## 2021-01-01 RX ADMIN — CIPROFLOXACIN 400 MG: 2 INJECTION, SOLUTION INTRAVENOUS at 09:30

## 2021-01-01 RX ADMIN — RIFAXIMIN 550 MG: 550 TABLET ORAL at 20:54

## 2021-01-01 RX ADMIN — MIDODRINE HYDROCHLORIDE 5 MG: 5 TABLET ORAL at 08:53

## 2021-01-01 RX ADMIN — APIXABAN 5 MG: 5 TABLET, FILM COATED ORAL at 08:46

## 2021-01-01 RX ADMIN — METOPROLOL TARTRATE 25 MG: 25 TABLET, FILM COATED ORAL at 08:18

## 2021-01-01 RX ADMIN — LEVOTHYROXINE SODIUM 50 MCG: 50 TABLET ORAL at 05:47

## 2021-01-01 RX ADMIN — POTASSIUM CHLORIDE 20 MEQ: 1500 TABLET, EXTENDED RELEASE ORAL at 08:28

## 2021-01-01 RX ADMIN — Medication 10 ML: at 20:35

## 2021-01-01 RX ADMIN — METRONIDAZOLE 500 MG: 500 INJECTION, SOLUTION INTRAVENOUS at 14:08

## 2021-01-01 RX ADMIN — LACTULOSE 20 G: 20 SOLUTION ORAL at 06:27

## 2021-01-01 RX ADMIN — RIFAXIMIN 550 MG: 550 TABLET ORAL at 08:46

## 2021-01-01 RX ADMIN — VANCOMYCIN HYDROCHLORIDE 500 MG: 500 INJECTION, POWDER, LYOPHILIZED, FOR SOLUTION INTRAVENOUS at 13:30

## 2021-01-01 RX ADMIN — Medication 2000 UNITS: at 09:19

## 2021-01-01 RX ADMIN — AZTREONAM 500 MG: 1 INJECTION, POWDER, LYOPHILIZED, FOR SOLUTION INTRAMUSCULAR; INTRAVENOUS at 14:00

## 2021-01-01 RX ADMIN — Medication 10 ML: at 21:03

## 2021-01-01 RX ADMIN — MIDODRINE HYDROCHLORIDE 5 MG: 5 TABLET ORAL at 16:38

## 2021-01-01 RX ADMIN — FLUCONAZOLE IN SODIUM CHLORIDE 200 MG: 2 INJECTION, SOLUTION INTRAVENOUS at 08:27

## 2021-01-01 RX ADMIN — Medication 2000 UNITS: at 08:24

## 2021-01-01 RX ADMIN — LEVOTHYROXINE SODIUM 50 MCG: 50 TABLET ORAL at 06:15

## 2021-01-01 RX ADMIN — APIXABAN 5 MG: 5 TABLET, FILM COATED ORAL at 20:32

## 2021-01-01 RX ADMIN — POTASSIUM CHLORIDE 40 MEQ: 29.8 INJECTION, SOLUTION INTRAVENOUS at 11:22

## 2021-01-01 RX ADMIN — METOPROLOL TARTRATE 25 MG: 25 TABLET, FILM COATED ORAL at 11:26

## 2021-01-01 RX ADMIN — METRONIDAZOLE 500 MG: 500 INJECTION, SOLUTION INTRAVENOUS at 16:36

## 2021-01-01 RX ADMIN — FLUCONAZOLE 200 MG: 100 TABLET ORAL at 08:41

## 2021-01-01 RX ADMIN — BENZONATATE 200 MG: 100 CAPSULE ORAL at 22:35

## 2021-01-01 RX ADMIN — OXYMETAZOLINE HYDROCHLORIDE 2 SPRAY: 0.05 SPRAY NASAL at 15:08

## 2021-01-01 RX ADMIN — SODIUM CHLORIDE, PRESERVATIVE FREE 10 ML: 5 INJECTION INTRAVENOUS at 14:32

## 2021-01-01 RX ADMIN — HEPARIN SODIUM 5000 UNITS: 5000 INJECTION INTRAVENOUS; SUBCUTANEOUS at 06:32

## 2021-01-01 RX ADMIN — METOCLOPRAMIDE 5 MG: 5 TABLET ORAL at 16:31

## 2021-01-01 RX ADMIN — APIXABAN 5 MG: 5 TABLET, FILM COATED ORAL at 20:53

## 2021-01-01 RX ADMIN — LEVOTHYROXINE SODIUM 50 MCG: 50 TABLET ORAL at 06:39

## 2021-01-01 RX ADMIN — FLUCONAZOLE IN SODIUM CHLORIDE 200 MG: 2 INJECTION, SOLUTION INTRAVENOUS at 08:30

## 2021-01-01 RX ADMIN — METRONIDAZOLE 500 MG: 500 INJECTION, SOLUTION INTRAVENOUS at 00:44

## 2021-01-01 RX ADMIN — PANTOPRAZOLE SODIUM 40 MG: 40 TABLET, DELAYED RELEASE ORAL at 06:34

## 2021-01-01 RX ADMIN — Medication 0.4 MG: at 06:51

## 2021-01-01 RX ADMIN — METRONIDAZOLE 500 MG: 500 TABLET ORAL at 16:54

## 2021-01-01 RX ADMIN — POTASSIUM CHLORIDE 40 MEQ: 20 TABLET, EXTENDED RELEASE ORAL at 18:37

## 2021-01-01 RX ADMIN — AZTREONAM 1000 MG: 1 INJECTION, POWDER, LYOPHILIZED, FOR SOLUTION INTRAMUSCULAR; INTRAVENOUS at 23:07

## 2021-01-01 RX ADMIN — CETIRIZINE HYDROCHLORIDE 10 MG: 10 TABLET, FILM COATED ORAL at 08:11

## 2021-01-01 RX ADMIN — DIPHENHYDRAMINE HYDROCHLORIDE 25 MG: 50 INJECTION, SOLUTION INTRAMUSCULAR; INTRAVENOUS at 11:27

## 2021-01-01 RX ADMIN — Medication 200 MG: at 04:56

## 2021-01-01 RX ADMIN — PANTOPRAZOLE SODIUM 40 MG: 40 TABLET, DELAYED RELEASE ORAL at 06:33

## 2021-01-01 RX ADMIN — FLUCONAZOLE IN SODIUM CHLORIDE 200 MG: 2 INJECTION, SOLUTION INTRAVENOUS at 08:58

## 2021-01-01 RX ADMIN — PROPOFOL 20 MG: 10 INJECTION, EMULSION INTRAVENOUS at 12:37

## 2021-01-01 RX ADMIN — PHENYLEPHRINE HYDROCHLORIDE 100 MCG: 10 INJECTION INTRAVENOUS at 05:36

## 2021-01-01 RX ADMIN — AMIODARONE HYDROCHLORIDE 200 MG: 200 TABLET ORAL at 11:36

## 2021-01-01 RX ADMIN — METOCLOPRAMIDE 5 MG: 5 TABLET ORAL at 16:46

## 2021-01-01 RX ADMIN — MONTELUKAST SODIUM 10 MG: 10 TABLET, FILM COATED ORAL at 21:08

## 2021-01-01 RX ADMIN — LEVOTHYROXINE SODIUM 50 MCG: 50 TABLET ORAL at 06:27

## 2021-01-01 RX ADMIN — MIDODRINE HYDROCHLORIDE 10 MG: 5 TABLET ORAL at 11:27

## 2021-01-01 RX ADMIN — TRIAMCINOLONE ACETONIDE: 1 CREAM TOPICAL at 21:00

## 2021-01-01 RX ADMIN — APIXABAN 5 MG: 5 TABLET, FILM COATED ORAL at 20:02

## 2021-01-01 RX ADMIN — Medication 10 ML: at 08:19

## 2021-01-01 RX ADMIN — METOCLOPRAMIDE 5 MG: 5 TABLET ORAL at 16:16

## 2021-01-01 RX ADMIN — MORPHINE SULFATE 2 MG: 2 INJECTION, SOLUTION INTRAMUSCULAR; INTRAVENOUS at 12:00

## 2021-01-01 RX ADMIN — APIXABAN 5 MG: 5 TABLET, FILM COATED ORAL at 08:24

## 2021-01-01 RX ADMIN — RIFAXIMIN 550 MG: 550 TABLET ORAL at 21:26

## 2021-01-01 RX ADMIN — SODIUM CHLORIDE, PRESERVATIVE FREE 10 ML: 5 INJECTION INTRAVENOUS at 09:37

## 2021-01-01 RX ADMIN — FLUCONAZOLE IN SODIUM CHLORIDE 200 MG: 2 INJECTION, SOLUTION INTRAVENOUS at 08:12

## 2021-01-01 RX ADMIN — LACTULOSE 20 G: 20 SOLUTION ORAL at 21:26

## 2021-01-01 RX ADMIN — FUROSEMIDE 40 MG: 40 TABLET ORAL at 16:45

## 2021-01-01 RX ADMIN — AMIODARONE HYDROCHLORIDE 200 MG: 200 TABLET ORAL at 13:23

## 2021-01-01 RX ADMIN — APIXABAN 5 MG: 5 TABLET, FILM COATED ORAL at 08:10

## 2021-01-01 RX ADMIN — Medication 10 ML: at 20:48

## 2021-01-01 RX ADMIN — LISINOPRIL 5 MG: 5 TABLET ORAL at 08:24

## 2021-01-01 RX ADMIN — Medication 10 ML: at 20:02

## 2021-01-01 RX ADMIN — SODIUM CHLORIDE, PRESERVATIVE FREE 10 ML: 5 INJECTION INTRAVENOUS at 20:40

## 2021-01-01 RX ADMIN — AZTREONAM 1000 MG: 1 INJECTION, POWDER, LYOPHILIZED, FOR SOLUTION INTRAMUSCULAR; INTRAVENOUS at 14:18

## 2021-01-01 RX ADMIN — TRIAMCINOLONE ACETONIDE: 1 CREAM TOPICAL at 21:33

## 2021-01-01 RX ADMIN — DILTIAZEM HYDROCHLORIDE 120 MG: 120 CAPSULE, COATED, EXTENDED RELEASE ORAL at 21:26

## 2021-01-01 RX ADMIN — AZTREONAM 1000 MG: 1 INJECTION, POWDER, LYOPHILIZED, FOR SOLUTION INTRAMUSCULAR; INTRAVENOUS at 13:38

## 2021-01-01 RX ADMIN — SODIUM CHLORIDE 1000 ML: 9 INJECTION, SOLUTION INTRAVENOUS at 12:25

## 2021-01-01 RX ADMIN — RIFAXIMIN 550 MG: 550 TABLET ORAL at 08:41

## 2021-01-01 RX ADMIN — CIPROFLOXACIN 400 MG: 2 INJECTION, SOLUTION INTRAVENOUS at 08:10

## 2021-01-01 RX ADMIN — Medication 10 ML: at 20:59

## 2021-01-01 RX ADMIN — METOPROLOL TARTRATE 25 MG: 25 TABLET, FILM COATED ORAL at 20:01

## 2021-01-01 RX ADMIN — LEVOTHYROXINE SODIUM 50 MCG: 50 TABLET ORAL at 06:12

## 2021-01-01 RX ADMIN — TRIAMCINOLONE ACETONIDE: 1 CREAM TOPICAL at 09:40

## 2021-01-01 RX ADMIN — MIDODRINE HYDROCHLORIDE 5 MG: 5 TABLET ORAL at 16:37

## 2021-01-01 RX ADMIN — APIXABAN 5 MG: 5 TABLET, FILM COATED ORAL at 21:31

## 2021-01-01 RX ADMIN — ANASTROZOLE 1 MG: 1 TABLET, COATED ORAL at 08:16

## 2021-01-01 RX ADMIN — AZTREONAM 500 MG: 1 INJECTION, POWDER, LYOPHILIZED, FOR SOLUTION INTRAMUSCULAR; INTRAVENOUS at 16:21

## 2021-01-01 RX ADMIN — CETIRIZINE HYDROCHLORIDE 10 MG: 10 TABLET, FILM COATED ORAL at 09:04

## 2021-01-01 RX ADMIN — Medication 10 ML: at 21:32

## 2021-01-01 RX ADMIN — AMIODARONE HYDROCHLORIDE 200 MG: 200 TABLET ORAL at 08:18

## 2021-01-01 RX ADMIN — MONTELUKAST SODIUM 10 MG: 10 TABLET, FILM COATED ORAL at 20:34

## 2021-01-01 RX ADMIN — Medication 10 ML: at 08:02

## 2021-01-01 RX ADMIN — MIDODRINE HYDROCHLORIDE 5 MG: 5 TABLET ORAL at 09:41

## 2021-01-01 RX ADMIN — METRONIDAZOLE 500 MG: 500 TABLET ORAL at 20:34

## 2021-01-01 RX ADMIN — RIFAXIMIN 550 MG: 550 TABLET ORAL at 21:08

## 2021-01-01 RX ADMIN — PHENYLEPHRINE HYDROCHLORIDE 100 MCG: 10 INJECTION INTRAVENOUS at 05:46

## 2021-01-01 RX ADMIN — PHENYLEPHRINE HYDROCHLORIDE 200 MCG: 10 INJECTION INTRAVENOUS at 05:02

## 2021-01-01 RX ADMIN — TRIAMCINOLONE ACETONIDE: 1 CREAM TOPICAL at 20:43

## 2021-01-01 RX ADMIN — MIDODRINE HYDROCHLORIDE 2.5 MG: 5 TABLET ORAL at 13:39

## 2021-01-01 RX ADMIN — DIPHENHYDRAMINE HYDROCHLORIDE 50 MG: 50 INJECTION, SOLUTION INTRAMUSCULAR; INTRAVENOUS at 22:32

## 2021-01-01 RX ADMIN — METRONIDAZOLE 500 MG: 500 INJECTION, SOLUTION INTRAVENOUS at 04:30

## 2021-01-01 RX ADMIN — LACTULOSE 20 G: 20 SOLUTION ORAL at 16:25

## 2021-01-01 RX ADMIN — PANTOPRAZOLE SODIUM 40 MG: 40 TABLET, DELAYED RELEASE ORAL at 05:35

## 2021-01-01 RX ADMIN — AMIODARONE HYDROCHLORIDE 200 MG: 200 TABLET ORAL at 11:02

## 2021-01-01 RX ADMIN — AZTREONAM 500 MG: 1 INJECTION, POWDER, LYOPHILIZED, FOR SOLUTION INTRAMUSCULAR; INTRAVENOUS at 18:27

## 2021-01-01 RX ADMIN — MONTELUKAST SODIUM 10 MG: 10 TABLET, FILM COATED ORAL at 20:53

## 2021-01-01 RX ADMIN — LEVOTHYROXINE SODIUM 50 MCG: 50 TABLET ORAL at 05:27

## 2021-01-01 RX ADMIN — MIDODRINE HYDROCHLORIDE 5 MG: 5 TABLET ORAL at 17:17

## 2021-01-01 RX ADMIN — CETIRIZINE HYDROCHLORIDE 10 MG: 10 TABLET, FILM COATED ORAL at 09:41

## 2021-01-01 RX ADMIN — LACTULOSE 20 G: 20 SOLUTION ORAL at 06:03

## 2021-01-01 RX ADMIN — Medication 10 ML: at 20:47

## 2021-01-01 RX ADMIN — AMIODARONE HYDROCHLORIDE 200 MG: 200 TABLET ORAL at 08:46

## 2021-01-01 RX ADMIN — CALCIUM POLYCARBOPHIL 625 MG: 625 TABLET, FILM COATED ORAL at 09:41

## 2021-01-01 RX ADMIN — IPRATROPIUM BROMIDE AND ALBUTEROL SULFATE 1 AMPULE: .5; 3 SOLUTION RESPIRATORY (INHALATION) at 19:05

## 2021-01-01 RX ADMIN — ONDANSETRON 8 MG: 2 INJECTION INTRAMUSCULAR; INTRAVENOUS at 05:34

## 2021-01-01 RX ADMIN — AMIODARONE HYDROCHLORIDE 200 MG: 200 TABLET ORAL at 09:40

## 2021-01-01 RX ADMIN — BENZONATATE 200 MG: 100 CAPSULE ORAL at 08:24

## 2021-01-01 RX ADMIN — FERROUS SULFATE TAB 325 MG (65 MG ELEMENTAL FE) 325 MG: 325 (65 FE) TAB at 08:18

## 2021-01-01 RX ADMIN — PANTOPRAZOLE SODIUM 40 MG: 40 TABLET, DELAYED RELEASE ORAL at 05:23

## 2021-01-01 RX ADMIN — PROPOFOL 40 MG: 10 INJECTION, EMULSION INTRAVENOUS at 12:50

## 2021-01-01 RX ADMIN — FENTANYL CITRATE 50 MCG: 50 INJECTION, SOLUTION INTRAMUSCULAR; INTRAVENOUS at 13:01

## 2021-01-01 RX ADMIN — MORPHINE SULFATE 2 MG: 2 INJECTION, SOLUTION INTRAMUSCULAR; INTRAVENOUS at 08:31

## 2021-01-01 RX ADMIN — RIFAXIMIN 550 MG: 550 TABLET ORAL at 08:24

## 2021-01-01 RX ADMIN — ONDANSETRON 4 MG: 4 TABLET, ORALLY DISINTEGRATING ORAL at 21:18

## 2021-01-01 RX ADMIN — PANTOPRAZOLE SODIUM 40 MG: 40 INJECTION, POWDER, FOR SOLUTION INTRAVENOUS at 21:05

## 2021-01-01 RX ADMIN — MIDODRINE HYDROCHLORIDE 5 MG: 5 TABLET ORAL at 16:43

## 2021-01-01 RX ADMIN — AMIODARONE HYDROCHLORIDE 200 MG: 200 TABLET ORAL at 08:41

## 2021-01-01 RX ADMIN — PROPOFOL 10 MG: 10 INJECTION, EMULSION INTRAVENOUS at 12:45

## 2021-01-01 RX ADMIN — METRONIDAZOLE 500 MG: 500 INJECTION, SOLUTION INTRAVENOUS at 08:12

## 2021-01-01 RX ADMIN — DILTIAZEM HYDROCHLORIDE 5 MG/HR: 5 INJECTION INTRAVENOUS at 13:36

## 2021-01-01 RX ADMIN — DIPHENHYDRAMINE HYDROCHLORIDE 25 MG: 50 INJECTION, SOLUTION INTRAMUSCULAR; INTRAVENOUS at 23:47

## 2021-01-01 RX ADMIN — SODIUM CHLORIDE, SODIUM LACTATE, POTASSIUM CHLORIDE, CALCIUM CHLORIDE AND DEXTROSE MONOHYDRATE: 5; 600; 310; 30; 20 INJECTION, SOLUTION INTRAVENOUS at 06:12

## 2021-01-01 RX ADMIN — FERROUS SULFATE TAB 325 MG (65 MG ELEMENTAL FE) 325 MG: 325 (65 FE) TAB at 17:21

## 2021-01-01 RX ADMIN — LEVOTHYROXINE SODIUM 50 MCG: 50 TABLET ORAL at 05:56

## 2021-01-01 RX ADMIN — CIPROFLOXACIN 400 MG: 2 INJECTION, SOLUTION INTRAVENOUS at 08:30

## 2021-01-01 RX ADMIN — DILTIAZEM HYDROCHLORIDE 5 MG/HR: 5 INJECTION INTRAVENOUS at 16:58

## 2021-01-01 RX ADMIN — ANASTROZOLE 1 MG: 1 TABLET, COATED ORAL at 09:59

## 2021-01-01 RX ADMIN — ANASTROZOLE 1 MG: 1 TABLET, COATED ORAL at 09:09

## 2021-01-01 RX ADMIN — ANASTROZOLE 1 MG: 1 TABLET, COATED ORAL at 11:12

## 2021-01-01 RX ADMIN — FLUCONAZOLE IN SODIUM CHLORIDE 200 MG: 2 INJECTION, SOLUTION INTRAVENOUS at 08:46

## 2021-01-01 RX ADMIN — CETIRIZINE HYDROCHLORIDE 10 MG: 10 TABLET, FILM COATED ORAL at 08:46

## 2021-01-01 RX ADMIN — BENZONATATE 200 MG: 100 CAPSULE ORAL at 14:12

## 2021-01-01 RX ADMIN — DILTIAZEM HYDROCHLORIDE 120 MG: 120 CAPSULE, COATED, EXTENDED RELEASE ORAL at 09:15

## 2021-01-01 RX ADMIN — PANTOPRAZOLE SODIUM 40 MG: 40 TABLET, DELAYED RELEASE ORAL at 06:31

## 2021-01-01 RX ADMIN — METRONIDAZOLE 500 MG: 500 TABLET ORAL at 06:03

## 2021-01-01 RX ADMIN — METRONIDAZOLE 500 MG: 500 INJECTION, SOLUTION INTRAVENOUS at 08:28

## 2021-01-01 RX ADMIN — ANASTROZOLE 1 MG: 1 TABLET, COATED ORAL at 07:59

## 2021-01-01 RX ADMIN — DILTIAZEM HYDROCHLORIDE 25 MG: 5 INJECTION INTRAVENOUS at 16:38

## 2021-01-01 RX ADMIN — Medication 10 ML: at 20:06

## 2021-01-01 RX ADMIN — FERROUS SULFATE TAB 325 MG (65 MG ELEMENTAL FE) 325 MG: 325 (65 FE) TAB at 07:59

## 2021-01-01 RX ADMIN — LACTULOSE 20 G: 20 SOLUTION ORAL at 08:26

## 2021-01-01 RX ADMIN — SODIUM CHLORIDE, SODIUM LACTATE, POTASSIUM CHLORIDE, CALCIUM CHLORIDE AND DEXTROSE MONOHYDRATE: 5; 600; 310; 30; 20 INJECTION, SOLUTION INTRAVENOUS at 16:34

## 2021-01-01 RX ADMIN — PREDNISONE 10 MG: 10 TABLET ORAL at 11:35

## 2021-01-01 RX ADMIN — LACTULOSE 20 G: 20 SOLUTION ORAL at 05:23

## 2021-01-01 RX ADMIN — LACTULOSE 20 G: 20 SOLUTION ORAL at 20:53

## 2021-01-01 RX ADMIN — BARIUM SULFATE 45 G: 0.6 CREAM ORAL at 14:30

## 2021-01-01 RX ADMIN — FUROSEMIDE 40 MG: 40 TABLET ORAL at 09:19

## 2021-01-01 RX ADMIN — LACTULOSE 20 G: 20 SOLUTION ORAL at 06:30

## 2021-01-01 RX ADMIN — MONTELUKAST SODIUM 10 MG: 10 TABLET, FILM COATED ORAL at 20:40

## 2021-01-01 RX ADMIN — AMIODARONE HYDROCHLORIDE 200 MG: 200 TABLET ORAL at 09:04

## 2021-01-01 RX ADMIN — RIFAXIMIN 550 MG: 550 TABLET ORAL at 07:58

## 2021-01-01 RX ADMIN — METOPROLOL TARTRATE 25 MG: 25 TABLET, FILM COATED ORAL at 08:10

## 2021-01-01 RX ADMIN — METRONIDAZOLE 500 MG: 500 INJECTION, SOLUTION INTRAVENOUS at 12:33

## 2021-01-01 RX ADMIN — TRIAMCINOLONE ACETONIDE: 1 CREAM TOPICAL at 09:59

## 2021-01-01 RX ADMIN — CALCIUM POLYCARBOPHIL 625 MG: 625 TABLET, FILM COATED ORAL at 08:17

## 2021-01-01 RX ADMIN — FERROUS SULFATE TAB 325 MG (65 MG ELEMENTAL FE) 325 MG: 325 (65 FE) TAB at 11:32

## 2021-01-01 RX ADMIN — MIDODRINE HYDROCHLORIDE 5 MG: 5 TABLET ORAL at 16:57

## 2021-01-01 RX ADMIN — MIDODRINE HYDROCHLORIDE 10 MG: 5 TABLET ORAL at 11:57

## 2021-01-01 RX ADMIN — APIXABAN 5 MG: 5 TABLET, FILM COATED ORAL at 20:34

## 2021-01-01 RX ADMIN — MONTELUKAST SODIUM 10 MG: 10 TABLET, FILM COATED ORAL at 20:32

## 2021-01-01 RX ADMIN — MIDODRINE HYDROCHLORIDE 10 MG: 5 TABLET ORAL at 16:02

## 2021-01-01 RX ADMIN — PANTOPRAZOLE SODIUM 40 MG: 40 TABLET, DELAYED RELEASE ORAL at 06:03

## 2021-01-01 RX ADMIN — LACTULOSE 20 G: 20 SOLUTION ORAL at 17:04

## 2021-01-01 RX ADMIN — DILTIAZEM HYDROCHLORIDE 120 MG: 120 CAPSULE, COATED, EXTENDED RELEASE ORAL at 11:05

## 2021-01-01 RX ADMIN — CETIRIZINE HYDROCHLORIDE 10 MG: 10 TABLET, FILM COATED ORAL at 11:36

## 2021-01-01 RX ADMIN — SODIUM CHLORIDE, SODIUM LACTATE, POTASSIUM CHLORIDE, CALCIUM CHLORIDE AND DEXTROSE MONOHYDRATE: 5; 600; 310; 30; 20 INJECTION, SOLUTION INTRAVENOUS at 15:37

## 2021-01-01 RX ADMIN — HYDROCORTISONE SODIUM SUCCINATE 50 MG: 100 INJECTION, POWDER, FOR SOLUTION INTRAMUSCULAR; INTRAVENOUS at 04:05

## 2021-01-01 RX ADMIN — BENZONATATE 200 MG: 100 CAPSULE ORAL at 09:15

## 2021-01-01 RX ADMIN — TRIAMCINOLONE ACETONIDE: 1 CREAM TOPICAL at 11:37

## 2021-01-01 RX ADMIN — APIXABAN 5 MG: 5 TABLET, FILM COATED ORAL at 08:12

## 2021-01-01 RX ADMIN — LACTULOSE 20 G: 20 SOLUTION ORAL at 16:36

## 2021-01-01 RX ADMIN — FUROSEMIDE 40 MG: 10 INJECTION, SOLUTION INTRAMUSCULAR; INTRAVENOUS at 11:05

## 2021-01-01 RX ADMIN — RIFAXIMIN 550 MG: 550 TABLET ORAL at 21:31

## 2021-01-01 RX ADMIN — LACTULOSE 20 G: 20 SOLUTION ORAL at 16:55

## 2021-01-01 RX ADMIN — Medication 10 ML: at 20:30

## 2021-01-01 RX ADMIN — IPRATROPIUM BROMIDE AND ALBUTEROL SULFATE 1 AMPULE: .5; 3 SOLUTION RESPIRATORY (INHALATION) at 06:41

## 2021-01-01 RX ADMIN — METRONIDAZOLE 500 MG: 500 INJECTION, SOLUTION INTRAVENOUS at 00:58

## 2021-01-01 RX ADMIN — AZTREONAM 1000 MG: 1 INJECTION, POWDER, LYOPHILIZED, FOR SOLUTION INTRAMUSCULAR; INTRAVENOUS at 22:01

## 2021-01-01 RX ADMIN — LISINOPRIL 5 MG: 5 TABLET ORAL at 09:15

## 2021-01-01 RX ADMIN — RIFAXIMIN 550 MG: 550 TABLET ORAL at 09:40

## 2021-01-01 RX ADMIN — APIXABAN 5 MG: 5 TABLET, FILM COATED ORAL at 20:48

## 2021-01-01 RX ADMIN — BARIUM SULFATE 45 ML: 400 SUSPENSION ORAL at 14:30

## 2021-01-01 RX ADMIN — AMIODARONE HYDROCHLORIDE 200 MG: 200 TABLET ORAL at 08:11

## 2021-01-01 RX ADMIN — MIDODRINE HYDROCHLORIDE 10 MG: 5 TABLET ORAL at 16:31

## 2021-01-01 RX ADMIN — SODIUM CHLORIDE, SODIUM LACTATE, POTASSIUM CHLORIDE, CALCIUM CHLORIDE AND DEXTROSE MONOHYDRATE: 5; 600; 310; 30; 20 INJECTION, SOLUTION INTRAVENOUS at 08:42

## 2021-01-01 RX ADMIN — OXYMETAZOLINE HYDROCHLORIDE 2 SPRAY: 0.05 SPRAY NASAL at 20:51

## 2021-01-01 RX ADMIN — LISINOPRIL 20 MG: 20 TABLET ORAL at 09:19

## 2021-01-01 RX ADMIN — METRONIDAZOLE 500 MG: 500 INJECTION, SOLUTION INTRAVENOUS at 09:56

## 2021-01-01 RX ADMIN — METRONIDAZOLE 500 MG: 500 TABLET ORAL at 08:46

## 2021-01-01 RX ADMIN — METOPROLOL TARTRATE 25 MG: 25 TABLET, FILM COATED ORAL at 11:29

## 2021-01-01 RX ADMIN — BARIUM SULFATE 45 G: 0.81 POWDER, FOR SUSPENSION ORAL at 14:29

## 2021-01-01 RX ADMIN — APIXABAN 5 MG: 5 TABLET, FILM COATED ORAL at 20:30

## 2021-01-01 RX ADMIN — ANASTROZOLE 1 MG: 1 TABLET, COATED ORAL at 12:59

## 2021-01-01 RX ADMIN — RIFAXIMIN 550 MG: 550 TABLET ORAL at 20:47

## 2021-01-01 RX ADMIN — CIPROFLOXACIN 400 MG: 2 INJECTION, SOLUTION INTRAVENOUS at 11:02

## 2021-01-01 RX ADMIN — MONTELUKAST SODIUM 10 MG: 10 TABLET, FILM COATED ORAL at 22:40

## 2021-01-01 RX ADMIN — TRAMADOL HYDROCHLORIDE 50 MG: 50 TABLET ORAL at 23:37

## 2021-01-01 RX ADMIN — RIFAXIMIN 550 MG: 550 TABLET ORAL at 11:36

## 2021-01-01 RX ADMIN — METOPROLOL TARTRATE 25 MG: 25 TABLET, FILM COATED ORAL at 21:25

## 2021-01-01 RX ADMIN — RIFAXIMIN 550 MG: 550 TABLET ORAL at 20:53

## 2021-01-01 RX ADMIN — MONTELUKAST SODIUM 10 MG: 10 TABLET, FILM COATED ORAL at 21:25

## 2021-01-01 RX ADMIN — Medication 10 ML: at 08:16

## 2021-01-01 RX ADMIN — Medication 10 ML: at 11:32

## 2021-01-01 RX ADMIN — APIXABAN 5 MG: 5 TABLET, FILM COATED ORAL at 11:01

## 2021-01-01 RX ADMIN — FUROSEMIDE 40 MG: 10 INJECTION, SOLUTION INTRAMUSCULAR; INTRAVENOUS at 13:57

## 2021-01-01 RX ADMIN — AZTREONAM 500 MG: 1 INJECTION, POWDER, LYOPHILIZED, FOR SOLUTION INTRAMUSCULAR; INTRAVENOUS at 02:14

## 2021-01-01 RX ADMIN — SODIUM CHLORIDE, PRESERVATIVE FREE 10 ML: 5 INJECTION INTRAVENOUS at 00:23

## 2021-01-01 RX ADMIN — ONDANSETRON 4 MG: 4 TABLET, ORALLY DISINTEGRATING ORAL at 02:19

## 2021-01-01 RX ADMIN — SODIUM CHLORIDE, PRESERVATIVE FREE 10 ML: 5 INJECTION INTRAVENOUS at 16:36

## 2021-01-01 RX ADMIN — METHYLPREDNISOLONE SODIUM SUCCINATE 40 MG: 40 INJECTION, POWDER, FOR SOLUTION INTRAMUSCULAR; INTRAVENOUS at 20:52

## 2021-01-01 RX ADMIN — ALBUMIN (HUMAN) 25 G: 0.25 INJECTION, SOLUTION INTRAVENOUS at 08:24

## 2021-01-01 RX ADMIN — BUMETANIDE 2 MG: 0.25 INJECTION INTRAMUSCULAR; INTRAVENOUS at 20:30

## 2021-01-01 RX ADMIN — FUROSEMIDE 40 MG: 10 INJECTION, SOLUTION INTRAMUSCULAR; INTRAVENOUS at 21:31

## 2021-01-01 RX ADMIN — SODIUM CHLORIDE: 9 INJECTION, SOLUTION INTRAVENOUS at 12:00

## 2021-01-01 RX ADMIN — IPRATROPIUM BROMIDE AND ALBUTEROL SULFATE 1 AMPULE: .5; 3 SOLUTION RESPIRATORY (INHALATION) at 09:13

## 2021-01-01 RX ADMIN — LACTULOSE 20 G: 20 SOLUTION ORAL at 16:16

## 2021-01-01 RX ADMIN — MIDODRINE HYDROCHLORIDE 5 MG: 5 TABLET ORAL at 08:18

## 2021-01-01 RX ADMIN — METOPROLOL TARTRATE 25 MG: 25 TABLET, FILM COATED ORAL at 08:51

## 2021-01-01 RX ADMIN — APIXABAN 5 MG: 5 TABLET, FILM COATED ORAL at 08:51

## 2021-01-01 RX ADMIN — FUROSEMIDE 40 MG: 40 TABLET ORAL at 16:53

## 2021-01-01 RX ADMIN — PROPOFOL 110 MG: 10 INJECTION, EMULSION INTRAVENOUS at 04:56

## 2021-01-01 RX ADMIN — LACTULOSE 20 G: 20 SOLUTION ORAL at 08:17

## 2021-01-01 RX ADMIN — TRIAMCINOLONE ACETONIDE: 1 CREAM TOPICAL at 11:30

## 2021-01-01 RX ADMIN — AMIODARONE HYDROCHLORIDE 200 MG: 200 TABLET ORAL at 07:52

## 2021-01-01 RX ADMIN — ALBUMIN (HUMAN) 25 G: 0.25 INJECTION, SOLUTION INTRAVENOUS at 14:00

## 2021-01-01 RX ADMIN — FUROSEMIDE 40 MG: 40 TABLET ORAL at 18:35

## 2021-01-01 RX ADMIN — LACTULOSE 20 G: 20 SOLUTION ORAL at 07:07

## 2021-01-01 RX ADMIN — FERROUS SULFATE TAB 325 MG (65 MG ELEMENTAL FE) 325 MG: 325 (65 FE) TAB at 09:55

## 2021-01-01 RX ADMIN — CETIRIZINE HYDROCHLORIDE 10 MG: 10 TABLET, FILM COATED ORAL at 08:51

## 2021-01-01 RX ADMIN — METOPROLOL TARTRATE 25 MG: 25 TABLET, FILM COATED ORAL at 08:08

## 2021-01-01 RX ADMIN — APIXABAN 5 MG: 5 TABLET, FILM COATED ORAL at 20:36

## 2021-01-01 RX ADMIN — METOPROLOL TARTRATE 25 MG: 25 TABLET, FILM COATED ORAL at 20:51

## 2021-01-01 RX ADMIN — METOPROLOL TARTRATE 25 MG: 25 TABLET, FILM COATED ORAL at 20:48

## 2021-01-01 RX ADMIN — AMIODARONE HYDROCHLORIDE 200 MG: 200 TABLET ORAL at 11:27

## 2021-01-01 RX ADMIN — LACTULOSE 20 G: 20 SOLUTION ORAL at 07:45

## 2021-01-01 SDOH — HEALTH STABILITY: MENTAL HEALTH: HOW MANY STANDARD DRINKS CONTAINING ALCOHOL DO YOU HAVE ON A TYPICAL DAY?: NOT ASKED

## 2021-01-01 ASSESSMENT — PAIN SCALES - GENERAL
PAINLEVEL_OUTOF10: 0
PAINLEVEL_OUTOF10: 1
PAINLEVEL_OUTOF10: 8
PAINLEVEL_OUTOF10: 0
PAINLEVEL_OUTOF10: 4
PAINLEVEL_OUTOF10: 0
PAINLEVEL_OUTOF10: 5
PAINLEVEL_OUTOF10: 0
PAINLEVEL_OUTOF10: 0
PAINLEVEL_OUTOF10: 8
PAINLEVEL_OUTOF10: 0
PAINLEVEL_OUTOF10: 8
PAINLEVEL_OUTOF10: 5
PAINLEVEL_OUTOF10: 7
PAINLEVEL_OUTOF10: 0
PAINLEVEL_OUTOF10: 1
PAINLEVEL_OUTOF10: 0
PAINLEVEL_OUTOF10: 7
PAINLEVEL_OUTOF10: 0
PAINLEVEL_OUTOF10: 6
PAINLEVEL_OUTOF10: 0
PAINLEVEL_OUTOF10: 5
PAINLEVEL_OUTOF10: 0
PAINLEVEL_OUTOF10: 1
PAINLEVEL_OUTOF10: 9
PAINLEVEL_OUTOF10: 0
PAINLEVEL_OUTOF10: 2
PAINLEVEL_OUTOF10: 0

## 2021-01-01 ASSESSMENT — PAIN DESCRIPTION - DESCRIPTORS
DESCRIPTORS: ACHING
DESCRIPTORS: ACHING
DESCRIPTORS: ACHING;DISCOMFORT;SORE
DESCRIPTORS: ACHING;CONSTANT;DISCOMFORT
DESCRIPTORS: ACHING;DULL;DISCOMFORT
DESCRIPTORS: ACHING

## 2021-01-01 ASSESSMENT — PAIN DESCRIPTION - PROGRESSION
CLINICAL_PROGRESSION: NOT CHANGED
CLINICAL_PROGRESSION: GRADUALLY IMPROVING

## 2021-01-01 ASSESSMENT — ENCOUNTER SYMPTOMS
DIARRHEA: 0
COUGH: 0
SHORTNESS OF BREATH: 1
BLOOD IN STOOL: 0
SHORTNESS OF BREATH: 1
SHORTNESS OF BREATH: 1
COUGH: 0
COUGH: 0
SHORTNESS OF BREATH: 1
COUGH: 0
VOMITING: 1
COLOR CHANGE: 0
SHORTNESS OF BREATH: 1
DIARRHEA: 0
COUGH: 1
BACK PAIN: 0
COUGH: 0
COUGH: 0
SHORTNESS OF BREATH: 1
ABDOMINAL DISTENTION: 0
ABDOMINAL PAIN: 1
DIARRHEA: 0
SHORTNESS OF BREATH: 1
CHEST TIGHTNESS: 0
WHEEZING: 0
COUGH: 0
DIARRHEA: 0
DIARRHEA: 0
COUGH: 0
SHORTNESS OF BREATH: 1
DIARRHEA: 0
COUGH: 0
SHORTNESS OF BREATH: 1
DIARRHEA: 0
DIARRHEA: 0
VOMITING: 0
SHORTNESS OF BREATH: 1
COUGH: 0
DIARRHEA: 0
COUGH: 0
SHORTNESS OF BREATH: 1
COUGH: 0
DIARRHEA: 0
DIARRHEA: 1
SHORTNESS OF BREATH: 1
SHORTNESS OF BREATH: 1
SINUS PRESSURE: 0
SHORTNESS OF BREATH: 1
SHORTNESS OF BREATH: 1
DIARRHEA: 0
DIARRHEA: 0
SORE THROAT: 0
RHINORRHEA: 0
COUGH: 0
DIARRHEA: 0
SHORTNESS OF BREATH: 1
ABDOMINAL DISTENTION: 0
ABDOMINAL PAIN: 0
SHORTNESS OF BREATH: 1
BACK PAIN: 0
NAUSEA: 0
DIARRHEA: 0
DIARRHEA: 0
DYSPNEA ACTIVITY LEVEL: NO INTERVAL CHANGE
NAUSEA: 1
SHORTNESS OF BREATH: 1
DIARRHEA: 0
SHORTNESS OF BREATH: 1
COUGH: 0
COUGH: 0
SHORTNESS OF BREATH: 1
COUGH: 0

## 2021-01-01 ASSESSMENT — PULMONARY FUNCTION TESTS
PIF_VALUE: 20
PIF_VALUE: 21
PIF_VALUE: 2
PIF_VALUE: 19
PIF_VALUE: 22
PIF_VALUE: 2
PIF_VALUE: 21
PIF_VALUE: 0
PIF_VALUE: 19
PIF_VALUE: 2
PIF_VALUE: 20
PIF_VALUE: 0
PIF_VALUE: 22
PIF_VALUE: 20
PIF_VALUE: 11
PIF_VALUE: 21
PIF_VALUE: 19
PIF_VALUE: 4
PIF_VALUE: 20
PIF_VALUE: 25
PIF_VALUE: 2
PIF_VALUE: 3
PIF_VALUE: 21
PIF_VALUE: 19
PIF_VALUE: 8
PIF_VALUE: 20
PIF_VALUE: 24
PIF_VALUE: 22
PIF_VALUE: 0
PIF_VALUE: 21
PIF_VALUE: 21
PIF_VALUE: 24
PIF_VALUE: 25
PIF_VALUE: 3
PIF_VALUE: 21
PIF_VALUE: 21
PIF_VALUE: 20
PIF_VALUE: 25
PIF_VALUE: 20
PIF_VALUE: 25
PIF_VALUE: 0
PIF_VALUE: 22
PIF_VALUE: 23
PIF_VALUE: 27
PIF_VALUE: 25
PIF_VALUE: 22
PIF_VALUE: 23
PIF_VALUE: 20
PIF_VALUE: 25
PIF_VALUE: 21
PIF_VALUE: 19
PIF_VALUE: 19
PIF_VALUE: 20
PIF_VALUE: 19
PIF_VALUE: 3
PIF_VALUE: 20
PIF_VALUE: 23
PIF_VALUE: 20
PIF_VALUE: 15
PIF_VALUE: 20
PIF_VALUE: 24
PIF_VALUE: 19
PIF_VALUE: 1
PIF_VALUE: 20
PIF_VALUE: 23
PIF_VALUE: 20
PIF_VALUE: 25
PIF_VALUE: 23
PIF_VALUE: 23
PIF_VALUE: 22
PIF_VALUE: 25
PIF_VALUE: 2
PIF_VALUE: 5
PIF_VALUE: 20
PIF_VALUE: 16
PIF_VALUE: 23
PIF_VALUE: 26
PIF_VALUE: 23
PIF_VALUE: 22
PIF_VALUE: 23

## 2021-01-01 ASSESSMENT — PAIN DESCRIPTION - PAIN TYPE
TYPE: SURGICAL PAIN
TYPE: ACUTE PAIN
TYPE: ACUTE PAIN;SURGICAL PAIN
TYPE: ACUTE PAIN
TYPE: SURGICAL PAIN
TYPE: ACUTE PAIN
TYPE: SURGICAL PAIN

## 2021-01-01 ASSESSMENT — PAIN DESCRIPTION - ORIENTATION
ORIENTATION: RIGHT
ORIENTATION: MID;LEFT
ORIENTATION: RIGHT;LEFT
ORIENTATION: MID

## 2021-01-01 ASSESSMENT — PAIN DESCRIPTION - FREQUENCY
FREQUENCY: INTERMITTENT
FREQUENCY: CONTINUOUS
FREQUENCY: CONTINUOUS

## 2021-01-01 ASSESSMENT — PAIN DESCRIPTION - ONSET
ONSET: ON-GOING
ONSET: GRADUAL
ONSET: ON-GOING

## 2021-01-01 ASSESSMENT — PAIN DESCRIPTION - LOCATION
LOCATION: ABDOMEN
LOCATION: ABDOMEN;BACK
LOCATION: ABDOMEN
LOCATION: CHEST
LOCATION: ABDOMEN
LOCATION: ABDOMEN
LOCATION: NECK
LOCATION: BACK;LEG

## 2021-01-01 ASSESSMENT — PAIN - FUNCTIONAL ASSESSMENT
PAIN_FUNCTIONAL_ASSESSMENT: PREVENTS OR INTERFERES SOME ACTIVE ACTIVITIES AND ADLS
PAIN_FUNCTIONAL_ASSESSMENT: PREVENTS OR INTERFERES SOME ACTIVE ACTIVITIES AND ADLS
PAIN_FUNCTIONAL_ASSESSMENT: PREVENTS OR INTERFERES WITH ALL ACTIVE AND SOME PASSIVE ACTIVITIES

## 2021-01-01 ASSESSMENT — LIFESTYLE VARIABLES: SMOKING_STATUS: 0

## 2021-02-25 NOTE — TELEPHONE ENCOUNTER
Patient is at acceptable risk for perioperative cardiovascular event for the planned GI procedure (upper and lower scope), patient may proceed without any further cardiac testing. Please feel free to call for any further information

## 2021-02-25 NOTE — TELEPHONE ENCOUNTER
Dr Olimpia Ayon is requesting cardiac clearance for Stu Mora for a upper and lower scope    Fax [phone]

## 2021-04-05 PROBLEM — I48.91 ATRIAL FIBRILLATION (HCC): Status: ACTIVE | Noted: 2021-01-01

## 2021-04-05 PROBLEM — I48.91 ATRIAL FIBRILLATION WITH RAPID VENTRICULAR RESPONSE (HCC): Status: ACTIVE | Noted: 2021-01-01

## 2021-04-05 NOTE — ED PROVIDER NOTES
Patient presents to the ED with a complaint of shortness of breath. She was seen by her PCP today and was told that she has pneumonia. She was sent here for admission. Her heart rate was in the 150s at the office. No known history of atrial fibrillation or atrial flutter. She is not on blood thinners. She has known history of COPD. She states she has been using her inhaler which does provide some relief. Shortness of breath is also improved with rest and is worse with exertion. She has had a cough for the past several days. Productive of a greenish and yellowish sputum. No blood in the sputum. Has not had any chest discomfort until today which she described as a pressure on the left side of her chest.  Does not radiate into her back, neck, or arm. No associated nausea, vomiting, or abdominal discomfort. She states she has diarrhea but states that is chronic due to her lactulose use. She also reports chronic swelling of her lower extremities. She has been using her diuretic lately and has lost 7 pounds over the past week. She states she has been urinating frequently as a result of the diuretic use. Tums are moderate in severity and have been persistent. Gradual in onset. Review of Systems   Constitutional: Negative for chills, diaphoresis, fatigue and fever. HENT: Negative for congestion and rhinorrhea. Eyes: Negative for visual disturbance. Respiratory: Positive for cough and shortness of breath. Cardiovascular: Positive for chest pain, palpitations and leg swelling. Gastrointestinal: Positive for diarrhea. Negative for abdominal distention, abdominal pain, blood in stool, nausea and vomiting. Genitourinary: Negative for decreased urine volume, difficulty urinating and hematuria. Musculoskeletal: Negative for back pain, gait problem and neck pain. Skin: Negative for color change and pallor.    Neurological: Negative for dizziness, syncope, light-headedness and headaches. Hematological: Does not bruise/bleed easily. Psychiatric/Behavioral: Negative for confusion. All other systems reviewed and are negative. Physical Exam  Vitals signs and nursing note reviewed. Constitutional:       General: She is not in acute distress. Appearance: She is well-developed. She is not diaphoretic. Comments: Patient appears to be in no distress. Resting comfortably in bed   HENT:      Head: Normocephalic and atraumatic. Eyes:      Conjunctiva/sclera: Conjunctivae normal.   Neck:      Musculoskeletal: Normal range of motion and neck supple. Cardiovascular:      Rate and Rhythm: Tachycardia present. Rhythm irregular. Heart sounds: Normal heart sounds. No murmur. Pulmonary:      Effort: Pulmonary effort is normal. No respiratory distress. Breath sounds: Normal breath sounds. No wheezing or rales. Abdominal:      General: Bowel sounds are normal.      Palpations: Abdomen is soft. Tenderness: There is no abdominal tenderness. There is no guarding or rebound. Musculoskeletal:      Comments: She does have 1-2+ pitting edema bilateral lower extremities. No calf tenderness. Skin:     General: Skin is warm and dry. Coloration: Skin is not jaundiced or pale. Neurological:      Mental Status: She is alert and oriented to person, place, and time. Procedures     MDM   Patient presented to the ED after being seen by her PCP. She presented with the possibility of having pneumonia. Her complaint was shortness of breath that has been getting worse over the past several days. When she arrived she was also tachycardic with an irregular heart rate. She eventually admitted that she does have a history of atrial fibrillation that is paroxysmal.  She is on aspirin but no other form of anticoagulants.   She was initially given a ER loading dose of Cardizem and a Cardizem infusion which did mildly affect her heart rate but also lowered her blood pressure. We backed off on the Cardizem infusion rate and gave digoxin. Patient's blood pressure is starting to come up now and we are going to start to increase the Cardizem infusion rate again. I did speak with cardiology who is agreeable to the plan. They will provide consultation. Patient's labs were assessed. CBC shows no evidence of leukocytosis or shift. No evidence of anemia. CMP did show the patient to be hypokalemic. No evidence of renal deficiency. Hepatic functions were at baseline. Patient's magnesium was also found to be low and this was also supplemented. Troponin less than 0.01. BNP elevated at 959. Patient's chest x-ray showed hypoaeration but no acute process. Patient was agreeable to admission for further evaluation. EKG Interpretation @ 1612    Interpreted by emergency department physician    Rhythm: atrial fibrillation - rapid  Rate: 147  Axis: right  Ectopy: none  Conduction: left posterior fasciclar block  ST Segments: normal  T Waves: normal  Q Waves: none    Clinical Impression: atrial fibrillation (chronic) in RVR    Mckayla Rojas     EKG Interpretation @ 8949    Interpreted by emergency department physician    Rhythm: atrial fibrillation - rapid  Rate: 115  Axis: right  Ectopy: none  Conduction: left posterior fasciclar block  ST Segments: no acute change  T Waves: no acute change  Q Waves: none    Clinical Impression: Atrial fibrillation with rapid ventricular response. Improved rate with Cardizem and digoxin. Mckayla Rojas       ED Course as of Apr 05 2016 Mon Apr 05, 2021   1614 Patient's EKG shows atrial fibrillation with rapid ventricular response. Heart rate 145. Cardizem bolus and drip ordered. [MS]   0359 Patient now tells me that she has a history of atrial fibrillation. 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 She is not on any anticoagulants.     [MS]   7266 Patient's heart rate has improved with the ER loading dose.  Heart rate is down to 112 from 147. We will start the Cardizem infusion and reassess. Patient resting comfortably in bed no distress. [MS]   1734 We went up on patient's Cardizem drip. Pressure is 83/44. Patient has no complaints. Heart rate is in the 130s to 120s. Digoxin ordered and will reassess. Discussed results of labs with patient thus far. She is also hypokalemic and will be given some potassium. As well as magnesium. [MS]   6470 Patient's blood pressure is starting to come up. We will increase the Cardizem drip to achieve better rate control. [MS]   1908 After increasing the Cardizem infusion rate the patient's blood pressure has dropped again. We will stop the Cardizem and assess the heart rate and pressure. [MS]   1951 The nurse checked the blood pressure on the forearm and it was in the 645 systolic. We will start the Cardizem drip at 5 and titrate from there. Patient continues to be in no distress. [MS]   2015 Patient's heart rate has improved to around 105 and 115. Good blood pressure. Patient is now going upstairs to the floor.    [MS]      ED Course User Index  [MS] Clayton Brown, DO       --------------------------------------------- PAST HISTORY ---------------------------------------------  Past Medical History:  has a past medical history of Anxiety, Cancer ([facility] [address] Cirrhosis, non-alcoholic ([facility] [address] COPD (chronic obstructive pulmonary disease) ([facility] [address] and Hypertension. Past Surgical History:  has a past surgical history that includes hernia repair; Breast surgery (2011); Hysterectomy; Tonsillectomy; and Shoulder arthroscopy (09 09 2011). Social History:  reports that she has never smoked. She has never used smokeless tobacco. She reports current alcohol use. She reports that she does not use drugs.     Family History: family history includes Alzheimer's Disease in her sister; COPD in her father and mother; Cancer in her brother and mother; Diabetes in her father; Heart Failure in her father and mother. The patients home medications have been reviewed.     Allergies: Penicillins, Sulfa antibiotics, Theophyllines, Codeine, Iodine, Tape [adhesive tape], and Vicodin [hydrocodone-acetaminophen]    -------------------------------------------------- RESULTS -------------------------------------------------    LABS:  Results for orders placed or performed during the hospital encounter of 04/05/21   COVID-19, Rapid   Result Value Ref Range    SARS-CoV-2, NAAT Not Detected Not Detected   CBC Auto Differential   Result Value Ref Range    WBC 4.1 (L) 4.5 - 11.5 E9/L    RBC 3.92 3.50 - 5.50 E12/L    Hemoglobin 12.8 11.5 - 15.5 g/dL    Hematocrit 39.7 34.0 - 48.0 %    .3 (H) 80.0 - 99.9 fL    MCH 32.7 26.0 - 35.0 pg    MCHC 32.2 32.0 - 34.5 %    RDW 14.1 11.5 - 15.0 fL    Platelets 360 357 - 292 E9/L    MPV 10.0 7.0 - 12.0 fL    Neutrophils % 78.1 43.0 - 80.0 %    Lymphocytes % 8.8 (L) 20.0 - 42.0 %    Monocytes % 9.6 2.0 - 12.0 %    Eosinophils % 3.5 0.0 - 6.0 %    Basophils % 0.7 0.0 - 2.0 %    Neutrophils Absolute 3.20 1.80 - 7.30 E9/L    Lymphocytes Absolute 0.37 (L) 1.50 - 4.00 E9/L    Monocytes Absolute 0.41 0.10 - 0.95 E9/L    Eosinophils Absolute 0.14 0.05 - 0.50 E9/L    Basophils Absolute 0.00 0.00 - 0.20 E9/L    Poikilocytes 1+     Ovalocytes 1+    Comprehensive Metabolic Panel w/ Reflex to MG   Result Value Ref Range    Sodium 140 132 - 146 mmol/L    Potassium reflex Magnesium 3.1 (L) 3.5 - 5.0 mmol/L    Chloride 99 98 - 107 mmol/L    CO2 32 (H) 22 - 29 mmol/L    Anion Gap 9 7 - 16 mmol/L    Glucose 130 (H) 74 - 99 mg/dL    BUN 10 8 - 23 mg/dL    CREATININE 0.9 0.5 - 1.0 mg/dL    GFR Non-African American >60 >=60 mL/min/1.73    GFR African American >60     Calcium 8.7 8.6 - 10.2 mg/dL    Total Protein 6.9 6.4 - 8.3 g/dL    Albumin 2.5 (L) 3.5 - 5.2 g/dL    Total Bilirubin 1.9 (H) 0.0 - 1.2 mg/dL    Alkaline Phosphatase 152 (H) 35 - 104 U/L    ALT 9 0 - 32 U/L AST 38 (H) 0 - 31 U/L   Troponin   Result Value Ref Range    Troponin <0.01 0.00 - 0.03 ng/mL   Brain Natriuretic Peptide   Result Value Ref Range    Pro- (H) 0 - 450 pg/mL   Protime-INR   Result Value Ref Range    Protime 19.1 (H) 9.3 - 12.4 sec    INR 1.6    Lactate, Sepsis   Result Value Ref Range    Lactic Acid, Sepsis 2.1 (H) 0.5 - 1.9 mmol/L   Magnesium   Result Value Ref Range    Magnesium 1.5 (L) 1.6 - 2.6 mg/dL   EKG 12 Lead   Result Value Ref Range    Ventricular Rate 147 BPM    Atrial Rate 156 BPM    QRS Duration 84 ms    Q-T Interval 278 ms    QTc Calculation (Bazett) 435 ms    R Axis 96 degrees    T Axis -44 degrees   EKG 12 Lead   Result Value Ref Range    Ventricular Rate 115 BPM    Atrial Rate 220 BPM    QRS Duration 80 ms    Q-T Interval 262 ms    QTc Calculation (Bazett) 362 ms    R Axis 105 degrees    T Axis -59 degrees       RADIOLOGY:  XR CHEST PORTABLE   Final Result   No acute process. Bibasilar hypoaeration                 ------------------------- NURSING NOTES AND VITALS REVIEWED ---------------------------  Date / Time Roomed:  4/5/2021  4:09 PM  ED Bed Assignment:  10/10    The nursing notes within the ED encounter and vital signs as below have been reviewed. Patient Vitals for the past 24 hrs:   BP Temp Pulse Resp SpO2 Height Weight   04/05/21 1953 108/74  108 21 92 %     04/05/21 1903 (!) 72/41  124 20 95 %     04/05/21 1826 (!) 106/46  117 27 94 %     04/05/21 1747    18      04/05/21 1742   129       04/05/21 1732 (!) 83/44  121 20 93 %     04/05/21 1718 (!) 106/50  125 18 94 %     04/05/21 1642 (!) 123/97  145 18 94 %     04/05/21 1558 (!) 161/74 98.3 °F (36.8 °C) 150 18 94 % 5' 4\" (1.626 m) 212 lb (96.2 kg)       Oxygen Saturation Interpretation: Normal    ------------------------------------------ PROGRESS NOTES ------------------------------------------  Re-evaluation(s):  Please see ED course above.     Counseling:  I have spoken with the patient and discussed todays results, in addition to providing specific details for the plan of care and counseling regarding the diagnosis and prognosis. Their questions are answered at this time and they are agreeable with the plan of admission.    --------------------------------- ADDITIONAL PROVIDER NOTES ---------------------------------  Consultations:  Time: 1801. Spoke with Dr. Espinoza Morales. Discussed case. He will admit the patient. Time: 1819. Spoke with Dr. Zara Brandon (Cardiology). Discussed case. They will provide consultation. This patient's ED course included: a personal history and physicial examination, re-evaluation prior to disposition, multiple bedside re-evaluations, IV medications, cardiac monitoring, continuous pulse oximetry and complex medical decision making and emergency management    This patient has remained hemodynamically stable during their ED course. Critical care:  Please note that the withdrawal or failure to initiate urgent interventions for this patient would likely result in a life threatening deterioration or permanent disability. Systems at risk for deterioration include: Atrial fibrillation with rapid ventricular response requiring Cardizem drip and bolus as well as digoxin. Patient is also hypokalemic and hypomagnesemic. Accordingly this patient received 47 minutes of critical care time, excluding separately billable procedures. Diagnosis:  1. Atrial fibrillation with RVR (HCC)    2. Paroxysmal atrial fibrillation ([address]    3. Hypokalemia    4. Hypomagnesemia        Disposition:  Patient's disposition: Admit to [facility]  Patient's condition is fair.          Ziyad Smith DO  04/05/21 2016

## 2021-04-06 PROBLEM — J18.9 CAP (COMMUNITY ACQUIRED PNEUMONIA): Status: ACTIVE | Noted: 2021-01-01

## 2021-04-06 PROBLEM — J44.1 ACUTE EXACERBATION OF COPD WITH ASTHMA (HCC): Status: ACTIVE | Noted: 2021-01-01

## 2021-04-06 PROBLEM — K74.60 LIVER CIRRHOSIS SECONDARY TO NASH (HCC): Status: ACTIVE | Noted: 2021-01-01

## 2021-04-06 PROBLEM — K75.81 LIVER CIRRHOSIS SECONDARY TO NASH (HCC): Status: ACTIVE | Noted: 2021-01-01

## 2021-04-06 PROBLEM — J45.901 ACUTE EXACERBATION OF COPD WITH ASTHMA (HCC): Status: ACTIVE | Noted: 2021-01-01

## 2021-04-06 NOTE — CARE COORDINATION
CM Note: 4/6/2021 at 11:23am: Per Department protocol, Free 30 day trial card for Eliquis explained and given to patient. Patient instructed to take this card with  prescription to retail pharmacy to obtain 30 day supply for free. Instructed patient to follow-up with PCP within 1-2 weeks in order to obtain subsequent prescription for this medication at which time PCP will complete prior authorization if required. Patient verbalized understanding. Mrs. Roge Rosa states she does not know for sure if she will be going on an anticoagulant, but asked that I put it in her soft blue chart in case this medication is prescribed at discharge.  Katie Cruz RN

## 2021-04-06 NOTE — CONSULTS
Inpatient Cardiology Consultation      Reason for Consult: Atrial fibrillation    Consulting Physician: Jin León    Requesting Physician:  Dr. Lawrence Freitas    Date of Consultation: 4/6/2021    HISTORY OF PRESENT ILLNESS:     This 47-year-old female is known to [facility] cardiology and is followed by [facility]. She was last evaluated in our office on July 28, 2020 and was stable from a cardiac standpoint. She developed swelling of the lower extremities and was evaluated by primary care about 2 weeks ago. Lasix was doubled and she did not see much of an improvement. She then was seen by primary care yesterday she was feeling queasy and had a dry cough. She denies any fever and the swelling of her lower extremities had not improved. She had palpitations and felt as if there was a \"weight on her chest \". Her chronic dyspnea with exertion was not much worse. The chest discomfort lasted all day. She admits to palpitations. Blood pressure on admission was 161/74 with a heart rate of 150 and O2 saturation 94% on room air and she was afebrile. EKG on admission atrial fibrillation with rapid ventricular response and a right axis deviation. Potassium was 3.1 with a magnesium of 1.5 and lactic acid was 2.8 and albumin was 2.5, WBCs 4.1 with a hemoglobin of 12.8 and hematocrit of 39.7 and proBNP 959 with a normal troponin. She was negative for COVID-19. Chest x-ray showed bibasilar hyperaeration and CT of the chest without contrast is pending. She was treated with IV Cardizem and given a bolus of digoxin and electrolytes were supplemented    This morning her potassium remains low with 3.2    History  1. Obesity  2. Nonsmoker  3. Hypertension  4. COPD/chronic dyspnea  5. Non alcoholic cirrhosis  6. Right breast cancer   status post lumpectomy and radiation in 2011 but no chemotherapy  7. Anxiety  8. Hernia repair  9.  Arthroscopic shoulder surgery and synovectomy  10. 2D echo August 23 2019 5% stage II diastolic dysfunction and mild aortic stenosis mild tricuspid regurgitation mild pulmonary hypertension  11. Lexiscan stress test August 23, 2019 was a low risk study with attenuation artifact but no reversible ischemia and EF 65% visually  12. Endoscopy and colonoscopy has colon polyps  13. Hypothyroidism          Medications Prior to admit:  Prior to Admission medications    Medication Sig Start Date End Date Taking? Authorizing Provider   LORazepam (ATIVAN) 0.5 MG tablet Take 0.5 mg by mouth every 8 hours as needed for Anxiety. Yes Historical Provider, MD   rifaximin (XIFAXAN) 550 MG tablet Take 1 tablet by mouth 2 times daily 6/11/19  Yes Shantanu Coe MD   lactulose (CHRONULAC) 10 GM/15ML solution Take 30 mLs by mouth 3 times daily Take to achieve 2-3 bowel movement every day. Do not take if more than 3 bowel movement in a day 6/11/19  Yes Shantanu Coe MD   levothyroxine (SYNTHROID) 50 MCG tablet Take 50 mcg by mouth Daily   Yes Historical Provider, MD   furosemide (LASIX) 40 MG tablet Take 40 mg by mouth 2 times daily   Yes Historical Provider, MD   omeprazole (PRILOSEC) 20 MG delayed release capsule Take 40 mg by mouth daily   Yes Historical Provider, MD   Biotin 1000 MCG TABS Take by mouth   Yes Historical Provider, MD   Cholecalciferol (VITAMIN D3) 2000 UNIT CAPS Take  by mouth daily. Yes Historical Provider, MD   lisinopril (PRINIVIL;ZESTRIL) 20 MG tablet Take 20 mg by mouth daily. 5/26/11  Yes Historical Provider, MD   montelukast (SINGULAIR) 10 MG tablet Take 10 mg by mouth nightly. Yes Historical Provider, MD   anastrozole (ARIMIDEX) 1 MG tablet Take 1 mg by mouth daily. L.D. 9/2/11   Yes Historical Provider, MD   loratadine (CLARITIN) 10 MG tablet Take 10 mg by mouth daily.       Historical Provider, MD       Current Medications:    Current Facility-Administered Medications: dilTIAZem 125 mg in dextrose 5 % 125 mL infusion, 5-15 mg/hr, Intravenous, Continuous  anastrozole (ARIMIDEX) tablet 1 mg, 1 mg, Oral, Daily  Vitamin D (CHOLECALCIFEROL) tablet 2,000 Units, 2 tablet, Oral, Daily  furosemide (LASIX) tablet 40 mg, 40 mg, Oral, BID  lactulose (CHRONULAC) 10 GM/15ML solution 20 g, 20 g, Oral, TID  levothyroxine (SYNTHROID) tablet 50 mcg, 50 mcg, Oral, Daily  lisinopril (PRINIVIL;ZESTRIL) tablet 20 mg, 20 mg, Oral, Daily  LORazepam (ATIVAN) tablet 0.5 mg, 0.5 mg, Oral, Q8H PRN  montelukast (SINGULAIR) tablet 10 mg, 10 mg, Oral, Nightly  pantoprazole (PROTONIX) tablet 40 mg, 40 mg, Oral, QAM AC  rifaximin (XIFAXAN) tablet 550 mg, 550 mg, Oral, BID  benzonatate (TESSALON) capsule 200 mg, 200 mg, Oral, TID  apixaban (ELIQUIS) tablet 5 mg, 5 mg, Oral, BID    Allergies:  Penicillins, Sulfa antibiotics, Theophyllines, Codeine, Iodine, Tape [adhesive tape], and Vicodin [hydrocodone-acetaminophen]    Social History: Non-smoker nondrinker no illicit drugs,       Family History:   Family History   Problem Relation Age of Onset    COPD Mother     Heart Failure Mother     Cancer Mother         leukemia    Heart Failure Father     COPD Father     Diabetes Father     Alzheimer's Disease Sister     Cancer Brother         esophageal       REVIEW OF SYSTEMS:     · Constitutional: Denies fatigue, fevers, chills or night sweats  · Eyes: Denies visual changes or drainage  · ENT: Denies headaches or hearing loss. No mouth sores or sore throat. No epistaxis   · Cardiovascular: Denies chest pain, pressure or palpitations. No lower extremity swelling. · Respiratory:  chronic WHITE,  and orthopnea but no PND. No hemoptysis   · Gastrointestinal: Denies hematemesis or anorexia. No hematochezia or melena    · Genitourinary: Denies urgency, dysuria or hematuria. · Musculoskeletal: Denies gait disturbance, weakness or joint complaints  · Integumentary: Denies rash, hives or pruritis   · Neurological: Denies dizziness, headaches or seizures.  No numbness or tingling  · Psychiatric: Denies anxiety or depression. · Endocrine: Denies temperature intolerance. No recent weight change. .  · Hematologic/Lymphatic: Denies abnormal bruising or bleeding. No swollen lymph nodes    PHYSICAL EXAM:   /67   Pulse 106   Temp 97.8 °F (36.6 °C) (Oral)   Resp 18   Ht 5' 4\" (1.626 m)   Wt 216 lb 8 oz (98.2 kg)   SpO2 95%   BMI 37.16 kg/m²   CONST:  Well developed, well nourished who appears of stated age. Awake, alert and cooperative. No apparent distress. HEENT:   Head- Normocephalic, atraumatic   Eyes- Conjunctivae pink, anicteric  Throat- Oral mucosa pink and moist  Neck-  No stridor, trachea midline, no jugular venous distention. No carotid bruit. CHEST: Chest symmetrical and non-tender to palpation. No accessory muscle use or intercostal retractions  RESPIRATORY: Lung sounds -  course and slight wheezing  CARDIOVASCULAR:     Heart Inspection- shows no noted pulsations  Heart Palpation- no heaves or thrills; PMI is non-displaced   Heart Ausculation-  irregularly irregular rate and rhythm, no murmur. No s3, s4 or rub   PV: 2 plus lower extremity edema. No varicosities. Pedal pulses palpable, no clubbing or cyanosis   ABDOMEN: Soft, non-tender to light palpation. Bowel sounds present. No palpable masses no organomegaly; no abdominal bruit  MS: Good muscle strength and tone. No atrophy or abnormal movements. : Deferred  SKIN: Warm and dry no statis dermatitis or ulcers   NEURO / PSYCH: Oriented to person, place and time. Speech clear and appropriate. Follows all commands.  Pleasant affect     DATA:    ECG / Tele strips:  atrial fibrillation with rapid ventricular response  Diagnostic:      Intake/Output Summary (Last 24 hours) at 4/6/2021 0823  Last data filed at 4/6/2021 0556  Gross per 24 hour   Intake 179 ml   Output    Net 179 ml       Labs:   CBC:   Recent Labs     04/05/21  1614 04/06/21  0512   WBC 4.1* 3.3*   HGB 12.8 11.2*   HCT 39.7 35.5    114*     BMP:   Recent Labs     04/05/21  1614 04/06/21  0512    141   K 3.1* 3.2*   CO2 32* 33*   BUN 10 11   CREATININE 0.9 0.9   LABGLOM >60 >60   CALCIUM 8.7 8.0*     Mag:   Recent Labs     04/05/21  1614   MG 1.5*     Phos: No results for input(s): PHOS in the last 72 hours. TSH: No results for input(s): TSH in the last 72 hours. HgA1c:   Lab Results   Component Value Date    LABA1C 5.5 02/03/2020     No results found for: EAG  proBNP:   Recent Labs     04/05/21  1614   PROBNP 959*     PT/INR:   Recent Labs     04/05/21  1614   PROTIME 19.1*   INR 1.6     APTT:No results for input(s): APTT in the last 72 hours. CARDIAC ENZYMES:  Recent Labs     04/05/21  1614   TROPONINI <0.01     FASTING LIPID PANEL:  Lab Results   Component Value Date    CHOL 236 02/08/2021    HDL 66 02/08/2021    LDLCALC 145 02/08/2021    TRIG 126 02/08/2021     LIVER PROFILE:  Recent Labs     04/05/21  1614 04/06/21  0512   AST 38* 32*   ALT 9 8   LABALBU 2.5* 2.3*     Impressions  1. Paroxysmal atrial fibrillation in the setting of hypokalemia and hypomagnesiumemia  2. Hypokalemia and hypomagnesium   3. anemia  4. COPD  5. Nonalcoholic liver cirrhosis, hypoalbuminemia  6. History of breast cancer and status post lumpectomy and radiation therapy   7. Hypothyroidism  8. Hyperlipidemia    Plans  1. IV to oral Cardizem   2. consider cardioversion  3. Supplement electrolytes  4. Check thyroid studies      Electronically signed by TAMARA Harding CNP on 4/6/2021 at 8:23 AM     Addendum:  Jared Fishman MD    Reason for consult:  A fib, CHF    Patient previously known to: Dr. Modesto Ivory    History of Present illness: 68-year-old female presented to ER for swelling of the lower extremities. She was evaluated by primary care about 2 weeks ago. Lasix was doubled and she did not see much of an improvement. She then was seen by primary care yesterday she was feeling queasy and had a dry cough. She denies any fever and the swelling of her lower extremities had not improved.   She had palpitations and felt as if there was a \"weight on her chest \". Her chronic dyspnea with exertion was not much worse. No orthopnea. No dizzy or syncope. Blood pressure on admission was 161/74 with a heart rate of 150 and O2 saturation 94% on room air and she was afebrile. EKG on admission atrial fibrillation with rapid ventricular response   She was treated with IV Cardizem and given a bolus of digoxin and electrolytes were supplemented. Feeling better now, No CP; Her CP at home likely her 'tachycardia\". Review of systems:  Review of 10 systems negative except as mentioned in the HPI    Medical History: Reviewed    Hypertension  COPD/chronic dyspnea  Obesity  Nonsmoker  Non alcoholic cirrhosis  Right breast cancer-status post lumpectomy and radiation in 2011 but no chemotherapy  Anxiety  Hernia repair  Arthroscopic shoulder surgery and synovectomy  Colon polyps  Hypothyroidism    Surgical history: Reviewed    FamilyHistory: Reviewed    Allergies:  Reviewed    Social Hx: Reviewed. Scheduled Meds:   methylPREDNISolone  40 mg Intravenous Q12H    levoFLOXacin  500 mg Oral Daily    potassium chloride  20 mEq Oral TID    [START ON 4/7/2021] lisinopril  5 mg Oral Daily    dilTIAZem  120 mg Oral BID    anastrozole  1 mg Oral Daily    Vitamin D  2 tablet Oral Daily    furosemide  40 mg Oral BID    lactulose  20 g Oral TID    levothyroxine  50 mcg Oral Daily    montelukast  10 mg Oral Nightly    pantoprazole  40 mg Oral QAM AC    rifaximin  550 mg Oral BID    benzonatate  200 mg Oral TID    apixaban  5 mg Oral BID     Continuous Infusions:   dilTIAZem (CARDIZEM) 125 mg in dextrose 5% 125 mL infusion 5 mg/hr (04/06/21 1733)     PRN Meds:. LORazepam      Labs/imaging studies: Reviewed. Above ISRRAEL exam, assessment reviewed and reflect my work. I personally saw, examined, and evaluated the patient today. I personally reviewed the medications, rhythm strips, pertinent labs and test reports. I directly participated in the medical-decision making, ordering pertinent tests and medication adjustment. Physical exam:     Vitals:    04/06/21 1015   BP: (!) 98/55   Pulse: 110   Resp: 18   Temp: 98.3 °F (36.8 °C)   SpO2:        In general, this is a well developed, well nourished who appears stated age. awake, alert, cooperative, no apparent distress    HEENT: eyes -conjunctivae pink,   Neck-  no stridor, no carotid bruit. no jugular venous distention   RESPIRATORY: Chest symmetrical and non-tender to palpation. No accessory muscles use. Lung auscultation - few rhonchi  CARDIOVASCULAR:     Heart Inspection shows no noted pulsations  Heart Palpation - no palpable thrills  Heart Ausculation - Irregular rate and rhythm, 1/6 systolic murmur, No s3 or rub.   + lower extremity edema, no varicosities. Distal pulses palpable, no clubbing or cyanosis   ABDOMEN: Soft, nontender,  Bowel sounds present. MS: n/a. : Deferred  Rectal Exam: Deferred  SKIN: warm and dry  NEURO / PSYCH: oriented to person, place        Impression/Recommendations:    Paroxysmal atrial fibrillation with RVR - High [id number] VASc score - TSH Ok, Rate control with PO Cardizem - Wean iv cardizem;Monitor BP and HR. Risk benefits of [address] discussed, agreeable for [address] -Already started on Eliquis by Dr Maykel Blas. Risk benefits and alternatives to MARISELA/DC cardioversion discussed, agreeable - Scheduled for tomorrow    Hypokalemia and hypomagnesium - supplemented    Acute CHF, likley diastolic - Echo pending  - Continue Diuretics, monitor daily wts, I/Os    Chronic Dyspnea    History of breast cancer and status post lumpectomy and radiation therapy     Hypothyroidism - On HRT              Above recommendations discussed with her. Will talk to her daughter, Mayela Alba, tomorrow    Thank you for the consult.       Sherry Morton MD  4/6/2021  12:40 PM  [facility]) Cardiology

## 2021-04-06 NOTE — PROGRESS NOTES
Patient sees Dr. Bernardino Peña as primary care provider and Dr. Fish Umaña as an outpatient. Patient has a history of right lumpectomy. Patient states she had an upper endo a year ago and went into A-fib during the procedure. Patient has a family history of esophageal CA. Patient saw Dr. Fish Umaña after the endo and was found to be in NSR.

## 2021-04-06 NOTE — CARE COORDINATION
MAGDA Note: 4/6/2021 at 11:13am: COVID NEGATIVE - test done on 4/5/2021. CM talked to the patient for transition of care. States she lives home alone in a one story home with 3 steps to enter with a rail. States no home oxygen, but does have a nebulizer. DME: [employer], [address], has handicapped shower with grab bars, shower seat and raised toilet seat. States no hx of HHC or SNF. PCP: Dr Smita Otoole. Pharmacy: [facility] on the [address] of [address]. States he rplan is to return home and her daughter will provide transportation.  Deborah Marks RN

## 2021-04-07 NOTE — PROGRESS NOTES
Preliminary MARISELA and Cardioversion Note      Pre-operative Diagnosis: A fib    Post-operative Diagnosis: No thrombus, mild MR, Left atrial enalargement    Procedure: MARISELA and  Cardioversion x 1 with 200J of synchronized biphasic direct current    Anesthesia: LMAC    Surgeons/Assistants: Dr Akanksha Gannon    Estimated Blood Loss: None    Complications: None    Full report to follow    Electronically signed by Michela Rangel MD, [facility]

## 2021-04-07 NOTE — ANESTHESIA PRE PROCEDURE
Department of Anesthesiology  Preprocedure Note       Name:  Magdy Noriega   Age:  68 y.o.  :  1940                                          MRN:  [id number]         Date:  2021      Surgeon: Natalie Culp    Procedure: MARISELA AND CARDIOVERSION     Medications prior to admission:   Prior to Admission medications    Medication Sig Start Date End Date Taking? Authorizing Provider   LORazepam (ATIVAN) 0.5 MG tablet Take 0.5 mg by mouth every 8 hours as needed for Anxiety. Historical Provider, MD   rifaximin (XIFAXAN) 550 MG tablet Take 1 tablet by mouth 2 times daily 19   Radha Menendez MD   lactulose (CHRONULAC) 10 GM/15ML solution Take 30 mLs by mouth 3 times daily Take to achieve 2-3 bowel movement every day. Do not take if more than 3 bowel movement in a day 19   Radha Menendez MD   levothyroxine (SYNTHROID) 50 MCG tablet Take 50 mcg by mouth Daily    Historical Provider, MD   furosemide (LASIX) 40 MG tablet Take 40 mg by mouth 2 times daily    Historical Provider, MD   omeprazole (PRILOSEC) 20 MG delayed release capsule Take 40 mg by mouth daily    Historical Provider, MD   Biotin 1000 MCG TABS Take by mouth    Historical Provider, MD   loratadine (CLARITIN) 10 MG tablet Take 10 mg by mouth daily. Historical Provider, MD   Cholecalciferol (VITAMIN D3) 2000 UNIT CAPS Take  by mouth daily. Historical Provider, MD   lisinopril (PRINIVIL;ZESTRIL) 20 MG tablet Take 20 mg by mouth daily. 11   Historical Provider, MD   montelukast (SINGULAIR) 10 MG tablet Take 10 mg by mouth nightly. Historical Provider, MD   anastrozole (ARIMIDEX) 1 MG tablet Take 1 mg by mouth daily. L.D. 11    Historical Provider, MD       Current medications:    No current facility-administered medications for this encounter. No current outpatient medications on file.      Facility-Administered Medications Ordered in Other Encounters   Medication Dose Route Frequency Provider Last Rate Last Admin    anastrozole (ARIMIDEX) tablet 1 mg  1 mg Oral Daily Joselyn Villalba MD        0.9 % sodium chloride infusion    Continuous PRN TAMARA Collins - CRNA   New Bag at 04/07/21 1200    [START ON 4/9/2021] levoFLOXacin (LEVAQUIN) tablet 500 mg  500 mg Oral Every Other Day Joselyn Villalba MD        methylPREDNISolone sodium (SOLU-MEDROL) injection 40 mg  40 mg Intravenous Q12H Joselyn Villalba MD   40 mg at 04/07/21 0914    potassium chloride (KLOR-CON M) extended release tablet 20 mEq  20 mEq Oral TID TAMARA Ayala - CNP   20 mEq at 04/06/21 2053    lisinopril (PRINIVIL;ZESTRIL) tablet 5 mg  5 mg Oral Daily TAMARA De Los Santos - CNP   5 mg at 04/07/21 0915    dilTIAZem (CARDIZEM CD) extended release capsule 120 mg  120 mg Oral BID TAMARA De Los Santos CNP   120 mg at 04/07/21 0915    diphenhydrAMINE (BENADRYL) injection 50 mg  50 mg Intravenous Q6H PRN Joselyn Villalba MD   50 mg at 04/06/21 2232    dilTIAZem 125 mg in dextrose 5 % 125 mL infusion  5-15 mg/hr Intravenous Continuous Peggye Lank, DO 5 mL/hr at 04/06/21 1733 5 mg/hr at 04/06/21 1733    Vitamin D (CHOLECALCIFEROL) tablet 2,000 Units  2 tablet Oral Daily Joselyn Villalba MD   2,000 Units at 04/07/21 0915    furosemide (LASIX) tablet 40 mg  40 mg Oral BID Joselyn Villalba MD   40 mg at 04/06/21 1835    lactulose (CHRONULAC) 10 GM/15ML solution 20 g  20 g Oral TID Joselyn Villalba MD   20 g at 04/06/21 2053    levothyroxine (SYNTHROID) tablet 50 mcg  50 mcg Oral Daily Joselyn Villalba MD   50 mcg at 04/07/21 0559    LORazepam (ATIVAN) tablet 0.5 mg  0.5 mg Oral Q8H PRN Joselyn Villalba MD        montelukast (SINGULAIR) tablet 10 mg  10 mg Oral Nightly Joselyn Villalba MD   10 mg at 04/06/21 2052    pantoprazole (PROTONIX) tablet 40 mg  40 mg Oral QAM AC Joselyn Villalba MD   40 mg at 04/07/21 0559    rifaximin (XIFAXAN) tablet 550 mg  550 mg Oral BID Joselyn Villalba MD   550 mg at 04/07/21 0914    benzonatate (TESSALON) capsule 200 mg  200 mg Oral TID Robby Hurd MD   200 mg at 04/07/21 0915    apixaban (ELIQUIS) tablet 5 mg  5 mg Oral BID Robby Hurd MD   5 mg at 04/07/21 7324       Allergies: Allergies   Allergen Reactions    Penicillins Anaphylaxis    Sulfa Antibiotics Anaphylaxis    Theophyllines Anaphylaxis     Liquid only    Codeine      Climb the walls    Iodine      IVP iodine  i get real hot    Tape Fatoumata Lose Tape]      Pulls my skin off    Vicodin [Hydrocodone-Acetaminophen]      Climb the walls       Problem List:    Patient Active Problem List   Diagnosis Code    Glenoid labral tear S43.439A    Synovitis M65.9    Biceps tendon tear S46.219A    Rotator cuff tear M75.100    Subacromial impingement M75.40    Encephalopathy G93.40    Atrial fibrillation with rapid ventricular response (HCC) I48.91    Atrial fibrillation (HCC) I48.91    Acute exacerbation of COPD with asthma (HCC) J44.1, J45.901    CAP (community acquired pneumonia) J18.9    Liver cirrhosis [facility] K75.81, K74.60    Hypertension I10       Past Medical History:        Diagnosis Date    Anxiety     Cancer ([facility] 2/11    right breast    Cirrhosis, non-alcoholic (HCC)     COPD (chronic obstructive pulmonary disease) (HCC)     Hypertension        Past Surgical History:        Procedure Laterality Date    BREAST SURGERY  2011    right lumpectomy    HERNIA REPAIR      HYSTERECTOMY      SHOULDER ARTHROSCOPY  09 09 2011    arthroscopy left shoulder rotator cuff repair, subacromial decompression, debridement labrum, synovectomy    TONSILLECTOMY         Social History:    Social History     Tobacco Use    Smoking status: Never Smoker    Smokeless tobacco: Never Used   Substance Use Topics    Alcohol use: Yes     Comment: social                                Counseling given: Not Answered      Vital Signs (Current): There were no vitals filed for this visit. BP Readings from Last 3 Encounters:   04/07/21 (!) 120/52   07/28/20 132/64   07/26/20 (!) 166/68       NPO Status:  GREATER THAN 8 HOURS                                                                               BMI:   Wt Readings from Last 3 Encounters:   04/05/21 216 lb 8 oz (98.2 kg)   07/28/20 210 lb (95.3 kg)   07/26/20 205 lb (93 kg)     There is no height or weight on file to calculate BMI.    CBC:   Lab Results   Component Value Date    WBC 3.3 04/06/2021    RBC 3.52 04/06/2021    HGB 11.2 04/06/2021    HCT 35.5 04/06/2021    .9 04/06/2021    RDW 14.0 04/06/2021     04/06/2021       CMP:   Lab Results   Component Value Date     04/07/2021    K 3.6 04/07/2021    K 3.1 04/05/2021    CL 98 04/07/2021    CO2 29 04/07/2021    BUN 16 04/07/2021    CREATININE 1.1 04/07/2021    GFRAA 58 04/07/2021    LABGLOM 48 04/07/2021    GLUCOSE 155 04/07/2021    GLUCOSE 100 05/30/2012    PROT 6.0 04/06/2021    CALCIUM 8.5 04/07/2021    BILITOT 1.5 04/06/2021    ALKPHOS 123 04/06/2021    AST 32 04/06/2021    ALT 8 04/06/2021       POC Tests: No results for input(s): POCGLU, POCNA, POCK, POCCL, POCBUN, POCHEMO, POCHCT in the last 72 hours.     Coags:   Lab Results   Component Value Date    PROTIME 19.1 04/05/2021    INR 1.6 04/05/2021    APTT 29.6 08/05/2020       HCG (If Applicable): No results found for: PREGTESTUR, PREGSERUM, HCG, HCGQUANT     ABGs: No results found for: PHART, PO2ART, PHG2NHQ, LZL3WBP, BEART, A1WHNXLL     Type & Screen (If Applicable):  No results found for: LABABO, LABRH    Drug/Infectious Status (If Applicable):  No results found for: HIV, HEPCAB    COVID-19 Screening (If Applicable):   Lab Results   Component Value Date    COVID19 Not Detected 04/05/2021           Anesthesia Evaluation  Patient summary reviewed no history of anesthetic complications:   Airway: Mallampati: III  TM distance: <3 FB   Neck ROM: full  Mouth opening: < 3 FB Dental: normal exam     Comment: Poor dentition  Front upper partial    Pulmonary: breath sounds clear to auscultation  (+) COPD:  asthma:     (-) pneumonia                           Cardiovascular:    (+) hypertension:, dysrhythmias: atrial fibrillation,         Rhythm: irregular                      Neuro/Psych:   (+) depression/anxiety             GI/Hepatic/Renal:   (+) GERD:, liver disease (CIRRHOSIS ):,           Endo/Other:    (+) hypothyroidism::., .                 Abdominal:           Vascular: negative vascular ROS. Anesthesia Plan      MAC     ASA 3       Induction: intravenous. Anesthetic plan and risks discussed with patient. Plan discussed with CRNA.             [address] DO   4/7/2021

## 2021-04-07 NOTE — PROGRESS NOTES
Philomena Angelo,    Your patient is on a medication that requires a renal dose adjustment. Renal Function Assessment:    Date Body Weight IBW Adj. Body Weight SCr CrCl Dialysis status   4/7/2021 98.2 kg 54.7 kg 72.1 kg 1.1 48 ml/min No orders       Pharmacy has renally dose-adjusted the following medication(s):    Date Medication Original Dosing Regimen New Dosing Regimen   4/7/2021 Levofloxacin  500 mg daily 500 mg every 48 hours           These changes were made per protocol according to the [facility] Renal Function-Based Dose Adjustments Policy    *Please note this dose may need readjusted if your patient's renal function significantly improves. Please contact pharmacy with any questions regarding these changes.

## 2021-04-07 NOTE — CARE COORDINATION
MAGDA Notes: 4/7/2021 at 10:38am: COVID NEGATIVE - test done on 4/5/2021. MAGDA talked to the patient and her daughter WANDA at the bedside. Both state she is going for a cardioversion today. Patient lives at home alone in a one story home and her daughter states her mom does pretty good with her ADL's. Currently on RA. States her plan remains to return home and her daughter WANDA will provide transportation.  Bud Chaudhary RN

## 2021-04-07 NOTE — PROGRESS NOTES
[facility]) Physicians        CARDIOLOGY                 INPATIENT PROGRESS NOTE          PATIENT SEEN IN FOLLOW UP FOR: A Fib    Hospital Day: Edmond Khan is a  year old patient known to Dr. Lawyer Recinos: Denies any chest pain; breathing better, No Palpitations or dizzy, no orthopnea    ROS: Review of rest of 10 systems negative except as mentioned above    OBJECTIVE: No acute distress. See Assessment     Diagnostics:       Telemetry: A Fib with RVR        Intake/Output Summary (Last 24 hours) at 4/7/2021 1053  Last data filed at 4/6/2021 1733  Gross per 24 hour   Intake 70 ml   Output 0 ml   Net 70 ml       Labs:   CBC:   Recent Labs     04/05/21 1614 04/06/21 0512   WBC 4.1* 3.3*   HGB 12.8 11.2*   HCT 39.7 35.5    114*     BMP:   Recent Labs     04/06/21  0512 04/07/21  0734    135   K 3.2* 3.6   CO2 33* 29   BUN 11 16   CREATININE 0.9 1.1*   LABGLOM >60 48   CALCIUM 8.0* 8.5*     Mag:   Recent Labs     04/05/21  1614 04/06/21 0512   MG 1.5* 1.5*     Phos: No results for input(s): PHOS in the last 72 hours. TSH:   Recent Labs     04/06/21 0512   TSH 2.710     HgA1c:     BNP: No results for input(s): BNP in the last 72 hours. PT/INR:   Recent Labs     04/05/21 1614   PROTIME 19.1*   INR 1.6     APTT:No results for input(s): APTT in the last 72 hours.   CARDIAC ENZYMES:  Recent Labs     04/05/21 1614   TROPONINI <0.01     FASTING LIPID PANEL:  Lab Results   Component Value Date    CHOL 236 02/08/2021    HDL 66 02/08/2021    LDLCALC 145 02/08/2021    TRIG 126 02/08/2021     LIVER PROFILE:  Recent Labs     04/05/21 1614 04/06/21 0512   AST 38* 32*   ALT 9 8   LABALBU 2.5* 2.3*       Current Inpatient Medications:   anastrozole  1 mg Oral Daily    methylPREDNISolone  40 mg Intravenous Q12H    levoFLOXacin  500 mg Oral Daily    potassium chloride  20 mEq Oral TID    lisinopril  5 mg Oral Daily    dilTIAZem  120 mg Oral BID    Vitamin D  2 tablet Oral Daily    furosemide  40 mg Oral BID    lactulose  20 g Oral TID    levothyroxine  50 mcg Oral Daily    montelukast  10 mg Oral Nightly    pantoprazole  40 mg Oral QAM AC    rifaximin  550 mg Oral BID    benzonatate  200 mg Oral TID    apixaban  5 mg Oral BID       IV Infusions (if any):   dilTIAZem (CARDIZEM) 125 mg in dextrose 5% 125 mL infusion 5 mg/hr (21 1733)         PHYSICAL EXAM:     CONSTITUTIONAL:   BP (!) 120/52   Pulse 113   Temp 98 °F (36.7 °C)   Resp 16   Ht 5' 4\" (1.626 m)   Wt 216 lb 8 oz (98.2 kg)   SpO2 95%   BMI 37.16 kg/m²   Pulse  Av.6  Min: 98  Max: 682  Systolic (89ZFM), OZQ:521 , Min:91 , XVR:964    Diastolic (53NTE), DPI:61, Min:52, Max:58         In general, this is a well developed, well nourished who appears stated age. awake, alert, cooperative, no apparent distress     HEENT: eyes -conjunctivae pink,   Neck-  no stridor, no carotid bruit. no jugular venous distention   RESPIRATORY: Chest symmetrical and non-tender to palpation. No accessory muscles use. Lung auscultation - few rhonchi  CARDIOVASCULAR:     Heart Inspection shows no noted pulsations  Heart Palpation - no palpable thrills  Heart Ausculation - Irregular rate and rhythm, 2/6 systolic murmur, No s3 or rub.   + lower extremity edema, no varicosities. Distal pulses palpable, no clubbing or cyanosis   ABDOMEN: Soft, nontender,  Bowel sounds present. MS: n/a. : Deferred  Rectal Exam: Deferred  SKIN: warm and dry  NEURO / PSYCH: oriented to person, place            Impression/Recommendations:     Paroxysmal atrial fibrillation with RVR - High [id number] VASc score - TSH Ok, Rate control with PO Cardizem - Wean iv cardizem;Monitor BP and HR.  Risk benefits and alternatives to MARISELA/DC cardioversion discussed, agreeable      Hypokalemia and hypomagnesium - supplemented     Acute CHF, likley diastolic - Continue Diuretics, monitor daily wts, I/Os     Chronic Dyspnea     Mild Tricuspid regurgitation History of breast cancer and status post lumpectomy and radiation therapy      Hypothyroidism - On HRT          Poss.  discharge home tomorrow      Above recommendations discussed with her and her daughter, Karol Hodgkins        Electronically signed by Prabhu Young MD on 4/7/2021 at 10:53 AM  [facility]) Cardiology

## 2021-04-07 NOTE — H&P
Department of Internal Medicine            CHIEF COMPLAINT:  sob    HISTORY OF PRESENT ILLNESS:      This is a camein follow up from acute congestive heart failure from a week prior. She started to cough and having increased sob and wheezing. Pt was in worsen distressed and was sent to er. She was found to have to have rapid afib    PAST MEDICAL Hx:  Past Medical History:   Diagnosis Date    Anxiety     Cancer ([facility] [address] 2/11    right breast    Cirrhosis, non-alcoholic (HCC)     COPD (chronic obstructive pulmonary disease) ([facility] [address]     Hypertension        PAST SURGICAL Hx:   Past Surgical History:   Procedure Laterality Date    BREAST SURGERY  2011    right lumpectomy    HERNIA REPAIR      HYSTERECTOMY      SHOULDER ARTHROSCOPY  09 09 2011    arthroscopy left shoulder rotator cuff repair, subacromial decompression, debridement labrum, synovectomy    TONSILLECTOMY         FAMILY Hx:  Family History   Problem Relation Age of Onset    COPD Mother     Heart Failure Mother     Cancer Mother         leukemia    Heart Failure Father     COPD Father     Diabetes Father     Alzheimer's Disease Sister     Cancer Brother         esophageal       HOME MEDICATIONS:  Prior to Admission medications    Medication Sig Start Date End Date Taking? Authorizing Provider   LORazepam (ATIVAN) 0.5 MG tablet Take 0.5 mg by mouth every 8 hours as needed for Anxiety. Yes Historical Provider, MD   rifaximin (XIFAXAN) 550 MG tablet Take 1 tablet by mouth 2 times daily 6/11/19  Yes Shantanu Dickinson MD   lactulose (CHRONULAC) 10 GM/15ML solution Take 30 mLs by mouth 3 times daily Take to achieve 2-3 bowel movement every day.   Do not take if more than 3 bowel movement in a day 6/11/19  Yes Shantanu Dickinson MD   levothyroxine (SYNTHROID) 50 MCG tablet Take 50 mcg by mouth Daily   Yes Historical Provider, MD   furosemide (LASIX) 40 MG tablet Take 40 mg by mouth 2 times daily   Yes Historical Provider, MD   omeprazole ([address] [place] [address]) 20 MG delayed release capsule Take 40 mg by mouth daily   Yes Historical Provider, MD   Biotin 1000 MCG TABS Take by mouth   Yes Historical Provider, MD   Cholecalciferol (VITAMIN D3) 2000 UNIT CAPS Take  by mouth daily. Yes Historical Provider, MD   lisinopril (PRINIVIL;ZESTRIL) 20 MG tablet Take 20 mg by mouth daily. 5/26/11  Yes Historical Provider, MD   montelukast (SINGULAIR) 10 MG tablet Take 10 mg by mouth nightly. Yes Historical Provider, MD   anastrozole (ARIMIDEX) 1 MG tablet Take 1 mg by mouth daily. L.D. 9/2/11   Yes Historical Provider, MD   loratadine (CLARITIN) 10 MG tablet Take 10 mg by mouth daily.       Historical Provider, MD       ALLERGIES:  Penicillins, Sulfa antibiotics, Theophyllines, Codeine, Iodine, Tape Dorothyann Jackson tape], and Vicodin [hydrocodone-acetaminophen]    SOCIAL Hx:  Social History     Socioeconomic History    Marital status:      Spouse name: Not on file    Number of children: Not on file    Years of education: Not on file    Highest education level: Not on file   Occupational History    Not on file   Social Needs    Financial resource strain: Not on file    Food insecurity     Worry: Not on file     Inability: Not on file    Transportation needs     Medical: Not on file     Non-medical: Not on file   Tobacco Use    Smoking status: Never Smoker    Smokeless tobacco: Never Used   Substance and Sexual Activity    Alcohol use: Yes     Comment: social    Drug use: Never    Sexual activity: Not Currently   Lifestyle    Physical activity     Days per week: Not on file     Minutes per session: Not on file    Stress: Not on file   Relationships    Social connections     Talks on phone: Not on file     Gets together: Not on file     Attends Judaism service: Not on file     Active member of club or organization: Not on file     Attends meetings of clubs or organizations: Not on file     Relationship status: Not on file    Intimate partner violence     Fear of current or ex partner: Not on file     Emotionally abused: Not on file     Physically abused: Not on file     Forced sexual activity: Not on file   Other Topics Concern    Not on file   Social History Narrative    Not on file       ROS:  General:   Denies chills, fatigue, fever, malaise, night sweats or weight loss    Psychological:   Denies anxiety, disorientation or hallucinations    ENT:    Denies epistaxis, headaches, vertigo or visual changes    Cardiovascular:   Irregular heart beat    Respiratory:   Sob, cough and wheezing    Gastrointestinal:   Denies nausea, vomiting, diarrhea, or constipation. Denies any abdominal pain. Denies change in bowel habits or stools. Genito-Urinary:    Denies any urgency, frequency, hematuria. Voiding without difficulty. Musculoskeletal:   Denies joint pain, joint stiffness, joint swelling or muscle pain    Neurology:    Denies any headache or focal neurological deficits. No weakness or paresthesia. Derm:    Denies any rashes, ulcers, or excoriations. Denies bruising. Extremities:   Denies any lower extremity swelling or edema. PHYSICAL EXAM:  VITALS:  Vitals:    04/06/21 1715   BP: (!) 118/58   Pulse: 118   Resp: 16   Temp: 98.4 °F (36.9 °C)   SpO2: 95%         CONSTITUTIONAL:    Awake, alert, cooperative, no apparent distress, and appears stated age    EYES:    PERRL, EOMI, sclera clear, conjunctiva normal    ENT:    Normocephalic, atraumatic, sinuses nontender on palpation. External ears without lesions. Oral pharynx with moist mucus membranes. Tonsils without erythema or exudates.     NECK:    Supple, symmetrical, trachea midline, no adenopathy, thyroid symmetric, not enlarged and no tenderness, skin normal, no bruits, no JVD    HEMATOLOGIC/LYMPHATICS:    No cervical lymphadenopathy and no supraclavicular lymphadenopathy    LUNGS:    Diffuse crackles and wheezing    CARDIOVASCULAR:    irr irr s1s2    ABDOMEN:    No scars, normal bowel sounds, soft, non-distended, non-tender, no masses palpated, no hepatosplenomegaly, no rebound or guarding elicited on palpation     MUSCULOSKELETAL:    There is no redness, warmth, or swelling of the joints. Full range of motion noted. Motor strength is 5 out of 5 all extremities bilaterally. Tone is normal.    NEUROLOGIC:    Awake, alert, oriented to name, place and time. Cranial nerves II-XII are grossly intact. Motor is 5 out of 5 bilaterally. SKIN:    No bruising or bleeding. No redness, warmth, or swelling    EXTREMITIES:    Peripheral pulses present. No edema, cyanosis, or swelling. LABORATORY DATA:  CBC:   Lab Results   Component Value Date    WBC 3.3 04/06/2021    RBC 3.52 04/06/2021    HGB 11.2 04/06/2021    HCT 35.5 04/06/2021    .9 04/06/2021    MCH 31.8 04/06/2021    MCHC 31.5 04/06/2021    RDW 14.0 04/06/2021     04/06/2021    MPV 10.1 04/06/2021     CMP:    Lab Results   Component Value Date     04/06/2021    K 3.2 04/06/2021    K 3.1 04/05/2021     04/06/2021    CO2 33 04/06/2021    BUN 11 04/06/2021    CREATININE 0.9 04/06/2021    GFRAA >60 04/06/2021    LABGLOM >60 04/06/2021    GLUCOSE 103 04/06/2021    GLUCOSE 100 05/30/2012    PROT 6.0 04/06/2021    LABALBU 2.3 04/06/2021    LABALBU 4.1 05/30/2012    CALCIUM 8.0 04/06/2021    BILITOT 1.5 04/06/2021    ALKPHOS 123 04/06/2021    AST 32 04/06/2021    ALT 8 04/06/2021       ASSESSMENT:  Patient Active Problem List   Diagnosis    Glenoid labral tear    Synovitis    Biceps tendon tear    Rotator cuff tear    Subacromial impingement    Encephalopathy    Atrial fibrillation with rapid ventricular response (HCC)    Atrial fibrillation (HCC)    [facility] exacerbation of COPD with asthma ([facility] [address]    CAP (community acquired pneumonia)    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 Eliquis. Iv solumedrol po levaquin. Ct scan reviewed.  Consult cardiology    Rukhsana Sanchez

## 2021-04-08 NOTE — PROGRESS NOTES
Inpatient Cardiology Progress note     PATIENT IS BEING FOLLOWED FOR: Atrial Fibrillation     Álvaro Lazaro is a 68year old  female who follows with Dr. Rachele Wallace. SUBJECTIVE: Denies chest pain. Complains of WHITE and orthopnea that are unchanged since prior to presentation. She is concerned that her speech is slow to respond and that this has been apparent for \"2 years, I think I had a small stroke then\". OBJECTIVE: No apparent distress     ROS:  Consist: Denies fevers, chills or night sweats  Heart: Denies chest pain, palpitations, lightheadedness, dizziness or syncope  Lungs: Denies cough, wheezing  GI: Denies abdominal pain, vomiting or diarrhea    PHYSICAL EXAM:   /60   Pulse 100   Temp 97.8 °F (36.6 °C)   Resp 18   Ht 5' 4\" (1.626 m)   Wt 216 lb 8 oz (98.2 kg)   SpO2 96%   BMI 37.16 kg/m²    CONST: Well developed, obese, elderly female who appears stated age. Awake, alert and cooperative. No apparent distress  HEENT:   Head- Normocephalic, atraumatic   Eyes- Conjunctivae pink, anicteric  Throat- Oral mucosa pink and moist  Neck-  No stridor, trachea midline, no jugular venous distention. No carotid bruit  CHEST: Chest symmetrical and non-tender to palpation. No accessory muscle use or intercostal retractions  RESPIRATORY:  Lung sounds - clear throughout fields   CARDIOVASCULAR:     Heart Inspection- shows no noted pulsations  Heart Palpation- no heaves or thrills; PMI is non-displaced   Heart Ausculation- Irregular rate and rhythm, no murmur. No s3, or rub   PV: trace bilateral lower extremity edema. No varicosities. Pedal pulses palpable, no clubbing or cyanosis   ABDOMEN: Soft, non-tender to light palpation. Bowel sounds present. No palpable masses no organomegaly; no abdominal bruit  MS: Good muscle strength and tone. No atrophy or abnormal movements. : Deferred  SKIN: Warm and dry no statis dermatitis or ulcers   NEURO / PSYCH: Oriented to person, place and time.  Speech clear and appropriate. Follows all commands.  Pleasant affect       Intake/Output Summary (Last 24 hours) at 4/8/2021 0908  Last data filed at 4/7/2021 1704  Gross per 24 hour   Intake 490 ml   Output    Net 490 ml       Weight:   Wt Readings from Last 3 Encounters:   04/05/21 216 lb 8 oz (98.2 kg)   07/28/20 210 lb (95.3 kg)   07/26/20 205 lb (93 kg)     Current Inpatient Medications:   predniSONE  10 mg Oral Daily    anastrozole  1 mg Oral Daily    [START ON 4/9/2021] levoFLOXacin  500 mg Oral Every Other Day    potassium chloride  20 mEq Oral TID    lisinopril  5 mg Oral Daily    dilTIAZem  120 mg Oral BID    Vitamin D  2 tablet Oral Daily    furosemide  40 mg Oral BID    lactulose  20 g Oral TID    levothyroxine  50 mcg Oral Daily    montelukast  10 mg Oral Nightly    pantoprazole  40 mg Oral QAM AC    rifaximin  550 mg Oral BID    benzonatate  200 mg Oral TID    apixaban  5 mg Oral BID       IV Infusions (if any):   dilTIAZem (CARDIZEM) 125 mg in dextrose 5% 125 mL infusion Stopped (04/07/21 1255)       DIAGNOSTIC/ LABORATORY DATA:  Labs:   CBC:   Recent Labs     04/05/21  1614 04/06/21  0512   WBC 4.1* 3.3*   HGB 12.8 11.2*   HCT 39.7 35.5    114*     BMP:   Recent Labs     04/06/21  0512 04/07/21  0734    135   K 3.2* 3.6   CO2 33* 29   BUN 11 16   CREATININE 0.9 1.1*   LABGLOM >60 48   CALCIUM 8.0* 8.5*     Mag:   Recent Labs     04/05/21  1614 04/06/21  0512   MG 1.5* 1.5*   TFT:   Lab Results   Component Value Date    TSH 2.710 04/06/2021    T4FREE 1.64 04/06/2021      HgA1c:   Lab Results   Component Value Date    LABA1C 5.5 02/03/2020   PT/INR:   Recent Labs     04/05/21  1614   PROTIME 19.1*   INR 1.6   CARDIAC ENZYMES:  Recent Labs     04/05/21  1614   TROPONINI <0.01     FASTING LIPID PANEL:  Lab Results   Component Value Date    CHOL 236 02/08/2021    HDL 66 02/08/2021    LDLCALC 145 02/08/2021    TRIG 126 02/08/2021     LIVER PROFILE:  Recent Labs     04/05/21  1618 04/06/21  0512   AST 38* 32*   ALT 9 8   LABALBU 2.5* 2.3*     04/05/2021 CXR:   No acute process. Bibasilar hypoaeration    04/06/2021 CT Chest without contrast:   Mild left upper lobe consolidation, pneumonitis versus atelectasis. Subsegmental right middle and lower lobe atelectasis. Moderate, chronic airway disease, asthma versus chronic bronchitis. Mildly prominent mediastinal lymph nodes. Mild, chronic pulmonary arterial hypertension. Cholelithiasis, no signs of cholecystitis. 04/07/2021 MARISELA (Dr. Sarita Smith):   No intra cardiac mass or thrombus. Normal left ventricular chamber size and systolic function. Left atrium is enlarged. Interatrial septum appears intact. No thrombus in left atrium or atrial appendage. Normal right ventricle size and function. There is trace mitral regurgitation. No mitral valve prolapse. There is trace tricuspid regurgitation. Normal aortic root size. No evidence of pericardial effusion. No comparison study available. Telemetry: Currently A Fib with CVR   12 lead EKG: Pending      ASSESSMENT:   1. New onset Atrial Fibrillation with CVR now; S/p successful MARISELA/DCCV 04/07/2021  2. Chronic anticoagulation with Eliquis initiated this admission   3. Acute decompensation of HFpEF (EF normal per MARISELA 04/07/2021), now compensated on PO Lasix. Inaccurate I&O.   4. Trace MR per MARISELA this admission   5. HTN: Controlled  6. COPD with chronic dyspnea  7. Hypothyroidism, on replacement therapy with normal TFT this admission   8. Nonalcoholic Cirrhosis  9. Right breast CA s/p right lumpectomy and radiation in 2011  10. Slowness of speech (per patient chronic) with reported intermittent confusion and reported Hx of CVA 2 years ago per patient         PLAN:  1. EKG now  2. Continue cardiac medications   3. Check BMP, Mg, and CBC  4.  Will discuss case with Dr. Kelechi León     Electronically signed by TAMARA Christiansen CNP on 4/8/2021 at 9:08 AM       Addendum:  Sia Walsh Wayne Coburn MD    Reason for visit: A fib     Patient previously known to: Dr. Dean Lundberg    History of Present illness: No CP, breathings better, She said \" I had some trouble finding words today, no tingling or numbness. Speech appears normal to me and able to speak clearly and responding to questions appropriately. Had cardioversion, yesterday, converted to Sinus. She went back to A fib. Review of systems:  Review of 8 systems negative except as mentioned in the HPI    Medical History: Reviewed    Surgical history: Reviewed    FamilyHistory: Reviewed    Allergies:  Reviewed    Social Hx: Reviewed. Scheduled Meds:   predniSONE  10 mg Oral Daily    anastrozole  1 mg Oral Daily    [START ON 4/9/2021] levoFLOXacin  500 mg Oral Every Other Day    potassium chloride  20 mEq Oral TID    lisinopril  5 mg Oral Daily    dilTIAZem  120 mg Oral BID    Vitamin D  2 tablet Oral Daily    furosemide  40 mg Oral BID    lactulose  20 g Oral TID    levothyroxine  50 mcg Oral Daily    montelukast  10 mg Oral Nightly    pantoprazole  40 mg Oral QAM AC    rifaximin  550 mg Oral BID    benzonatate  200 mg Oral TID    apixaban  5 mg Oral BID     Continuous Infusions:   dilTIAZem (CARDIZEM) 125 mg in dextrose 5% 125 mL infusion Stopped (04/07/21 1255)     PRN Meds:.diphenhydrAMINE, LORazepam      Labs/imaging studies: Reviewed. Above ISRRAEL exam, assessment reviewed and reflect my work. I personally saw, examined, and evaluated the patient today. I personally reviewed the medications, rhythm strips, pertinent labs and test reports. I directly participated in the medical-decision making, ordering pertinent tests and medication adjustment. Physical exam:     Vitals:    04/08/21 0815   BP: 100/60   Pulse: 100   Resp: 18   Temp: 97.8 °F (36.6 °C)   SpO2: 96%         In general, this is a well developed, well nourished who appears stated age.  awake, alert, cooperative, no apparent distress     HEENT: eyes -conjunctivae pink,   Neck-  no stridor, no carotid bruit. no jugular venous distention   RESPIRATORY: Chest symmetrical and non-tender to palpation.  No accessory muscles use.   Lung auscultation - few rhonchi  CARDIOVASCULAR:     Heart Inspection shows no noted pulsations  Heart Palpation - no palpable thrills  Heart Ausculation - Irregular rate and rhythm, 1/6 systolic murmur, No s3 or rub.   + lower extremity edema,  Distal pulses palpable, no clubbing   ABDOMEN: Soft, nontender,  Bowel sounds present. MS: n/a. : Deferred  Rectal Exam: Deferred  SKIN: warm and dry  NEURO / PSYCH: oriented to person, place              Impression/Recommendations:     Paroxysmal atrial fibrillation with RVR - High TRI3HR3 VASc score, started on Eliquis; TSH Ok, s/p successful DCCV yesterday. Back in A Fib, rates controlled, Continue Eliquis for [address]   Again risk benefits of Eliquis discussed.      Hypokalemia and hypomagnesium - supplemented     Acute CHF, likley diastolic - Better, continue Diuretics, monitor daily wts, I/Os     Chronic Dyspnea     Mild Tricuspid regurgitation     History of breast cancer and status post lumpectomy and radiation therapy      Hypothyroidism - On HRT               Discharge home later today    F/u by Dr Paul Murillo 2-3 weeks      Liv Valadez MD  4/8/2021  1:42 PM  [facility]) Cardiology

## 2021-04-08 NOTE — OP NOTE
[address]                                OPERATIVE REPORT    PATIENT NAME: Lorenzo Dalton                    :        1940  MED REC NO:   [id number]                            ROOM:       0618  ACCOUNT NO:   [de-identified]                           ADMIT DATE: 2021  PROVIDER:     Meng Alcantara MD    DATE OF PROCEDURE:  2021    PREOPERATIVE DIAGNOSIS:  Atrial fibrillation. POSTPROCEDURE DIAGNOSIS:  Atrial fibrillation. PROCEDURE PERFORMED:  DC cardioversion. SURGEON:  Meng Alcantara MD    SEDATION:  LMA sedation. IMMEDIATE COMPLICATIONS:  None. CLINICAL HISTORY:  The patient was scheduled for cardioversion due to  her symptomatic atrial fibrillation. Risks, benefits, and alternatives  were discussed. The patient was on Eliquis anticoagulation. The  patient underwent brief MARISELA prior to cardioversion to rule out  intracardiac thrombus. OPERATIVE REPORT:  The patient was brought to the PACU area. The  patient was connected to blood pressure, pulse oximetry, and heart  rhythm monitor. Anterior and posterior cardioversion patches were  applied. After MARISELA showed no intracardiac thrombus, the patient  received 200 joules of synchronized biphasic direct current and was  converted to sinus rhythm and maintained in sinus rhythm. Postprocedure, the patient was alert, oriented with some intermittent  cough. No focal neurological deficits. CONCLUSION:  Successful conversion of atrial fibrillation into sinus  rhythm with 200 joules of synchronized biphasic direct current.         Mely Menendez MD    D: 2021 8:32:17       T: 2021 11:15:05     [email or web address]  Job#: [id number]     Doc#: [id number]    CC:

## 2021-04-09 NOTE — PROGRESS NOTES
Physician Progress Note      PATIENT:               Monserrat Rose  CSN #:                  [id number]  :                       1940  ADMIT DATE:       2021 4:09 PM  [address] DATE:        2021 7:01 PM  RESPONDING  PROVIDER #:        Naida Valdez MD          QUERY TEXT:    Patient admitted with Atrial Fib. Noted documentation of Encephalopathy on   active problem list in H&P , PN. Please document in progress notes the likely   type of encephalopathy or if this is PMH only. If the diagnosis of Encephalopathy is PMH only, please update the problem list   appropriately so it does not continue to appear as an active problem in daily   progress notes. The medical record reflects the following:  Risk Factors: Hx encephalopathy, PAFib, Cirrhosis, HTN,  Clinical Indicators: Intermittent confusion per pt. Treatment: Chronulac, Cardizem gtt, Eliquis monitoring behavior. Thank you, Shilo Ball RN [phone]  Options provided:  -- Encephalopathy is PMH only  -- Acute hepatic encephalopathy without coma  -- Chronic hepatic encephalopathy without coma  -- Metabolic encephalopathy  -- Other - I will add my own diagnosis  -- Disagree - Not applicable / Not valid  -- Disagree - Clinically unable to determine / Unknown  -- Refer to Clinical Documentation Reviewer    PROVIDER RESPONSE TEXT:    This patient has chronic hepatic encephalopathy without coma.     Query created by: Liliane Crum on 2021 1:19 PM      Electronically signed by:  Naida Valdez MD 2021 8:34 AM

## 2021-04-22 NOTE — PROGRESS NOTES
[address] [facility] Cardiology Progress Note  Dr. Dante Godinez      Referring Physician: Robby Hurd MD  CHIEF COMPLAINT:   Chief Complaint   Patient presents with    Follow-Up from Hospital     c/o mild confusion since discharge, this is improving.  c/o SOB most of the time. c/o rash which started Tuesday. HISTORY OF PRESENT ILLNESS:   Patient is 68year old female with history of diastolic congestive heart failure, sinus arrhythmia, is here for follow-up appointment. .   Shortness of breath, pedal edema, denies any palpitations, no pedal edema, no PND, no orthopnea, no syncope, no presyncopal episodes. Functional capacity is at baseline      Past Medical History:   Diagnosis Date    Anxiety     Cancer [facility]) 2/11    right breast    Cirrhosis, non-alcoholic (HCC)     COPD [facility]chronic obstructive pulmonary disease) ([facility] [address]     Hypertension          Past Surgical History:   Procedure Laterality Date    BREAST SURGERY  2011    right lumpectomy    HERNIA REPAIR      HYSTERECTOMY      SHOULDER ARTHROSCOPY  09 09 2011    arthroscopy left shoulder rotator cuff repair, subacromial decompression, debridement labrum, synovectomy    TONSILLECTOMY           No current facility-administered medications for this visit. No current outpatient medications on file.      Facility-Administered Medications Ordered in Other Visits   Medication Dose Route Frequency Provider Last Rate Last Admin    cetirizine (ZYRTEC) tablet 10 mg  10 mg Oral Daily Alecia Crawford MD   10 mg at 05/09/21 0859    [Held by provider] metOLazone (ZAROXOLYN) tablet 5 mg  5 mg Oral Daily Alecia Crawford MD        anastrozole (ARIMIDEX) tablet 1 mg  1 mg Oral Daily Alecia Crawford MD        [Held by provider] bumetanide (BUMEX) tablet 2 mg  2 mg Oral BID Alecia Crawford MD        [Held by provider] digoxin (LANOXIN) tablet 250 mcg  250 mcg Oral See Admin Instructions Alecia Crawford MD        levothyroxine (SYNTHROID) tablet 50 mcg  50 mcg Oral Daily Jacob Fox MD   50 mcg at 05/09/21 0859    metoprolol tartrate (LOPRESSOR) tablet 100 mg  100 mg Oral BID Jacob Fox MD   100 mg at 05/09/21 0859    montelukast (SINGULAIR) tablet 10 mg  10 mg Oral Nightly Jacob Fox MD        pantoprazole (PROTONIX) tablet 40 mg  40 mg Oral QAM AC Jacob Fox MD   40 mg at 05/09/21 0859    sodium chloride flush 0.9 % injection 5-40 mL  5-40 mL Intravenous 2 times per day Jacob Fox MD   10 mL at 05/09/21 0828    sodium chloride flush 0.9 % injection 5-40 mL  5-40 mL Intravenous PRN Jacob Fox MD        0.9 % sodium chloride infusion  25 mL Intravenous PRN Jacob Fox MD        ondansetron (ZOFRAN-ODT) disintegrating tablet 4 mg  4 mg Oral Q8H PRN Jacob Fox MD        Or    ondansetron (ZOFRAN) injection 4 mg  4 mg Intravenous Q6H PRN Jacob Fox MD        morphine (PF) injection 2 mg  2 mg Intravenous Q2H PRN Jacob Fox MD   2 mg at 05/09/21 1200    ciprofloxacin (CIPRO) IVPB 400 mg  400 mg Intravenous Q12H Jacob Fox MD   Stopped at 05/09/21 0958    metronidazole (FLAGYL) 500 mg in NaCl 100 mL IVPB premix  500 mg Intravenous Q8H Jacob Fox  mL/hr at 05/09/21 1536 500 mg at 05/09/21 1536    traMADol (ULTRAM) tablet 50 mg  50 mg Oral Q6H PRN Jacob Fox MD   50 mg at 05/09/21 0859    fluconazole (DIFLUCAN) in 0.9 % sodium chloride IVPB 200 mg  200 mg Intravenous Q24H Daquan Dowell  mL/hr at 05/09/21 0858 200 mg at 05/09/21 0858    dextrose 5 % in lactated ringers infusion   Intravenous Continuous Nhi Briceño  mL/hr at 05/09/21 1537 New Bag at 05/09/21 1537    [START ON 5/10/2021] heparin (porcine) injection 5,000 Units  5,000 Units Subcutaneous 3 times per day Nhi Briceño MD        hydrocortisone sodium succinate PF (SOLU-CORTEF) injection 50 mg  50 mg Intravenous Q8H Nhi Briceño MD   50 mg at 05/09/21 1200         Allergies as of 04/23/2021 - Review Complete 04/23/2021   Allergen Reaction Noted    Penicillins Anaphylaxis 08/02/2011    Sulfa antibiotics Anaphylaxis 08/02/2011    Theophyllines Anaphylaxis 09/09/2011    Codeine  08/02/2011    Iodine  09/06/2011    Tape [adhesive tape]  09/06/2011    Vicodin [hydrocodone-acetaminophen]  08/02/2011       Social History     Socioeconomic History    Marital status:      Spouse name: Not on file    Number of children: Not on file    Years of education: Not on file    Highest education level: Not on file   Occupational History    Not on file   Social Needs    Financial resource strain: Not on file    Food insecurity     Worry: Not on file     Inability: Not on file   [employer] needs     Medical: Not on file     Non-medical: Not on file   Tobacco Use    Smoking status: Never Smoker    Smokeless tobacco: Never Used   Substance and Sexual Activity    Alcohol use: Not Currently     Comment: 1-2 cups coffee daily    Drug use: Never    Sexual activity: Not Currently   Lifestyle    Physical activity     Days per week: Not on file     Minutes per session: Not on file    Stress: Not on file   Relationships    Social connections     Talks on phone: Not on file     Gets together: Not on file     Attends [employer] service: Not on file     Active member of club or organization: Not on file     Attends meetings of clubs or organizations: Not on file     Relationship status: Not on file    Intimate partner violence     Fear of current or ex partner: Not on file     Emotionally abused: Not on file     Physically abused: Not on file     Forced sexual activity: Not on file   Other Topics Concern    Not on file   Social History Narrative    Not on file       Family History   Problem Relation Age of Onset    COPD Mother     Heart Failure Mother     Cancer Mother         leukemia    Heart Failure Father     COPD Father     Diabetes Father     Alzheimer's Disease Sister     Cancer Brother         esophageal       REVIEW OF SYSTEMS:     CONSTITUTIONAL:  negative for  fevers, chills, sweats, + fatigue  HEENT:  negative for  tinnitus, earaches, nasal congestion and epistaxis  RESPIRATORY:  negative for  dry cough, cough with sputum,wheezing and hemoptysis  GASTROINTESTINAL:  negative for nausea, vomiting, diarrhea, constipation, pruritus and jaundice  HEMATOLOGIC/LYMPHATIC:  negative for easy bruising, bleeding, lymphadenopathy and petechiae  ENDOCRINE:  negative for heat intolerance, cold intolerance, tremor, hair loss and diabetic symptoms including neither polyuria nor polydipsia nor blurred vision  MUSCULOSKELETAL:  negative for  myalgias, arthralgias, joint swelling, stiff joints and decreased range of motion  NEUROLOGICAL:  negative for memory problems, speech problems, visual disturbance, dysphagia, weakness and numbness      PHYSICAL EXAM:   Constitutional:  Awake, alert cooperative, no apparent distress, and appears stated age. HEENT:  Moist and pink mucous membranes, normocephalic, without obvious abnormality, atraumatic, normal ears and nose. NECK:  Supple, symmetrical, trachea midline, no JVD, no adenopathy, thyroid symmetric, not enlarged and no tenderness, good carotid upstroke bilaterally, no carotid bruit, skin normal.  LUNGS: No increased work of breathing, good air exchange, clear to auscultation bilaterally, no crackles or wheezing. Cardiovascular: Normal apical impulse, regular rate and rhythm, normal S1 and S2, no S3 or S4, no murmur, + + pedal edema, good carotid upstroke bilaterally, no carotid bruit, no JVD, no abdominal pulsating masses. ABDOMEN: Soft, nontender, no hepatomegaly, no splenomegaly, bowel sound positive. CHEST:  Expands symmetrically, nontender to palpation. Musculoskeletal:  No clubbing or cyanosis. No redness, warmth, or swelling of the joints. Neurological: Alert, awake, and oriented X3.    SKIN: No bruises, no bleeding, normal skin color, texture, turgor and no redness, warmth or swelling. BP (!) 108/52 (Site: Left Upper Arm, Position: Sitting, Cuff Size: Large Adult)   Pulse 125   Ht 5' 4\" (1.626 m)   Wt 214 lb (97.1 kg)   BMI 36.73 kg/m²     DATA:   I personally reviewed the visit EKG with the following interpretation:    EKG 4/8/21 Atrial fibrillation  Cannot rule out Inferior infarct , age undetermined  Cannot rule out Anterior infarct (cited on or before 05-APR-2021)  Abnormal ECG  When compared with ECG of 05-APR-2021 18:15,  QRS axis shifted left  Questionable change in initial forces of Septal leads  Nonspecific T wave abnormality, improved in Anterior leads    ECHO:4/7/21 Summary   No intra cardiac mass or thrombus. Normal left ventricular chamber size and systolic function. Left atrium is enlarged. Interatrial septum appears intact. No thrombus in left atrium or atrial appendage. Normal right ventricle size and function. There is trace mitral regurgitation. No mitral valve prolapse. There is trace tricuspid regurgitation. Normal aortic root size. No evidence of pericardial effusion. No comparison study available. 8/23/19 Normal left ventricular systolic function with stage II diastolic dysfunction. EF 55%. Mild aortic stenosis. Trace mitral and tricuspid regurgitation with mild pulmonary hypertension. Stress Test: 8/23/19 . Negative Lexiscan stress test for ischemic symptoms or ischemic EKG changes  2. Probably normal Cardiolite perfusion scan showing decreased uptake of the radioactive tracer in the anterior wall on rest and stress images with normal wall motion most likely secondary to breast attenuation artifact  3. Normal left ventricular systolic function with normal wall motion  4. There is no comparison study  5.   The results of the study predict low probability for significant coronary artery disease or future cardiac events    Cardiology Labs: BMP:    Lab Results   Component Value Date  05/09/2021    K 3.1 05/09/2021    K 3.1 04/05/2021     05/09/2021    CO2 27 05/09/2021    BUN 46 05/09/2021    CREATININE 2.1 05/09/2021     CMP:    Lab Results   Component Value Date     05/09/2021    K 3.1 05/09/2021    K 3.1 04/05/2021     05/09/2021    CO2 27 05/09/2021    BUN 46 05/09/2021    CREATININE 2.1 05/09/2021    PROT 5.2 05/09/2021     CBC:    Lab Results   Component Value Date    WBC 8.6 05/09/2021    RBC 3.27 05/09/2021    HGB 10.6 05/09/2021    HCT 33.1 05/09/2021    .2 05/09/2021    RDW 15.0 05/09/2021    PLT 86 05/09/2021     PT/INR:  No results found for: PTINR  PT/INR Warfarin:  No components found for: PTPATWAR, PTINRWAR  PTT:    Lab Results   Component Value Date    APTT 29.6 08/05/2020     PTT Heparin:  No components found for: APTTHEP  Magnesium:    Lab Results   Component Value Date    MG 1.5 05/09/2021     TSH:    Lab Results   Component Value Date    TSH 1.660 05/09/2021     TROPONIN:  No components found for: TROP  BNP:  No results found for: BNP  FASTING LIPID PANEL:    Lab Results   Component Value Date    CHOL 236 02/08/2021    HDL 66 02/08/2021    TRIG 126 02/08/2021     No orders to display     I have personally reviewed the laboratory, cardiac diagnostic and radiographic testing as outlined above:      IMPRESSION:  1. Acute exacerbation of diastolic congestive heart failure: Acute on chronic, decompensated, will adjust diuretic therapy  2. Atrial fibrillation: Sinus rhythm, will continue chronic anticoagulation with Eliquis  3. Acute or chronic kidney disease  4. Liver disease    RECOMMENDATIONS:   1. Adjust diuretic therapy  2. Basic metabolic panel and BNP  3. Daily weight  4. Follow-up with Dr. Bernardino Peña as scheduled  5.   Follow-up with Dr. Fish Umaña in 4 days    I have reviewed my findings and recommendations with patient    Electronically signed by Kun Khan MD on 5/9/2021 at 6:03 PM    NOTE: This report was transcribed using voice recognition software.  Every effort was made to ensure accuracy; however, inadvertent computerized transcription errors may be present

## 2021-04-27 NOTE — TELEPHONE ENCOUNTER
Dr Og Ward the daughter called and statedthat you wanted patient to take Metolazone 5 mg for 5 days. Patient never received a script for it. Just the Metoprolol was sent to the pharmacy. Please advise,  Patricia Gaspar patient's daughter re: The above.   Nat Perez

## 2021-04-29 NOTE — DISCHARGE SUMMARY
Absolute                            Date: 04/05/2021Value: 0.00        Ref range: 0.00 - 0.20 E9/L   Status: FinalPoikilocytes                                  Date: 04/05/2021Value: 1+            Status: FinalOvalocytes                                    Date: 04/05/2021Value: 1+            Status: FinalSodium                                        Date: 04/05/2021Value: 140         Ref range: 132 - 146 mmol/L   Status: FinalPotassium reflex Magnesium                    Date: 04/05/2021Value: 3.1*        Ref range: 3.5 - 5.0 mmol/L   Status: FinalChloride                                      Date: 04/05/2021Value: 99          Ref range: 98 - 107 mmol/L    Status: FinalCO2                                           Date: 04/05/2021Value: 32*         Ref range: 22 - 29 mmol/L     Status: FinalAnion Gap                                     Date: 04/05/2021Value: 9           Ref range: 7 - 16 mmol/L      Status: FinalGlucose                                       Date: 04/05/2021Value: 130*        Ref range: 74 - 99 mg/dL      Status: FinalBUN                                           Date: 04/05/2021Value: 10          Ref range: 8 - 23 mg/dL       Status: FinalCREATININE                                    Date: 04/05/2021Value: 0.9         Ref range: 0.5 - 1.0 mg/dL    Status: FinalGFR Non-                      Date: 04/05/2021Value: >60         Ref range: >=60 mL/min/1.73   Status: Final              Comment: Chronic Kidney Disease: less than 60 ml/min/1.73 sq.m. Kidney Failure: less than 15 ml/min/1.73 sq. m. Results valid for patients 18 years and older. GFR                           Date: 04/05/2021Value: >60           Status: FinalCalcium                                       Date: 04/05/2021Value: 8.7         Ref range: 8.6 - 10.2 mg/dL   Status: FinalTotal Protein                                 Date: 04/05/2021Value: 6.9         Ref range: 6.4 - 8.3 g/dL     Status: FinalQ-T Interval                                  Date: 04/05/2021Value: 262         Ref range: ms                 Status: FinalQTc Calculation (Bazett)                      Date: 04/05/2021Value: 362         Ref range: ms                 Status: FinalR Axis                                        Date: 04/05/2021Value: 105         Ref range: degrees            Status: FinalT Axis                                        Date: 04/05/2021Value: -61         Ref range: degrees            Status: FinalLactic Acid, Sepsis                           Date: 04/05/2021Value: 2.4*        Ref range: 0.5 - 1.9 mmol/L   Status: FinalLactic Acid, Sepsis                           Date: 04/05/2021Value: 2.8*        Ref range: 0.5 - 1.9 mmol/L   Status: [address] [facility]                                           Date: 04/06/2021Value: 3.3*        Ref range: 4.5 - 11.5 E9/L    Status: FinalRBC                                           Date: 04/06/2021Value: 3.52        Ref range: 3.50 - 5.50 E12/L  Status: FinalHemoglobin                                    Date: 04/06/2021Value: 11.2*       Ref range: 11.5 - 15.5 g/dL   Status: FinalHematocrit                                    Date: 04/06/2021Value: 35.5        Ref range: 34.0 - 48.0 %      Status: FinalMCV                                           Date: 04/06/2021Value: 100.9*      Ref range: 80.0 - 99.9 fL     Status: [address]                                           Date: 04/06/2021Value: 31.8        Ref range: 26.0 - 35.0 pg     Status: [address]                                          Date: 04/06/2021Value: 31.5*       Ref range: 32.0 - 34.5 %      Status: FinalRDW                                           Date: 04/06/2021Value: 14.0        Ref range: 11.5 - 15.0 fL     Status: FinalPlatelets                                     Date: 04/06/2021Value: 114*        Ref range: 130 - 450 E9/L     Status: FinalMPV                                           Date: 04/06/2021Value: 10.1        Ref range: 7.0 - 12.0 fL      Status: FinalSodium                                        Date: 04/06/2021Value: 141         Ref range: 132 - 146 mmol/L   Status: FinalPotassium                                     Date: 04/06/2021Value: 3.2*        Ref range: 3.5 - 5.0 mmol/L   Status: FinalChloride                                      Date: 04/06/2021Value: 100         Ref range: 98 - 107 mmol/L    Status: FinalCO2                                           Date: 04/06/2021Value: 33*         Ref range: 22 - 29 mmol/L     Status: FinalAnion Gap                                     Date: 04/06/2021Value: 8           Ref range: 7 - 16 mmol/L      Status: FinalGlucose                                       Date: 04/06/2021Value: 103*        Ref range: 74 - 99 mg/dL      Status: FinalBUN                                           Date: 04/06/2021Value: 11          Ref range: 8 - 23 mg/dL       Status: FinalCREATININE                                    Date: 04/06/2021Value: 0.9         Ref range: 0.5 - 1.0 mg/dL    Status: FinalGFR Non-                      Date: 04/06/2021Value: >60         Ref range: >=60 mL/min/1.73   Status: Final              Comment: Chronic Kidney Disease: less than 60 ml/min/1.73 sq.m. Kidney Failure: less than 15 ml/min/1.73 sq. m. Results valid for patients 18 years and older. GFR                           Date: 04/06/2021Value: >60           Status: FinalCalcium                                       Date: 04/06/2021Value: 8.0*        Ref range: 8.6 - 10.2 mg/dL   Status: FinalTotal Protein                                 Date: 04/06/2021Value: 6.0*        Ref range: 6.4 - 8.3 g/dL     Status: FinalAlbumin                                       Date: 04/06/2021Value: 2.3*        Ref range: 3.5 - 5.2 g/dL     Status: FinalTotal Bilirubin                               Date: 04/06/2021Value: 1.5*        Ref range: 0.0 - 1.2 mg/dL    Status: FinalAlkaline Phosphatase Date: 04/06/2021Value: 123*        Ref range: 35 - 104 U/L       Status: FinalALT                                           Date: 04/06/2021Value: 8           Ref range: 0 - 32 U/L         Status: FinalAST                                           Date: 04/06/2021Value: 32*         Ref range: 0 - 31 U/L         Status: Final              Comment: Specimen is slightly Hemolyzed. Result may be artificially increased. T4 Free                                       Date: 04/06/2021Value: 1.64        Ref range: 0.93 - 1.70 ng/dL  Status: [address]                                           Date: 04/06/2021Value: 2.710       Ref range: 0.270 - 4.200 uI*  Status: FinalMagnesium                                     Date: 04/06/2021Value: 1.5*        Ref range: 1.6 - 2.6 mg/dL    Status: FinalSodium                                        Date: 04/07/2021Value: 135         Ref range: 132 - 146 mmol/L   Status: FinalPotassium                                     Date: 04/07/2021Value: 3.6         Ref range: 3.5 - 5.0 mmol/L   Status: FinalChloride                                      Date: 04/07/2021Value: 98          Ref range: 98 - 107 mmol/L    Status: FinalCO2                                           Date: 04/07/2021Value: 29          Ref range: 22 - 29 mmol/L     Status: FinalAnion Gap                                     Date: 04/07/2021Value: 8           Ref range: 7 - 16 mmol/L      Status: FinalGlucose                                       Date: 04/07/2021Value: 155*        Ref range: 74 - 99 mg/dL      Status: FinalBUN                                           Date: 04/07/2021Value: 16          Ref range: 8 - 23 mg/dL       Status: FinalCREATININE                                    Date: 04/07/2021Value: 1.1*        Ref range: 0.5 - 1.0 mg/dL    Status: FinalGFR Non-                      Date: 04/07/2021Value: 48          Ref range: >=60 mL/min/1.73   Status: Final              Comment: Chronic Kidney Disease: less than 60 ml/min/1.73 sq.m. Kidney Failure: less than 15 ml/min/1.73 sq. m. Results valid for patients 18 years and older. GFR                           Date: 04/07/2021Value: 58            Status: FinalCalcium                                       Date: 04/07/2021Value: 8.5*        Ref range: 8.6 - 10.2 mg/dL   Status: FinalVentricular Rate                              Date: 04/08/2021Value: 100         Ref range: BPM                Status: FinalAtrial Rate                                   Date: 04/08/2021Value: 197         Ref range: BPM                Status: FinalQRS Duration                                  Date: 04/08/2021Value: 98          Ref range: ms                 Status: FinalQ-T Interval                                  Date: 04/08/2021Value: 372         Ref range: ms                 Status: FinalQTc Calculation (Bazett)                      Date: 04/08/2021Value: 479         Ref range: ms                 Status: FinalR Axis                                        Date: 04/08/2021Value: 74          Ref range: degrees            Status: FinalT Axis                                        Date: 04/08/2021Value: -78         Ref range: degrees            Status: FinalSodium                                        Date: 04/08/2021Value: 135         Ref range: 132 - 146 mmol/L   Status: FinalPotassium                                     Date: 04/08/2021Value: 4.1         Ref range: 3.5 - 5.0 mmol/L   Status: FinalChloride                                      Date: 04/08/2021Value: 98          Ref range: 98 - 107 mmol/L    Status: FinalCO2                                           Date: 04/08/2021Value: 25          Ref range: 22 - 29 mmol/L     Status: FinalAnion Gap                                     Date: 04/08/2021Value: 12          Ref range: 7 - 16 mmol/L      Status: FinalGlucose                                       Date: 04/08/2021Value: 202*        Ref range: 74 - 99 mg/dL      Status: FinalBUN                                           Date: 04/08/2021Value: 32*         Ref range: 8 - 23 mg/dL       Status: FinalCREATININE                                    Date: 04/08/2021Value: 2.0*        Ref range: 0.5 - 1.0 mg/dL    Status: FinalGFR Non-                      Date: 04/08/2021Value: 24          Ref range: >=60 mL/min/1.73   Status: Final              Comment: Chronic Kidney Disease: less than 60 ml/min/1.73 sq.m. Kidney Failure: less than 15 ml/min/1.73 sq. m. Results valid for patients 18 years and older. GFR                           Date: 04/08/2021Value: 29            Status: FinalCalcium                                       Date: 04/08/2021Value: 9.0         Ref range: 8.6 - 10.2 mg/dL   Status: FinalMagnesium                                     Date: 04/08/2021Value: 1.8         Ref range: 1.6 - 2.6 mg/dL    Status: [address] [facility]                                           Date: 04/08/2021Value: 13.8*       Ref range: 4.5 - 11.5 E9/L    Status: FinalRBC                                           Date: 04/08/2021Value: 3.73        Ref range: 3.50 - 5.50 E12/L  Status: FinalHemoglobin                                    Date: 04/08/2021Value: 12.1        Ref range: 11.5 - 15.5 g/dL   Status: FinalHematocrit                                    Date: 04/08/2021Value: 37.9        Ref range: 34.0 - 48.0 %      Status: FinalMCV                                           Date: 04/08/2021Value: 101.6*      Ref range: 80.0 - 99.9 fL     [facility]                                           Date: 04/08/2021Value: 32.4        Ref range: 26.0 - 35.0 pg     Status: [address]                                          Date: 04/08/2021Value: 31.9*       Ref range: 32.0 - 34.5 %      Status: FinalRDW                                           Date: 04/08/2021Value: 14.1        Ref range: 11.5 - 15.0 fL     Status: FinalPlatelets (PRILOSEC) 20 MG delayed release capsuleTake 40 mg by mouth dailyHistorical MedBiotin 1000 MCG TABSTake by mouthHistorical Medloratadine (CLARITIN) 10 MG tabletTake 10 mg by mouth daily. Historical MedCholecalciferol (VITAMIN D3) 2000 UNIT CAPSTake  by mouth daily. Historical Medmontelukast (SINGULAIR) 10 MG tabletTake 10 mg by mouth nightly. Historical Medanastrozole (ARIMIDEX) 1 MG tabletTake 1 mg by mouth daily. L.D. 9/2/11Historical Med    Discharge Medication List as of 4/8/2021  6:37 PM    Time Spent on Discharge:E] minutes were spent in patient examination, evaluation, counseling as well as medication reconciliation, prescriptions for required medications, discharge plan, and follow up.     Electronically signed by Raf Varela MD on 4/28/21 at 11:01 PM EDT

## 2021-04-30 NOTE — PROGRESS NOTES
[facility] Cardiology Progress Note  Dr. Jarad Gustafson      Referring Physician: Urban Anglin MD  CHIEF COMPLAINT:   Chief Complaint   Patient presents with    Atrial Fibrillation     2 week check . Patient is very shaky and very tired. Patient is very tired and fatigued       HISTORY OF PRESENT ILLNESS:   Patient is 68year old female with history of atrial fibrillation, diastolic congestive heart failure, is here for follow-up appointment. Patient was seen in the office couple of weeks ago for acute exacerbation of CHF, was started on Lasix, metolazone, decrease metoprolol dose, patient is here for follow-up appointment, shortness of breath is still the same, pedal edema slightly better, palpitations, no chest pain, occasional lightheadedness, no syncope, no presyncopal episodes.         Past Medical History:   Diagnosis Date    Anxiety     Cancer [facility]) 2/11    right breast    Cirrhosis, non-alcoholic (HCC)     COPD (chronic obstructive pulmonary disease) ([facility] [address]     Hypertension          Past Surgical History:   Procedure Laterality Date    BREAST SURGERY  2011    right lumpectomy    HERNIA REPAIR      HYSTERECTOMY      SHOULDER ARTHROSCOPY  09 09 2011    arthroscopy left shoulder rotator cuff repair, subacromial decompression, debridement labrum, synovectomy    TONSILLECTOMY           Current Outpatient Medications   Medication Sig Dispense Refill    bumetanide (BUMEX) 2 MG tablet Take 1 tablet by mouth 2 times daily 180 tablet 3    digoxin (LANOXIN) 250 MCG tablet Take 1 tablet by mouth See Admin Instructions Take 2 by mouth today then 1 by mouth daily 90 tablet 1    metOLazone (ZAROXOLYN) 5 MG tablet Take 1 tablet by mouth daily Take 1 by mouth daily for 5 days and then one half daily as needed for swelling 30 tablet 0    predniSONE (DELTASONE) 10 MG tablet Take 10 mg by mouth daily      metoprolol tartrate (LOPRESSOR) 100 MG tablet Take 1 tablet by mouth 2 times daily 180 tablet 2    apixaban (ELIQUIS) 5 MG TABS tablet Take 1 tablet by mouth 2 times daily 60 tablet 1    potassium chloride (KLOR-CON M) 20 MEQ extended release tablet Take 1 tablet by mouth 3 times daily 60 tablet 3    LORazepam (ATIVAN) 0.5 MG tablet Take 0.5 mg by mouth every 8 hours as needed for Anxiety.  rifaximin (XIFAXAN) 550 MG tablet Take 1 tablet by mouth 2 times daily 60 tablet 0    lactulose (CHRONULAC) 10 GM/15ML solution Take 30 mLs by mouth 3 times daily Take to achieve 2-3 bowel movement every day. Do not take if more than 3 bowel movement in a day 1892 mL 1    levothyroxine (SYNTHROID) 50 MCG tablet Take 50 mcg by mouth Daily      omeprazole (PRILOSEC) 20 MG delayed release capsule Take 40 mg by mouth daily      Biotin 1000 MCG TABS Take by mouth      loratadine (CLARITIN) 10 MG tablet Take 10 mg by mouth daily.  Cholecalciferol (VITAMIN D3) 2000 UNIT CAPS Take  by mouth daily.  montelukast (SINGULAIR) 10 MG tablet Take 10 mg by mouth nightly.  anastrozole (ARIMIDEX) 1 MG tablet Take 1 mg by mouth daily. L.D. 9/2/11       No current facility-administered medications for this visit.           Allergies as of 05/03/2021 - Review Complete 04/23/2021   Allergen Reaction Noted    Penicillins Anaphylaxis 08/02/2011    Sulfa antibiotics Anaphylaxis 08/02/2011    Theophyllines Anaphylaxis 09/09/2011    Codeine  08/02/2011    Iodine  09/06/2011    Tape [adhesive tape]  09/06/2011    Vicodin [hydrocodone-acetaminophen]  08/02/2011       Social History     Socioeconomic History    Marital status:      Spouse name: Not on file    Number of children: Not on file    Years of education: Not on file    Highest education level: Not on file   Occupational History    Not on file   Social Needs    Financial resource strain: Not on file    Food insecurity     Worry: Not on file     Inability: Not on file    Transportation needs     Medical: Not on file     Non-medical: Not on file problems, speech problems, visual disturbance, dysphagia, weakness and numbness      PHYSICAL EXAM:   Constitutional:  Awake, alert cooperative, no apparent distress, and appears stated age. HEENT:  Moist and pink mucous membranes, normocephalic, without obvious abnormality, atraumatic, normal ears and nose. NECK:  Supple, symmetrical, trachea midline, no JVD, no adenopathy, thyroid symmetric, not enlarged and no tenderness, good carotid upstroke bilaterally, no carotid bruit, skin normal.  LUNGS: No increased work of breathing, good air exchange, clear to auscultation bilaterally, no crackles or wheezing. Cardiovascular: Normal apical impulse, irregularly irregular, tachycardic, normal S1 and S2, no S3, no murmur, + + + pedal edema, good carotid upstroke bilaterally, no carotid bruit, no JVD, no abdominal pulsating masses. ABDOMEN: Soft, nontender, no hepatomegaly, no splenomegaly, bowel sound positive. CHEST:  Expands symmetrically, nontender to palpation. Musculoskeletal:  No clubbing or cyanosis. No redness, warmth, or swelling of the joints. Neurological: Alert, awake, and oriented X3. SKIN: No bruises, no bleeding, normal skin color, texture, turgor and no redness, warmth or swelling. BP (!) 86/52 (Site: Left Upper Arm, Position: Sitting, Cuff Size: Large Adult)   Pulse 122   Ht 5' 4\" (1.626 m)   Wt 211 lb (95.7 kg)   BMI 36.22 kg/m²     DATA:   I personally reviewed the visit EKG with the following interpretation:Atrial fibrillation, rapid ventricular response, possible old anterior wall MI age-indeterminate, nonspecific T wave changes    EKG 4/23/21 Atrial flutter-fibrillation, rapid ventricular response  Low voltage in limb leads. Poor R wave progression  Old inferior wall MI age undetermined   -  Nonspecific T-abnormality. ECHO:4/7/21 Summary   No intra cardiac mass or thrombus. Normal left ventricular chamber size and systolic function. Left atrium is enlarged. Interatrial septum appears intact. No thrombus in left atrium or atrial appendage. Normal right ventricle size and function. There is trace mitral regurgitation. No mitral valve prolapse. There is trace tricuspid regurgitation. Normal aortic root size. No evidence of pericardial effusion. No comparison study available. 8/23/19 Normal left ventricular systolic function with stage II diastolic dysfunction. EF 55%. Mild aortic stenosis. Trace mitral and tricuspid regurgitation with mild pulmonary hypertension. Stress Test: 8/23/19 . Negative Lexiscan stress test for ischemic symptoms or ischemic EKG changes  2. Probably normal Cardiolite perfusion scan showing decreased uptake of the radioactive tracer in the anterior wall on rest and stress images with normal wall motion most likely secondary to breast attenuation artifact  3. Normal left ventricular systolic function with normal wall motion  4. There is no comparison study  5.   The results of the study predict low probability for significant coronary artery disease or future cardiac events    Cardiology Labs: BMP:    Lab Results   Component Value Date     04/15/2021    K 4.7 04/15/2021    K 3.1 04/05/2021     04/15/2021    CO2 23 04/15/2021    BUN 31 04/15/2021    CREATININE 1.4 04/15/2021     CMP:    Lab Results   Component Value Date     04/15/2021    K 4.7 04/15/2021    K 3.1 04/05/2021     04/15/2021    CO2 23 04/15/2021    BUN 31 04/15/2021    CREATININE 1.4 04/15/2021    PROT 7.2 04/15/2021     CBC:    Lab Results   Component Value Date    WBC 13.8 04/08/2021    RBC 3.73 04/08/2021    HGB 12.1 04/08/2021    HCT 37.9 04/08/2021    .6 04/08/2021    RDW 14.1 04/08/2021     04/08/2021     PT/INR:  No results found for: PTINR  PT/INR Warfarin:  No components found for: PTPATWAR, PTINRWAR  PTT:    Lab Results   Component Value Date    APTT 29.6 08/05/2020     PTT Heparin:  No components found for: may be present

## 2021-05-05 NOTE — PROGRESS NOTES
[address] [facility] Cardiology Progress Note  Dr. Kun Khan      Referring Physician: Alexys Vilchis MD  CHIEF COMPLAINT:   Chief Complaint   Patient presents with    Congestive Heart Failure     4 day. Patient feels a little bit better. Labs were just drawn       HISTORY OF PRESENT ILLNESS:   Patient is 68year old female with history of atrial fibrillation, diastolic congestive heart failure, is here for follow-up appointment. Test of breath and pedal edema slightly better, denies any chest pain, no palpitations, no lightheadedness or dizziness, no pedal edema, no PND, no orthopnea, no syncope.         Past Medical History:   Diagnosis Date    Anxiety     Cancer [facility]) 2/11    right breast    Cirrhosis, non-alcoholic (HCC)     COPD (chronic obstructive pulmonary disease) ([facility] [address]     Hypertension          Past Surgical History:   Procedure Laterality Date    BREAST SURGERY  2011    right lumpectomy    HERNIA REPAIR      HYSTERECTOMY      SHOULDER ARTHROSCOPY  09 09 2011    arthroscopy left shoulder rotator cuff repair, subacromial decompression, debridement labrum, synovectomy    TONSILLECTOMY           Current Outpatient Medications   Medication Sig Dispense Refill    bumetanide (BUMEX) 2 MG tablet Take 1 tablet by mouth 2 times daily 180 tablet 3    digoxin (LANOXIN) 250 MCG tablet Take 1 tablet by mouth See Admin Instructions Take 2 by mouth today then 1 by mouth daily 90 tablet 1    metOLazone (ZAROXOLYN) 5 MG tablet Take 1 tablet by mouth daily Take 1 by mouth daily for 5 days and then one half daily as needed for swelling (Patient taking differently: Take 5 mg by mouth daily ) 30 tablet 0    predniSONE (DELTASONE) 10 MG tablet Take 10 mg by mouth daily      metoprolol tartrate (LOPRESSOR) 100 MG tablet Take 1 tablet by mouth 2 times daily (Patient taking differently: Take 100 mg by mouth 2 times daily 1/2 of tablet) 180 tablet 2    apixaban (ELIQUIS) 5 MG TABS tablet Take 1 tablet by mouth 2 times daily 60 tablet 1    potassium chloride (KLOR-CON M) 20 MEQ extended release tablet Take 1 tablet by mouth 3 times daily 60 tablet 3    LORazepam (ATIVAN) 0.5 MG tablet Take 0.5 mg by mouth every 8 hours as needed for Anxiety.  rifaximin (XIFAXAN) 550 MG tablet Take 1 tablet by mouth 2 times daily 60 tablet 0    lactulose (CHRONULAC) 10 GM/15ML solution Take 30 mLs by mouth 3 times daily Take to achieve 2-3 bowel movement every day. Do not take if more than 3 bowel movement in a day 1892 mL 1    levothyroxine (SYNTHROID) 50 MCG tablet Take 50 mcg by mouth Daily      omeprazole (PRILOSEC) 20 MG delayed release capsule Take 40 mg by mouth daily      Biotin 1000 MCG TABS Take by mouth      loratadine (CLARITIN) 10 MG tablet Take 10 mg by mouth daily.  Cholecalciferol (VITAMIN D3) 2000 UNIT CAPS Take  by mouth daily.  montelukast (SINGULAIR) 10 MG tablet Take 10 mg by mouth nightly.  anastrozole (ARIMIDEX) 1 MG tablet Take 1 mg by mouth daily. L.D. 9/2/11       No current facility-administered medications for this visit.           Allergies as of 05/07/2021 - Review Complete 05/03/2021   Allergen Reaction Noted    Penicillins Anaphylaxis 08/02/2011    Sulfa antibiotics Anaphylaxis 08/02/2011    Theophyllines Anaphylaxis 09/09/2011    Codeine  08/02/2011    Iodine  09/06/2011    Tape [adhesive tape]  09/06/2011    Vicodin [hydrocodone-acetaminophen]  08/02/2011       Social History     Socioeconomic History    Marital status:      Spouse name: Not on file    Number of children: Not on file    Years of education: Not on file    Highest education level: Not on file   Occupational History    Not on file   Social Needs    Financial resource strain: Not on file    Food insecurity     Worry: Not on file     Inability: Not on file    Transportation needs     Medical: Not on file     Non-medical: Not on file   Tobacco Use    Smoking status: Never Smoker    Smokeless tobacco: Never Used   Substance and Sexual Activity    Alcohol use: Not Currently     Comment: 1-2 cups coffee daily    Drug use: Never    Sexual activity: Not Currently   Lifestyle    Physical activity     Days per week: Not on file     Minutes per session: Not on file    Stress: Not on file   Relationships    Social connections     Talks on phone: Not on file     Gets together: Not on file     Attends Jain service: Not on file     Active member of club or organization: Not on file     Attends meetings of clubs or organizations: Not on file     Relationship status: Not on file    Intimate partner violence     Fear of current or ex partner: Not on file     Emotionally abused: Not on file     Physically abused: Not on file     Forced sexual activity: Not on file   Other Topics Concern    Not on file   Social History Narrative    Not on file       Family History   Problem Relation Age of Onset    COPD Mother     Heart Failure Mother     Cancer Mother         leukemia    Heart Failure Father     COPD Father     Diabetes Father     Alzheimer's Disease Sister     Cancer Brother         esophageal       REVIEW OF SYSTEMS:     CONSTITUTIONAL:  negative for  fevers, chills, sweats, + fatigue  HEENT:  negative for  tinnitus, earaches, nasal congestion and epistaxis  RESPIRATORY:  negative for  dry cough, cough with sputum,wheezing and hemoptysis  GASTROINTESTINAL:  negative for nausea, vomiting, diarrhea, constipation, pruritus and jaundice  HEMATOLOGIC/LYMPHATIC:  negative for easy bruising, bleeding, lymphadenopathy and petechiae  ENDOCRINE:  negative for heat intolerance, cold intolerance, tremor, hair loss and diabetic symptoms including neither polyuria nor polydipsia nor blurred vision  MUSCULOSKELETAL:  negative for  myalgias, arthralgias, joint swelling, stiff joints and decreased range of motion  NEUROLOGICAL:  negative for memory problems, speech problems, visual disturbance, dysphagia, weakness and numbness      PHYSICAL EXAM:   Constitutional:  Awake, alert cooperative, no apparent distress, and appears stated age. HEENT:  Moist and pink mucous membranes, normocephalic, without obvious abnormality, atraumatic, normal ears and nose. NECK:  Supple, symmetrical, trachea midline, no JVD, no adenopathy, thyroid symmetric, not enlarged and no tenderness, good carotid upstroke bilaterally, no carotid bruit, skin normal.  LUNGS: No increased work of breathing, good air exchange, clear to auscultation bilaterally, no crackles or wheezing. Cardiovascular: Normal apical impulse, irregularly irregular, tachycardic, normal S1 and S2, no S3, no murmur, + + pedal edema, good carotid upstroke bilaterally, no carotid bruit, no JVD, no abdominal pulsating masses. ABDOMEN: Soft, nontender, no hepatomegaly, no splenomegaly, bowel sound positive. CHEST:  Expands symmetrically, nontender to palpation. Musculoskeletal:  No clubbing or cyanosis. No redness, warmth, or swelling of the joints. Neurological: Alert, awake, and oriented X3. SKIN: No bruises, no bleeding, normal skin color, texture, turgor and no redness, warmth or swelling. /70 (Site: Left Upper Arm, Position: Sitting, Cuff Size: Large Adult)   Pulse 79   Ht 5' 4\" (1.626 m)   Wt 208 lb (94.3 kg)   SpO2 99%   BMI 35.70 kg/m²     DATA:   I personally reviewed the visit EKG with the following interpretation:    EKG 5/3/21 Atrial fibrillation, rapid ventricular response, possible old anterior wall MI age-indeterminate, nonspecific T wave changes    ECHO:4/7/21 Summary   No intra cardiac mass or thrombus. Normal left ventricular chamber size and systolic function. Left atrium is enlarged. Interatrial septum appears intact. No thrombus in left atrium or atrial appendage. Normal right ventricle size and function. There is trace mitral regurgitation. No mitral valve prolapse. There is trace tricuspid regurgitation. Normal aortic root size. No evidence of pericardial effusion. No comparison study available. 8/23/19 Normal left ventricular systolic function with stage II diastolic dysfunction. EF 55%. Mild aortic stenosis. Trace mitral and tricuspid regurgitation with mild pulmonary hypertension. Stress Test: 8/23/19 . Negative Lexiscan stress test for ischemic symptoms or ischemic EKG changes  2. Probably normal Cardiolite perfusion scan showing decreased uptake of the radioactive tracer in the anterior wall on rest and stress images with normal wall motion most likely secondary to breast attenuation artifact  3. Normal left ventricular systolic function with normal wall motion  4. There is no comparison study  5.   The results of the study predict low probability for significant coronary artery disease or future cardiac events    Cardiology Labs: BMP:    Lab Results   Component Value Date     05/07/2021    K 4.0 05/07/2021    K 3.1 04/05/2021     05/07/2021    CO2 26 05/07/2021    BUN 47 05/07/2021    CREATININE 1.9 05/07/2021     CMP:    Lab Results   Component Value Date     05/07/2021    K 4.0 05/07/2021    K 3.1 04/05/2021     05/07/2021    CO2 26 05/07/2021    BUN 47 05/07/2021    CREATININE 1.9 05/07/2021    PROT 7.2 04/15/2021     CBC:    Lab Results   Component Value Date    WBC 13.8 04/08/2021    RBC 3.73 04/08/2021    HGB 12.1 04/08/2021    HCT 37.9 04/08/2021    .6 04/08/2021    RDW 14.1 04/08/2021     04/08/2021     PT/INR:  No results found for: PTINR  PT/INR Warfarin:  No components found for: PTPATWAR, PTINRWAR  PTT:    Lab Results   Component Value Date    APTT 29.6 08/05/2020     PTT Heparin:  No components found for: APTTHEP  Magnesium:    Lab Results   Component Value Date    MG 1.8 04/08/2021     TSH:    Lab Results   Component Value Date    TSH 2.710 04/06/2021     TROPONIN:  No components found for: TROP  BNP:  No results found for: BNP  FASTING LIPID PANEL:    Lab Results   Component Value Date    CHOL 236 02/08/2021    HDL 66 02/08/2021    TRIG 126 02/08/2021     No orders to display     I have personally reviewed the laboratory, cardiac diagnostic and radiographic testing as outlined above:      IMPRESSION:  1. Acute exacerbation of congestive heart failure: Acute on chronic, decompensated, not responding to current medical therapy, treatment options discussed with the patient and her daughter at bedside including hospital admission versus suggested outpatient treatment and see how she does over the next couple of days, they opted for adjusting medications, will change medications, will check basic metabolic panel and BNP, will reevaluate in a couple of days  2. Atrial fibrillation: Rapid ventricular response, cannot uptitrate beta-blocker due to low blood pressure, will start digoxin, cannot use amiodarone due to iodine allergy, will continue current anticoagulation  3. Stage III chronic kidney disease  4. Chronic anticoagulation    RECOMMENDATIONS:   1. Continue current diuretic therapy  2. Basic metabolic panel and BNP early next week  3. Daily weight  4. Patient is to call with progress on Monday  5. May need hospital admission if symptoms are not better over the next few days  6. Follow-up with Dr. Jere Page as scheduled  7. Follow-up with Dr. Chino Boss in one week    I have reviewed my findings and recommendations with patient    Electronically signed by José Antonio Rose MD on 5/7/2021 at 12:02 PM    NOTE: This report was transcribed using voice recognition software.  Every effort was made to ensure accuracy; however, inadvertent computerized transcription errors may be present

## 2021-05-09 PROBLEM — N28.9 ACUTE ON CHRONIC RENAL INSUFFICIENCY: Status: ACTIVE | Noted: 2021-01-01

## 2021-05-09 PROBLEM — I48.20 CHRONIC ATRIAL FIBRILLATION (HCC): Status: ACTIVE | Noted: 2021-01-01

## 2021-05-09 PROBLEM — N18.9 ACUTE ON CHRONIC RENAL INSUFFICIENCY: Status: ACTIVE | Noted: 2021-01-01

## 2021-05-09 PROBLEM — R19.8 PERFORATED VISCUS: Status: ACTIVE | Noted: 2021-01-01

## 2021-05-09 PROBLEM — I50.32 CHRONIC DIASTOLIC HEART FAILURE (HCC): Chronic | Status: ACTIVE | Noted: 2021-01-01

## 2021-05-09 PROBLEM — N18.31 CHRONIC RENAL IMPAIRMENT, STAGE 3A (HCC): Chronic | Status: ACTIVE | Noted: 2021-01-01

## 2021-05-09 NOTE — ED PROVIDER NOTES
Chief complaint:  Multiple complaints    HPI history provided by the patient and report  Patient comes in with a history of atrial fibrillation complaining of multiple things. She is had multiple days of poor appetite, feeling palpitations, feeling some dizziness, feeling weak. Has had nausea. Complains of abdominal pain. Complains of 1 bout of vomiting today. Has bilateral leg edema. Has some shortness of breath. Has some chest pain. Patient has a near positive review of systems during questioning. Does deny fevers or headache. No confusion or speech difficulty. Review of Systems   Constitutional: Positive for appetite change and fatigue. Negative for chills, diaphoresis and fever. HENT: Negative for congestion, sinus pressure and sore throat. Respiratory: Positive for shortness of breath. Negative for cough, chest tightness and wheezing. Cardiovascular: Positive for chest pain, palpitations and leg swelling. Gastrointestinal: Positive for abdominal pain, nausea and vomiting. Negative for abdominal distention and diarrhea. Genitourinary: Negative for dysuria, flank pain, frequency and urgency. Musculoskeletal: Negative for arthralgias, back pain, gait problem, joint swelling, myalgias, neck pain and neck stiffness. Skin: Negative for rash and wound. Neurological: Positive for dizziness. Negative for seizures, syncope, weakness, light-headedness, numbness and headaches. Hematological: Negative for adenopathy. All other systems reviewed and are negative. Physical Exam  Vitals signs and nursing note reviewed. Constitutional:       General: She is not in acute distress. Appearance: She is well-developed. She is not ill-appearing, toxic-appearing or diaphoretic. HENT:      Head: Normocephalic and atraumatic. Eyes:      General: No scleral icterus. Extraocular Movements: Extraocular movements intact.       Conjunctiva/sclera: Conjunctivae normal.      Pupils: Pupils are equal, round, and reactive to light. Neck:      Musculoskeletal: Normal range of motion and neck supple. Normal range of motion. No neck rigidity, injury, pain with movement, spinous process tenderness or muscular tenderness. Trachea: Trachea and phonation normal.   Cardiovascular:      Rate and Rhythm: Normal rate. Rhythm irregularly irregular. Heart sounds: Normal heart sounds. No murmur. Pulmonary:      Effort: Pulmonary effort is normal. No respiratory distress. Breath sounds: Normal breath sounds. No stridor, decreased air movement or transmitted upper airway sounds. No decreased breath sounds, wheezing, rhonchi or rales. Chest:      Chest wall: No tenderness. Abdominal:      General: Bowel sounds are normal. There is no distension. Palpations: Abdomen is soft. Tenderness: There is generalized abdominal tenderness. There is no right CVA tenderness, left CVA tenderness, guarding or rebound. Comments: Abdomen soft, general mild to moderate tenderness on palpation with no focality. No gross distention. No jaundice or icterus. No CVA tenderness. Musculoskeletal:         General: No swelling, tenderness, deformity or signs of injury. Right lower leg: Edema present. Left lower leg: Edema present. Comments: +3 lower extremity bilateral pretibial edema with no calf pain and no unilateral swelling. Lymphadenopathy:      Cervical: No cervical adenopathy. Skin:     General: Skin is warm and dry. Coloration: Skin is not jaundiced or pale. Findings: No erythema or rash. Neurological:      General: No focal deficit present. Mental Status: She is alert and oriented to person, place, and time. GCS: GCS eye subscore is 4. GCS verbal subscore is 5. GCS motor subscore is 6. Cranial Nerves: Cranial nerves are intact. No cranial nerve deficit. Sensory: Sensation is intact. Motor: Motor function is intact.       Coordination: Coordination is intact. Coordination normal.          Procedures     MDM     ED Course as of May 09 0243   Jameson Splinter May 09, 2021   0201 Patient resting comfortably in the bed no acute distress. Family at bedside. Exam is unchanged, moderate diffuse abdominal tenderness still noted. Vital signs are generally unremarkable, heart rate in the 80s. Updated on work-up results, CT results and need for admission. [NC]   0229 Case discussed with Dr. Giovana Aguero, detailed overview given, they will consult on the patient, patient to be admitted under surgery. [NC]   8627 Case discussed with Dr. Mar Sher, surgery, detailed overview given, he will admit the patient, probable surgery    [NC]      ED Course User Index  [NC] Kristi Salvador DO          EKG Interpretation    Interpreted by emergency department physician    Rhythm: atrial fibrillation - controlled  Rate: 79  Axis: normal  Ectopy: none  Conduction: normal  ST Segments: no acute change  T Waves: no acute change  Q Waves: none    Clinical Impression: atrial fibrillation (chronic)    Kristi Salvador     ED Course as of May 09 0243   Sun May 09, 2021   0201 Patient resting comfortably in the bed no acute distress. Family at bedside. Exam is unchanged, moderate diffuse abdominal tenderness still noted. Vital signs are generally unremarkable, heart rate in the 80s. Updated on work-up results, CT results and need for admission. [NC]   0229 Case discussed with Dr. Giovana Aguero, detailed overview given, they will consult on the patient, patient to be admitted under surgery.     [NC]   4914 Case discussed with Dr. Mar Sher, surgery, detailed overview given, he will admit the patient, probable surgery    [NC]      ED Course User Index  [NC] Leatha Hollins DO       --------------------------------------------- PAST HISTORY ---------------------------------------------  Past Medical History:  has a past medical history of Anxiety, Cancer ([facility] [address] Cirrhosis, non-alcoholic ([facility] [address] COPD (chronic obstructive pulmonary disease) ([facility] [address] and Hypertension. Past Surgical History:  has a past surgical history that includes hernia repair; Breast surgery (2011); Hysterectomy; Tonsillectomy; and Shoulder arthroscopy (09 09 2011). Social History:  reports that she has never smoked. She has never used smokeless tobacco. She reports previous alcohol use. She reports that she does not use drugs. Family History: family history includes Alzheimer's Disease in her sister; COPD in her father and mother; Cancer in her brother and mother; Diabetes in her father; Heart Failure in her father and mother. The patients home medications have been reviewed.     Allergies: Penicillins, Sulfa antibiotics, Theophyllines, Codeine, Iodine, Tape [adhesive tape], and Vicodin [hydrocodone-acetaminophen]    -------------------------------------------------- RESULTS -------------------------------------------------    Lab  Results for orders placed or performed during the hospital encounter of 05/09/21   CBC Auto Differential   Result Value Ref Range    WBC 9.8 4.5 - 11.5 E9/L    RBC 4.23 3.50 - 5.50 E12/L    Hemoglobin 13.6 11.5 - 15.5 g/dL    Hematocrit 42.6 34.0 - 48.0 %    .7 (H) 80.0 - 99.9 fL    MCH 32.2 26.0 - 35.0 pg    MCHC 31.9 (L) 32.0 - 34.5 %    RDW 15.1 (H) 11.5 - 15.0 fL    Platelets 994 082 - 725 E9/L    MPV 10.2 7.0 - 12.0 fL    Neutrophils % 87.3 (H) 43.0 - 80.0 %    Immature Granulocytes % 0.4 0.0 - 5.0 %    Lymphocytes % 6.2 (L) 20.0 - 42.0 %    Monocytes % 5.6 2.0 - 12.0 %    Eosinophils % 0.3 0.0 - 6.0 %    Basophils % 0.2 0.0 - 2.0 %    Neutrophils Absolute 8.51 (H) 1.80 - 7.30 E9/L    Immature Granulocytes # 0.04 E9/L    Lymphocytes Absolute 0.60 (L) 1.50 - 4.00 E9/L    Monocytes Absolute 0.55 0.10 - 0.95 E9/L    Eosinophils Absolute 0.03 (L) 0.05 - 0.50 E9/L    Basophils Absolute 0.02 0.00 - 0.20 E9/L   Comprehensive Metabolic Panel   Result Value Ref Range most likely source of   free air as there is pneumatosis coli from the cecum to the a [place]   flexure. Underlying colonic ischemia is a consideration. There is also diverticular disease involving the left colon. There is some   inflammation involving/abutting the mid to distal descending colon which   could represent minimal acute diverticulitis. Note that this is minimal and   not the source of free air. Cholelithiasis. Cirrhotic liver. Minimal ascites. I discussed findings by phone with Mirta Brown at 1:42 a.m. on 05/09/2021. XR CHEST PORTABLE   Final Result   No acute process. Cardiomegaly. ------------------------- NURSING NOTES AND VITALS REVIEWED ---------------------------  Date / Time Roomed:  5/9/2021 12:01 AM  ED Bed Assignment:  08/08    The nursing notes within the ED encounter and vital signs as below have been reviewed. Patient Vitals for the past 24 hrs:   BP Temp Temp src Pulse Resp SpO2 Weight   05/09/21 0204 (!) 128/49 99 °F (37.2 °C) Oral 90 20 95 %    05/09/21 0003 115/80   104 20 96 % 204 lb (92.5 kg)   05/09/21 0002  97.9 °F (36.6 °C) Oral           Oxygen Saturation Interpretation: Normal          I have spoken with the patient and discussed todays results, in addition to providing specific details for the plan of care and counseling regarding the diagnosis and prognosis. Their questions are answered at this time and they are agreeable with the plan.      --------------------------------- ADDITIONAL PROVIDER NOTES ---------------------------------  Consultations: This patient's ED course included: a personal history and physicial examination, re-evaluation prior to disposition, multiple bedside re-evaluations, IV medications, cardiac monitoring and continuous pulse oximetry    This patient has remained hemodynamically stable, remained unchanged and been closely monitored during their ED course.     Please note that the withdrawal or failure to initiate urgent interventions for this patient would likely result in a life threatening deterioration or permanent disability. Accordingly this patient received 30 minutes of critical care time, excluding separately billable procedures. Systems at risk for deterioration include: cardiac. Clinical Impression  1. Perforated abdominal viscus    2. Longstanding persistent atrial fibrillation ([facility] [address]    3. Chronic renal insufficiency, stage 2 (mild)          Disposition  Patient's disposition: Admit to IMCU/OR  Patient's condition is fair.        Desmond Alves DO  05/09/21 [id number]

## 2021-05-09 NOTE — CONSULTS
Pulmonary/Critical Care Consult Note    CHIEF COMPLAINT: Perforated diverticulitis, status post diverting colostomy, acute kidney injury, sigmoid colectomy, pelvic abscess with drainage, placement of left internal jugular triple-lumen catheter    HISTORY OF PRESENT ILLNESS: The patient is a 72-year-old female who came to the emergency room with 2 days of abdominal pain superimposed on a history of atrial fibrillation. For several days prior, she complained of poor appetite palpitations weakness and dizziness. CT scan of the abdomen showed free air and suspicion of a perforated diverticulum of the sigmoid colon. She was taken to surgery by Dr. Evette Coleman where an end colostomy was done along with a sigmoid colectomy and drainage of a pelvic abscess, likely related to the infected diverticulum. She required intermittent phenylephrine in the operating room but is currently on no pressors and on saline at 75 cc/h. Review of laboratory data shows evidence of acute kidney injury likely related to dehydration/volume depletion. Patient does have a remote history of cigarette smoking and is a history of right breast cancer and COPD although the patient in fact denies cigarette smoking. Patient is on long-term prednisone although I am not sure why. She will require small doses of hydrocortisone until she can take oral steroids.     ALLERGY:  Penicillins, Sulfa antibiotics, Theophyllines, Codeine, Iodine, Tape [adhesive tape], and Vicodin [hydrocodone-acetaminophen]    FAMILY HISTORY:  Family History   Problem Relation Age of Onset   [facility] COPD Mother     Heart Failure Mother     Cancer Mother         leukemia    Heart Failure Father     COPD Father     Diabetes Father     Alzheimer's Disease Sister     Cancer Brother         esophageal       SOCIAL HISTORY:  Social History     Socioeconomic History    Marital status:      Spouse name: Not on file    Number of children: Not on file    Years of education: Not on file    Highest education level: Not on file   Occupational History    Not on file   Social Needs    Financial resource strain: Not on file    Food insecurity     Worry: Not on file     Inability: Not on file    Transportation needs     Medical: Not on file     Non-medical: Not on file   Tobacco Use    Smoking status: Never Smoker    Smokeless tobacco: Never Used   Substance and Sexual Activity    Alcohol use: Not Currently     Comment: 1-2 cups coffee daily    Drug use: Never    Sexual activity: Not Currently   Lifestyle    Physical activity     Days per week: Not on file     Minutes per session: Not on file    Stress: Not on file   Relationships    Social connections     Talks on phone: Not on file     Gets together: Not on file     Attends Zoroastrianism service: Not on file     Active member of club or organization: Not on file     Attends meetings of clubs or organizations: Not on file     Relationship status: Not on file    Intimate partner violence     Fear of current or ex partner: Not on file     Emotionally abused: Not on file     Physically abused: Not on file     Forced sexual activity: Not on file   Other Topics Concern    Not on file   Social History Narrative    Not on file       MEDICAL HISTORY:  Past Medical History:   Diagnosis Date    Anxiety     Cancer [facility]) 2/11    right breast    Cir[address]osis, non-alcoholic ([facility] [address]     COPD (chronic obstructive pulmonary disease) ([facility] [address]     Hypertension        MEDICATIONS:   cetirizine  10 mg Oral Daily    [Held by provider] predniSONE  10 mg Oral Daily    [Held by provider] metOLazone  5 mg Oral Daily    anastrozole  1 mg Oral Daily    [Held by provider] bumetanide  2 mg Oral BID    [Held by provider] digoxin  250 mcg Oral See Admin Instructions    levothyroxine  50 mcg Oral Daily    metoprolol  100 mg Oral BID    montelukast  10 mg Oral Nightly    pantoprazole  40 mg Oral QAM AC    sodium chloride flush  5-40 mL Intravenous 2 times per day    ciprofloxacin  400 mg Intravenous Q12H    metroNIDAZOLE  500 mg Intravenous Q8H    fluconazole  200 mg Intravenous Q24H    [START ON 5/10/2021] heparin (porcine)  5,000 Units Subcutaneous 3 times per day    potassium chloride  40 mEq Intravenous Once    potassium chloride  20 mEq Intravenous Once      sodium chloride      dextrose 5% in lactated ringers 125 mL/hr at 05/09/21 0848     sodium chloride flush, sodium chloride, ondansetron **OR** ondansetron, morphine, traMADol    REVIEW OF SYSTEMS:  Constitutional: Denies fever, weight loss, night sweats, and fatigue  Skin: Denies pigmentation, dark lesions, and rashes   HEENT: Denies hearing loss, tinnitus, ear drainage, epistaxis, sore throat, and hoarseness. Cardiovascular: Denies palpitations, chest pain, and chest pressure. Respiratory: Denies cough, dyspnea at rest, hemoptysis, apnea, and choking. Gastrointestinal: Positive for recent nausea, vomiting, poor appetite, diarrhea, heartburn or reflux  Genitourinary: Denies dysuria, frequency, urgency or hematuria  Musculoskeletal: Denies myalgias, muscle weakness, and bone pain  Neurological: Denies dizziness, vertigo, headache, and focal weakness  Psychological: Denies anxiety and depression  Endocrine: Denies heat intolerance and cold intolerance  Hematopoietic/Lymphatic: Denies bleeding problems and blood transfusions    PHYSICAL EXAM:  Vitals:    05/09/21 0730   BP: 111/66   Pulse: 88   Resp: 20   Temp: 97.8 °F (36.6 °C)   SpO2: 100%        O2 Flow Rate (L/min): 4 L/min  O2 Device: Nasal cannula    Constitutional: No fever, chills, diaphoresis  Skin: Decreased skin turgor  HEENT: Mucous membranes dry  Neck: No JVD, lymphadenopathy, thyromegaly  Cardiovascular: S1, S2 irregular consistent with atrial fibrillation  Respiratory: Few soft expiratory wheezes bilaterally.   No inspiratory crackles  Gastrointestinal: Postoperative, colostomy in place  Genitourinary: No CVA tenderness  Extremities: No clubbing, cyanosis, or edema  Neurological: Awake, alert, slightly somnolent from anesthesia however. Moves all extremities.   Psychological: Cannot evaluate appropriately under sedation    LABS:  WBC   Date Value Ref Range Status   05/09/2021 8.6 4.5 - 11.5 E9/L Final   05/09/2021 9.8 4.5 - 11.5 E9/L Final   04/08/2021 13.8 (H) 4.5 - 11.5 E9/L Final     Hemoglobin   Date Value Ref Range Status   05/09/2021 10.6 (L) 11.5 - 15.5 g/dL Final   05/09/2021 13.6 11.5 - 15.5 g/dL Final   04/08/2021 12.1 11.5 - 15.5 g/dL Final     Hematocrit   Date Value Ref Range Status   05/09/2021 33.1 (L) 34.0 - 48.0 % Final   05/09/2021 42.6 34.0 - 48.0 % Final   04/08/2021 37.9 34.0 - 48.0 % Final     MCV   Date Value Ref Range Status   05/09/2021 101.2 (H) 80.0 - 99.9 fL Final   05/09/2021 100.7 (H) 80.0 - 99.9 fL Final   04/08/2021 101.6 (H) 80.0 - 99.9 fL Final     Platelets   Date Value Ref Range Status   05/09/2021 86 (L) 130 - 450 E9/L Final   05/09/2021 142 130 - 450 E9/L Final   04/08/2021 182 130 - 450 E9/L Final     Sodium   Date Value Ref Range Status   05/09/2021 141 132 - 146 mmol/L Final   05/09/2021 139 132 - 146 mmol/L Final   05/07/2021 141 132 - 146 mmol/L Final     Potassium   Date Value Ref Range Status   05/09/2021 3.1 (L) 3.5 - 5.0 mmol/L Final   05/09/2021 3.4 (L) 3.5 - 5.0 mmol/L Final   05/07/2021 4.0 3.5 - 5.0 mmol/L Final     Potassium reflex Magnesium   Date Value Ref Range Status   04/05/2021 3.1 (L) 3.5 - 5.0 mmol/L Final   07/26/2020 4.1 3.5 - 5.0 mmol/L Final   06/10/2019 4.0 3.5 - 5.0 mmol/L Final     Chloride   Date Value Ref Range Status   05/09/2021 102 98 - 107 mmol/L Final   05/09/2021 97 (L) 98 - 107 mmol/L Final   05/07/2021 103 98 - 107 mmol/L Final     CO2   Date Value Ref Range Status   05/09/2021 27 22 - 29 mmol/L Final   05/09/2021 33 (H) 22 - 29 mmol/L Final   05/07/2021 26 22 - 29 mmol/L Final     BUN   Date Value Ref Range Status   05/09/2021 46 (H) 6 - 23 mg/dL Final   05/09/2021 48 (H) 6 - 23 mg/dL Final   05/07/2021 47 (H) 6 - 23 mg/dL Final     CREATININE   Date Value Ref Range Status   05/09/2021 2.1 (H) 0.5 - 1.0 mg/dL Final   05/09/2021 2.1 (H) 0.5 - 1.0 mg/dL Final   05/07/2021 1.9 (H) 0.5 - 1.0 mg/dL Final     Glucose   Date Value Ref Range Status   05/09/2021 143 (H) 74 - 99 mg/dL Final   05/09/2021 157 (H) 74 - 99 mg/dL Final   05/07/2021 118 (H) 74 - 99 mg/dL Final   05/30/2012 100 70 - 110 mg/dL Final   11/14/2011 85 70 - 110 mg/dL Final   09/06/2011 110 70 - 110 mg/dL Final     Calcium   Date Value Ref Range Status   05/09/2021 8.0 (L) 8.6 - 10.2 mg/dL Final   05/09/2021 8.9 8.6 - 10.2 mg/dL Final   05/07/2021 9.0 8.6 - 10.2 mg/dL Final     Total Protein   Date Value Ref Range Status   05/09/2021 5.2 (L) 6.4 - 8.3 g/dL Final   05/09/2021 6.8 6.4 - 8.3 g/dL Final   04/15/2021 7.2 6.4 - 8.3 g/dL Final     Albumin   Date Value Ref Range Status   05/09/2021 2.7 (L) 3.5 - 5.2 g/dL Final   05/09/2021 2.6 (L) 3.5 - 5.2 g/dL Final   04/15/2021 3.0 (L) 3.5 - 5.2 g/dL Final   05/30/2012 4.1 3.2 - 4.8 g/dL Final   11/14/2011 3.8 3.2 - 4.8 g/dL Final   11/15/2010 3.6 3.2 - 4.8 g/dL Final     Total Bilirubin   Date Value Ref Range Status   05/09/2021 2.0 (H) 0.0 - 1.2 mg/dL Final   05/09/2021 1.8 (H) 0.0 - 1.2 mg/dL Final   04/15/2021 1.4 (H) 0.0 - 1.2 mg/dL Final     Alkaline Phosphatase   Date Value Ref Range Status   05/09/2021 92 35 - 104 U/L Final   05/09/2021 159 (H) 35 - 104 U/L Final   04/15/2021 152 (H) 35 - 104 U/L Final     AST   Date Value Ref Range Status   05/09/2021 18 0 - 31 U/L Final   05/09/2021 27 0 - 31 U/L Final     Comment:     Specimen is slightly Hemolyzed. Result may be artificially increased.    04/15/2021 68 (H) 0 - 31 U/L Final     ALT   Date Value Ref Range Status   05/09/2021 13 0 - 32 U/L Final   05/09/2021 18 0 - 32 U/L Final   04/15/2021 34 (H) 0 - 32 U/L Final     GFR Non-   Date Value Ref Range Status   05/09/2021 23 >=60 mL/min/1.73 Final Comment:     Chronic Kidney Disease: less than 60 ml/min/1.73 sq.m. Kidney Failure: less than 15 ml/min/1.73 sq.m. Results valid for patients 18 years and older. 05/09/2021 23 >=60 mL/min/1.73 Final     Comment:     Chronic Kidney Disease: less than 60 ml/min/1.73 sq.m. Kidney Failure: less than 15 ml/min/1.73 sq.m. Results valid for patients 18 years and older. 05/07/2021 26 >=60 mL/min/1.73 Final     Comment:     Chronic Kidney Disease: less than 60 ml/min/1.73 sq.m. Kidney Failure: less than 15 ml/min/1.73 sq.m. Results valid for patients 18 years and older. GFR    Date Value Ref Range Status   05/09/2021 28  Final   05/09/2021 28  Final   05/07/2021 31  Final     Magnesium   Date Value Ref Range Status   05/09/2021 1.5 (L) 1.6 - 2.6 mg/dL Final   05/09/2021 1.7 1.6 - 2.6 mg/dL Final   04/08/2021 1.8 1.6 - 2.6 mg/dL Final     Phosphorus   Date Value Ref Range Status   05/09/2021 4.4 2.5 - 4.5 mg/dL Final     No results for input(s): PH, PO2, PCO2, HCO3, BE, O2SAT in the last 72 hours. RADIOLOGY:  CT HEAD WO CONTRAST   Final Result   No acute intracranial abnormality. Periventricular white matter changes consistent chronic microvascular   disease. Diffuse volume loss. CT ABDOMEN PELVIS WO CONTRAST Additional Contrast? None   Final Result   Moderate amount of free intraperitoneal air which is compatible with   perforated abdominal viscus. The right colon is the most likely source of   free air as there is pneumatosis coli from the cecum to the a P[place]ock   flexure. Underlying colonic ischemia is a consideration. There is also diverticular disease involving the left colon. There is some   inflammation involving/abutting the mid to distal descending colon which   could represent minimal acute diverticulitis. Note that this is minimal and   not the source of free air. Cholelithiasis. Cirrhotic liver. Minimal ascites.       I discussed findings by phone with Dora Alas at 1:42 a.m. on 05/09/2021. XR CHEST PORTABLE   Final Result   No acute process. Cardiomegaly. XR CHEST PORTABLE    (Results Pending)       IMPRESSION:  1. Perforated diverticulitis  2. Status post sigmoid colectomy and diverting colostomy  3. COPD by history  4. Atrial fibrillation on Eliquis at home  5. Status post hysterectomy  6. History of nonalcoholic liver cirrhosis  7. History of CHF (but echo normal 4/7/2021)  8. Status post drainage of pelvic abscess the time of surgery  9. YANELIS possibly superimposed on an element of CKD  10. Hypomagnesemia  11. Hypokalemia  12. Moderate thrombocytopenia    PLAN:  1. Continue Cipro and Flagyl for now but need to add better gram-positive coverage with vancomycin  2. Incentive spirometry  3. Aerosolized bronchodilators  4. IV fluids  5. Hypomagnesemia requires replacement  6. Hypokalemia requires replacement  7. Resume Eliquis tomorrow  8. Hold diuretics including Zaroxolyn and bumetanide  9. Follow labs carefully including platelet count    ATTESTATION:  ICU Staff Physician note of personal involvement in Care  As the attending physician, I certify that I personally reviewed the patients history and personally examined the patient to confirm the physical findings described above,  And that I reviewed the relevant imaging studies and available reports. I also discussed the differential diagnosis and all of the proposed management plans with the patient and individuals accompanying the patient to this visit. They had the opportunity to ask questions about the proposed management plans and to have those questions answered. This patient has a high probability of sudden, clinically significant deterioration, which requires the highest level of physician preparedness to intervene urgently. I managed/supervised life or organ supporting interventions that required frequent physician assessment.    I devoted my full attention to the direct care of this patient for the amount of time indicated below. Time I spent with the family or surrogate(s) is included only if the patient was incapable of providing the necessary information or participating in medical decisions  Time devoted to teaching and to any procedures I billed separately is not included.     CRITICAL CARE TIME:  39 minutes    Electronically signed by Daniel Malik MD on 5/9/2021 at 10:35 AM

## 2021-05-09 NOTE — ED NOTES
Bed: 08  Expected date:   Expected time:   Means of arrival:   Comments:  erick Davenport RN  05/09/21 0001

## 2021-05-09 NOTE — OP NOTE
percutaneously accessed. A wire was passed easily into the superior vena cava. The needle was removed and a small incision was made around the wire. A dilator was then passed over the wire to dilate the subcutaneous tissue. A 7 [place] triple-lumen catheter was then advanced over the wire and secured to the patient's neck. A Biopatch was placed and a sterile dressing was then placed over the secured catheter. With the patient in the supine position and prepped and draped, a skin incision was made in the upper midline down to the level of the umbilicus with a 15 blade scalpel. Electrocautery was then used to dissect through the subcutaneous tissues down to the level of the fascia. The patient had a prior ventral hernia repair and the mesh was incised to gain access into the intra-abdominal cavity. Initial evaluation of the abdomen revealed omental adhesions to the mesh that were taken down with electrocautery. There was purulent fluid in the left lateral abdomen and the pelvis. Further evaluation revealed inflammatory changes in the omentum and the descending colon. A Yankauer suction was then used to perforate a pelvic abscess was which was drained and some of the fluid was sent for evaluation. I then proceeded to evaluate the remainder of the colon starting at the cecum. Evaluation there did reveal air in the mesentery up around the cecum however the bowel wall itself was healthy and the cecum was pink and viable. I proceeded to then evaluate the remainder of the transverse colon and descending colon, again encountering a perforation in the distal descending colon. I then proceeded to perform a sigmoid colectomy. The white line of Toldt was incised in the left lower quadrant. Dissection was carried up around the descending colon and around the splenic flexure.   The splenic flexure was then completely mobilized as lateral and superior attachments were taken down until the lesser sac was able to be reached and the colon was freely mobile in the abdomen. Down in the pelvis, a portion of sigmoid colon which was free of disease was isolated. A mesenteric window was created and a DWAYNE-75 stapler was used to transect the colon. The remainder of the mesentery was then divided with a LigaSure device. Special care was taken to identify and preserve the left ureter. In the proximal descending colon, an area of healthy colon was then isolated in the DWAYNE-75 stapler was used to transect the colon which was then passed off as specimen    A disc of skin was removed from the left lateral abdominal wall. Electrocautery was used to dissect through the subcutaneous tissues down to the level of the anterior rectus sheath. The anterior rectus sheath was incised and the muscle spread to expose the posterior rectus sheath which was then incised. An aperture at least 2 fingerbreadths in size was then created and a Gutiérrez Finner was used to pass the proximal descending colon through the anterior abdominal wall. I then proceeded to irrigate the abdomen profusely with warm saline until the aspirate returned clear. There was no other abnormality in the abdominal cavity. Of note, evaluation of the liver did reveal a cirrhotic liver. I then proceeded to close the abdominal cavity using 0 looped PDS sutures starting from the top and the bottom of the incision and tying in the middle at the level of the umbilicus. The skin was closed with skin staples. The left lower quadrant: Was then opened up and a colostomy was matured using 3-0 Vicryl sutures. The colonic mucosa was pink and viable and there was stool in the colon. An ostomy appliance was cut to size and was applied to the abdominal wall. The midline incision was covered with an Aquacel dressing. The patient tolerated the procedure well. There were no complications. Total and instrument counts were correct x2.   The patient was extubated and transferred to the ICU in stable condition.     Electronically signed by Tan Marti MD on 5/9/2021 at 8:59 AM

## 2021-05-09 NOTE — ANESTHESIA PRE PROCEDURE
Department of Anesthesiology  Preprocedure Note       Name:  Ryan Parra   Age:  68 y.o.  :  1940                                          MRN:  [id number]         Date:  2021      Surgeon: Khalida Etienne    Procedure: MARISELA AND CARDIOVERSION     Medications prior to admission:   Prior to Admission medications    Medication Sig Start Date End Date Taking? Authorizing Provider   amiodarone (CORDARONE) 200 MG tablet Take 200 mg by mouth daily    Historical Provider, MD   bumetanide (BUMEX) 2 MG tablet Take 1 tablet by mouth 2 times daily 5/3/21   Diego Cast MD   digoxin (LANOXIN) 250 MCG tablet Take 1 tablet by mouth See Admin Instructions Take 2 by mouth today then 1 by mouth daily 5/3/21   Diego Cast MD   metOLazone (ZAROXOLYN) 5 MG tablet Take 1 tablet by mouth daily Take 1 by mouth daily for 5 days and then one half daily as needed for swelling  Patient taking differently: Take 5 mg by mouth daily  21   Diego Cast MD   predniSONE (DELTASONE) 10 MG tablet Take 10 mg by mouth daily    Historical Provider, MD   metoprolol tartrate (LOPRESSOR) 100 MG tablet Take 1 tablet by mouth 2 times daily 21   Diego Cast MD   apixaban (ELIQUIS) 5 MG TABS tablet Take 1 tablet by mouth 2 times daily 21   Kezia Low MD   potassium chloride (KLOR-CON M) 20 MEQ extended release tablet Take 1 tablet by mouth 3 times daily 21   Kezia Low MD   LORazepam (ATIVAN) 0.5 MG tablet Take 0.5 mg by mouth every 8 hours as needed for Anxiety. Historical Provider, MD   rifaximin (XIFAXAN) 550 MG tablet Take 1 tablet by mouth 2 times daily 19   Get Mahoney MD   lactulose (CHRONULAC) 10 GM/15ML solution Take 30 mLs by mouth 3 times daily Take to achieve 2-3 bowel movement every day.   Do not take if more than 3 bowel movement in a day 19   Get Mahoney MD   levothyroxine (SYNTHROID) 50 MCG tablet Take 50 mcg by mouth Daily    Historical Provider, MD   omeprazole impingement M75.40    Encephalopathy G93.40    Atrial fibrillation with rapid ventricular response (HCC) I48.91    Atrial fibrillation (HCC) I48.91    Acute exacerbation of COPD with asthma (HCC) J44.1, J45.901    CAP (community acquired pneumonia) J18.9    Liver cirrhosis secondary to BROCK ([facility] K75.81, K74.60    Hypertension I10    Acute heart failure (HCC) I50.9    Dyspnea R06.00    Non morbid obesity E66.9    Perforated viscus R19.8       Past Medical History:        Diagnosis Date    Anxiety     Cancer ([facility] 2/11    right breast    Cirrhosis, non-alcoholic (HCC)     COPD (chronic obstructive pulmonary disease) (HCC)     Hypertension        Past Surgical History:        Procedure Laterality Date    BREAST SURGERY  2011    right lumpectomy    HERNIA REPAIR      HYSTERECTOMY      SHOULDER ARTHROSCOPY  09 09 2011    arthroscopy left shoulder rotator cuff repair, subacromial decompression, debridement labrum, synovectomy    TONSILLECTOMY         Social History:    Social History     Tobacco Use    Smoking status: Never Smoker    Smokeless tobacco: Never Used   Substance Use Topics    Alcohol use: Not Currently     Comment: 1-2 cups coffee daily                                Counseling given: Not Answered      Vital Signs (Current): There were no vitals filed for this visit.                                            BP Readings from Last 3 Encounters:   05/09/21 (!) 128/49   05/09/21 (!) 116/59   05/07/21 110/70       NPO Status:  GREATER THAN 8 HOURS                                                                               BMI:   Wt Readings from Last 3 Encounters:   05/09/21 204 lb (92.5 kg)   05/07/21 208 lb (94.3 kg)   05/03/21 211 lb (95.7 kg)     There is no height or weight on file to calculate BMI.    CBC:   Lab Results   Component Value Date    WBC 9.8 05/09/2021    RBC 4.23 05/09/2021    HGB 13.6 05/09/2021    HCT 42.6 05/09/2021    .7 05/09/2021    RDW 15.1 05/09/2021  05/09/2021       CMP:   Lab Results   Component Value Date     05/09/2021    K 3.4 05/09/2021    K 3.1 04/05/2021    CL 97 05/09/2021    CO2 33 05/09/2021    BUN 48 05/09/2021    CREATININE 2.1 05/09/2021    GFRAA 28 05/09/2021    LABGLOM 23 05/09/2021    GLUCOSE 157 05/09/2021    GLUCOSE 100 05/30/2012    PROT 6.8 05/09/2021    CALCIUM 8.9 05/09/2021    BILITOT 1.8 05/09/2021    ALKPHOS 159 05/09/2021    AST 27 05/09/2021    ALT 18 05/09/2021       POC Tests: No results for input(s): POCGLU, POCNA, POCK, POCCL, POCBUN, POCHEMO, POCHCT in the last 72 hours. Coags:   Lab Results   Component Value Date    PROTIME 19.1 04/05/2021    INR 1.6 04/05/2021    APTT 29.6 08/05/2020       HCG (If Applicable): No results found for: PREGTESTUR, PREGSERUM, HCG, HCGQUANT     ABGs: No results found for: PHART, PO2ART, FBV2INA, XFY5BPI, BEART, E5RPWKJQ     Type & Screen (If Applicable):  No results found for: LABABO, LABRH    Drug/Infectious Status (If Applicable):  No results found for: HIV, HEPCAB    COVID-19 Screening (If Applicable):   Lab Results   Component Value Date    COVID19 Not Detected 05/09/2021           Anesthesia Evaluation  Patient summary reviewed no history of anesthetic complications:   Airway: Mallampati: III  TM distance: <3 FB   Neck ROM: full  Mouth opening: < 3 FB Dental: normal exam     Comment: Poor dentition  Front lower partial    Pulmonary: breath sounds clear to auscultation  (+) pneumonia:  COPD:  shortness of breath:  asthma:                            Cardiovascular:    (+) hypertension:, dysrhythmias ( Atrial fibrillation with rapid ventricular response ): atrial fibrillation, hyperlipidemia        Rhythm: irregular  Rate: normal                    Neuro/Psych:   (+) CVA:, depression/anxiety             GI/Hepatic/Renal:   (+) GERD:, liver disease (CIRRHOSIS - BROCK):, renal disease: ARF,          ROS comment: Diverticular disease.    Endo/Other:    (+) hypothyroidism::., .

## 2021-05-09 NOTE — CONSULTS
CONSULT    H&P    Patient ID:  Joanna Domínguez  [id number]  3 y.o.  1940     Reason for consult: Medical management, chronic atrial fibrillation, diastolic heart failure    Requesting Physician: Dr. True Tripathi    History Obtained From: Patient    HISTORY OF PRESENT ILLNESS:    The patient is a 68 y.o. female who presents with known history of nonalcoholic fatty liver with cirrhosis, COPD, history of CA breast right side postmastectomy, hypertension, chronic renal insufficiency, admitted with acute abdominal pain early morning, CT scan showed perforated viscus patient right away was taken to operating room patient now postop divergent colostomy and sigmoid colectomy for perforated viscus, patient received Cipro and Flagyl, patient has 1+ leg edema, patient had been taking lactulose for her chronic liver disease and nonalcoholic fatty liver with cirrhosis.     Past Medical History:        Diagnosis Date    Anxiety     Cancer [facility]) 2/11    right breast    Cirrhosis, non-alcoholic (HCC)     COPD (chronic obstructive pulmonary disease) ([facility] [address]     Hypertension        Past Surgical History:        Procedure Laterality Date    BREAST SURGERY  2011    right lumpectomy    HERNIA REPAIR      HYSTERECTOMY      SHOULDER ARTHROSCOPY  09 09 2011    arthroscopy left shoulder rotator cuff repair, subacromial decompression, debridement labrum, synovectomy    TONSILLECTOMY         Medications Prior to Admission:    Medications Prior to Admission: amiodarone (CORDARONE) 200 MG tablet, Take 200 mg by mouth daily  bumetanide (BUMEX) 2 MG tablet, Take 1 tablet by mouth 2 times daily  digoxin (LANOXIN) 250 MCG tablet, Take 1 tablet by mouth See Admin Instructions Take 2 by mouth today then 1 by mouth daily  metOLazone (ZAROXOLYN) 5 MG tablet, Take 1 tablet by mouth daily Take 1 by mouth daily for 5 days and then one half daily as needed for swelling (Patient taking differently: Take 5 mg by mouth daily )  predniSONE (DELTASONE) 10 MG tablet, Take 10 mg by mouth daily  metoprolol tartrate (LOPRESSOR) 100 MG tablet, Take 1 tablet by mouth 2 times daily  apixaban (ELIQUIS) 5 MG TABS tablet, Take 1 tablet by mouth 2 times daily  potassium chloride (KLOR-CON M) 20 MEQ extended release tablet, Take 1 tablet by mouth 3 times daily  LORazepam (ATIVAN) 0.5 MG tablet, Take 0.5 mg by mouth every 8 hours as needed for Anxiety. rifaximin (XIFAXAN) 550 MG tablet, Take 1 tablet by mouth 2 times daily  lactulose (CHRONULAC) 10 GM/15ML solution, Take 30 mLs by mouth 3 times daily Take to achieve 2-3 bowel movement every day. Do not take if more than 3 bowel movement in a day  levothyroxine (SYNTHROID) 50 MCG tablet, Take 50 mcg by mouth Daily  omeprazole (PRILOSEC) 20 MG delayed release capsule, Take 40 mg by mouth daily  Biotin 1000 MCG TABS, Take by mouth  loratadine (CLARITIN) 10 MG tablet, Take 10 mg by mouth daily. Cholecalciferol (VITAMIN D3) 2000 UNIT CAPS, Take  by mouth daily. montelukast (SINGULAIR) 10 MG tablet, Take 10 mg by mouth nightly. anastrozole (ARIMIDEX) 1 MG tablet, Take 1 mg by mouth daily. L.D. 9/2/11    Allergies:  Penicillins, Sulfa antibiotics, Theophyllines, Codeine, Iodine, Tape [adhesive tape], and Vicodin [hydrocodone-acetaminophen]    Social History:   TOBACCO:   reports that she has never smoked. She has never used smokeless tobacco.  ETOH:   reports previous alcohol use.   Patient currently lives with family     Family History:       Problem Relation Age of Onset    COPD Mother     Heart Failure Mother     Cancer Mother         leukemia    Heart Failure Father     COPD Father     Diabetes Father     Alzheimer's Disease Sister     Cancer Brother         esophageal       REVIEW OF SYSTEMS: Patient had low blood pressure 84 systolic on pressure support patient return from surgery an hour ago  CONSTITUTIONAL:  Neg   Recent weight changes,fatigue,fever,chills or night sweats  EYES:  Neg  blurriness,tearing,itching or acute change in vision  EARS:  Neg   hearing loss,tinnitus,vertigo,discharge or earache. NOSE:  Neg  rhinorrhea,sneezing,itching,allergy or epistaxis  MOUTH/THROAT:  Neg  bleeding gums,hoarseness or sore throat. RESPIRATORY:   Neg SOB,wheeze,cough,sputum,hemoptysis or bronochitis  CARDIOVASCULAR   Neg : chest pain,palpitations,dyspnea on exertion,orthopnea,paroxysmal nocturnal dyspnea or edema, known history of chronic atrial fibrillation  GASTROINTESTINAL: Postoperative abdominal pain. GENITOURINARY: Patient has Mercedes catheter with some urine output  HEMATOLOGIC/LYMPHATIC:  Neg  Anemia,bleeding tendency  MUSCULOSKELETAL:    New myalgias,bone pain,joint pain,swelling or stiffness and has had  change in gait. NEUROLOGICAL:  Neg  Loss of Consciousness,memeory loss,forgetfulness,periods of confusion,difficulty concentrating,seizures,decline in intellect,nervousness,insomina,aphasia or dysarthria. SKIN :  Neg  skin or hair changes,and has no itching,rashes,sores. PSYCHIATRIC:  Neg depression,personality changes,anxiety. ENDOCRINE:  Neg polydipsia,polyuria,abnormal weight changes,heat /cold intolerance. ALL/IMM :  Neg reactions to drugs other than listed      PHYSICAL EXAM:  /66   Pulse 88   Temp 97.8 °F (36.6 °C) (Oral)   Resp 20   Ht 5' 4\" (1.626 m)   Wt 203 lb 7.8 oz (92.3 kg)   SpO2 100%   BMI 34.93 kg/m²   General appearance: alert, appears stated age, cooperative and no distress  Head: Normocephalic, without obvious abnormality, atraumatic  Eyes: conjunctivae/corneas clear. PERRL, EOM's intact.    Ears: normal external ear canals both ears  Neck: no adenopathy, no carotid bruit, no JVD, supple, symmetrical, trachea midline and thyroid not enlarged, symmetric, no tenderness/mass/nodules  Lungs: clear to auscultation bilaterally,no rales or wheezes   Heart: Irregular, S1, S2 normal, no murmur,  regular rate and rhythm   Abdomen:soft, non-tender; non-distended absent bowel sounds no masses, no visualized portion of the orbits demonstrate no acute abnormality. SINUSES: The visualized paranasal sinuses and mastoid air cells demonstrate no acute abnormality. SOFT TISSUES/SKULL:  No acute abnormality of the visualized skull or soft tissues. No acute intracranial abnormality. Periventricular white matter changes consistent chronic microvascular disease. Diffuse volume loss. Xr Chest Portable    Result Date: 5/9/2021  EXAMINATION: ONE XRAY VIEW OF THE CHEST 5/9/2021 9:01 am COMPARISON: Radiograph performed 10 hours earlier. HISTORY: ORDERING SYSTEM PROVIDED HISTORY: s/p triple lumen catheter TECHNOLOGIST PROVIDED HISTORY: Reason for exam:->s/p triple lumen catheter A left internal jugular catheter has its tip near the superior cavoatrial junction. Subdiaphragmatic free air has decreased markedly. The heart and mediastinum are normal for AP technique. Lung volumes are decreased. There are no focal airspace opacities. Blunting of the costophrenic sulci suggest there are small dependent pleural effusions. There are no signs of pneumothorax. FINDINGS: 1. Left IJ catheter appears satisfactory, no signs of pneumothorax. 2. Decreased lung volumes, suspect small pleural effusions. Xr Chest Portable    Result Date: 5/9/2021  EXAMINATION: ONE XRAY VIEW OF THE CHEST 5/9/2021 12:48 am COMPARISON: 04/05/2021 HISTORY: ORDERING SYSTEM PROVIDED HISTORY: chest pain TECHNOLOGIST PROVIDED HISTORY: Reason for exam:->chest pain FINDINGS: The lungs are without acute focal process. There is no effusion or pneumothorax. Cardiomegaly. The osseous structures are without acute process. No acute process. Cardiomegaly.        CBC with Differential:    Lab Results   Component Value Date    WBC 8.6 05/09/2021    RBC 3.27 05/09/2021    HGB 10.6 05/09/2021    HCT 33.1 05/09/2021    PLT 86 05/09/2021    .2 05/09/2021    SEGSPCT 57 12/09/2013    LYMPHOPCT 4.0 05/09/2021     BMP:    Lab Results   Component Value Date

## 2021-05-09 NOTE — ANESTHESIA POSTPROCEDURE EVALUATION
Department of Anesthesiology  Postprocedure Note    Patient: Marilin Rodriguez  MRN: [id number]  YOB: 1940  Date of evaluation: 5/9/2021  Time:  8:44 AM     Procedure Summary     Date: 05/09/21 Room / Location: [address] [place] [address] / [address] [place] [address]    Anesthesia Start: 8230 Anesthesia Stop: 0427    Procedure: OPEN BOWEL RESECTION WITH COLOSTOMY (N/A Abdomen) Diagnosis: (APERFORATED ABDOMINAL VISCUS)    Surgeons: Elvie Lucero MD Responsible Provider: Lexii Sidhu MD    Anesthesia Type: general ASA Status: 4 - Emergent          Anesthesia Type: general    Jess Phase I: Jess Score: 9    Jess Phase II:      Last vitals: Reviewed and per EMR flowsheets.        Anesthesia Post Evaluation    Patient location during evaluation: ICU  Patient participation: complete - patient participated  Level of consciousness: awake  Pain score: 4  Airway patency: patent  Nausea & Vomiting: no nausea and no vomiting  Complications: no  Cardiovascular status: hemodynamically stable  Respiratory status: acceptable  Hydration status: euvolemic

## 2021-05-09 NOTE — BRIEF OP NOTE
Brief Postoperative Note      Patient: Jeanne Yates  YOB: 1940  MRN: [id number]    Date of Procedure: 5/9/2021    Pre-Op Diagnosis: APERFORATED ABDOMINAL VISCUS    Post-Op Diagnosis: Same, perforated sigmoid colon       Procedure(s):  OPEN BOWEL RESECTION WITH COLOSTOMY    Surgeon(s):  Miguel Figueredo MD    Assistant:  First Assistant: Akash Raya RN    Anesthesia: General    Estimated Blood Loss (mL): less than 50     Complications: None    Specimens:   ID Type Source Tests Collected by Time Destination   1 : PERITIONEAL FLUID Body Fluid Fluid CULTURE, FUNGUS, GRAM STAIN, CULTURE, BODY FLUID Miguel Figueredo MD 5/9/2021 9572    A : SIGMOID COLON Tissue Tissue SURGICAL PATHOLOGY Miguel Figueredo MD 5/9/2021 3916        Implants:  * No implants in log *      Drains:   Colostomy LLQ Descending/sigmoid (Active)       Urethral Catheter Non-latex 16 fr (Active)       Findings: perforated diverticulitis    Electronically signed by Miguel Figueredo MD on 5/9/2021 at 7:20 AM

## 2021-05-09 NOTE — H&P
General Surgery History and Physical  [facility]    Patient's Name/Date of Birth: Ruby Blevins [birth year]    Date: May 9, 2021     Surgeon: Herbert Godinez MD    PCP: Colvin Hashimoto, MD     Chief Complaint: perforated viscus    HPI:   Ruby Blevins is a 68 y.o. female who presents for evaluation of abdominal pain and nausea since yesterday. CT abdomen/pelvis showed pneumoperitoneum. She has a significant past medical history highlighted by Afib on eliquis and recent cardioversion, CKD, non alcoholic liver cirrhosis and decompensated CHF. She has history of hysterectomy and prior hernia repair.      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 ([address]    Hypertension    Acute heart failure ([address]    Dyspnea    Non morbid obesity    Perforated viscus       Past Medical History:   Diagnosis Date    Anxiety     Cancer ([address] 2/11    right breast    Cirrhosis, non-alcoholic ([address]     COPD (chronic obstructive pulmonary disease) ([address]     Hypertension        Past Surgical History:   Procedure Laterality Date    BREAST SURGERY  2011    right lumpectomy    HERNIA REPAIR      HYSTERECTOMY      SHOULDER ARTHROSCOPY  09 09 2011    arthroscopy left shoulder rotator cuff repair, subacromial decompression, debridement labrum, synovectomy    TONSILLECTOMY         Allergies   Allergen Reactions    Penicillins Anaphylaxis    Sulfa Antibiotics Anaphylaxis    Theophyllines Anaphylaxis     Liquid only    Codeine      Climb the walls    Iodine      IVP iodine  i get real hot    Tape [Adhesive Tape]      Pulls my skin off    Vicodin [Hydrocodone-Acetaminophen]      Climb the walls       The patient has a family history that is negative for severe cardiovascular or respiratory issues, negative for reaction to anesthesia. Time spent reviewing past medical, surgical, social and family history, vitals, nursing assessment and images. No changes from above documented history.     Social History     Socioeconomic History    Marital status:      Spouse name: Not on file    Number of children: Not on file    Years of education: Not on file    Highest education level: Not on file   Occupational History    Not on file   Social Needs    Financial resource strain: Not on file    Food insecurity     Worry: Not on file     Inability: Not on file    Transportation needs     Medical: Not on file     Non-medical: Not on file   Tobacco Use    Smoking status: Never Smoker    Smokeless tobacco: Never Used   Substance and Sexual Activity    Alcohol use: Not Currently     Comment: 1-2 cups coffee daily    Drug use: Never    Sexual activity: Not Currently   Lifestyle    Physical activity     Days per week: Not on file     Minutes per session: Not on file    Stress: Not on file   Relationships    Social connections     Talks on phone: Not on file     Gets together: Not on file     Attends Latter day service: Not on file     Active member of club or organization: Not on file     Attends meetings of clubs or organizations: Not on file     Relationship status: Not on file    Intimate partner violence     Fear of current or ex partner: Not on file     Emotionally abused: Not on file     Physically abused: Not on file     Forced sexual activity: Not on file   Other Topics Concern    Not on file   Social History Narrative    Not on file       I have reviewed relevant labs from this admission and interpretation is included in my assessment and plan    Review of Systems    A complete 10 system review was performed and are otherwise negative unless mentioned in the above HPI. Specific negatives are listed below but may not include all those reviewed. General ROS: negative obtundation, AMS  ENT ROS: negative rhinorrhea, epistaxis  Allergy and Immunology ROS: negative itchy/watery eyes or nasal congestion  Hematological and Lymphatic ROS: negative spontaneous bleeding or bruising  Endocrine ROS: negative  lethargy, mood swings, palpitations or polydipsia/polyuria  Respiratory ROS: negative sputum changes, stridor, tachypnea or wheezing  Cardiovascular ROS: negative for - loss of consciousness, murmur or orthopnea  Gastrointestinal ROS: negative for - hematochezia or hematemesis  Genito-Urinary ROS: negative for -  genital discharge or hematuria  Musculoskeletal ROS: negative for - focal weakness, gangrene  Psych/Neuro ROS: negative for - visual or auditory hallucinations, suicidal ideation    Physical exam:   BP (!) 128/49   Pulse 90   Temp 99 °F (37.2 °C) (Oral)   Resp 20   Wt 204 lb (92.5 kg)   SpO2 95%   BMI 35.02 kg/m²   General appearance:  NAD, appears stated age  Head: NCAT, PERRLA, EOMI, red conjunctiva  Neck: supple, no masses, trachea midline  Lungs: Equal chest rise bilateral, no retractions, no wheezing  Heart: Reg rate  Abdomen: soft, tender generalized with guarding and rebound, nondistended  Skin; warm and dry, no cyanosis  Gu: no cva tenderness  Extremities: atraumatic, no focal motor deficits, no open wounds  Psych: No tremor, visual hallucinations      Radiology: I reviewed relevant abdominal imaging from this admission and that available in the EMR including CT abd/pel from 05/09/21.  My assessment is pneumoperitoneum and pneumatosis of R colon    Assessment:  Vitor Booth is a 68 y.o. 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 She will be admitted to the ICU postoperatively. They understand she is at risk for long term intubation, tracheostomy, worsening kidney function, perioperative cardiovascular compromise, bleeding, infection and death. They agree to proceed.         Yamile Martínez MD  4:47 AM  5/9/2021

## 2021-05-10 NOTE — PROGRESS NOTES
Pulmonary/Critical Care Progress Note    We are following patient for perforated diverticulitis resulting in colostomy, resection of sigmoid bowel, drainage of pelvic abscess, history of hypertension, YANELIS, remote history of breast cancer, thrombocytopenia    SUBJECTIVE:  Patient is awake and alert and is doing fairly well overall. However, her acute kidney injury is somewhat worse today probably owing to third spacing, hypotension during surgery, and intravascular depletion. She is receiving lactated Ringer's at 125 cc/h with a slow improvement in urine output. Hopefully, her renal indices will improve further by tomorrow. We will discontinue her hydrocortisone following the next dose. The patient will require physical and occupational therapy to help her begin to mobilize from her bed. She has not needed any pressors but her blood pressure is not good enough to resume her antihypertensive medications at this time.     MEDICATIONS:   ipratropium-albuterol  1 ampule Inhalation TID    hydrocortisone sodium succinate PF  50 mg Intravenous Q8H    apixaban  5 mg Oral BID    [START ON 5/11/2021] ciprofloxacin  400 mg Intravenous Q24H    cetirizine  10 mg Oral Daily    [Held by provider] metOLazone  5 mg Oral Daily    anastrozole  1 mg Oral Daily    [Held by provider] bumetanide  2 mg Oral BID    [Held by provider] digoxin  250 mcg Oral See Admin Instructions    levothyroxine  50 mcg Oral Daily    [Held by provider] metoprolol  100 mg Oral BID    montelukast  10 mg Oral Nightly    pantoprazole  40 mg Oral QAM AC    sodium chloride flush  5-40 mL Intravenous 2 times per day    metroNIDAZOLE  500 mg Intravenous Q8H    fluconazole  200 mg Intravenous Q24H      norepinephrine      sodium chloride      dextrose 5% in lactated ringers 125 mL/hr at 05/10/21 0842     perflutren lipid microspheres, fentanNYL, sodium chloride flush, sodium chloride, ondansetron **OR** ondansetron, traMADol      REVIEW OF SYSTEMS:  Constitutional: Denies fever, weight loss, night sweats, and fatigue  Skin: Denies pigmentation, dark lesions, and rashes   HEENT: Denies hearing loss, tinnitus, ear drainage, epistaxis, sore throat, and hoarseness. Cardiovascular: Denies palpitations, chest pain, and chest pressure. Respiratory: Denies cough, dyspnea at rest, hemoptysis, apnea, and choking. Gastrointestinal: Denies nausea, vomiting, poor appetite, diarrhea, heartburn or reflux  Genitourinary: Denies dysuria, frequency, urgency or hematuria  Musculoskeletal: Denies myalgias, muscle weakness, and bone pain  Neurological: Denies dizziness, vertigo, headache, and focal weakness  Psychological: Denies anxiety and depression  Endocrine: Denies heat intolerance and cold intolerance  Hematopoietic/Lymphatic: Denies bleeding problems and blood transfusions    OBJECTIVE:  Vitals:    05/10/21 1300   BP: (!) 98/57   Pulse: 118   Resp: 13   Temp:    SpO2: 98%        O2 Flow Rate (L/min): 2 L/min  O2 Device: Nasal cannula    PHYSICAL EXAM:  Constitutional: No fever, chills, diaphoresis  Skin: Skin rash, no skin breakdown  HEENT: Unremarkable  Neck: No JVD, lymphadenopathy, thyromegaly  Cardiovascular: S1, S2 normal.  No S3 murmurs or rubs present  Respiratory: Clear to auscultation bilaterally  Gastrointestinal: Postoperative  Genitourinary: No CVA tenderness  Extremities: Trace edema in ankles and feet, otherwise no abnormalities  Neurological: Awake, alert, oriented x3. No evidence of focal motor or sensory deficits  Psychological: In good spirits.   Appropriate affect    LABS:  WBC   Date Value Ref Range Status   05/10/2021 7.0 4.5 - 11.5 E9/L Final   05/09/2021 8.6 4.5 - 11.5 E9/L Final   05/09/2021 9.8 4.5 - 11.5 E9/L Final     Hemoglobin   Date Value Ref Range Status   05/10/2021 9.3 (L) 11.5 - 15.5 g/dL Final   05/09/2021 10.6 (L) 11.5 - 15.5 g/dL Final   05/09/2021 13.6 11.5 - 15.5 g/dL Final     Hematocrit   Date Value Ref Range Status 05/10/2021 29.0 (L) 34.0 - 48.0 % Final   05/09/2021 33.1 (L) 34.0 - 48.0 % Final   05/09/2021 42.6 34.0 - 48.0 % Final     MCV   Date Value Ref Range Status   05/10/2021 101.8 (H) 80.0 - 99.9 fL Final   05/09/2021 101.2 (H) 80.0 - 99.9 fL Final   05/09/2021 100.7 (H) 80.0 - 99.9 fL Final     Platelets   Date Value Ref Range Status   05/10/2021 68 (L) 130 - 450 E9/L Final   05/09/2021 86 (L) 130 - 450 E9/L Final   05/09/2021 142 130 - 450 E9/L Final     Sodium   Date Value Ref Range Status   05/10/2021 138 132 - 146 mmol/L Final   05/09/2021 141 132 - 146 mmol/L Final   05/09/2021 139 132 - 146 mmol/L Final     Potassium   Date Value Ref Range Status   05/09/2021 3.1 (L) 3.5 - 5.0 mmol/L Final   05/09/2021 3.4 (L) 3.5 - 5.0 mmol/L Final   05/07/2021 4.0 3.5 - 5.0 mmol/L Final     Potassium reflex Magnesium   Date Value Ref Range Status   05/10/2021 3.7 3.5 - 5.0 mmol/L Final   04/05/2021 3.1 (L) 3.5 - 5.0 mmol/L Final   07/26/2020 4.1 3.5 - 5.0 mmol/L Final     Chloride   Date Value Ref Range Status   05/10/2021 100 98 - 107 mmol/L Final   05/09/2021 102 98 - 107 mmol/L Final   05/09/2021 97 (L) 98 - 107 mmol/L Final     CO2   Date Value Ref Range Status   05/10/2021 29 22 - 29 mmol/L Final   05/09/2021 27 22 - 29 mmol/L Final   05/09/2021 33 (H) 22 - 29 mmol/L Final     BUN   Date Value Ref Range Status   05/10/2021 52 (H) 6 - 23 mg/dL Final   05/09/2021 46 (H) 6 - 23 mg/dL Final   05/09/2021 48 (H) 6 - 23 mg/dL Final     CREATININE   Date Value Ref Range Status   05/10/2021 2.4 (H) 0.5 - 1.0 mg/dL Final   05/09/2021 2.1 (H) 0.5 - 1.0 mg/dL Final   05/09/2021 2.1 (H) 0.5 - 1.0 mg/dL Final     Glucose   Date Value Ref Range Status   05/10/2021 162 (H) 74 - 99 mg/dL Final   05/09/2021 143 (H) 74 - 99 mg/dL Final   05/09/2021 157 (H) 74 - 99 mg/dL Final   05/30/2012 100 70 - 110 mg/dL Final   11/14/2011 85 70 - 110 mg/dL Final   09/06/2011 110 70 - 110 mg/dL Final     Calcium   Date Value Ref Range Status   05/10/2021 8.0 (L) 8.6 - 10.2 mg/dL Final   05/09/2021 8.0 (L) 8.6 - 10.2 mg/dL Final   05/09/2021 8.9 8.6 - 10.2 mg/dL Final     Total Protein   Date Value Ref Range Status   05/10/2021 5.2 (L) 6.4 - 8.3 g/dL Final   05/09/2021 5.2 (L) 6.4 - 8.3 g/dL Final   05/09/2021 6.8 6.4 - 8.3 g/dL Final     Albumin   Date Value Ref Range Status   05/10/2021 2.7 (L) 3.5 - 5.2 g/dL Final   05/09/2021 2.7 (L) 3.5 - 5.2 g/dL Final   05/09/2021 2.6 (L) 3.5 - 5.2 g/dL Final   05/30/2012 4.1 3.2 - 4.8 g/dL Final   11/14/2011 3.8 3.2 - 4.8 g/dL Final   11/15/2010 3.6 3.2 - 4.8 g/dL Final     Total Bilirubin   Date Value Ref Range Status   05/10/2021 1.2 0.0 - 1.2 mg/dL Final   05/09/2021 2.0 (H) 0.0 - 1.2 mg/dL Final   05/09/2021 1.8 (H) 0.0 - 1.2 mg/dL Final     Alkaline Phosphatase   Date Value Ref Range Status   05/10/2021 76 35 - 104 U/L Final   05/09/2021 92 35 - 104 U/L Final   05/09/2021 159 (H) 35 - 104 U/L Final     AST   Date Value Ref Range Status   05/10/2021 16 0 - 31 U/L Final   05/09/2021 18 0 - 31 U/L Final   05/09/2021 27 0 - 31 U/L Final     Comment:     Specimen is slightly Hemolyzed. Result may be artificially increased. ALT   Date Value Ref Range Status   05/10/2021 11 0 - 32 U/L Final   05/09/2021 13 0 - 32 U/L Final   05/09/2021 18 0 - 32 U/L Final     GFR Non-   Date Value Ref Range Status   05/10/2021 19 >=60 mL/min/1.73 Final     Comment:     Chronic Kidney Disease: less than 60 ml/min/1.73 sq.m. Kidney Failure: less than 15 ml/min/1.73 sq.m. Results valid for patients 18 years and older. 05/09/2021 23 >=60 mL/min/1.73 Final     Comment:     Chronic Kidney Disease: less than 60 ml/min/1.73 sq.m. Kidney Failure: less than 15 ml/min/1.73 sq.m. Results valid for patients 18 years and older. 05/09/2021 23 >=60 mL/min/1.73 Final     Comment:     Chronic Kidney Disease: less than 60 ml/min/1.73 sq.m. Kidney Failure: less than 15 ml/min/1.73 sq.m.   Results valid for patients 18 years and older. GFR    Date Value Ref Range Status   05/10/2021 24  Final   05/09/2021 28  Final   05/09/2021 28  Final     Magnesium   Date Value Ref Range Status   05/09/2021 1.5 (L) 1.6 - 2.6 mg/dL Final   05/09/2021 1.7 1.6 - 2.6 mg/dL Final   04/08/2021 1.8 1.6 - 2.6 mg/dL Final     Phosphorus   Date Value Ref Range Status   05/09/2021 4.4 2.5 - 4.5 mg/dL Final     No results for input(s): PH, PO2, PCO2, HCO3, BE, O2SAT in the last 72 hours. RADIOLOGY:  XR CHEST PORTABLE   Final Result      CT HEAD WO CONTRAST   Final Result   No acute intracranial abnormality. Periventricular white matter changes consistent chronic microvascular   disease. Diffuse volume loss. CT ABDOMEN PELVIS WO CONTRAST Additional Contrast? None   Final Result   Moderate amount of free intraperitoneal air which is compatible with   perforated abdominal viscus. The right colon is the most likely source of   free air as there is pneumatosis coli from the cecum to the a [place]   flexure. Underlying colonic ischemia is a consideration. There is also diverticular disease involving the left colon. There is some   inflammation involving/abutting the mid to distal descending colon which   could represent minimal acute diverticulitis. Note that this is minimal and   not the source of free air. Cholelithiasis. Cirrhotic liver. Minimal ascites. I discussed findings by phone with Roxana Scooter at 1:42 a.m. on 05/09/2021. XR CHEST PORTABLE   Final Result   No acute process. Cardiomegaly. 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 *      IMPRESSION:  1.  Perforated diverticulitis resulting in colostomy and sigmoid colectomy  2. Septic shock  3. Hypovolemic shock  4. Thrombocytopenia  5. Chronic atrial fibrillation on apixaban  6. Status post drainage of pelvic abscess at the time of surgery  7. Electrolyte disorders    PLAN:  1. Continue Cipro, Flagyl, and vancomycin  2. Continue incentive spirometry and bronchodilators  3. Continue IV fluids at current rate  4. Eliquis to be resumed but need to watch platelet count very closely  5. Physical and Occupational Therapy  6. Check chest x-ray tomorrow for signs of fluid overload  7. Labs in a.m. ATTESTATION:  ICU Staff Physician note of personal involvement in Care  As the attending physician, I certify that I personally reviewed the patients history and personally examined the patient to confirm the physical findings described above,  And that I reviewed the relevant imaging studies and available reports. I also discussed the differential diagnosis and all of the proposed management plans with the patient and individuals accompanying the patient to this visit. They had the opportunity to ask questions about the proposed management plans and to have those questions answered. This patient has a high probability of sudden, clinically significant deterioration, which requires the highest level of physician preparedness to intervene urgently. I managed/supervised life or organ supporting interventions that required frequent physician assessment. I devoted my full attention to the direct care of this patient for the amount of time indicated below. Time I spent with the family or surrogate(s) is included only if the patient was incapable of providing the necessary information or participating in medical decisions  Time devoted to teaching and to any procedures I billed separately is not included.     CRITICAL CARE TIME:  31 minutes    Electronically signed by Nhi Briceño MD on 5/10/2021 at 2:49 PM

## 2021-05-10 NOTE — PROGRESS NOTES
Physical Therapy    Physical Therapy Initial Evaluation    Room #:  IC12/IC12-01  Patient Name: Rayshawn Turner  YOB: 1940  MRN: [id number]    Referring Provider:   Carolin Rodriguez MD     Date of Service: 5/10/2021    Evaluating Physical Therapist: Song Rose, PT  #[id number]       Diagnosis:   Perforated viscus [R19.8]   Admitted with  Abdominal pain   Date of Procedure: 5/9/2021    Procedure(s):   OPEN BOWEL RESECTION WITH COLOSTOMY   Surgeon(s): Nba Post MD    Patient Active Problem List   Diagnosis    Glenoid labral tear    Synovitis    Biceps tendon tear    Rotator cuff tear    Subacromial impingement    Encephalopathy    Atrial fibrillation with rapid ventricular response (HCC)    Chronic atrial fibrillation (HCC)    Acute exacerbation of COPD with asthma ([address]    CAP (community acquired pneumonia)    Cirrhosis of liver not due to alcohol ([address]    Hypertension    Acute heart failure ([address]    Dyspnea    Non morbid obesity    Perforated viscus    Chronic renal impairment, stage 3a    Acute on chronic renal insufficiency    Chronic diastolic heart failure ([address]        Tentative placement recommendation: Home Health Physical Therapy     Equipment recommendation: Patient has needed equipment       Prior Level of Function: Patient ambulated independently cane  Rehab Potential: good    for baseline    Past medical history:   Past Medical History:   Diagnosis Date    Anxiety     Cancer ([address] 2/11    right breast    Cirrhosis, non-alcoholic ([address]     COPD (chronic obstructive pulmonary disease) ([address]     Hypertension      Past Surgical History:   Procedure Laterality Date    BREAST SURGERY  2011    right lumpectomy    COLECTOMY N/A 5/9/2021    OPEN BOWEL RESECTION WITH COLOSTOMY performed by Nba Post MD at [address] ARTHROSCOPY  09 09 2011    arthroscopy left shoulder rotator cuff repair, subacromial decompression, debridement labrum, synovectomy    TONSILLECTOMY         Precautions: Up as tolerated, falls, alarm and O2 , 2 Liters of o2 via nasal cannula     SUBJECTIVE:    Social history: Patient lives alone   in a ranch home  with Ramp  to enter   Equipment owned: Cane, Boneta Brandan and Toilet riser,       [address]   How much difficulty turning over in bed?: A Little  How much difficulty sitting down on / standing up from a chair with arms?: A Lot  How much difficulty moving from lying on back to sitting on side of bed?: A Lot  How much help from another person moving to and from a bed to a chair?: A Lot  How much help from another person needed to walk in hospital room?: A Lot  How much help from another person for climbing 3-5 steps with a railing?: A Lot  AM-PAC Inpatient Mobility Raw Score : 13  AM-PAC Inpatient T-Scale Score : 36.74  Mobility Inpatient CMS 0-100% Score: 64.91  Mobility Inpatient CMS G-Code Modifier : CL    Nursing cleared patient for PT evaluation. The admitting diagnosis and active problem list as listed above have been reviewed prior to the initiation of this evaluation. OBJECTIVE;   Initial Evaluation  Date: 5/10/2021 Treatment Date:     Short Term/ Long Term   Goals   Was pt agreeable to Eval/treatment? Yes    To be met in 3 days   Pain level   4/10   abdomen     Bed Mobility    Rolling: Moderate assist of 1    Supine to sit: Moderate assist of 1    Sit to supine: Moderate assist of 1    Scooting: Moderate assist of 1    Rolling: Supervision     Supine to sit: Supervision     Sit to supine: Supervision     Scooting: Supervision      Transfers Sit to stand:  Moderate assist of 1    Sit to stand: Supervision      Ambulation    3-4 steps using  wheeled walker with Minimal assist of 2       75 feet using  wheeled walker with Supervision     ROM Within functional limits        Strength BUE:  refer to OT eval  RLE:  3+/5  LLE:  3+/5 Increase strength in affected mm groups by 1/3 grade   Balance Sitting EOB:  fair    Dynamic Standing:  fair minus with wheeled walker   Sitting EOB:  good    Dynamic Standing: fair       Patient is Alert & Oriented x person, place, time and situation and follows directions hard of hearing   Sensation:  Patient  denies numbness and tingling     Edema:  yes bilateral lower extremities    Endurance: poor      Vitals: 2 liters nasal cannula    Blood Pressure supine 112/60 Blood Pressure seated 105/71 Blood Pressure standing 69/57   Heart rate 66 Heart Rate seated 72 Heart Rate standing 100-145  A fib per rn   spo2  99% SPO2 seated   96%  SPO2 during session 93%     Patient education  Patient educated on role of Physical Therapy, risks of immobility, safety and plan of care,  importance of mobility while in hospital  and importance and purpose of adaptive device and adjusted to proper height for the patient. Patient response to education:   Pt verbalized understanding Pt demonstrated skill Pt requires further education in this area   Yes Partial Yes      Treatment:  Patient practiced and was instructed/facilitated in the following treatment: Patient   Sat edge of bed 10 minutes with Supervision  to increase dynamic sitting balance and activity tolerance. seated and standing challenges for balance and endurance; vitals monitored     Therapeutic Exercises:  ankle pumps  x 10 reps. At end of session, patient in chair with  call light and phone within reach,   all lines and tubes intact, nursing notified. Patient would benefit from continued skilled Physical Therapy to improve functional independence and quality of life.           Patient's/ family goals   home with family support      ASSESSMENT: Patient exhibits decreased strength, balance and endurance impairing functional mobility, transfers, gait , gait distance and tolerance to activity and are barriers to d/c and require skilled intervention during hospital stay . These factors impact  safe mobility and  patient requires mod  assist with mobility, continues to require skilled intervention to progress activity to monitor vital signs and response to activity. Pain today is not limiting factor however orthostatic hypotension is    Plan of Care:     -Bed Mobility: Lower extremity exercises  and Upper extremity exercises   -Sitting Balance: Incorporate reaching activities to activate trunk muscles   -Standing Balance: Perform strengthening exercises in standing to promote motor control with or without upper extremity support  and Instruct patient on adequate base of support to maintain balance  -Transfers: Provide instruction on proper hand and foot position for adequate transfer of weight onto lower extremities and use of gait device, Cues for hand placement, technique and safety and Provide stabilization to prevent fall   -Gait: Gait training, Standing activities to improve: base of support, weight shift, weight bearing  and Exercises to improve hip and knee control   -Endurance: Utilize Supervised activities to increase level of endurance to allow for safe functional mobility including transfers and gait   -Stairs: Stair training with instruction on proper technique and hand placement on rail  -Instruction in independent management of O2 line    Patient and or family understand(s) diagnosis, prognosis, and plan of care. Frequency of treatments: Patient will be seen  daily. Time in  305  Time out  338    Total Treatment Time  13 minutes    Evaluation time includes thorough review of current medical information, gathering information on past medical history/social history and prior level of function, completion of standardized testing/informal observation of tasks, assessment of data, and development of Plan of care and goals.      CPT codes:  Low Complexity PT evaluation (93644)  Therapeutic activities (47775)   13 minutes  1 unit(s)    Lizbeth Salamanca

## 2021-05-10 NOTE — PROGRESS NOTES
Surgery Progress Note            Chief complaint:   Chief Complaint   Patient presents with    Emesis      Patient Active Problem List   Diagnosis    Glenoid labral tear    Synovitis    Biceps tendon tear    Rotator cuff tear    Subacromial impingement    Encephalopathy    Atrial fibrillation with rapid ventricular response (HCC)    Chronic atrial fibrillation (HCC)    Acute exacerbation of COPD with asthma ([facility]    CAP (community acquired pneumonia)    Cirrhosis of liver not due to alcohol ([facility]    Hypertension    Acute heart failure ([facility]    Dyspnea    Non morbid obesity    Perforated viscus    Chronic renal impairment, stage 3a    Acute on chronic renal insufficiency    Chronic diastolic heart failure ([facility]       S: no acute changes    O:   Vitals:    05/10/21 1000   BP:    Pulse: 111   Resp: 13   Temp:    SpO2: 94%       Intake/Output Summary (Last 24 hours) at 5/10/2021 1018  Last data filed at 5/10/2021 0649  Gross per 24 hour   Intake 4027 ml   Output 650 ml   Net 3377 ml           Labs:  Lab Results   Component Value Date    WBC 7.0 05/10/2021    WBC 8.6 05/09/2021    WBC 9.8 05/09/2021    HGB 9.3 05/10/2021    HGB 10.6 05/09/2021    HGB 13.6 05/09/2021    HCT 29.0 05/10/2021    HCT 33.1 05/09/2021    HCT 42.6 05/09/2021     Lab Results   Component Value Date    CREATININE 2.4 (H) 05/10/2021    BUN 52 (H) 05/10/2021     05/10/2021    K 3.7 05/10/2021     05/10/2021    CO2 29 05/10/2021     Lab Results   Component Value Date    LIPASE 43 05/09/2021    LIPASE 47 07/26/2020    LIPASE 47 12/07/2019         Physical exam:   BP (!) 100/57   Pulse 111   Temp 97.9 °F (36.6 °C) (Oral)   Resp 13   Ht 5' 4\" (1.626 m)   Wt 203 lb 7.8 oz (92.3 kg)   SpO2 94%   BMI 34.93 kg/m²   General appearance: NAD  Head: NCAT  Neck: supple, no masses  Lungs: equal chest rise bilateral  Heart: S1S2 present  Abdomen: soft, moderately tender, nondistended,  Incisions bandaged with aquacel, no strikethrough, ostomy red patent with minimal output  Skin; no lesions  Gu: no cva tenderness  Extremities: extremities normal, atraumatic, no cyanosis or edema    A:  POD #1 Exploratory laparotomy, sigmoid colectomy, end colostomy, open drainage of pelvic abscess, placement of left internal jugular vein triple-lumen catheter    P: full liquids and cont iv abx, no other changes from surgery today    Isiah Kocher, MD  5/10/2021

## 2021-05-10 NOTE — PROGRESS NOTES
Occupational Therapy  OCCUPATIONAL THERAPY INITIAL EVALUATION      Date:5/10/2021  Patient Name: Chandrakant Roche  MRN: [id number]  : 1940  Room: [place] [address]    Referring Provider: Scott Styles MD    Evaluating OT: Krista Hdez, OTR/L #[id number]    AM-PAC Daily Activity Raw Score: 1624    Recommended Placement: Subacute rehab vs. Home with [address] [place] [address]  Recommended Adaptive Equipment: TBD     Diagnosis:   1. Perforated abdominal viscus    2. Longstanding persistent atrial fibrillation ([facility]    3. Chronic renal insufficiency, stage 2 (mild)      Surgery: 21 -  Exploratory laparotomy, sigmoid colectomy, end colostomy, open drainage of pelvic abscess, placement of left internal jugular vein triple-lumen catheter    Pertinent Medical History:    Past Medical History:   Diagnosis Date    Anxiety     Cancer ([facility] [address]     right breast    Cirrhosis, non-alcoholic (HCC)     [facility] disease[facility]     Hypertension       Precautions:  Falls, alarm, O2, abdominal precautions     Home Living: Pt lives alone in a 1 story home with 3 KAYCE with 1 rail(s).  Sister, daughter and granddaughter will be taking care of patient for 3 weeks once d/c  Bathroom setup: walk in shower with grab bars, 2 built in seats   Equipment owned: [employer], cane, elevated toilet    Prior Level of Function: Independent with ADLs , Independent with IADLs; ambulated without AD  Driving: No  Occupation: Not reported    Pain Level: -/10  Cognition: A&O: 4/4; Follows 1-2 step directions   Memory:  Fair+   Sequencing:  Fair+   Problem solving:  Fair+   Judgement/safety:  Fair+     Functional Assessment:   Initial Eval Status  Date: 5/10/21 Treatment Status  Date: STGs = LTGs  Time frame: 5-7 days   Feeding Independent        Grooming Minimal Assist     Modified [place]   UB Dressing Minimal Assist     Modified [place]    LB Dressing Maximal Assist     Moderate Assist    Bathing Maximal Assist    Moderate Assist    Toileting Moderate Assist     Minimal Assist    Bed Mobility  Supine to sit: Moderate Assist   Sit to supine: NT, pt up in chair     Supine to sit: Minimal Assist   Sit to supine: Minimal Assist    Functional Transfers Minimal Assist   Sit<>stand  Bed, chair  Cuing on body mechanics, hand placement and safety  Independent    Functional Mobility Minimal Assist of 2  For safety  Several steps between bed and chair using [address]    Independent    Balance Sitting:     Static:  Fair+    Dynamic:fair+  Standing: fair+  Sitting:     Static:  good    Dynamic:good  Standing: good   Activity Tolerance Fair  /65  SpO2     Fair+   Visual/  Perceptual Glasses: Yes   WFL       Hand Dominance Not reported     Strength ROM Additional Info:    RUE  4/5  WFL good  and wfl FMC/dexterity noted during ADL tasks       LUE 4/5  WFL good  and wfl FMC/dexterity noted during ADL tasks       Hearing: WFL   Sensation:  No c/o numbness or tingling   Tone: WFL   Edema: None noted    Comments:   Nursing approved therapy session. Upon arrival, patient supine in bed and agreeable to OT session. Therapist educated pt on role of OT. At end of session, patient up in chair with call light and phone within reach, all lines and tubes intact; nursing aware. Pt demonstrated fair+ understanding of education/techniques and decreased independence and safety during completion of ADL/functional transfer/mobility tasks. Pt would benefit from continued skilled OT to increase safety and independence with completion of ADL/IADL tasks for functional independence and quality of life.     Eval Complexity: Low    Assessment of current deficits   Functional mobility [x]  ADLs [x] Strength [x]  Cognition []  Functional transfers  [x] IADLs [x] Safety Awareness [x]  Endurance [x]  Fine Motor Coordination [] Balance [x] Vision/perception [] Sensation []   Gross Motor Coordination [] ROM [] Delirium []                  Motor Control []    Plan of Care: 1-3 days/week for 1-2 weeks PRN   Instruction/training on adapted ADL techniques and AE recommendations to increase functional independence within precautions  Training on energy conservation strategies/techniques to improve independence/tolerance for self-care routine  Functional transfer/mobility training/DME recommendations for increased independence, safety, and fall prevention  Patient/Family education to increase follow through with safety techniques and functional independence  Recommendation of environmental modifications for increased safety with functional transfers/mobility and ADLs  Therapeutic exercise to improve motor endurance, ROM, and functional strength for ADLs/functional transfers  Therapeutic activities to facilitate/challenge dynamic balance, stand tolerance, fine motor dexterity/in-hand manipulation for increased independence with ADLs  Neuro-muscular re-education: facilitation of righting/equilibrium reactions, midline orientation, scapular stability/mobility, normalization of muscle tone, and facilitation of volitional active controled movement    Rehab Potential: Good for established goals     Patient / Family Goal: Not reported      Patient and/or family were instructed on functional diagnosis, prognosis/goals and OT plan of care. Demonstrated fair+ understanding.     Eval Complexity: Low      Time In: 1515  Time Out: 1543  Total Treatment Time: 0    Min Units   OT Eval Low 97165  X  1   OT Eval Medium 89560      OT Eval High 41976       OT Re-Eval [id number]       Therapeutic Ex 58445       Therapeutic Activities 43688       ADL/Self Care 78847       Orthotic Management 07584       Neuro Re-Ed 84318       Non-Billable Time          Evaluation Time includes thorough review of current medical information, gathering information on past medical history/social history and prior level of function, completion of standardized testing/informal observation of tasks, assessment of data and education on plan of care and goals.      Daylin Mcleod, OTR/L #[id number]

## 2021-05-10 NOTE — PROGRESS NOTES
Notified Dr. Nacho Nieto of /47 MAP 54 asymptomatic and 125 cc urine since 1400; hold metoprolol dose tonight.  Flores Townsend RN

## 2021-05-10 NOTE — TELEPHONE ENCOUNTER
Dr. Stanley Gravely:    Patient's daughter called. She wanted you to know that she had an emergency colostomy yesterday at [facility]. She is currently in ICU.     Michelle Yeh

## 2021-05-11 NOTE — PROGRESS NOTES
Physical Therapy    Physical Therapy Treatment Note    Room #:  IC12/IC12-01  Patient Name: Eugenio Miller  YOB: 1940  MRN: [id number]    Referring Provider:   Ja Potts MD     Date of Service: 5/11/2021    Evaluating Physical Therapist: Luis Eduardo Portillo, PT  #[id number]       Diagnosis:   Perforated viscus [R19.8]   Admitted with  Abdominal pain   Date of Procedure: 5/9/2021    Procedure(s):   OPEN BOWEL RESECTION WITH COLOSTOMY   Surgeon(s): Mukesh Calloway MD    Patient Active Problem List   Diagnosis    Glenoid labral tear    Synovitis    Biceps tendon tear    Rotator cuff tear    Subacromial impingement    Encephalopathy    Atrial fibrillation with rapid ventricular response (HCC)    Chronic atrial fibrillation (HCC)    Acute exacerbation of COPD with asthma ([address]    CAP (community acquired pneumonia)    Cirrhosis of liver not due to alcohol ([address]    Hypertension    Acute heart failure ([address]    Dyspnea    Non morbid obesity    Perforated viscus    Chronic renal impairment, stage 3a    Acute on chronic renal insufficiency    Chronic diastolic heart failure ([address]        Tentative placement recommendation: Home Health Physical Therapy     Equipment recommendation: Wheelchair      Prior Level of Function: Patient ambulated independently cane  Rehab Potential: good    for baseline    Past medical history:   Past Medical History:   Diagnosis Date    Anxiety     Cancer ([address] 2/11    right breast    Cirrhosis, non-alcoholic ([address]     COPD (chronic obstructive pulmonary disease) ([address]     Hypertension      Past Surgical History:   Procedure Laterality Date    BREAST SURGERY  2011    right lumpectomy    COLECTOMY N/A 5/9/2021    OPEN BOWEL RESECTION WITH COLOSTOMY performed by Mukesh Calloway MD at [address] ARTHROSCOPY  09 09 2011    arthroscopy left shoulder rotator cuff repair, subacromial decompression, debridement labrum, synovectomy    TONSILLECTOMY         Precautions: Up as tolerated, falls, alarm and O2 ,   Room air    SUBJECTIVE:    Social history: Patient lives alone   in a ranch home  with Ramp  to enter   Equipment owned: Cane, Wheeled Walker and Toilet riser,       [id number] [facility]  Mobility Inpatient   How much difficulty turning over in bed?: None  How much difficulty sitting down on / standing up from a chair with arms?: A Little  How much difficulty moving from lying on back to sitting on side of bed?: A Little  How much help from another person moving to and from a bed to a chair?: A Little  How much help from another person needed to walk in hospital room?: A Little  How much help from another person for climbing 3-5 steps with a railing?: A Lot  AM-PAC Inpatient Mobility Raw Score : 18  AM-PAC Inpatient T-Scale Score : 43.63  Mobility Inpatient CMS 0-100% Score: 46.58  Mobility Inpatient CMS G-Code Modifier : CK    Nursing cleared patient for PT treatment. OBJECTIVE;   Initial Evaluation  Date: 5/10/2021 Treatment Date:    5/11/2021    Short Term/ Long Term   Goals   Was pt agreeable to Eval/treatment? Yes   yes To be met in 3 days   Pain level   4/10   abdomen 3/10    Bed Mobility    Rolling: Moderate assist of 1    Supine to sit: Moderate assist of 1    Sit to supine: Moderate assist of 1    Scooting: Moderate assist of 1   Rolling: Modified Independent    Supine to sit: Minimal assist of 1    Sit to supine: Moderate assist of 1 for bilateral lower extremities   Scooting: Minimal assist of 1    Rolling: Supervision     Supine to sit: Supervision     Sit to supine: Supervision     Scooting: Supervision      Transfers Sit to stand:  Moderate assist of 1   Sit to stand: Minimal assist of 1 from bed and chair x 3 reps      Sit to stand: Supervision      Ambulation    3-4 steps using  wheeled walker with Minimal assist of 2     2x3  feet using  wheeled walker with Minimal assist of 1    Slight dizziness initially   75 feet using  wheeled walker with Supervision     ROM Within functional limits        Strength BUE:  refer to OT eval  RLE:  3+/5  LLE:  3+/5   Increase strength in affected mm groups by 1/3 grade   Balance Sitting EOB:  fair    Dynamic Standing:  fair minus with wheeled walker  Sitting EOB: good    Dynamic Standing: fair plus with wheeled walker able to stand intermittently without bilateral upper extremities support  Sitting EOB:  good    Dynamic Standing: fair       Patient is Alert & Oriented x person, place, time and situation and follows directions hard of hearing   Sensation:  Patient  denies numbness and tingling     Edema:  yes bilateral lower extremities    Endurance: fair       Vitals: room air    Blood Pressure [id number] Blood Pressure seated 98/64   Heart Rate supine 114 Heart Rate seated 130   SPO2 supine   98%  SPO2 during seated and activity 97%     Patient education  Patient educated on role of Physical Therapy, risks of immobility, safety and plan of care and  importance of mobility while in hospital       Patient response to education:   Pt verbalized understanding Pt demonstrated skill Pt requires further education in this area   Yes Partial Yes      Treatment:  Patient practiced and was instructed/facilitated in the following treatment: Patient   Sat edge of bed 10 minutes with Supervision  to increase dynamic sitting balance and activity tolerance. seated and standing challenges for balance and endurance; vitals monitored   trf and gait training; Therapeutic Exercises:  ankle pumps and wt shifting in all directions to challenge balance, endurance and upright control near bed d/t low bp's  x 10 reps. At end of session, patient in chair with  call light and phone within reach,   all lines and tubes intact, nursing notified. Patient would benefit from continued skilled Physical Therapy to improve functional independence and quality of life. Patient's/ family goals   home with family support      ASSESSMENT: Patient exhibits decreased strength, balance and endurance impairing functional mobility, transfers, gait , gait distance and tolerance to activity and are barriers to d/c and require skilled intervention during hospital stay . These factors impact  safe mobility and  patient requires min assist with mobility, continues to require skilled intervention to progress activity to monitor vital signs and response to activity. Pain today is not limiting factor however orthostatic hypotension is. Plan of Care:     -Bed Mobility: Lower extremity exercises  and Upper extremity exercises   -Sitting Balance: Incorporate reaching activities to activate trunk muscles   -Standing Balance: Perform strengthening exercises in standing to promote motor control with or without upper extremity support  and Instruct patient on adequate base of support to maintain balance  -Transfers: Provide instruction on proper hand and foot position for adequate transfer of weight onto lower extremities and use of gait device, Cues for hand placement, technique and safety and Provide stabilization to prevent fall   -Gait: Gait training, Standing activities to improve: base of support, weight shift, weight bearing  and Exercises to improve hip and knee control   -Endurance: Utilize Supervised activities to increase level of endurance to allow for safe functional mobility including transfers and gait   -Stairs: Stair training with instruction on proper technique and hand placement on rail  -Instruction in independent management of O2 line    Patient and or family understand(s) diagnosis, prognosis, and plan of care. Frequency of treatments: Patient will be seen  daily.        Time in 212  Time out  235    Total Treatment Time23 minutes     CPT codes:    Therapeutic activities (50347)   13 minutes  1 unit(s)  Therapeutic exercises (99816)   10 minutes  1 unit(s)    Colonel Grullon Tammie Zaragoza

## 2021-05-11 NOTE — PLAN OF CARE
Problem: Falls - Risk of:  Goal: Will remain free from falls  Description: Will remain free from falls  Outcome: Met This Shift  Goal: Absence of physical injury  Description: Absence of physical injury  Outcome: Met This Shift     Problem: Skin Integrity:  Goal: Will show no infection signs and symptoms  Description: Will show no infection signs and symptoms  Outcome: Met This Shift  Goal: Absence of new skin breakdown  Description: Absence of new skin breakdown  Outcome: Met This Shift     Problem:  Activity:  Goal: Ability to tolerate increased activity will improve  Description: Ability to tolerate increased activity will improve  Outcome: Met This Shift

## 2021-05-11 NOTE — PROGRESS NOTES
Pulmonary/Critical Care Progress Note    We are following patient for perforated diverticulitis, septic shock, status post sigmoid colectomy, colostomy, drainage of pelvic abscess, hypertension, YANELIS, remote history of breast cancer, thrombocytopenia    SUBJECTIVE:  The patient is clearly getting stronger each day. She has been out of bed and is now on a regular diet. Therefore, we will restart her oral medications including amiodarone for rate control for atrial fibrillation as well as rifaximin and lactulose for her nonalcoholic related cirrhosis. She is resuming her a apixaban for anticoagulation secondary to atrial fibrillation. The patient remains on ciprofloxacin and metronidazole for her pelvic abscess and aerosolized bronchodilators as well as montelukast for her asthma/COPD. We are holding metolazone because of its hypotensive effect.     MEDICATIONS:   amiodarone  200 mg Oral Daily    rifaximin  550 mg Oral BID    lactulose  20 g Oral BID    ipratropium-albuterol  1 ampule Inhalation BID    apixaban  5 mg Oral BID    ciprofloxacin  400 mg Intravenous Q24H    cetirizine  10 mg Oral Daily    [Held by provider] metOLazone  5 mg Oral Daily    anastrozole  1 mg Oral Daily    [Held by provider] bumetanide  2 mg Oral BID    levothyroxine  50 mcg Oral Daily    [Held by provider] metoprolol  100 mg Oral BID    montelukast  10 mg Oral Nightly    pantoprazole  40 mg Oral QAM AC    sodium chloride flush  5-40 mL Intravenous 2 times per day    metroNIDAZOLE  500 mg Intravenous Q8H    fluconazole  200 mg Intravenous Q24H      norepinephrine      sodium chloride      dextrose 5% in lactated ringers 85 mL/hr at 05/11/21 0754     perflutren lipid microspheres, fentanNYL, sodium chloride flush, sodium chloride, traMADol      REVIEW OF SYSTEMS:  Constitutional: Denies fever, weight loss, night sweats, and fatigue  Skin: Denies pigmentation, dark lesions, and rashes   HEENT: Denies hearing loss, Range Status   05/11/2021 101.7 (H) 80.0 - 99.9 fL Final   05/10/2021 101.8 (H) 80.0 - 99.9 fL Final   05/09/2021 101.2 (H) 80.0 - 99.9 fL Final     Platelets   Date Value Ref Range Status   05/11/2021 75 (L) 130 - 450 E9/L Final   05/10/2021 68 (L) 130 - 450 E9/L Final   05/09/2021 86 (L) 130 - 450 E9/L Final     Sodium   Date Value Ref Range Status   05/11/2021 137 132 - 146 mmol/L Final   05/10/2021 138 132 - 146 mmol/L Final   05/09/2021 141 132 - 146 mmol/L Final     Potassium   Date Value Ref Range Status   05/11/2021 4.0 3.5 - 5.0 mmol/L Final   05/09/2021 3.1 (L) 3.5 - 5.0 mmol/L Final   05/09/2021 3.4 (L) 3.5 - 5.0 mmol/L Final     Potassium reflex Magnesium   Date Value Ref Range Status   05/10/2021 3.7 3.5 - 5.0 mmol/L Final   04/05/2021 3.1 (L) 3.5 - 5.0 mmol/L Final   07/26/2020 4.1 3.5 - 5.0 mmol/L Final     Chloride   Date Value Ref Range Status   05/11/2021 100 98 - 107 mmol/L Final   05/10/2021 100 98 - 107 mmol/L Final   05/09/2021 102 98 - 107 mmol/L Final     CO2   Date Value Ref Range Status   05/11/2021 29 22 - 29 mmol/L Final   05/10/2021 29 22 - 29 mmol/L Final   05/09/2021 27 22 - 29 mmol/L Final     BUN   Date Value Ref Range Status   05/11/2021 59 (H) 6 - 23 mg/dL Final   05/10/2021 52 (H) 6 - 23 mg/dL Final   05/09/2021 46 (H) 6 - 23 mg/dL Final     CREATININE   Date Value Ref Range Status   05/11/2021 2.7 (H) 0.5 - 1.0 mg/dL Final   05/10/2021 2.4 (H) 0.5 - 1.0 mg/dL Final   05/09/2021 2.1 (H) 0.5 - 1.0 mg/dL Final     Glucose   Date Value Ref Range Status   05/11/2021 160 (H) 74 - 99 mg/dL Final   05/10/2021 162 (H) 74 - 99 mg/dL Final   05/09/2021 143 (H) 74 - 99 mg/dL Final   05/30/2012 100 70 - 110 mg/dL Final   11/14/2011 85 70 - 110 mg/dL Final   09/06/2011 110 70 - 110 mg/dL Final     Calcium   Date Value Ref Range Status   05/11/2021 8.1 (L) 8.6 - 10.2 mg/dL Final   05/10/2021 8.0 (L) 8.6 - 10.2 mg/dL Final   05/09/2021 8.0 (L) 8.6 - 10.2 mg/dL Final     Total Protein   Date Final     Magnesium   Date Value Ref Range Status   05/11/2021 1.9 1.6 - 2.6 mg/dL Final   05/09/2021 1.5 (L) 1.6 - 2.6 mg/dL Final   05/09/2021 1.7 1.6 - 2.6 mg/dL Final     Phosphorus   Date Value Ref Range Status   05/11/2021 4.9 (H) 2.5 - 4.5 mg/dL Final   05/09/2021 4.4 2.5 - 4.5 mg/dL Final     No results for input(s): PH, PO2, PCO2, HCO3, BE, O2SAT in the last 72 hours. RADIOLOGY:  XR CHEST PORTABLE   Final Result      CT HEAD WO CONTRAST   Final Result   No acute intracranial abnormality. Periventricular white matter changes consistent chronic microvascular   disease. Diffuse volume loss. CT ABDOMEN PELVIS WO CONTRAST Additional Contrast? None   Final Result   Moderate amount of free intraperitoneal air which is compatible with   perforated abdominal viscus. The right colon is the most likely source of   free air as there is pneumatosis coli from the cecum to the a [place]   flexure. Underlying colonic ischemia is a consideration. There is also diverticular disease involving the left colon. There is some   inflammation involving/abutting the mid to distal descending colon which   could represent minimal acute diverticulitis. Note that this is minimal and   not the source of free air. Cholelithiasis. Cirrhotic liver. Minimal ascites. I discussed findings by phone with Antionette Nicolas at 1:42 a.m. on 05/09/2021. XR CHEST PORTABLE   Final Result   No acute process. Cardiomegaly. 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 *      IMPRESSION:  1. Perforated diverticulitis resulting in colostomy and sigmoid colectomy  2.  Pelvic abscess, Genesis Sheikh MD on 5/11/2021 at 3:33 PM

## 2021-05-11 NOTE — PROGRESS NOTES
General Surgery Progress Note  Mark Montelongo MD, MS    Patient's Name/Date of Birth: Maryellen Armijo [birth year]    Date: May 11, 2021     Surgeon: Carol Richardson MD    Chief Complaint: s/p exlap, perforated sigmoid colon    Patient Active Problem List   Diagnosis    Glenoid labral tear    Synovitis    Biceps tendon tear    Rotator cuff tear    Subacromial impingement    Encephalopathy    Atrial fibrillation with rapid ventricular response (HCC)    Chronic atrial fibrillation ([address]    Acute exacerbation of COPD with asthma ([address]    CAP (community acquired pneumonia)    Cirrhosis of liver not due to alcohol ([address]    Hypertension    Acute heart failure ([address]    Dyspnea    Non morbid obesity    Perforated viscus    Chronic renal impairment, stage 3a    Acute on chronic renal insufficiency    Chronic diastolic heart failure ([address]       Subjective: improved, no nausea, hungry, air and stool in bag    Objective:  /60   Pulse 82   Temp 96.4 °F (35.8 °C) (Infrared)   Resp 22   Ht 5' 4\" (1.626 m)   Wt 203 lb 7.8 oz (92.3 kg)   SpO2 97%   BMI 34.93 kg/m²   Labs:  Recent Labs     05/09/21  0839 05/10/21  0628 05/11/21  0613   WBC 8.6 7.0 7.6   HGB 10.6* 9.3* 9.5*   HCT 33.1* 29.0* 29.6*     Lab Results   Component Value Date    CREATININE 2.7 (H) 05/11/2021    BUN 59 (H) 05/11/2021     05/11/2021    K 4.0 05/11/2021     05/11/2021    CO2 29 05/11/2021     Recent Labs     05/09/21  0020   LIPASE 43         General appearance:  NAD  Head: NCAT, PERRLA, EOMI, red conjunctiva  Neck: supple, no masses  Lungs: CTAB, equal chest rise bilateral  Heart: Reg rate  Abdomen: soft, nondistended, tender appropriately, incision C/D/I, dressing dry  Skin; no lesions  Gu: no cva tenderness  Extremities: extremities normal, atraumatic, no cyanosis or edema      Assessment/Plan:  Maryellen Armijo is a 68 y.o. female POD3 exlap, [place]    gen diet  Ok out of ICU  Out of bed  Cont tanner until Cr recovered    Physician Signature: Electronically signed by Dr. Franklin Head  [phone] (p)  5/11/2021  8:12 AM

## 2021-05-11 NOTE — PROGRESS NOTES
Progress Note  Date:5/10/2021       Room:[place] [address]  Patient Sameer Yee     YOB: 1940     Age:80 y.o. Subjective    Subjective:  Symptoms:  Stable. She reports shortness of breath, malaise and weakness. No cough, chest pain, headache, chest pressure, anorexia, diarrhea or anxiety. Diet:  Adequate intake. Activity level: Impaired due to weakness. Pain:  She complains of pain that is moderate. pt is doing better  Review of Systems   Respiratory: Positive for shortness of breath. Negative for cough. Cardiovascular: Negative for chest pain. Gastrointestinal: Negative for anorexia and diarrhea. Neurological: Positive for weakness. Objective         Vitals Last 24 Hours:  TEMPERATURE:  Temp  Av.5 °F (36.4 °C)  Min: 96.8 °F (36 °C)  Max: 98.8 °F (37.1 °C)  RESPIRATIONS RANGE: Resp  Av.4  Min: 13  Max: 24  PULSE OXIMETRY RANGE: SpO2  Av.3 %  Min: 93 %  Max: 100 %  PULSE RANGE: Pulse  Av.9  Min: 59  Max: 118  BLOOD PRESSURE RANGE: Systolic (70QKV), YSH:034 , Min:84 , MNZ:634   ; Diastolic (45GTX), NWT:00, Min:30, Max:72    I/O (24Hr): Intake/Output Summary (Last 24 hours) at 5/10/2021 2218  Last data filed at 5/10/2021 2139  Gross per 24 hour   Intake 6300.25 ml   Output 675 ml   Net 5625.25 ml     Objective:  General Appearance:  Comfortable. Vital signs: (most recent): Blood pressure 105/69, pulse 86, temperature 98.8 °F (37.1 °C), temperature source Infrared, resp. rate 13, height 5' 4\" (1.626 m), weight 203 lb 7.8 oz (92.3 kg), SpO2 95 %. Vital signs are normal.    Output: Producing urine. HEENT: Normal HEENT exam.    Lungs:  Normal respiratory rate. Heart: Regular rhythm. S1 normal and S2 normal.    Abdomen: Abdomen is soft. Bowel sounds are normal.   There is no abdominal tenderness. Extremities: Normal range of motion.       Labs/Imaging/Diagnostics    Labs:  CBC:  Recent Labs     21  0020 21  0839 05/10/21 0628   WBC 9.8 8.6 7.0   RBC 4.23 3.27* 2.85*   HGB 13.6 10.6* 9.3*   HCT 42.6 33.1* 29.0*   .7* 101.2* 101.8*   RDW 15.1* 15.0 15.0    86* 68*     CHEMISTRIES:  Recent Labs     05/09/21  0020 05/09/21  0839 05/10/21  0628    141 138   K 3.4* 3.1* 3.7   CL 97* 102 100   CO2 33* 27 29   BUN 48* 46* 52*   CREATININE 2.1* 2.1* 2.4*   GLUCOSE 157* 143* 162*   PHOS  --  4.4  --    MG 1.7 1.5*  --      PT/INR:No results for input(s): PROTIME, INR in the last 72 hours. APTT:No results for input(s): APTT in the last 72 hours. LIVER PROFILE:  Recent Labs     05/09/21  0020 05/09/21  0839 05/10/21  0628   AST 27 18 16   ALT 18 13 11   BILITOT 1.8* 2.0* 1.2   ALKPHOS 159* 92 76       Imaging Last 24 Hours:  Ct Abdomen Pelvis Wo Contrast Additional Contrast? None    Result Date: 5/9/2021  EXAMINATION: CT OF THE ABDOMEN AND PELVIS WITHOUT CONTRAST 5/9/2021 12:49 am TECHNIQUE: CT of the abdomen and pelvis was performed without the administration of intravenous contrast. Multiplanar reformatted images are provided for review. Dose modulation, iterative reconstruction, and/or weight based adjustment of the mA/kV was utilized to reduce the radiation dose to as low as reasonably achievable. COMPARISON: None. HISTORY: ORDERING SYSTEM PROVIDED HISTORY: abd pain, vomiting TECHNOLOGIST PROVIDED HISTORY: Additional Contrast?->None Reason for exam:->abd pain, vomiting Decision Support Exception - unselect if not a suspected or confirmed emergency medical condition->Emergency Medical Condition (MA) FINDINGS: The visualized portions of the lung bases are clear. There is moderate amount of free intraperitoneal air. This is compatible with perforated abdominal viscus. Most likely this is right colonic origin as there is pneumatosis coli involving the right colon from the cecum to the hepatic flexure. This colonic ischemia as a source of pneumatosis and perforation is a consideration. The liver is cirrhotic.   There is trace ascites in the abdomen and pelvis. There is cholelithiasis. There are no findings of intestinal obstruction. Appendix not visualized. There is descending and sigmoid diverticulosis. There is some mild inflammatory change involving/abutting the mid to distal descending colon which may represent minimal diverticulitis. This is not likely the source of free air. There is no abscess. Bladder is unremarkable in appearance. There is no abnormal pelvic mass seen. There is a small amount of pelvic ascites. Moderate amount of free intraperitoneal air which is compatible with perforated abdominal viscus. The right colon is the most likely source of free air as there is pneumatosis coli from the cecum to the a [place] flexure. Underlying colonic ischemia is a consideration. There is also diverticular disease involving the left colon. There is some inflammation involving/abutting the mid to distal descending colon which could represent minimal acute diverticulitis. Note that this is minimal and not the source of free air. Cholelithiasis. Cirrhotic liver. Minimal ascites. I discussed findings by phone with Earlene Dodson at 1:42 a.m. on 05/09/2021. Ct Head Wo Contrast    Result Date: 5/9/2021  EXAMINATION: CT OF THE HEAD WITHOUT CONTRAST  5/9/2021 12:49 am TECHNIQUE: CT of the head was performed without the administration of intravenous contrast. Dose modulation, iterative reconstruction, and/or weight based adjustment of the mA/kV was utilized to reduce the radiation dose to as low as reasonably achievable. COMPARISON: None. HISTORY: ORDERING SYSTEM PROVIDED HISTORY: dizzy TECHNOLOGIST PROVIDED HISTORY: Has a \"code stroke\" or \"stroke alert\" been called? ->No Reason for exam:->dizzy Decision Support Exception - unselect if not a suspected or confirmed emergency medical condition->Emergency Medical Condition (MA) FINDINGS: BRAIN/VENTRICLES: There is no acute intracranial hemorrhage, mass effect or midline shift.  No abnormal extra-axial fluid collection. The gray-white differentiation is maintained without evidence of an acute infarct. There is no evidence of hydrocephalus. Periventricular white matter changes consistent chronic microvascular disease. Diffuse volume loss. ORBITS: The visualized portion of the orbits demonstrate no acute abnormality. SINUSES: The visualized paranasal sinuses and mastoid air cells demonstrate no acute abnormality. SOFT TISSUES/SKULL:  No acute abnormality of the visualized skull or soft tissues. No acute intracranial abnormality. Periventricular white matter changes consistent chronic microvascular disease. Diffuse volume loss. Xr Chest Portable    Result Date: 5/9/2021  EXAMINATION: ONE XRAY VIEW OF THE CHEST 5/9/2021 9:01 am COMPARISON: Radiograph performed 10 hours earlier. HISTORY: ORDERING SYSTEM PROVIDED HISTORY: s/p triple lumen catheter TECHNOLOGIST PROVIDED HISTORY: Reason for exam:->s/p triple lumen catheter A left internal jugular catheter has its tip near the superior cavoatrial junction. Subdiaphragmatic free air has decreased markedly. The heart and mediastinum are normal for AP technique. Lung volumes are decreased. There are no focal airspace opacities. Blunting of the costophrenic sulci suggest there are small dependent pleural effusions. There are no signs of pneumothorax. FINDINGS: 1. Left IJ catheter appears satisfactory, no signs of pneumothorax. 2. Decreased lung volumes, suspect small pleural effusions. Xr Chest Portable    Result Date: 5/9/2021  EXAMINATION: ONE XRAY VIEW OF THE CHEST 5/9/2021 12:48 am COMPARISON: 04/05/2021 HISTORY: ORDERING SYSTEM PROVIDED HISTORY: chest pain TECHNOLOGIST PROVIDED HISTORY: Reason for exam:->chest pain FINDINGS: The lungs are without acute focal process. There is no effusion or pneumothorax. Cardiomegaly. The osseous structures are without acute process. No acute process. Cardiomegaly. Assessment//Plan           Hospital Problems           Last Modified POA    Chronic atrial fibrillation ([address] 5/9/2021 Yes    Cirrhosis of liver not due to alcohol ([address] 5/9/2021 Yes    Perforated viscus 5/9/2021 Yes    Chronic renal impairment, stage 3a (Chronic) 5/9/2021 Yes    Acute on chronic renal insufficiency 5/9/2021 Yes    Chronic diastolic heart failure ([address] (Chronic) 5/9/2021 Yes        Assessment:  (Problem List as of 5/10/2021 Reviewed: 5/9/2021  6:12 PM by Linda Knapp MD    Chronic renal impairment, stage 3a    Chronic diastolic heart failure (HCC)    Glenoid labral tear    Synovitis    Biceps tendon tear    Rotator cuff tear    Subacromial impingement    Encephalopathy    Atrial fibrillation with rapid ventricular response (HCC)    Chronic atrial fibrillation (HCC)    Acute exacerbation of COPD with asthma ([address]    CAP (community acquired pneumonia)    Cirrhosis of liver not due to alcohol ([address]    Hypertension    Acute heart failure ([address]    Dyspnea    Non morbid obesity    Perforated viscus    Acute on chronic renal insufficiency    ). Plan:   (Cont with current tx).        Electronically signed by Saintclair Frees, MD on 5/10/21 at 10:18 PM EDT

## 2021-05-11 NOTE — CARE COORDINATION
5-11-Cm note: ( covid neg 5-9) I met with pt for dc needs, PT/OT recommend [facility] vs. [facility], Pt chose [facility] health care , I called the referral to liaison , pt has a new colostomy. Per Juancarlos Paige [facility]), pt's granddaughter will also be available to assist with colostomy care. [facility] has accepted the pt and will follow for dc .  Electronically signed by Marcus Strong RN on 5/11/2021 at 11:12 AM

## 2021-05-11 NOTE — PROGRESS NOTES
Physical Therapy    Physical Therapy Treatment Note    Room #:  IC12/IC12-01  Patient Name: Akhil Ervin  YOB: 1940  MRN: [id number]    Referring Provider:   Denise Mckeon MD     Date of Service: 5/11/2021    Evaluating Physical Therapist: Ludmila Lopez, PT  #[id number]       Diagnosis:   Perforated viscus [R19.8]   Admitted with  Abdominal pain   Date of Procedure: 5/9/2021    Procedure(s):   OPEN BOWEL RESECTION WITH COLOSTOMY   Surgeon(s): Mila Juarez MD    Patient Active Problem List   Diagnosis    Glenoid labral tear    Synovitis    Biceps tendon tear    Rotator cuff tear    Subacromial impingement    Encephalopathy    Atrial fibrillation with rapid ventricular response (HCC)    Chronic atrial fibrillation (HCC)    Acute exacerbation of COPD with asthma ([address]    CAP (community acquired pneumonia)    Cirrhosis of liver not due to alcohol ([address]    Hypertension    Acute heart failure ([address]    Dyspnea    Non morbid obesity    Perforated viscus    Chronic renal impairment, stage 3a    Acute on chronic renal insufficiency    Chronic diastolic heart failure ([address]        Tentative placement recommendation: Home Health Physical Therapy     Equipment recommendation: Wheelchair      Prior Level of Function: Patient ambulated independently cane  Rehab Potential: good    for baseline    Past medical history:   Past Medical History:   Diagnosis Date    Anxiety     Cancer ([address] 2/11    right breast    Cirrhosis, non-alcoholic ([address]     COPD (chronic obstructive pulmonary disease) ([address]     Hypertension      Past Surgical History:   Procedure Laterality Date    BREAST SURGERY  2011    right lumpectomy    COLECTOMY N/A 5/9/2021    OPEN BOWEL RESECTION WITH COLOSTOMY performed by Mila Juarez MD at [address] ARTHROSCOPY  09 09 2011    arthroscopy left shoulder rotator cuff repair, subacromial decompression, debridement labrum, synovectomy    TONSILLECTOMY         Precautions: Up as tolerated, falls, alarm and O2 ,   Room air    SUBJECTIVE:    Social history: Patient lives alone   in a ranch home  with Ramp  to enter   Equipment owned: Cane, Wheeled Walker and Toilet riser,       [id number] [facility]  Mobility Inpatient   How much difficulty turning over in bed?: None  How much difficulty sitting down on / standing up from a chair with arms?: A Little  How much difficulty moving from lying on back to sitting on side of bed?: A Little  How much help from another person moving to and from a bed to a chair?: A Little  How much help from another person needed to walk in hospital room?: A Little  How much help from another person for climbing 3-5 steps with a railing?: A Lot  AM-PAC Inpatient Mobility Raw Score : 18  AM-PAC Inpatient T-Scale Score : 43.63  Mobility Inpatient CMS 0-100% Score: 46.58  Mobility Inpatient CMS G-Code Modifier : CK    Nursing cleared patient for PT treatment. OBJECTIVE;   Initial Evaluation  Date: 5/10/2021 Treatment Date:    5/11/2021  2 nd session  Short Term/ Long Term   Goals   Was pt agreeable to Eval/treatment? Yes   yes To be met in 3 days   Pain level   4/10   abdomen 3/10    Bed Mobility    Rolling: Moderate assist of 1    Supine to sit: Moderate assist of 1    Sit to supine: Moderate assist of 1    Scooting: Moderate assist of 1   Rolling: Modified Independent    Supine to sit: Minimal assist of 1    Sit to supine: Moderate assist of 1 for bilateral lower extremities   Scooting: Minimal assist of 1    Rolling: Supervision     Supine to sit: Supervision     Sit to supine: Supervision     Scooting: Supervision      Transfers Sit to stand:  Moderate assist of 1   Sit to stand: Minimal assist of 1   Chair and bed x 3      Sit to stand: Supervision      Ambulation    3-4 steps using  wheeled walker with Minimal assist of 2     12 forward/backward steps using  wheeled walker with Minimal assist of 1        75 feet using  wheeled walker with Supervision     ROM Within functional limits        Strength BUE:  refer to OT eval  RLE:  3+/5  LLE:  3+/5   Increase strength in affected mm groups by 1/3 grade   Balance Sitting EOB:  fair    Dynamic Standing:  fair minus with wheeled walker  Sitting EOB: good    Dynamic Standing: fair plus with wheeled walker able to stand intermittently without bilateral upper extremities support  Sitting EOB:  good    Dynamic Standing: fair       Patient is Alert & Oriented x person, place, time and situation and follows directions hard of hearing   Sensation:  Patient  denies numbness and tingling     Edema:  yes bilateral lower extremities    Endurance: fair       Vitals: room air    Blood Pressure supine  Blood Pressure seated     Heart Rate supine   Heart Rate seated     SPO2 supine    SPO2 during seated and activity       Patient education  Patient educated on role of Physical Therapy, risks of immobility, safety and plan of care and  importance of mobility while in hospital       Patient response to education:   Pt verbalized understanding Pt demonstrated skill Pt requires further education in this area   Yes Partial Yes      Treatment:  Patient practiced and was instructed/facilitated in the following treatment: Patient   Sat edge of bed 5 minutes with Independent to increase dynamic sitting balance and activity tolerance. gait training; Therapeutic Exercises:  not performed      At end of session, patient in bed with  call light and phone within reach,   all lines and tubes intact, nursing notified. Patient would benefit from continued skilled Physical Therapy to improve functional independence and quality of life.           Patient's/ family goals   home with family support      ASSESSMENT: Patient exhibits decreased strength, balance and endurance impairing functional mobility, transfers, gait , gait distance and tolerance to activity and are barriers to

## 2021-05-12 NOTE — PROGRESS NOTES
OT BEDSIDE TREATMENT NOTE      Date:2021  Patient Name: Eugenio Miller  MRN: [id number]  : 1940  Room: [address] [place] [address]     Referring Provider: Ja Potts MD     Evaluating OT: Norm Nielson OTR/L #[id number]     AM-PAC Daily Activity Raw Score:      Recommended Placement: IP rehab  Recommended Adaptive Equipment: AE for LE dressing; TBD      Diagnosis:   1. Perforated abdominal viscus    2. Longstanding persistent atrial fibrillation ([facility]    3. Chronic renal insufficiency, stage 2 (mild)       Surgery: 21 -  Exploratory laparotomy, sigmoid colectomy, end colostomy, open drainage of pelvic abscess, placement of left internal jugular vein triple-lumen catheter     Pertinent Medical History:    Past Medical History        Past Medical History:   Diagnosis Date    Anxiety      Cancer ([facility]      right breast    Cirrhosis, non-alcoholic (HCC)      COPD (chronic obstructive pulmonary disease) ([facility]      Hypertension           Precautions:  Falls, alarm, O2, abdominal precautions     Home Living: Pt lives alone in a 1 story home with 3 KAYCE with 1 rail(s).  Sister, daughter and granddaughter will be taking care of patient for 3 weeks once d/c  Bathroom setup: walk in shower with grab bars, 2 built in seats   Equipment owned: [employer], cane, elevated toilet     Prior Level of Function: Independent with ADLs , Independent with IADLs; ambulated without AD  Driving: No  Occupation: Not reported     Pain Level:unquantified abdominal pain, as well as c/o nausea; nsg notified  Cognition: A&O: 4/4; Follows 1-2 step directions              Memory:  Fair+              Sequencing:  Fair+              Problem solving:  Fair+              Judgement/safety:  Fair+                Functional Assessment:    Initial Eval Status  Date: 5/10/21 Treatment Status  Date:2021 STGs = LTGs  Time frame: 5-7 days   Feeding Independent      N/T     Grooming Minimal Assist    N/T Modified [place]   UB Dressing Minimal Assist   Min A to tie/adjust back of gown Modified [place]    LB Dressing Maximal Assist     Mod A for donning pants when seated EOB; cuing to cross LE's; assist to thread B feet through pants; pt may benefit from AE for LE dressing d/t hip precautions Moderate Assist    Bathing Maximal Assist    N/T  Moderate Assist    Toileting Moderate Assist     Min A for transfer to/from toilet with cuing for hand placement Minimal Assist    Bed Mobility  Supine to sit: Moderate Assist   Sit to supine: NT, pt up in chair     Mod A for supine to sit with assist to guide UB to sitting; scooting at min A; sit to supine at min A with cuing for log roll technique and assist to guide LE's to supine  Supine to sit: Minimal Assist   Sit to supine: Minimal Assist    Functional Transfers Minimal Assist   Sit<>stand  Bed, chair  Cuing on body mechanics, hand placement and safety Min A for sit to stand transfers to/from EOB and to/from low commode with cuing for hand placement; FWW used; pt demo'd decreased endurance and nausea with mobility  Independent    Functional Mobility Minimal Assist of 2  For safety  Several steps between bed and chair using [employer]    Min A with FWW for household distances in room to/from bathroom; assist for IV and catheter line management  Independent    Balance Sitting:     Static:  Fair+    Dynamic:fair+  Standing: fair+ Sitting:     Static:  Fair    Dynamic:fair  Standing: fair  Sitting:     Static:  good    Dynamic:good  Standing: good   Activity Tolerance Fair  /65  SpO2      Fair/fair minus; pt limited d/t pain and fatigue; nsg aware Fair+   Visual/  Perceptual Glasses: Yes   WFL          Hand Dominance Not reported       Strength ROM Additional Info:    RUE  4/5  WFL good  and wfl FMC/dexterity noted during ADL tasks         LUE 4/5  WFL good  and wfl FMC/dexterity noted during ADL tasks         Comments: Patient cleared by nursing staff. Upon arrival pt supine in bed.    Pt agreeable to OT tx session. Sister present. Pt educated with regards to bed mobility, abdominal precautions, hand placement, safety awareness, static sitting balance,  standing balance, transfer training, functional mobility, walker safety, LE/UE dressing and LE dressing techniques, ECT's. At end of session pt supine in bed  with all lines and tubes intact, call light within reach. Overall, pt demonstrated decreased independence and safety during completion of ADL/functional transfers/mobility tasks. Pt would benefit from continued skilled OT to increase safety and independence with completion of ADL/IADL tasks for functional independence and quality of life. Pt required cues and education as noted above for safe facilitation and completion of tasks. Therapist provided skilled monitoring of patient's response during treatment session. Prior to and at the end of session, environmental modifications /IV line management completed for patients safety and efficiency of treatment session. Overall, patient demonstrates minimal/moderate difficulties with completion of BADLs and IADLs. Factors contributing to these difficulties include abdominal precautions, pain, fatigue, decreased endurance, and generalized weakness. As noted above, patient likely to benefit from further OT intervention to increase independence, safety, and overall quality of life. Treatment:     ? Bed mobility: Facilitated bed mobility with cues for proper body mechanics and sequencing to prepare for ADL completion. Abdominal precautions  ? Functional transfers: Facilitated transfers from various surfaces with cues for body alignment, safety and hand placement. ? ADL completion: Self-care retraining for the above-mentioned ADLs; training on proper hand placement, safety technique, sequencing, and energy conservation techniques. Pt may benefit from use of AE for LE dressing d/t abdominal precautions and difficulty with crossing LE's  ?  Postural Balance:

## 2021-05-12 NOTE — PROGRESS NOTES
SYSTEMS:  Constitutional: Denies fever, weight loss, night sweats, and fatigue  Skin: Denies pigmentation, dark lesions, and rashes   HEENT: Denies hearing loss, tinnitus, ear drainage, epistaxis, sore throat, and hoarseness. Cardiovascular: Denies palpitations, chest pain, and chest pressure. Respiratory: Denies cough, dyspnea at rest, hemoptysis, apnea, and choking. Gastrointestinal: Denies nausea, vomiting, poor appetite, diarrhea, heartburn or reflux  Genitourinary: Denies dysuria, frequency, urgency or hematuria  Musculoskeletal: Denies myalgias, muscle weakness, and bone pain  Neurological: Denies dizziness, vertigo, headache, and focal weakness  Psychological: Denies anxiety and depression  Endocrine: Denies heat intolerance and cold intolerance  Hematopoietic/Lymphatic: Denies bleeding problems and blood transfusions    OBJECTIVE:  Vitals:    05/12/21 1300   BP: (!) 120/51   Pulse: 110   Resp: 25   Temp:    SpO2: 97%        O2 Flow Rate (L/min): 2 L/min  O2 Device: None (Room air)    PHYSICAL EXAM:  Constitutional: No fever, chills, diaphoresis  Skin: No skin rash, no skin breakdown  HEENT: Unremarkable  Neck: No JVD, lymphadenopathy, thyromegaly  Cardiovascular: S1, S2 normal.  No S3 murmurs rubs present  Respiratory: Clear to auscultation bilaterally  Gastrointestinal: Soft, nontender, colostomy is functioning  Genitourinary: No CVA tenderness  Extremities: No clubbing, cyanosis, or edema  Neurological: Awake, alert, oriented x3. No evidence of focal motor or sensory deficits  Psychological: Appropriate affect.   In very good spirits    LABS:  WBC   Date Value Ref Range Status   05/12/2021 6.4 4.5 - 11.5 E9/L Final   05/11/2021 7.6 4.5 - 11.5 E9/L Final   05/10/2021 7.0 4.5 - 11.5 E9/L Final     Hemoglobin   Date Value Ref Range Status   05/12/2021 9.5 (L) 11.5 - 15.5 g/dL Final   05/11/2021 9.5 (L) 11.5 - 15.5 g/dL Final   05/10/2021 9.3 (L) 11.5 - 15.5 g/dL Final     Hematocrit   Date Value Ref Range Status   05/12/2021 28.9 (L) 34.0 - 48.0 % Final   05/11/2021 29.6 (L) 34.0 - 48.0 % Final   05/10/2021 29.0 (L) 34.0 - 48.0 % Final     MCV   Date Value Ref Range Status   05/12/2021 100.7 (H) 80.0 - 99.9 fL Final   05/11/2021 101.7 (H) 80.0 - 99.9 fL Final   05/10/2021 101.8 (H) 80.0 - 99.9 fL Final     Platelets   Date Value Ref Range Status   05/12/2021 79 (L) 130 - 450 E9/L Final   05/11/2021 75 (L) 130 - 450 E9/L Final   05/10/2021 68 (L) 130 - 450 E9/L Final     Sodium   Date Value Ref Range Status   05/12/2021 136 132 - 146 mmol/L Final   05/11/2021 137 132 - 146 mmol/L Final   05/10/2021 138 132 - 146 mmol/L Final     Potassium   Date Value Ref Range Status   05/12/2021 3.9 3.5 - 5.0 mmol/L Final   05/11/2021 4.0 3.5 - 5.0 mmol/L Final   05/09/2021 3.1 (L) 3.5 - 5.0 mmol/L Final     Potassium reflex Magnesium   Date Value Ref Range Status   05/10/2021 3.7 3.5 - 5.0 mmol/L Final   04/05/2021 3.1 (L) 3.5 - 5.0 mmol/L Final   07/26/2020 4.1 3.5 - 5.0 mmol/L Final     Chloride   Date Value Ref Range Status   05/12/2021 100 98 - 107 mmol/L Final   05/11/2021 100 98 - 107 mmol/L Final   05/10/2021 100 98 - 107 mmol/L Final     CO2   Date Value Ref Range Status   05/12/2021 25 22 - 29 mmol/L Final   05/11/2021 29 22 - 29 mmol/L Final   05/10/2021 29 22 - 29 mmol/L Final     BUN   Date Value Ref Range Status   05/12/2021 64 (H) 6 - 23 mg/dL Final   05/11/2021 59 (H) 6 - 23 mg/dL Final   05/10/2021 52 (H) 6 - 23 mg/dL Final     CREATININE   Date Value Ref Range Status   05/12/2021 3.0 (H) 0.5 - 1.0 mg/dL Final   05/11/2021 2.7 (H) 0.5 - 1.0 mg/dL Final   05/10/2021 2.4 (H) 0.5 - 1.0 mg/dL Final     Glucose   Date Value Ref Range Status   05/12/2021 148 (H) 74 - 99 mg/dL Final   05/11/2021 160 (H) 74 - 99 mg/dL Final   05/10/2021 162 (H) 74 - 99 mg/dL Final   05/30/2012 100 70 - 110 mg/dL Final   11/14/2011 85 70 - 110 mg/dL Final   09/06/2011 110 70 - 110 mg/dL Final     Calcium   Date Value Ref Range Status 05/12/2021 8.3 (L) 8.6 - 10.2 mg/dL Final   05/11/2021 8.1 (L) 8.6 - 10.2 mg/dL Final   05/10/2021 8.0 (L) 8.6 - 10.2 mg/dL Final     Total Protein   Date Value Ref Range Status   05/12/2021 5.2 (L) 6.4 - 8.3 g/dL Final   05/11/2021 5.3 (L) 6.4 - 8.3 g/dL Final   05/10/2021 5.2 (L) 6.4 - 8.3 g/dL Final     Albumin   Date Value Ref Range Status   05/12/2021 2.7 (L) 3.5 - 5.2 g/dL Final   05/11/2021 2.4 (L) 3.5 - 5.2 g/dL Final   05/10/2021 2.7 (L) 3.5 - 5.2 g/dL Final   05/30/2012 4.1 3.2 - 4.8 g/dL Final   11/14/2011 3.8 3.2 - 4.8 g/dL Final   11/15/2010 3.6 3.2 - 4.8 g/dL Final     Total Bilirubin   Date Value Ref Range Status   05/12/2021 0.8 0.0 - 1.2 mg/dL Final   05/11/2021 0.8 0.0 - 1.2 mg/dL Final   05/10/2021 1.2 0.0 - 1.2 mg/dL Final     Alkaline Phosphatase   Date Value Ref Range Status   05/12/2021 103 35 - 104 U/L Final   05/11/2021 85 35 - 104 U/L Final   05/10/2021 76 35 - 104 U/L Final     AST   Date Value Ref Range Status   05/12/2021 19 0 - 31 U/L Final   05/11/2021 16 0 - 31 U/L Final     Comment:     Specimen is slightly Hemolyzed. Result may be artificially increased. 05/10/2021 16 0 - 31 U/L Final     ALT   Date Value Ref Range Status   05/12/2021 13 0 - 32 U/L Final   05/11/2021 13 0 - 32 U/L Final   05/10/2021 11 0 - 32 U/L Final     GFR Non-   Date Value Ref Range Status   05/12/2021 15 >=60 mL/min/1.73 Final     Comment:     Chronic Kidney Disease: less than 60 ml/min/1.73 sq.m. Kidney Failure: less than 15 ml/min/1.73 sq.m. Results valid for patients 18 years and older. 05/11/2021 17 >=60 mL/min/1.73 Final     Comment:     Chronic Kidney Disease: less than 60 ml/min/1.73 sq.m. Kidney Failure: less than 15 ml/min/1.73 sq.m. Results valid for patients 18 years and older. 05/10/2021 19 >=60 mL/min/1.73 Final     Comment:     Chronic Kidney Disease: less than 60 ml/min/1.73 sq.m. Kidney Failure: less than 15 ml/min/1.73 sq.m.   Results valid for patients 18 years and older. GFR    Date Value Ref Range Status   05/12/2021 18  Final   05/11/2021 21  Final   05/10/2021 24  Final     Magnesium   Date Value Ref Range Status   05/12/2021 2.0 1.6 - 2.6 mg/dL Final   05/11/2021 1.9 1.6 - 2.6 mg/dL Final   05/09/2021 1.5 (L) 1.6 - 2.6 mg/dL Final     Phosphorus   Date Value Ref Range Status   05/12/2021 4.7 (H) 2.5 - 4.5 mg/dL Final   05/11/2021 4.9 (H) 2.5 - 4.5 mg/dL Final   05/09/2021 4.4 2.5 - 4.5 mg/dL Final     No results for input(s): PH, PO2, PCO2, HCO3, BE, O2SAT in the last 72 hours. RADIOLOGY:  XR CHEST PORTABLE   Final Result   No significant change. Continued follow-up recommended. XR CHEST PORTABLE   Final Result      CT HEAD WO CONTRAST   Final Result   No acute intracranial abnormality. Periventricular white matter changes consistent chronic microvascular   disease. Diffuse volume loss. CT ABDOMEN PELVIS WO CONTRAST Additional Contrast? None   Final Result   Moderate amount of free intraperitoneal air which is compatible with   perforated abdominal viscus. The right colon is the most likely source of   free air as there is pneumatosis coli from the cecum to the a [place]   flexure. Underlying colonic ischemia is a consideration. There is also diverticular disease involving the left colon. There is some   inflammation involving/abutting the mid to distal descending colon which   could represent minimal acute diverticulitis. Note that this is minimal and   not the source of free air. Cholelithiasis. Cirrhotic liver. Minimal ascites. I discussed findings by phone with Sari Ornelas at 1:42 a.m. on 05/09/2021. XR CHEST PORTABLE   Final Result   No acute process. Cardiomegaly.                  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 *      IMPRESSION:  1. Perforated diverticulitis resulting in colostomy and sigmoid colectomy  2. Pelvic abscess, drain  3. Septic shock, resolved  4. Hypovolemic shock, resolved  5. YANELIS with hypovolemic component  6. Thrombocytopeniaimproved  7. Chronic atrial fibrillation on apixaban, amiodarone, and metoprolol  8. Cirrhosis of liver, now on alcohol related    PLAN:  1. Continue lactulose and rifaximin for cirrhosis  2. IV fluids per nephrology service  3. Continue aerosolized bronchodilators and incentive spirometry  4. Continue physical and occupational therapy  5. Can transfer to monitored floor  6. Monitor renal function carefully    ATTESTATION:  ICU Staff Physician note of personal involvement in Care  As the attending physician, I certify that I personally reviewed the patients history and personally examined the patient to confirm the physical findings described above,  And that I reviewed the relevant imaging studies and available reports. I also discussed the differential diagnosis and all of the proposed management plans with the patient and individuals accompanying the patient to this visit. They had the opportunity to ask questions about the proposed management plans and to have those questions answered. This patient has a high probability of sudden, clinically significant deterioration, which requires the highest level of physician preparedness to intervene urgently. I managed/supervised life or organ supporting interventions that required frequent physician assessment. I devoted my full attention to the direct care of this patient for the amount of time indicated below.   Time I spent with the family or surrogate(s) is included only if the patient was incapable of providing the necessary information or participating in medical decisions  Time devoted to teaching and to any procedures I billed separately is not included.     CRITICAL CARE TIME:  33 minutes    Electronically signed by Mayuri Jiménez MD on 5/12/2021 at 2:47 PM

## 2021-05-12 NOTE — CONSULTS
loratadine 10 mg  daily, vitamin D3 2000 units daily, montelukast 10 mg daily, and  Arimidex 1 mg daily. CURRENT MEDICATIONS:  The patient's current medications in the hospital  include Lactated Ringers at 80 mL an hour. The patient is on Bumex 2 mg  twice a day, DuoNeb inhalation twice a day, rifaximin 550 mg twice a  day, Eliquis 5 mg twice a day, Arimidex 1 mg daily, metolazone 5 mg  daily, levothyroxine 50 mcg daily, Cordarone 200 mg daily, Zyrtec 10 mg  daily, pantoprazole 40 mg daily, ciprofloxacin 400 mg q.24 hours,  metronidazole 5 mg IV q.8 hours, and montelukast 10 mg at bedtime. PHYSICAL EXAMINATION:  GENERAL:  The patient is awake and alert. She is not using any  supplemental oxygen. She is in no acute distress. VITAL SIGNS:  Blood pressure is 95/54, pulse is 107, respiratory rate is  14, and temperature 97.1 degrees Fahrenheit. HEENT:  Head is normocephalic and atraumatic. Eyes:  Sclerae are  nonicteric. Pupils are equal and reactive to light and accommodation. Fundus examination deferred. Mouth and throat:  Dry oral mucosa without  any mucosal ulceration or exudate. NECK:  Supple with no distention. No carotid bruits. No palpable  masses. CHEST:  Symmetrical.  LUNGS:  Vesicular breath sounds. Breath sounds are decreased at the  bases. No rales or rhonchi. HEART:  Irregularly irregular rate and rhythm without any pericardial  rub or gallop. Distant heart sounds. ABDOMEN:  Soft with mild generalized tenderness, nondistended. Dressing  in place over the incision. Colostomy with liquid stools. EXTREMITIES:  The patient has +1 pedal edema. LABORATORY DATA:  Lab data from today; sodium 136 mmol/L, potassium 3.9  mmol/L, chloride 100 mmol/L, CO2 25 mmol/L, BUN 64 mg/dL, creatinine 3.0  mg/dL, calcium 8.3 mg/dL, phosphorus 4.7 mg/dL. Hemoglobin 9.5 gm/dL,  hematocrit 28.9%, WBC count 6400, platelet count is 98,884.   Urine  sodium was less than 20, urine chloride was less than

## 2021-05-12 NOTE — PROGRESS NOTES
Report given, patients vitals, labs events of stay and overall condition reviewed. Will put in for transport.

## 2021-05-12 NOTE — PROGRESS NOTES
ET Nurse  Admit Date: 5/9/2021 12:01 AM    Reason for consult:  New colostomy    Significant history:    Past Medical History:   Diagnosis Date    Anxiety     Cancer ([facility] 2/11    right breast    Cirrhosis, non-alcoholic ([facility]     COPD (chronic obstructive pulmonary disease) ([facility]     Hypertension        Stoma assessment:  Stoma red color sm amt of stool  Pouch is intact    Plan:  Daughter is at bedside  inst on one piece system coloplast [id number]      John Sue 5/12/2021 10:31 AM

## 2021-05-12 NOTE — CARE COORDINATION
SS Note: Covid negative 5/9/21, new order today for BD Max per request of [place]. Plan was for pt to return home with [facility]. Nurse stated pt's daughter requesting Hailey Aguilar met with pt and she is in agreement with daughter's request. Megan Smiley called referral to [phone]. SW received return call from liajaswant Garcia stating pt is accepted to [facility], [address], fax [phone], upon discharge, NO PRECERT required. [place] requested BD Max Covid test which is ordered. ROSARIO updated pt and left voicemail for pt's daughter Mirlande Bravo. ROSARIO updated Shaunna Keller with [facility]. RAMO will need signed by physician.   Electronically signed by EVERARDO Segura on 5/12/2021 at 2:19 PM

## 2021-05-12 NOTE — PROGRESS NOTES
Dr. Ash Azar notified of urine output as per nursing communication. 175 mL of clear/claudia urine  from 1300 to 1700 noted in urometer. No new orders.

## 2021-05-12 NOTE — DISCHARGE INSTR - COC
Continuity of Care Form    Patient Name: Todd Jones   :  1940  MRN:  [id number]    Admit date:  2021  Discharge date:  ***    Code Status Order: Full Code   Advance Directives:   Advance Care Flowsheet Documentation       Date/Time Healthcare Directive Type of Healthcare Directive Copy in [address] Agent's Name Healthcare Agent's Phone Number    [phone]  Unknown, patient unable to respond due to medical condition -- -- -- -- --            Admitting Physician:  Elpidio Rebolledo MD  PCP: Elina Serrano MD    Discharging Nurse: [facility]/Room#: [facility] Phone Number: ***    Emergency Contact:   Extended Emergency Contact Information  Primary Emergency Contact: Deniz Laddele  Address: [address] [place] [address], [address] [place] [address] Phone: [phone]  Mobile Phone: [phone]  Relation: Child   needed? No  Secondary Emergency Contact: [facility] Phone: [phone]  Mobile Phone: [phone]  Relation: Grandchild   needed? No    Past Surgical History:  Past Surgical History:   Procedure Laterality Date    BREAST SURGERY      right lumpectomy    COLECTOMY N/A 2021    OPEN BOWEL RESECTION WITH COLOSTOMY performed by Elpidio Rebolledo MD at [address] ARTHROSCOPY  2011    arthroscopy left shoulder rotator cuff repair, subacromial decompression, debridement labrum, synovectomy    TONSILLECTOMY         Immunization History: There is no immunization history on file for this patient.     Active Problems:  Patient Active Problem List   Diagnosis Code    Glenoid labral tear S43.439A    Synovitis M65.9    Biceps tendon tear S46.219A    Rotator cuff tear M75.100    Subacromial impingement M75.40    Encephalopathy G93.40    Atrial fibrillation with rapid ventricular response (HCC) I48.91    Chronic atrial fibrillation ([facility] I48.20    Acute exacerbation of COPD with asthma ([facility] J44.1, J45.901    CAP (community acquired pneumonia) J18.9    Cirrhosis of liver not due to alcohol ([facility] K74.60    Hypertension I10    Acute heart failure (HCC) I50.9    Dyspnea R06.00    Non morbid obesity E66.9    Perforated viscus R19.8    Chronic renal impairment, stage 3a N18.31    Acute on chronic renal insufficiency N28.9, N18.9    Chronic diastolic heart failure (HCC) I50.32       Isolation/Infection:   Isolation            No Isolation          Patient Infection Status       Infection Onset Added Last Indicated Last Indicated By Review Planned Expiration Resolved Resolved By    COVID-19 Rule Out 05/12/21 05/12/21 05/12/21 COVID-19 (Ordered) 05/19/21 05/26/21      Resolved    COVID-19 Rule Out 05/09/21 05/09/21 05/09/21 COVID-19 (Ordered)   05/09/21 Rule-Out Test Resulted    COVID-19 Rule Out 04/05/21 04/05/21 04/05/21 COVID-19, Rapid (Ordered)   04/05/21 Rule-Out Test Resulted            Nurse Assessment:  Last Vital Signs: BP (!) 120/51   Pulse 110   Temp 97.5 °F (36.4 °C) (Infrared)   Resp 25   Ht 5' 4\" (1.626 m)   Wt 203 lb 7.8 oz (92.3 kg)   SpO2 97%   BMI 34.93 kg/m²     Last documented pain score (0-10 scale): Pain Level: 0  Last Weight:   Wt Readings from Last 1 Encounters:   05/09/21 203 lb 7.8 oz (92.3 kg)     Mental Status:  oriented    IV Access:  - None    Nursing Mobility/ADLs:  Walking   Dependent  Transfer  Dependent  Bathing  Dependent  Dressing  Dependent  Toileting  Dependent  Feeding  Assisted  Med Admin  Independent  Med Delivery   whole and prefers mixed with applesauce    Wound Care Documentation and Therapy:        Elimination:  Continence:   · Bowel: yes  · Bladder: No  Urinary Catheter: None   Colostomy/Ileostomy/Ileal Conduit: Yes  Colostomy LLQ Descending/sigmoid-Stomal Appliance: Clean, Dry, Intact, 1 piece  Colostomy LLQ Descending/sigmoid-Stoma  Assessment: Red  Colostomy LLQ Descending/sigmoid-Peristomal Assessment: Clean, Intact  Colostomy LLQ Descending/sigmoid-Stool Appearance: Bloody, Watery  Colostomy LLQ Descending/sigmoid-Stool Color: Red  Colostomy LLQ Descending/sigmoid-Stool Amount: Small  Colostomy LLQ Descending/sigmoid-Output (mL): 0 ml    Date of Last BM: 6/3/2021    Intake/Output Summary (Last 24 hours) at 5/12/2021 1423  Last data filed at 5/12/2021 1300  Gross per 24 hour   Intake 3128.5 ml   Output 385 ml   Net 2743.5 ml     I/O last 3 completed shifts: In: 3128.5 [P.O.:980; I.V.:2048.5; IV Piggyback:100]  Out: 465 [Urine:450; Stool:15]    Safety Concerns: At Risk for Falls    Impairments/Disabilities:      None    Nutrition Therapy:  Current Nutrition Therapy:   - Oral Diet:  Renal    Routes of Feeding: Oral  Liquids: Thin Liquids  Daily Fluid Restriction: no  Last Modified Barium Swallow with Video (Video Swallowing Test): done on 06/02    Treatments at the Time of Hospital Discharge:   Respiratory Treatments: None  Oxygen Therapy:  is on oxygen at 2 L/min per nasal cannula.   Ventilator:    - No ventilator support    Rehab Therapies: Physical Therapy and Occupational Therapy  Weight Bearing Status/Restrictions: No weight bearing restirctions  Other Medical Equipment (for information only, NOT a DME order):  wheelchair  Other Treatments: N/A    Patient's personal belongings (please select all that are sent with patient):  None    RN SIGNATURE:  {Esignature:[id number]}    CASE MANAGEMENT/SOCIAL WORK SECTION    Inpatient Status Date: 5/12/21    Readmission Risk Assessment Score:  Readmission Risk              Risk of Unplanned Readmission:        25           Discharging to Facility/ Agency   Name: [facility] [place] [address] [facility]   [phone]    Dialysis Facility (if applicable)   · Name:  · Address:  · Dialysis Schedule:  · Phone:  · Fax:    / signature: Electronically signed by Electronically signed by

## 2021-05-12 NOTE — PROGRESS NOTES
Physical Therapy    Physical Therapy Treatment Note    Room #:  5394/1542-30  Patient Name: Yeny Rosales  YOB: 1940  MRN: [id number]    Referring Provider:   Sakina Reyna MD     Date of Service: 5/12/2021    Evaluating Physical Therapist: Olivia Segundo, PT  #[id number]       Diagnosis:   Perforated viscus [R19.8]   Admitted with  Abdominal pain   Date of Procedure: 5/9/2021    Procedure(s):   OPEN BOWEL RESECTION WITH COLOSTOMY   Surgeon(s): Alecia Crawford MD    Patient Active Problem List   Diagnosis    Glenoid labral tear    Synovitis    Biceps tendon tear    Rotator cuff tear    Subacromial impingement    Encephalopathy    Atrial fibrillation with rapid ventricular response (HCC)    Chronic atrial fibrillation (HCC)    Acute exacerbation of COPD with asthma ([address]    CAP (community acquired pneumonia)    Cirrhosis of liver not due to alcohol ([address]    Hypertension    Acute heart failure ([address]    Dyspnea    Non morbid obesity    Perforated viscus    Chronic renal impairment, stage 3a    Acute on chronic renal insufficiency    Chronic diastolic heart failure ([address]        Tentative placement recommendation: Home Health Physical Therapy     Equipment recommendation: Wheelchair      Prior Level of Function: Patient ambulated independently cane  Rehab Potential: good    for baseline    Past medical history:   Past Medical History:   Diagnosis Date    Anxiety     Cancer ([address] 2/11    right breast    Cirrhosis, non-alcoholic ([address]     COPD (chronic obstructive pulmonary disease) ([address]     Hypertension      Past Surgical History:   Procedure Laterality Date    BREAST SURGERY  2011    right lumpectomy    COLECTOMY N/A 5/9/2021    OPEN BOWEL RESECTION WITH COLOSTOMY performed by Alecia Crawford MD at [address] ARTHROSCOPY  09 09 2011    arthroscopy left shoulder rotator cuff repair, subacromial decompression, debridement labrum, synovectomy    TONSILLECTOMY         Precautions: Up as tolerated, falls, alarm and O2 ,   Room air    SUBJECTIVE:    Social history: Patient lives alone   in a ranch home  with Ramp  to enter   Equipment owned: Cane, Wheeled Walker and Toilet riser,       [address] [facility]  Mobility Inpatient   How much difficulty turning over in bed?: None  How much difficulty sitting down on / standing up from a chair with arms?: A Little  How much difficulty moving from lying on back to sitting on side of bed?: A Little  How much help from another person moving to and from a bed to a chair?: A Little  How much help from another person needed to walk in hospital room?: A Little  How much help from another person for climbing 3-5 steps with a railing?: A Lot  AM-PAC Inpatient Mobility Raw Score : 18  AM-PAC Inpatient T-Scale Score : 43.63  Mobility Inpatient CMS 0-100% Score: 46.58  Mobility Inpatient CMS G-Code Modifier : CK    Nursing cleared patient for PT treatment. Patient reports some nausea. OBJECTIVE;   Initial Evaluation  Date: 5/10/2021 Treatment Date:    5/12/2021    Short Term/ Long Term   Goals   Was pt agreeable to Eval/treatment? Yes   yes To be met in 3 days   Pain level   4/10   abdomen NA     Bed Mobility    Rolling: Moderate assist of 1    Supine to sit: Moderate assist of 1    Sit to supine: Moderate assist of 1    Scooting: Moderate assist of 1   Rolling: Supervision     Supine to sit: Minimal assist of 1    Sit to supine: Minimal assist of 1 for bilateral lower extremities   Scooting: Minimal assist of 1    Rolling: Supervision     Supine to sit: Supervision     Sit to supine: Supervision     Scooting: Supervision      Transfers Sit to stand:  Moderate assist of 1   Sit to stand: Minimal assist of 1       Sit to stand: Supervision      Ambulation    3-4 steps using  wheeled walker with Minimal assist of 2     3-4 side steps using  wheeled walker with Minimal assist of 1  cues for improve functional independence and quality of life. Patient's/ family goals   home with family support      ASSESSMENT: Patient exhibits decreased strength, balance and endurance impairing functional mobility, transfers, gait , gait distance and tolerance to activity and are barriers to d/c and require skilled intervention during hospital stay . These factors impact  safe mobility and  patient requires min assist with mobility, continues to require skilled intervention and strengthening for bilateral lower extremities. Patient improved with sit to stand and demonstrates improved balance. Plan of Care:     -Bed Mobility: Lower extremity exercises  and Upper extremity exercises   -Sitting Balance: Incorporate reaching activities to activate trunk muscles   -Standing Balance: Perform strengthening exercises in standing to promote motor control with or without upper extremity support  and Instruct patient on adequate base of support to maintain balance  -Transfers: Provide instruction on proper hand and foot position for adequate transfer of weight onto lower extremities and use of gait device, Cues for hand placement, technique and safety and Provide stabilization to prevent fall   -Gait: Gait training, Standing activities to improve: base of support, weight shift, weight bearing  and Exercises to improve hip and knee control   -Endurance: Utilize Supervised activities to increase level of endurance to allow for safe functional mobility including transfers and gait   -Stairs: Stair training with instruction on proper technique and hand placement on rail  -Instruction in independent management of O2 line    Patient and or family understand(s) diagnosis, prognosis, and plan of care. Frequency of treatments: Patient will be seen  daily.        Time in 333  Time out 402    Total Treatment Time 29 minutes     CPT codes:    Therapeutic activities (21480)   19 minutes  1 unit(s)  Therapeutic exercises (29263)

## 2021-05-12 NOTE — PROGRESS NOTES
General Surgery Progress Note  José Manuel Olson MD, MS    Patient's Name/Date of Birth: Zena Rushing / 2/39/1852    Date: May 12, 2021     Surgeon: Marsha Powell MD    Chief Complaint: s/p exlap, perforated sigmoid colon    Patient Active Problem List   Diagnosis    Glenoid labral tear    Synovitis    Biceps tendon tear    Rotator cuff tear    Subacromial impingement    Encephalopathy    Atrial fibrillation with rapid ventricular response (HCC)    Chronic atrial fibrillation (HCC)    [facility] of [facility]    CAP (community acquired pneumonia)    Cirrhosis of liver not due to alcohol ([facility]    Hypertension    Acute heart failure ([facility]    Dyspnea    Non morbid obesity    Perforated viscus    Chronic renal impairment, stage 3a    Acute on chronic renal insufficiency    Chronic diastolic heart failure (HCC)       Subjective: improved, nausea with lactulose, has been out of bed with PT    Objective:  BP (!) 120/51   Pulse 110   Temp 97.5 °F (36.4 °C) (Infrared)   Resp 25   Ht 5' 4\" (1.626 m)   Wt 203 lb 7.8 oz (92.3 kg)   SpO2 97%   BMI 34.93 kg/m²   Labs:  Recent Labs     05/10/21  0628 05/11/21  0613 05/12/21  0550   WBC 7.0 7.6 6.4   HGB 9.3* 9.5* 9.5*   HCT 29.0* 29.6* 28.9*     Lab Results   Component Value Date    CREATININE 3.0 (H) 05/12/2021    BUN 64 (H) 05/12/2021     05/12/2021    K 3.9 05/12/2021     05/12/2021    CO2 25 05/12/2021     No results for input(s): LIPASE, AMYLASE in the last 72 hours.       General appearance:  NAD  Head: NCAT, PERRLA, EOMI, red conjunctiva  Neck: supple, no masses  Lungs: CTAB, equal chest rise bilateral  Heart: Reg rate  Abdomen: soft, obese, midline incision covered with dressing is dry, ostomy left lower quadrant with stool and air in the bag  Skin; no lesions  Gu: no cva tenderness  Extremities: extremities normal, atraumatic, no cyanosis or edema      Assessment/Plan:  Zena Parsonspriscillacharles is a 68 y.o. female POD4 exlap, Miguels    gen diet  Ok out of ICU  Out of bed  Cont tanner until Cr recovered    Physician Signature: Electronically signed by Dr. Archana Honeycutt  [phone] (p)  5/12/2021  1:57 PM

## 2021-05-12 NOTE — PROGRESS NOTES
Progress Note  Date:2021       Room:[place] [address]  Patient Mariola Servin     YOB: 1940     Age:80 y.o. Subjective    Subjective:  Symptoms:  Stable. She reports shortness of breath, malaise, weakness, anorexia and anxiety. No cough, chest pain, headache or chest pressure. Diet:  Adequate intake. Activity level: Impaired due to weakness. Pain:  She complains of pain that is mild. Review of Systems   Respiratory: Positive for shortness of breath. Negative for cough. Cardiovascular: Negative for chest pain. Gastrointestinal: Positive for anorexia. Neurological: Positive for weakness. Objective         Vitals Last 24 Hours:  TEMPERATURE:  Temp  Av °F (36.1 °C)  Min: 96.4 °F (35.8 °C)  Max: 98 °F (36.7 °C)  RESPIRATIONS RANGE: Resp  Av  Min: 11  Max: 29  PULSE OXIMETRY RANGE: SpO2  Av %  Min: 91 %  Max: 97 %  PULSE RANGE: Pulse  Av.2  Min: 69  Max: 120  BLOOD PRESSURE RANGE: Systolic (06ADF), CGQ:778 , Min:79 , NORA:764   ; Diastolic (71JFX), WKF:81, Min:39, Max:89    I/O (24Hr): Intake/Output Summary (Last 24 hours) at 2021 2225  Last data filed at 2021 1400  Gross per 24 hour   Intake 1195 ml   Output 335 ml   Net 860 ml     Objective:  General Appearance:  Comfortable. Vital signs: (most recent): Blood pressure (!) 101/52, pulse 98, temperature 96.9 °F (36.1 °C), temperature source Infrared, resp. rate 18, height 5' 4\" (1.626 m), weight 203 lb 7.8 oz (92.3 kg), SpO2 91 %. Vital signs are normal.    Output: Producing urine. HEENT: Normal HEENT exam.    Lungs:  Normal effort. She is not in respiratory distress. Heart: Irregular rhythm. S1 normal and S2 normal.    Abdomen: Abdomen is soft. There is no abdominal tenderness. Extremities: Normal range of motion. Pulses: Distal pulses are intact. Neurological: Patient is alert and oriented to person, place and time.     Pupils:  Pupils are equal, round, and reactive to light. Skin:  Warm and dry. Labs/Imaging/Diagnostics    Labs:  CBC:  Recent Labs     05/09/21  0839 05/10/21  0628 05/11/21  0613   WBC 8.6 7.0 7.6   RBC 3.27* 2.85* 2.91*   HGB 10.6* 9.3* 9.5*   HCT 33.1* 29.0* 29.6*   .2* 101.8* 101.7*   RDW 15.0 15.0 15.1*   PLT 86* 68* 75*     CHEMISTRIES:  Recent Labs     05/09/21  0020 05/09/21  0839 05/10/21  0628 05/11/21  0613    141 138 137   K 3.4* 3.1* 3.7 4.0   CL 97* 102 100 100   CO2 33* 27 29 29   BUN 48* 46* 52* 59*   CREATININE 2.1* 2.1* 2.4* 2.7*   GLUCOSE 157* 143* 162* 160*   PHOS  --  4.4  --  4.9*   MG 1.7 1.5*  --  1.9     PT/INR:No results for input(s): PROTIME, INR in the last 72 hours. APTT:No results for input(s): APTT in the last 72 hours. LIVER PROFILE:  Recent Labs     05/09/21  0839 05/10/21  0628 05/11/21  0613   AST 18 16 16   ALT 13 11 13   BILITOT 2.0* 1.2 0.8   ALKPHOS 92 76 85       Imaging Last 24 Hours:  Echo Complete    Result Date: 5/11/2021  Transthoracic Echocardiography Report (TTE)  Demographics   Patient Name    Reij Valdes Gender            Female                  R   Medical Record  [id number]      Room Number       IC12  Number   Account #       [de-identified]     Procedure Date    05/11/2021   Corporate ID                  Ordering                                Physician   Accession       [id number]    Referring  Number                        Physician   Date of Birth   1940    [address] [facility]   Age             68 year(s)    Interpreting      [address]                                Physician         Physician Cardiology                                                  Yuly Browning MD                                 Any Other  Procedure Type of Study   TTE procedure  Procedure Date Date: 05/11/2021 Start: 10:07 AM Study Location: Portable Technical Quality: Adequate visualization Indications:Congestive heart failure.  Patient Status: Routine Height: 64 inches Weight: 203 pounds BSA: 1.97 m^2 BMI: 34.84 kg/m^2 Rhythm: Within normal limits HR: 80 bpm BP: 106/67 mmHg  Findings   Left Ventricle  Normal left ventricular chamber size. Normal left ventricular systolic function. Visually estimated LVEF is 60-65 %. No wall motion abnormalities. Right Ventricle  Grossly normal right ventricle function. Mitral Valve  Normal mitral valve structure and function. No mitral valve regurgitation. Tricuspid Valve  Moderate tricuspid valve regurgitation. Aortic Valve  Mild aortic valve sclerosis. Trace aortic regurgitation. Pulmonic Valve  Normal pulmonic valve structure and function. No pulmonic valve regurgitation. Pericardial Effusion  No pericardial effusion. Miscellaneous  Plethoric inferior vena cava suggestive of elevated right atrial pressure. Conclusions   Summary  Normal left ventricular chamber size. Normal left ventricular systolic function. Visually estimated LVEF is 60-65 %. No wall motion abnormalities. Grossly normal right ventricle function. Moderate tricuspid valve regurgitation. Plethoric inferior vena cava suggestive of elevated right atrial pressure.    Signature   ----------------------------------------------------------------  Electronically signed by Joseluis Izquierdo MD(Interpreting  physician) on 05/11/2021 11:08 AM  ----------------------------------------------------------------  M-Mode/2D Measurements & Calculations   LV Diastolic    LV Systolic Dimension: 2.8   AV Cusp Separation: 1.8 cmLA  Dimension: 4.2  cm                           Dimension: 3.9 cmAO Root  cm              LV Volume Diastolic: 44.9 ml Dimension: 2.9 cm  LV FS:33.3 %    LV Volume Systolic: 00.1 ml  LV PW           LV EDV/LV EDV Index: 49.9  Diastolic: 1.1  BQ/14 IE/Z^5HH ESV/LV ESV  cm              Index: 28.6 ml/15ml/ m^2     RV Diastolic Dimension: 2.6  Septum          EF Calculated: 64.3 %        cm  Diastolic: 1.1  LV Mass Index: 80 l/min*m^2  cm  CO: 3.74 l/min Rotator cuff tear    Subacromial impingement    Encephalopathy    Atrial fibrillation with rapid ventricular response (HCC)    Chronic atrial fibrillation (HCC)    Acute exacerbation of COPD with asthma ([facility]    CAP (community acquired pneumonia)    Cirrhosis of liver not due to alcohol ([address]    Hypertension    Acute heart failure (HCC)    Dyspnea    Non morbid obesity    Perforated viscus    Acute on chronic renal insufficiency    ). Plan:   (Kidneys worsen. Cont with tx).        Electronically signed by Colvin Hashimoto, MD on 5/11/21 at 10:25 PM EDT

## 2021-05-13 NOTE — PLAN OF CARE
Problem: Falls - Risk of:  Goal: Will remain free from falls  Description: Will remain free from falls  5/13/2021 1434 by Genaro Manjarrez RN  Outcome: Met This Shift     Problem: Falls - Risk of:  Goal: Absence of physical injury  Description: Absence of physical injury  5/13/2021 1434 by Genaro Manjarrez RN  Outcome: Met This Shift     Problem: Skin Integrity:  Goal: Will show no infection signs and symptoms  Description: Will show no infection signs and symptoms  5/13/2021 1434 by Genaro Manjarrez RN  Outcome: Met This Shift     Problem: Skin Integrity:  Goal: Absence of new skin breakdown  Description: Absence of new skin breakdown  5/13/2021 1434 by Genaro Manjarrez RN  Outcome: Met This Shift     Problem: Activity:  Goal: Ability to tolerate increased activity will improve  Description: Ability to tolerate increased activity will improve  5/13/2021 1434 by Genaro Manjarrez RN  Outcome: Met This Shift     Problem: Increased nutrient needs (NI-5.1)  Goal: Food and/or Nutrient Delivery  Description: Individualized approach for food/nutrient provision.   5/13/2021 1227 by Rodri Fleming RD, LD  Outcome: Met This Shift

## 2021-05-13 NOTE — PROGRESS NOTES
Nephrology   Progress Note     REASON FOR CONSULTATION:  Acute kidney injury. Interval history:    Patient resting in bed, daughter at bedside. States she is feeling ok but is feeling anxious. Denies sob, chest pain.         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 She apparently  does have a history of chronic kidney disease stage G3b. She also has a  history of carcinoma of the breast.  She has a history of COPD.     PAST SURGICAL HISTORY:  Past surgical history is significant for right  breast lumpectomy, hernia repair surgery, hysterectomy, left shoulder  rotator cuff repair, and tonsillectomy.     ALLERGIES:  The patient has multiple drug allergies including allergies  to PENICILLIN, SULFONAMIDE, THEOPHYLLINES, CODEINE, IODINE, VICODIN, and  ADHESIVE TAPE.     SOCIAL HISTORY:  The patient is . She has no history of tobacco  use or alcohol use. She drinks one to two cups of coffee a day.     FAMILY HISTORY:  Family history is significant for congestive heart  failure in both her parents. Her father had diabetes mellitus and COPD.   Her parents are obviously .       MEDICATIONS:  Prior to admission include bumetanide 2 mg twice a day,  amiodarone 200 mg daily, metolazone 5 mg daily, metoprolol tartrate 100  mg twice a day, apixaban 5 mg twice a day, potassium chloride 20 mEq  three times a day, lorazepam 0.5 mg q.8 hours p.r.n., rifaximin 550 mg  twice a day, lactulose 20 gm three times a day, levothyroxine 50 mcg  daily, omeprazole 20 mg daily, Biotin 1000 mcg daily, loratadine 10 mg  daily, vitamin D3 2000 units daily, montelukast 10 mg daily, and  Arimidex 1 mg daily.     Current Facility-Administered Medications Medication Dose Route Frequency Provider Last Rate Last Admin    metoprolol tartrate (LOPRESSOR) tablet 25 mg  25 mg Oral BID Hansel Garcia MD   25 mg at 05/13/21 0810    amiodarone (CORDARONE) tablet 200 mg  200 mg Oral Daily Hansel Garcia MD   200 mg at 05/13/21 5751    rifaximin (XIFAXAN) tablet 550 mg  550 mg Oral BID Hansel Garcia MD   550 mg at 05/13/21 0810    lactulose (CHRONULAC) 10 GM/15ML solution 20 g  20 g Oral BID Hansel Garcia MD   20 g at 05/13/21 0630    ipratropium-albuterol (DUONEB) nebulizer solution 1 ampule  1 ampule Inhalation BID Hansel Garcia MD   1 ampule at 05/13/21 6266    apixaban (ELIQUIS) tablet 5 mg  5 mg Oral BID Hansel Garcia MD   5 mg at 05/13/21 0810    perflutren lipid microspheres (DEFINITY) injection 1.65 mg  1.5 mL Intravenous ONCE PRN Hansel Garcia MD        fentaNYL (SUBLIMAZE) injection 50 mcg  50 mcg Intravenous Q2H PRN Hansel Garcia MD        ciprofloxacin (CIPRO) IVPB 400 mg  400 mg Intravenous Q24H Cher Last MD   Stopped at 05/13/21 1013    cetirizine (ZYRTEC) tablet 10 mg  10 mg Oral Daily Cher Last MD   10 mg at 05/13/21 0811    anastrozole (ARIMIDEX) tablet 1 mg  1 mg Oral Daily Cher Last MD   1 mg at 05/13/21 0811    levothyroxine (SYNTHROID) tablet 50 mcg  50 mcg Oral Daily Cher Last MD   50 mcg at 05/13/21 0617    montelukast (SINGULAIR) tablet 10 mg  10 mg Oral Nightly Cher Last MD   10 mg at 05/12/21 2053    pantoprazole (PROTONIX) tablet 40 mg  40 mg Oral QAM AC Cher Last MD   40 mg at 05/13/21 0616    sodium chloride flush 0.9 % injection 5-40 mL  5-40 mL Intravenous 2 times per day Cher Last MD   10 mL at 05/13/21 1012    sodium chloride flush 0.9 % injection 5-40 mL  5-40 mL Intravenous PRN Cher Last MD   10 mL at 05/10/21 0023    0.9 % sodium chloride infusion  25 mL Intravenous PRN Cher Last MD        metronidazole (FLAGYL) 500 mg in NaCl 100 mL IVPB premix  500 mg Intravenous Nasrin Woo MD   Stopped at 05/13/21 1013    traMADol (ULTRAM) tablet 50 mg  50 mg Oral Q6H PRN Miguel Figueredo MD   50 mg at 05/09/21 0859    fluconazole (DIFLUCAN) in 0.9 % sodium chloride IVPB 200 mg  200 mg Intravenous Q24H Orquidea Pepe  mL/hr at 05/13/21 0810 200 mg at 05/13/21 0810    dextrose 5 % in lactated ringers infusion   Intravenous Continuous Gilda Biggs  mL/hr at 05/12/21 [facility]. at 05/12/21 1841             PHYSICAL EXAMINATION:    General appearance: alert, in no acute distress  Skin: No rashes or lesions on exposed skin  Neck: No JVD  Lungs: Clear upper, diminished lower, no adventitious sounds  Heart: Irregular rate and rhythm  Abdomen: Soft, non-tender, + bowel sounds, dressing present, colostomy with brown liquid stool  Extremities: 1+ BLE  Neurologic: Mental status: Alert, oriented, thought content appropriate     CBC with Differential:    Lab Results   Component Value Date    WBC 5.2 05/13/2021    RBC 2.84 05/13/2021    HGB 9.2 05/13/2021    HCT 28.1 05/13/2021    PLT 74 05/13/2021    MCV 98.9 05/13/2021    MCH 32.4 05/13/2021    MCHC 32.7 05/13/2021    RDW 15.4 05/13/2021    SEGSPCT 57 12/09/2013    METASPCT 0.9 12/07/2019    LYMPHOPCT 5.1 05/13/2021    MONOPCT 7.6 05/13/2021    MYELOPCT 0.9 07/26/2020    BASOPCT 0.2 05/13/2021    MONOSABS 0.42 05/13/2021    LYMPHSABS 0.26 05/13/2021    EOSABS 0.18 05/13/2021    BASOSABS 0.00 05/13/2021     CMP:    Lab Results   Component Value Date     05/13/2021    K 3.8 05/13/2021    K 3.8 05/13/2021     05/13/2021    CO2 27 05/13/2021    BUN 66 05/13/2021    CREATININE 3.0 05/13/2021    GFRAA 18 05/13/2021    LABGLOM 15 05/13/2021    GLUCOSE 145 05/13/2021    GLUCOSE 100 05/30/2012    PROT 4.9 05/13/2021    LABALBU 2.5 05/13/2021    LABALBU 4.1 05/30/2012    CALCIUM 8.0 05/13/2021    BILITOT 0.9 05/13/2021    ALKPHOS 87 05/13/2021    AST 19 05/13/2021    ALT 11 05/13/2021     Calcium:    Lab Results   Component Value Date    CALCIUM 8.0 05/13/2021     Ionized Calcium:  No results found for: IONCA  Magnesium:    Lab Results   Component Value Date    MG 2.0 05/13/2021     Phosphorus:    Lab Results   Component Value Date    PHOS 4.2 05/13/2021     Uric Acid:  No results found for: Jessica Novak  PTT:    Lab Results   Component Value Date    APTT 29.6 08/05/2020   [APTT}  Last 3 Troponin:    Lab Results   Component Value Date    TROPONINI 0.01 05/09/2021    TROPONINI <0.01 04/05/2021    TROPONINI <0.01 07/26/2020     U/A:    Lab Results   Component Value Date    COLORU Yellow 05/12/2021    PROTEINU TRACE 05/12/2021    PHUR 5.5 05/12/2021    LABCAST FEW  07/02/2011    WBCUA 1-3 05/12/2021    WBCUA >20 07/02/2011    RBCUA 10-20 05/12/2021    RBCUA 1-3 07/02/2011    BACTERIA RARE 05/12/2021    CLARITYU Clear 05/12/2021    SPECGRAV >=1.030 05/12/2021    LEUKOCYTESUR TRACE 05/12/2021    UROBILINOGEN 0.2 05/12/2021    BILIRUBINUR Negative 05/12/2021    BILIRUBINUR NEGATIVE 07/02/2011    BLOODU LARGE 05/12/2021    GLUCOSEU Negative 05/12/2021    GLUCOSEU NEGATIVE 07/02/2011     HgBA1c:    Lab Results   Component Value Date    LABA1C 5.5 02/03/2020     Microalbumen/Creatinine ratio:  No components found for: RUCREAT  TSH:    Lab Results   Component Value Date    TSH 1.660 05/09/2021     VITAMIN B12: No components found for: B12  FOLATE:  No results found for: FOLATE  IRON:    Lab Results   Component Value Date    IRON 149 06/11/2019     Iron Saturation:  No components found for: PERCENTFE  TIBC:    Lab Results   Component Value Date    TIBC 281 06/11/2019     FERRITIN:    Lab Results   Component Value Date    FERRITIN 64 06/11/2019     CINTHYA:    Lab Results   Component Value Date    CINTHYA NEGATIVE 06/11/2019          IMPRESSION:  1. Acute kidney injury. Acute kidney injury in this patient in all  probability secondary to renal hypoperfusion as reflected by urine  electrolytes.   There maybe an element of renal hypoperfusion due to  borderline low blood pressure readings. The patient with rather low  fractional excretion of sodium. Diuretics discontinued  FENA 0.4% supports pre-renal etiology  Continue IV fluids. Strict I&O  Follow urinary output    2. Borderline hypotension. The patient's blood pressures are somewhat  on the low side. We will support with volume. 3.  Anemia, follow hemoglobin and hematocrit. 4.  Hypocalcemia. Hypocalcemia with reflection of hypoalbuminemia. Check ionized calcium. 5.  Hyperphosphatemia, reflects acute kidney injury. Improving is wnl today        Thank you for allowing me to participate in the care of this patient. TAMARA Paula-CNS      Patient seen and examined. I had a face to face encounter with the patient. Agree with exam.    Agree with  formulation, assessment and plan as outlined above and directed by me. Addition and corrections were done as deemed appropriate. My exam and plan include:    Continue hydration at a decreased rate. Serum creatinine is at a plateau. Urine output has improved. Hypocalcemia this may reflect hypoalbuminemia. Will check ionized calcium. Check vitamin D and PTH levels. Continue current treatment.       Domingo Diaz MD  Nephrology        Electronically signed by Domingo Diaz MD on 5/13/2021 at 12:29 PM

## 2021-05-13 NOTE — PROGRESS NOTES
Physical Therapy    Physical Therapy Treatment Note    Room #:  9409/9578-65  Patient Name: Zena Rushing  YOB: 1940  MRN: [id number]    Referring Provider:   Buffy Leon MD     Date of Service: 5/13/2021    Evaluating Physical Therapist: Juan Whitney, PT  #[id number]       Diagnosis:   Perforated viscus [R19.8]   Admitted with  Abdominal pain   Date of Procedure: 5/9/2021    Procedure(s):   OPEN BOWEL RESECTION WITH COLOSTOMY   Surgeon(s): Tan Marti MD    Patient Active Problem List   Diagnosis    Glenoid labral tear    Synovitis    Biceps tendon tear    Rotator cuff tear    Subacromial impingement    Encephalopathy    Atrial fibrillation with rapid ventricular response (HCC)    Chronic atrial fibrillation (HCC)    Acute exacerbation of COPD with asthma ([address]    CAP (community acquired pneumonia)    Cirrhosis of liver 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 to alcohol ([address]    Hypertension    Acute heart failure ([address]    Dyspnea    Non morbid obesity    Perforated viscus    Chronic renal impairment, stage 3a    Acute on chronic renal insufficiency    Chronic diastolic heart failure ([address]        Tentative placement recommendation: Inpatient Rehab vs home health physical therapy if goals met     Equipment recommendation: Wheelchair      Prior Level of Function: Patient ambulated independently cane  Rehab Potential: good    for baseline    Past medical history:   Past Medical History:   Diagnosis Date    Anxiety     Cancer ([address] 2/11    right breast    Cirrhosis, non-alcoholic ([address]     COPD (chronic obstructive pulmonary disease) ([address]     Hypertension      Past Surgical History:   Procedure Laterality Date    BREAST SURGERY  2011    right lumpectomy    COLECTOMY N/A 5/9/2021    OPEN BOWEL RESECTION WITH COLOSTOMY performed by Tan Marti MD at [address] ARTHROSCOPY  09 09 2011    arthroscopy left shoulder rotator cuff repair, subacromial decompression, debridement labrum, synovectomy    TONSILLECTOMY         Precautions: Up as tolerated, falls and alarm ,   Room air    SUBJECTIVE:    Social history: Patient lives alone   in a ranch home  with Ramp  to enter   Equipment owned: Lacy Fonseca, Wheeled Walker and Toilet riser,       [id number] [facility]  Mobility Inpatient   How much difficulty turning over in bed?: A Little  How much difficulty sitting down on / standing up from a chair with arms?: A Little  How much difficulty moving from lying on back to sitting on side of bed?: A Little  How much help from another person moving to and from a bed to a chair?: A Little  How much help from another person needed to walk in hospital room?: A Little  How much help from another person for climbing 3-5 steps with a railing?: A Lot  AM-PAC Inpatient Mobility Raw Score : 17  AM-PAC Inpatient T-Scale Score : 42.13  Mobility Inpatient CMS 0-100% Score: 50.57  Mobility Inpatient CMS G-Code Modifier : CK    Nursing cleared patient for PT treatment. Pt in the chair, reports feeling better today     OBJECTIVE;   Initial Evaluation  Date: 5/10/2021 Treatment Date:    5/13/2021    Short Term/ Long Term   Goals   Was pt agreeable to Eval/treatment? Yes   yes To be met in 3 days   Pain level   4/10   abdomen NA     Bed Mobility    Rolling: Moderate assist of 1    Supine to sit: Moderate assist of 1    Sit to supine: Moderate assist of 1    Scooting: Moderate assist of 1   Rolling: Not assessed     Supine to sit: Not assessed     Sit to supine: Not assessed     Scooting: Not assessed     Rolling: Supervision     Supine to sit: Supervision     Sit to supine: Supervision     Scooting: Supervision      Transfers Sit to stand:  Moderate assist of 1   Sit to stand: Minimal assist of 1  With cues for technique      Sit to stand: Supervision      Ambulation    3-4 steps using  wheeled walker with Minimal assist of 2     2 x 5 feet using  wheeled walker with Minimal assist of 1  cues for sequencing, safety, pt amb forward/back       75 feet using  wheeled walker with Supervision     ROM Within functional limits        Strength BUE:  refer to OT eval  RLE:  3+/5  LLE:  3+/5   Increase strength in affected mm groups by 1/3 grade   Balance Sitting EOB:  fair    Dynamic Standing:  fair minus with wheeled walker  Sitting EOB: good    Dynamic Standing: fair  with wheeled walker   Sitting EOB:  good    Dynamic Standing: fair       Patient is Alert & Oriented x person, place, time and situation and follows directions hard of hearing   Sensation:  Patient  denies numbness and tingling     Edema:  yes bilateral lower extremities, weeping bilateral LE  Endurance: fair       Vitals: room air    Blood Pressure supine  Blood Pressure seated     Heart Rate supine   Heart Rate seated     SPO2  At rest 97    SPO2 during activity  95-96%     Patient education  Patient educated on role of Physical Therapy, risks of immobility, safety and plan of care, energy conservation,  importance of mobility while in hospital  and safety ankle pumps for blood clot prevention, importance and benefits of mobility      Patient response to education:   Pt verbalized understanding Pt demonstrated skill Pt requires further education in this area   Yes Partial Yes      Treatment:  Patient practiced and was instructed/facilitated in the following treatment:   Therapeutic Exercises:  ankle pumps, long arc quad and seated marching, standing heel raises  x 15-20 reps      At end of session, patient in chair with instructions to use call light for needs,  call light and phone within reach,   all lines and tubes intact, nursing notified. Patient would benefit from continued skilled Physical Therapy to improve functional independence and quality of life.           Patient's/ family goals   home with family support      ASSESSMENT: Patient exhibits decreased strength, balance and endurance impairing functional mobility, transfers, gait , gait distance and tolerance to activity and are barriers to d/c and require skilled intervention during hospital stay . These factors impact  safe mobility and  patient requires min assist for transfer and  mobility, continues to require skilled intervention and strengthening for bilateral lower extremities. Increased tolerance to activity, rests required ,  pleasant and motivated to participate and progress. Decreased strength and endurance limiting further function   Plan of Care:     -Bed Mobility: Lower extremity exercises  and Upper extremity exercises   -Sitting Balance: Incorporate reaching activities to activate trunk muscles   -Standing Balance: Perform strengthening exercises in standing to promote motor control with or without upper extremity support  and Instruct patient on adequate base of support to maintain balance  -Transfers: Provide instruction on proper hand and foot position for adequate transfer of weight onto lower extremities and use of gait device, Cues for hand placement, technique and safety and Provide stabilization to prevent fall   -Gait: Gait training, Standing activities to improve: base of support, weight shift, weight bearing  and Exercises to improve hip and knee control   -Endurance: Utilize Supervised activities to increase level of endurance to allow for safe functional mobility including transfers and gait   -Stairs: Stair training with instruction on proper technique and hand placement on rail  -Instruction in independent management of O2 line    Patient and or family understand(s) diagnosis, prognosis, and plan of care. Frequency of treatments: Patient will be seen  daily.        Time in 1114  Time out  1140    Total Treatment Time 26 minutes     CPT codes:    Therapeutic activities (43971)   13 minutes  1 unit(s)  Therapeutic exercises (62340)   13 minutes  1 unit(s)    [facility]  LIC# [id number]

## 2021-05-13 NOTE — PLAN OF CARE
Problem: Falls - Risk of:  Goal: Will remain free from falls  Description: Will remain free from falls  Outcome: Ongoing  Goal: Absence of physical injury  Description: Absence of physical injury  Outcome: Ongoing     Problem: Skin Integrity:  Goal: Will show no infection signs and symptoms  Description: Will show no infection signs and symptoms  Outcome: Ongoing  Goal: Absence of new skin breakdown  Description: Absence of new skin breakdown  Outcome: Ongoing     Problem:  Activity:  Goal: Ability to tolerate increased activity will improve  Description: Ability to tolerate increased activity will improve  Outcome: Ongoing

## 2021-05-13 NOTE — CONSULTS
Comprehensive Nutrition Assessment    Type and Reason for Visit:  Initial, Consult    Nutrition Recommendations/Plan: Continue with diet an start ONS BID Pascual BID, Ensure HP BID    Nutrition Assessment:  Pt admits w/ Perforated viscus s/p Colostomy w/ pMH of Cirrhosis of Liver 2/2 BROCK, CHF, COPD, Ca breast. Pt tolerating diet w/ ~ 25% p.o. intakes. Will start ONS BID and monitor    Malnutrition Assessment:  Malnutrition Status: At risk for malnutrition (Comment)    Context:  Acute Illness     Findings of the 6 clinical characteristics of malnutrition:  Energy Intake:  7 - 50% or less of estimated energy requirements for 5 or more days  Weight Loss:  No significant weight loss     Body Fat Loss:  No significant body fat loss     Muscle Mass Loss:  No significant muscle mass loss    Fluid Accumulation:  No significant fluid accumulation     Strength:  Not Performed    Estimated Daily Nutrient Needs:  Energy (kcal):  ; Weight Used for Energy Requirements:  Current     Protein (g):  75-85(x1.3-1.5gm/kg); Weight Used for Protein Requirements:  Ideal        Fluid (ml/day):  ; Method Used for Fluid Requirements:  1 ml/kcal      Nutrition Related Findings:  A/ox4, upper dentures, +BS, ostomy tube, BLE edema +3 pitting, generalized edema +1, +I/O +10.9L      Wounds:  Surgical Incision, Wound Consult Pending       Current Nutrition Therapies:    DIET GENERAL;  Dietary Nutrition Supplements: Wound Healing Oral Supplement  Dietary Nutrition Supplements: Low Calorie High Protein Supplement    Anthropometric Measures:  · Height: 5' 4\" (162.6 cm)  · Current Body Weight: 203 lb (92.1 kg)(5/9 bed scale)      · Ideal Body Weight: 120 lbs; % Ideal Body Weight 169.2 %   · BMI: 34.8  · Adjusted Body Weight:  ; No Adjustment   · BMI Categories: Obese Class 1 (BMI 30.0-34. 9)       Nutrition Diagnosis:   · Increased nutrient needs related to increase demand for energy/nutrients as evidenced by poor intake prior to admission, intake 0-25%, wounds(surgicial wound)      Nutrition Interventions:   Food and/or Nutrient Delivery:  Continue Current Diet, Start Oral Nutrition Supplement  Nutrition Education/Counseling:  No recommendation at this time   Coordination of Nutrition Care:  Continue to monitor while inpatient    Goals:  Consume >50-75% most meals/ ONS       Nutrition Monitoring and Evaluation:   Behavioral-Environmental Outcomes:  None Identified   Food/Nutrient Intake Outcomes:  Food and Nutrient Intake, Supplement Intake  Physical Signs/Symptoms Outcomes:  Biochemical Data, Fluid Status or Edema, Nutrition Focused Physical Findings, Skin, Weight     Discharge Planning:     Too soon to determine     Electronically signed by Emily Da Silva RD, LD on 5/13/21 at 12:24 PM EDT    Contact: [phone]

## 2021-05-13 NOTE — PROGRESS NOTES
OT BEDSIDE TREATMENT NOTE      Date:2021  Patient Name: Zena Rushing  MRN: [id number]  : 1940  Room: [address] [place] [address]     Referring Provider: Buffy Leon MD     Evaluating OT: Jamison Mendoza, OTR/L #[id number]     AM-PAC Daily Activity Raw Score:      Recommended Placement: IP rehab  Recommended Adaptive Equipment: AE for LE dressing; TBD      Diagnosis:   1. Perforated abdominal viscus    2. Longstanding persistent atrial fibrillation ([facility]    3. Chronic renal insufficiency, stage 2 (mild)       Surgery: 21 -  Exploratory laparotomy, sigmoid colectomy, end colostomy, open drainage of pelvic abscess, placement of left internal jugular vein triple-lumen catheter     Pertinent Medical History:    Past Medical History        Past Medical History:   Diagnosis Date    Anxiety      Cancer ([facility]      right breast    Cirrhosis, non-alcoholic (HCC)      COPD (chronic obstructive pulmonary disease) ([facility]      Hypertension           Precautions:  Falls, alarm, O2, abdominal precautions     Home Living: Pt lives alone in a 1 story home with 3 KAYCE with 1 rail(s).  Sister, daughter and granddaughter will be taking care of patient for 3 weeks once d/c  Bathroom setup: walk in shower with grab bars, 2 built in seats   Equipment owned: [employer], cane, elevated toilet     Prior Level of Function: Independent with ADLs , Independent with IADLs; ambulated without AD  Driving: No  Occupation: Not reported     Pain Level:unquantified abdominal pain, as well as c/o nausea; nsg notified  Cognition: A&O: 4/4; Follows 1-2 step directions              Memory:  Fair+              Sequencing:  Fair+              Problem solving:  Fair+              Judgement/safety:  Fair+                Functional Assessment:    Initial Eval Status  Date: 5/10/21 Treatment Status  Date:2021 STGs = LTGs  Time frame: 5-7 days   Feeding Independent      n/t     Grooming Minimal Assist    MIN A pt demo'd ability requiring assist to brush hair Modified [place]   UB Dressing Minimal Assist     Min A to tie/adjust back of gown Modified [place]    LB Dressing Maximal Assist     MOD A to dennys/doff pants requiring assist to thread pants over B LE feet, maintain standing balance to pull up around waist  Moderate Assist    Bathing Maximal Assist    N/T educated with regard to DME, bathing AE  Moderate Assist    Toileting Moderate Assist     MIN A per last report  Minimal Assist    Bed Mobility  Supine to sit: Moderate Assist   Sit to supine: NT, pt up in chair     MIN A supine>sit  MOD A scooting to EOB v/c's for hand placement and fall prevention  Supine to sit: Minimal Assist   Sit to supine: Minimal Assist    Functional Transfers Minimal Assist   Sit<>stand  Bed, chair  Cuing on body mechanics, hand placement and safety Min A for sit to stand transfers to/from EOB v/c's for hand placement and walker sfety  Independent    Functional Mobility Minimal Assist of 2  For safety  Several steps between bed and chair using [employer]    Min A with FWW for household distances in room v/c's for navigation around objects  Independent    Balance Sitting:     Static:  Fair+    Dynamic:fair+  Standing: fair+ Sitting:     Static:  Fair    Dynamic:fair  Standing: fair  Sitting:     Static:  good    Dynamic:good  Standing: good   Activity Tolerance Fair  /65  SpO2      Fair/fair minus; pt limited d/t pain and fatigue; nsg aware Fair+   Visual/  Perceptual Glasses: Yes   WFL          Hand Dominance Not reported       Strength ROM Additional Info:    RUE  4/5  WFL good  and wfl FMC/dexterity noted during ADL tasks         LUE 4/5  WFL good  and wfl FMC/dexterity noted during ADL tasks         Comments: Patient cleared by nursing staff. Upon arrival pt supine in bed. Pt agreeable to OT tx session. Pt educated with regards to bed mobility, abdominal precautions, hand placement, safety awareness, static sitting balance,  standing balance, transfer training, functional mobility, walker safety, LE dressing  techniques, ECT's. At end of session pt seated in chair with tray table in front of pt,  with all lines and tubes intact, call light within reach. Overall, pt demonstrated decreased independence and safety during completion of ADL/functional transfers/mobility tasks. Pt would benefit from continued skilled OT to increase safety and independence with completion of ADL/IADL tasks for functional independence and quality of life. Pt required cues and education as noted above for safe facilitation and completion of tasks. Therapist provided skilled monitoring of patient's response during treatment session. Prior to and at the end of session, environmental modifications /IV line management completed for patients safety and efficiency of treatment session. Overall, patient demonstrates minimal/moderate difficulties with completion of BADLs and IADLs. Factors contributing to these difficulties include abdominal precautions, pain, fatigue, decreased endurance, and generalized weakness. As noted above, patient likely to benefit from further OT intervention to increase independence, safety, and overall quality of life. Treatment:     ? Bed mobility: Facilitated bed mobility with cues for proper body mechanics and sequencing to prepare for ADL completion. Abdominal precautions  ? Functional transfers: Facilitated transfers from various surfaces with cues for body alignment, safety and hand placement. ? ADL completion: Self-care retraining for the above-mentioned ADLs; training on proper hand placement, safety technique, sequencing, and energy conservation techniques. Pt may benefit from use of AE for LE dressing d/t abdominal precautions and difficulty with crossing LE's  ? Postural Balance: Sitting/standing balance retraining to improve righting reactions with postural changes during ADLs.     · Pt has made fair progress towards set goals    · OT 1-3x/week for 5-7 days during hospitalization       Treatment Time In: 1030  Treatment Time Out: 7431          Treatment Charges: Mins Units   ADL/Home Mgt     79576 15 1   Thera Activities     83825 10 1   Ther Ex                 06263     Manual Therapy    21427     Neuro Re-ed         12907     Orthotic manage/training                               16773     Non Billable Time     Total Timed Treatment [address]  TORIBIO/L [id number]

## 2021-05-13 NOTE — CARE COORDINATION
SOCIAL WORK / DISCHARGE PLANNING:  COVID neg 5/9, BD MAX pending, Marlo RN confirmed it was collected Dc plan to [facility], [address], fax [phone], NO PRECERT needed. Renal following. RAMO will need signed by physician.                     Electronically signed by EVERARDO Davis on 5/13/2021 at 9:53 AM

## 2021-05-13 NOTE — PROGRESS NOTES
Progress Note  Date:2021       EOQI:[id number]  Patient Karl Hernández     YOB: 1940     Age:80 y.o. Subjective    Subjective:  Symptoms:  Stable. She reports shortness of breath, malaise and weakness. No cough, chest pain, headache, chest pressure, anorexia, diarrhea or anxiety. Diet:  Adequate intake. Activity level: Normal.    Pain:  She reports no pain. pt is doing ok. Review of Systems   Respiratory: Positive for shortness of breath. Negative for cough. Cardiovascular: Negative for chest pain. Gastrointestinal: Negative for anorexia and diarrhea. Neurological: Positive for weakness. Objective         Vitals Last 24 Hours:  TEMPERATURE:  Temp  Av.5 °F (36.4 °C)  Min: 97.1 °F (36.2 °C)  Max: 98.1 °F (36.7 °C)  RESPIRATIONS RANGE: Resp  Av.6  Min: 13  Max: 25  PULSE OXIMETRY RANGE: SpO2  Av.6 %  Min: 91 %  Max: 97 %  PULSE RANGE: Pulse  Av.4  Min: 77  Max: 112  BLOOD PRESSURE RANGE: Systolic (62MFK), FWZ:161 , Min:91 , AGE:89   ; Diastolic (55VZP), SAZ:22, Min:48, Max:81    I/O (24Hr): Intake/Output Summary (Last 24 hours) at 20215  Last data filed at 2021 1839  Gross per 24 hour   Intake 1500 ml   Output 245 ml   Net 1255 ml     Objective:  General Appearance:  Comfortable. Vital signs: (most recent): Blood pressure 102/61, pulse 87, temperature 98.1 °F (36.7 °C), temperature source Oral, resp. rate 22, height 5' 4\" (1.626 m), weight 203 lb 7.8 oz (92.3 kg), SpO2 95 %. Vital signs are normal.    Output: Producing urine. HEENT: Normal HEENT exam.    Lungs:  She is not in respiratory distress. No decreased breath sounds. Heart: Regular rhythm. S1 normal and S2 normal.    Abdomen: Abdomen is soft. Bowel sounds are normal.     Extremities: Normal range of motion. Pulses: Distal pulses are intact. Neurological: Patient is alert and oriented to person, place and time.     Pupils:  Pupils are equal, round, and reactive to light. Labs/Imaging/Diagnostics    Labs:  CBC:  Recent Labs     05/10/21  0628 05/11/21  0613 05/12/21  0550   WBC 7.0 7.6 6.4   RBC 2.85* 2.91* 2.87*   HGB 9.3* 9.5* 9.5*   HCT 29.0* 29.6* 28.9*   .8* 101.7* 100.7*   RDW 15.0 15.1* 15.2*   PLT 68* 75* 79*     CHEMISTRIES:  Recent Labs     05/10/21  0628 05/11/21  0613 05/12/21  0550    137 136   K 3.7 4.0 3.9    100 100   CO2 29 29 25   BUN 52* 59* 64*   CREATININE 2.4* 2.7* 3.0*   GLUCOSE 162* 160* 148*   PHOS  --  4.9* 4.7*   MG  --  1.9 2.0     PT/INR:No results for input(s): PROTIME, INR in the last 72 hours. APTT:No results for input(s): APTT in the last 72 hours. LIVER PROFILE:  Recent Labs     05/10/21  0628 05/11/21  0613 05/12/21  0550   AST 16 16 19   ALT 11 13 13   BILITOT 1.2 0.8 0.8   ALKPHOS 76 85 103       Imaging Last 24 Hours:  Echo Complete    Result Date: 5/11/2021  Transthoracic Echocardiography Report (TTE)  Demographics   Patient Name    Demetrice Reyes Gender            Female                  R   Medical Record  [id number]      Room Number       IC12  Number   Account #       [de-identified]     Procedure Date    05/11/2021   Corporate ID                  Ordering                                Physician   Accession       [id number]    Referring  Number                        Physician   Date of Birth   1940    [address] [facility]   Age             68 year(s)    Interpreting      Brock 66                                Physician         Physician Cardiology                                                  Joseluis Izquierdo MD                                 Any Other  Procedure Type of Study   TTE procedure  Procedure Date Date: 05/11/2021 Start: 10:07 AM Study Location: Portable Technical Quality: Adequate visualization Indications:Congestive heart failure.  Patient Status: Routine Height: 64 inches Weight: 203 pounds BSA: 1.97 m^2 BMI: 34.84 kg/m^2 Rhythm: Within normal fibrillation ([address] 5/9/2021 Yes    Cirrhosis of liver not due to alcohol ([address] 5/9/2021 Yes    Perforated viscus 5/9/2021 Yes    Chronic renal impairment, stage 3a (Chronic) 5/9/2021 Yes    Acute on chronic renal insufficiency 5/9/2021 Yes    Chronic diastolic heart failure ([address] (Chronic) 5/9/2021 Yes        Assessment:  (Problem List as of 5/12/2021 Reviewed: 5/9/2021  6:12 PM by Patricia Gilmore MD    Chronic renal impairment, stage 3a    Chronic diastolic heart failure (HCC)    Glenoid labral tear    Synovitis    Biceps tendon tear    Rotator cuff tear    Subacromial impingement    Encephalopathy    Atrial fibrillation with rapid ventricular response (HCC)    Chronic atrial fibrillation (HCC)    Acute exacerbation of COPD with asthma ([address]    CAP (community acquired pneumonia)    Cirrhosis of liver not due to alcohol ([address]    Hypertension    Acute heart failure ([address]    Dyspnea    Non morbid obesity    Perforated viscus    Acute on chronic renal insufficiency    ). Plan:   (Renal consult for kidneys issues. ).        Electronically signed by Danica He MD on 5/12/21 at 10:05 PM EDT

## 2021-05-14 NOTE — PROGRESS NOTES
Nephrology   Progress Note     REASON FOR CONSULTATION:  Acute kidney injury. Interval history:    Patient resting in bed, grand daughter at bedside. States she is feeling better now she is in a private room, roommate was very disruptive. Denies sob, chest pain. Grand daughter states edema in patient's legs is less than before hospitalization        ALLERGIES:  The patient has multiple drug allergies including allergies  to PENICILLIN, SULFONAMIDE, THEOPHYLLINES, CODEINE, IODINE, VICODIN, and  ADHESIVE TAPE.     SOCIAL HISTORY:  The patient is . She has no history of tobacco  use or alcohol use. She drinks one to two cups of coffee a day.     FAMILY HISTORY:  Family history is significant for congestive heart  failure in both her parents. Her father had diabetes mellitus and COPD.   Her parents are obviously .        Current Facility-Administered Medications   Medication Dose Route Frequency Provider Last Rate Last Admin    metoprolol tartrate (LOPRESSOR) tablet 25 mg  25 mg Oral BID Ja Potts MD   25 mg at 21 1101    amiodarone (CORDARONE) tablet 200 mg  200 mg Oral Daily Ja Potts MD   200 mg at 21 1102    rifaximin (XIFAXAN) tablet 550 mg  550 mg Oral BID Ja Potts MD   550 mg at 21 1101    lactulose (CHRONULAC) 10 GM/15ML solution 20 g  20 g Oral BID Ja Potts MD   20 g at 21 0551    ipratropium-albuterol (DUONEB) nebulizer solution 1 ampule  1 ampule Inhalation BID Ja Potts MD   1 ampule at 21 1826    apixaban (ELIQUIS) tablet 5 mg  5 mg Oral BID Ja Potts MD   5 mg at 21 1101    perflutren lipid microspheres (DEFINITY) injection 1.65 mg  1.5 mL Intravenous ONCE PRN Ja Potts MD        fentaNYL (SUBLIMAZE) injection 50 mcg  50 mcg Intravenous Q2H PRN Ja Potts MD        ciprofloxacin (CIPRO) IVPB 400 mg  400 mg Intravenous Q24H Mukesh Calloway  mL/hr at 21 1102 400 mg at 21 1102    cetirizine (ZYRTEC) tablet 10 mg  10 mg Oral Daily Minesh Aguilera MD   10 mg at 05/14/21 1102    anastrozole (ARIMIDEX) tablet 1 mg  1 mg Oral Daily Minesh Aguilera MD   1 mg at 05/14/21 1123    levothyroxine (SYNTHROID) tablet 50 mcg  50 mcg Oral Daily Minesh Aguilera MD   50 mcg at 05/14/21 0551    montelukast (SINGULAIR) tablet 10 mg  10 mg Oral Nightly Minesh Aguilera MD   10 mg at 05/13/21 2032    pantoprazole (PROTONIX) tablet 40 mg  40 mg Oral QAM AC Minesh Aguilera MD   40 mg at 05/14/21 0551    sodium chloride flush 0.9 % injection 5-40 mL  5-40 mL Intravenous 2 times per day Minesh Aguilera MD   10 mL at 05/14/21 1122    sodium chloride flush 0.9 % injection 5-40 mL  5-40 mL Intravenous PRN Minesh Aguilera MD   10 mL at 05/13/21 1636    0.9 % sodium chloride infusion  25 mL Intravenous PRN Minesh Aguilera MD        metronidazole (FLAGYL) 500 mg in NaCl 100 mL IVPB premix  500 mg Intravenous Marylene Roan, MD   Stopped at 05/14/21 0230    traMADol (ULTRAM) tablet 50 mg  50 mg Oral Q6H PRN Minesh Aguilera MD   50 mg at 05/09/21 0859    fluconazole (DIFLUCAN) in 0.9 % sodium chloride IVPB 200 mg  200 mg Intravenous Q24H Vishal Jose  mL/hr at 05/14/21 1105 200 mg at 05/14/21 1105    dextrose 5 % in lactated ringers infusion   Intravenous Continuous Jeana Aragon  mL/hr at 05/14/21 0811 New Bag at 05/14/21 0811       PHYSICAL EXAMINATION:    General appearance: alert, in no acute distress  Skin: No rashes or lesions on exposed skin  Neck: No JVD  Lungs: Clear upper, diminished lower, no adventitious sounds  Heart: Irregular rate and rhythm  Abdomen: Soft, non-tender, + bowel sounds, dressing present, colostomy with brown liquid stool  Extremities: 2+ BLE  Neurologic: Mental status: Alert, oriented, thought content appropriate     CBC with Differential:    Lab Results   Component Value Date    WBC 5.5 05/14/2021    RBC 2.92 05/14/2021    HGB 9.4 05/14/2021    HCT 28.8 05/14/2021    PLT 81 05/14/2021    MCV 98.6 05/14/2021    MCH 32.2 05/14/2021    MCHC 32.6 05/14/2021    RDW 15.9 05/14/2021    SEGSPCT 57 12/09/2013    METASPCT 0.9 12/07/2019    LYMPHOPCT 6.1 05/14/2021    MONOPCT 6.1 05/14/2021    MYELOPCT 0.9 07/26/2020    BASOPCT 0.2 05/14/2021    MONOSABS 0.33 05/14/2021    LYMPHSABS 0.33 05/14/2021    EOSABS 0.34 05/14/2021    BASOSABS 0.00 05/14/2021     CMP:    Lab Results   Component Value Date     05/14/2021    K 3.7 05/14/2021     05/14/2021    CO2 27 05/14/2021    BUN 65 05/14/2021    CREATININE 3.0 05/14/2021    GFRAA 18 05/14/2021    LABGLOM 15 05/14/2021    GLUCOSE 128 05/14/2021    GLUCOSE 100 05/30/2012    PROT 5.1 05/14/2021    LABALBU 2.4 05/14/2021    LABALBU 4.1 05/30/2012    CALCIUM 8.2 05/14/2021    BILITOT 1.1 05/14/2021    ALKPHOS 83 05/14/2021    AST 21 05/14/2021    ALT 11 05/14/2021     Calcium:    Lab Results   Component Value Date    CALCIUM 8.2 05/14/2021     Ionized Calcium:  No results found for: IONCA  Magnesium:    Lab Results   Component Value Date    MG 2.0 05/13/2021     Phosphorus:    Lab Results   Component Value Date    PHOS 4.2 05/13/2021     Uric Acid:  No results found for: LABURIC, URICACID  PTT:    Lab Results   Component Value Date    APTT 29.6 08/05/2020   [APTT}  Last 3 Troponin:    Lab Results   Component Value Date    TROPONINI 0.01 05/09/2021    TROPONINI <0.01 04/05/2021    TROPONINI <0.01 07/26/2020     U/A:    Lab Results   Component Value Date    COLORU Yellow 05/12/2021    PROTEINU TRACE 05/12/2021    PHUR 5.5 05/12/2021    LABCAST FEW  07/02/2011    WBCUA 1-3 05/12/2021    WBCUA >20 07/02/2011    RBCUA 10-20 05/12/2021    RBCUA 1-3 07/02/2011    BACTERIA RARE 05/12/2021    CLARITYU Clear 05/12/2021    SPECGRAV >=1.030 05/12/2021    LEUKOCYTESUR TRACE 05/12/2021    UROBILINOGEN 0.2 05/12/2021    BILIRUBINUR Negative 05/12/2021    BILIRUBINUR NEGATIVE 07/02/2011    BLOODU LARGE 05/12/2021    GLUCOSEU Negative 05/12/2021    GLUCOSEU NEGATIVE 07/02/2011     HgBA1c:    Lab Results   Component Value Date    LABA1C 5.5 02/03/2020     Microalbumen/Creatinine ratio:  No components found for: RUCREAT  TSH:    Lab Results   Component Value Date    TSH 1.660 05/09/2021     VITAMIN B12: No components found for: B12  FOLATE:  No results found for: FOLATE  IRON:    Lab Results   Component Value Date    IRON 149 06/11/2019     Iron Saturation:  No components found for: PERCENTFE  TIBC:    Lab Results   Component Value Date    TIBC 281 06/11/2019     FERRITIN:    Lab Results   Component Value Date    FERRITIN 64 06/11/2019     CINTHYA:    Lab Results   Component Value Date    CINTHYA NEGATIVE 06/11/2019          IMPRESSION:  1. Acute kidney injury. Acute kidney injury in this patient in all  probability secondary to renal hypoperfusion as reflected by urine  electrolytes. There maybe an element of renal hypoperfusion due to  borderline low blood pressure readings. The patient with rather low  fractional excretion of sodium. Diuretics discontinued  FENA 0.4% supports pre-renal etiology  Continue IV fluids. Strict I&O  Follow urinary output    2. Borderline hypotension. The patient's blood pressures are somewhat  on the low side. We will support with volume. 3.  Anemia, follow hemoglobin and hematocrit. 4.  Hypocalcemia. Hypocalcemia with reflection of hypoalbuminemia. Awaiting ionized calcium. 5.  Hyperphosphatemia, reflects acute kidney injury. Improving is wnl today  PTH 81, Vit. D 72        Thank you for allowing me to participate in the care of this patient. ALEXIS Sanchez      Electronically signed by TAMARA Aguiar on 5/14/2021 at 11:44 AM     Patient seen and examined. I had a face to face encounter with the patient. Patient's granddaughter is present at the bedside. Agree with exam.    Agree with  formulation, assessment and plan as outlined above and directed by me.    Addition and

## 2021-05-14 NOTE — PROGRESS NOTES
Occupational Therapy  OT SESSION ATTEMPT     Date:2021  Patient Name: Analisa Ingram  MRN: [id number]  : 1940  Room: [facility]     Attempted OT session this date:    [] unavailable due to other medical staff currently with pt   [] on hold per nursing staff   [] on hold per nursing staff secondary to lab / radiology results    [x] pt declined due to _\"doctor asking her to rest\"___. Benefits of participation in therapy reviewed with pt.    [] off unit   [] Other:     Will reattempt OT evaluation and/or treatment at a later time.     Keri Porras, OTR/L #[id number]

## 2021-05-14 NOTE — PROGRESS NOTES
Progress Note  Date:2021       YJOV:[id number]  Patient Gustabo Mayen     YOB: 1940     Age:80 y.o. Subjective    Subjective:  Symptoms:  Stable. She reports shortness of breath, malaise and weakness. No chest pain, anorexia or anxiety. Diet:  Adequate intake. Activity level: Impaired due to weakness. Pain:  She complains of pain that is moderate. pt is doing ok. Kidneys function same  Review of Systems   Respiratory: Positive for shortness of breath. Cardiovascular: Negative for chest pain. Gastrointestinal: Negative for anorexia. Neurological: Positive for weakness. Objective         Vitals Last 24 Hours:  TEMPERATURE:  Temp  Av.2 °F (36.8 °C)  Min: 97.9 °F (36.6 °C)  Max: 98.7 °F (37.1 °C)  RESPIRATIONS RANGE: Resp  Av.7  Min: 16  Max: 22  PULSE OXIMETRY RANGE: SpO2  Av.5 %  Min: 93 %  Max: 97 %  PULSE RANGE: Pulse  Av  Min: 70  Max: 95  BLOOD PRESSURE RANGE: Systolic (81BNL), AZS:060 , Min:96 , VJF:998   ; Diastolic (36UPD), JVH:24, Min:49, Max:77    I/O (24Hr): Intake/Output Summary (Last 24 hours) at 2021 2301  Last data filed at 2021 2140  Gross per 24 hour   Intake 2714 ml   Output 1300 ml   Net 1414 ml     Objective:  General Appearance:  Comfortable. Vital signs: (most recent): Blood pressure 110/60, pulse 70, temperature 98.7 °F (37.1 °C), temperature source Oral, resp. rate 18, height 5' 4\" (1.626 m), weight 203 lb 7.8 oz (92.3 kg), SpO2 95 %. Vital signs are normal.    Output: Producing urine. HEENT: Normal HEENT exam.    Lungs:  Normal respiratory rate. Heart: Regular rhythm. S1 normal and S2 normal.    Abdomen: Abdomen is soft. Bowel sounds are normal.     Extremities: Normal range of motion. Pulses: Distal pulses are intact.       Labs/Imaging/Diagnostics    Labs:  CBC:  Recent Labs     21  0613 21  0550 21  0445   WBC 7.6 6.4 5.2   RBC 2.91* 2.87* 2.84*   HGB 9.5* 9.5* 9.2* HCT 29.6* 28.9* 28.1*   .7* 100.7* 98.9   RDW 15.1* 15.2* 15.4*   PLT 75* 79* 74*     CHEMISTRIES:  Recent Labs     05/11/21 0613 05/12/21  0550 05/13/21  0445    136 137   K 4.0 3.9 3.8  3.8    100 101   CO2 29 25 27   BUN 59* 64* 66*   CREATININE 2.7* 3.0* 3.0*   GLUCOSE 160* 148* 145*   PHOS 4.9* 4.7* 4.2   MG 1.9 2.0 2.0     PT/INR:No results for input(s): PROTIME, INR in the last 72 hours. APTT:No results for input(s): APTT in the last 72 hours. LIVER PROFILE:  Recent Labs     05/11/21 0613 05/12/21  0550 05/13/21 0445   AST 16 19 19   ALT 13 13 11   BILITOT 0.8 0.8 0.9   ALKPHOS 85 103 87       Imaging Last 24 Hours:  Xr Chest Portable    Result Date: 5/12/2021  EXAMINATION: ONE XRAY VIEW OF THE CHEST 5/12/2021 5:46 am COMPARISON: 05/09/2021 HISTORY: ORDERING SYSTEM PROVIDED HISTORY: SOB TECHNOLOGIST PROVIDED HISTORY: Reason for exam:->SOB FINDINGS: EKG leads overlie the chest.  Left IJ catheter remains in place. Cardiac silhouette is near the upper limits of normal but unchanged. Scattered vascular calcifications. No pneumothorax. Patchy bibasilar opacities are similar to previous and favored to represent atelectasis. Tiny effusions are not excluded. No significant change. Continued follow-up recommended.      Assessment//Plan           Hospital Problems           Last Modified POA    Chronic atrial fibrillation ([facility] [address] 5/9/2021 Yes    Cirrhosis of liver not due to alcohol ([address] 5/9/2021 Yes    Perforated viscus 5/9/2021 Yes    Chronic renal impairment, stage 3a (Chronic) 5/9/2021 Yes    Acute on chronic renal insufficiency 5/9/2021 Yes    Chronic diastolic heart failure (HCC) (Chronic) 5/9/2021 Yes        Assessment:  (Problem List as of 5/13/2021 Reviewed: 5/9/2021  6:12 PM by Diego Cast MD    Chronic renal impairment, stage 3a    Chronic diastolic heart failure (HCC)    Glenoid labral tear    Synovitis    Biceps tendon tear    Rotator cuff tear    Subacromial impingement    Encephalopathy    Atrial fibrillation with rapid ventricular response (HCC)    Chronic atrial fibrillation (HCC)    Acute exacerbation of COPD with asthma ([facility] [address]    CAP (community acquired pneumonia)    Cirrhosis of liver not due to alcohol ([facility] [address]    Hypertension    Acute heart failure (HCC)    Dyspnea    Non morbid obesity    Perforated viscus    Acute on chronic renal insufficiency    ). Plan:   (Cont with tx).        Electronically signed by Danielle Nye MD on 5/13/21 at 11:01 PM EDT

## 2021-05-14 NOTE — PROGRESS NOTES
decompression, debridement labrum, synovectomy    TONSILLECTOMY         Precautions: Up as tolerated, falls and alarm ,   Room air    SUBJECTIVE:    Social history: Patient lives alone   in a ranch home  with Ramp  to enter   [facility] [address] [facility] Wheeled Walker and Toilet riser,       [address] [facility]  M[facility]   How much difficulty turning over in bed?: A Little  How much difficulty sitting down on / standing up from a chair with arms?: A Little  How much difficulty moving from lying on back to sitting on side of bed?: A Little  How much help from another person moving to and from a bed to a chair?: A Little  How much help from another person needed to walk in hospital room?: A Little  How much help from another person for climbing 3-5 steps with a railing?: A Lot  AM-PAC Inpatient Mobility Raw Score : 17  AM-PAC Inpatient T-Scale Score : 42.13  Mobility Inpatient CMS 0-100% Score: 50.57  Mobility Inpatient CMS G-Code Modifier : CK    Nursing cleared patient for PT treatment. Pt states the dr told her to stay in bed and do bed ex today. OBJECTIVE;   Initial Evaluation  Date: 5/10/2021 Treatment Date:    5/14/2021    Short Term/ Long Term   Goals   Was pt agreeable to Eval/treatment? Yes   yes To be met in 3 days   Pain level   4/10   abdomen NA     Bed Mobility    Rolling: Moderate assist of 1    Supine to sit: Moderate assist of 1    Sit to supine: Moderate assist of 1    Scooting: Moderate assist of 1   Rolling: Not assessed     Supine to sit: Not assessed     Sit to supine: Not assessed     Scooting: Not assessed     Rolling: Supervision     Supine to sit: Supervision     Sit to supine: Supervision     Scooting: Supervision      Transfers Sit to stand:  Moderate assist of 1   Sit to stand: Not assessed      Sit to stand: Supervision      Ambulation    3-4 steps using  wheeled walker with Minimal assist of 2     not assessed       75 feet using  wheeled walker with Supervision ROM Within functional limits        Strength BUE:  refer to OT eval  RLE:  3+/5  LLE:  3+/5   Increase strength in affected mm groups by 1/3 grade   Balance Sitting EOB:  fair    Dynamic Standing:  fair minus with wheeled walker  Sitting EOB: not assessed    Dynamic Standing: not assessed     Sitting EOB:  good    Dynamic Standing: fair       Patient is Alert & Oriented x person, place, time and situation and follows directions hard of hearing   Sensation:  Patient  denies numbness and tingling     Edema:  yes bilateral lower extremities, weeping bilateral LE  Endurance: fair       Vitals: room air    Blood Pressure supine  Blood Pressure seated     Heart Rate supine   Heart Rate seated     SPO2  At rest     SPO2 during activity  %     Patient education  Patient educated on role of Physical Therapy, risks of immobility, safety and plan of care, energy conservation,  importance of mobility while in hospital  and safety ankle pumps for blood clot prevention, importance and benefits of mobility      Patient response to education:   Pt verbalized understanding Pt demonstrated skill Pt requires further education in this area   Yes Partial Yes      Treatment:  Patient practiced and was instructed/facilitated in the following treatment:   Therapeutic Exercises:  ankle pumps, quad sets, glut sets, heel slide, hip abduction/adduction, straight leg raise and bicep curls, sh flex, grasp,relax x 15-20 reps , At end of session, patient in bed with instructions to use call light for needs, alarm activated  call light and phone within reach,   all lines and tubes intact, nursing notified. Patient would benefit from continued skilled Physical Therapy to improve functional independence and quality of life.           Patient's/ family goals   home with family support      ASSESSMENT: Patient exhibits decreased strength, balance and endurance impairing functional mobility, transfers, gait , gait distance and tolerance to activity and are barriers to d/c and require skilled intervention during hospital stay ,  pleasant and motivated to participate with ex . Decreased strength and endurance, fatigue  limiting further function , activity limited to ex this session   Plan of Care:     -Bed Mobility: Lower extremity exercises  and Upper extremity exercises   -Sitting Balance: Incorporate reaching activities to activate trunk muscles   -Standing Balance: Perform strengthening exercises in standing to promote motor control with or without upper extremity support  and Instruct patient on adequate base of support to maintain balance  -Transfers: Provide instruction on proper hand and foot position for adequate transfer of weight onto lower extremities and use of gait device, Cues for hand placement, technique and safety and Provide stabilization to prevent fall   -Gait: Gait training, Standing activities to improve: base of support, weight shift, weight bearing  and Exercises to improve hip and knee control   -Endurance: Utilize Supervised activities to increase level of endurance to allow for safe functional mobility including transfers and gait   -Stairs: Stair training with instruction on proper technique and hand placement on rail  -Instruction in independent management of O2 line    Patient and or family understand(s) diagnosis, prognosis, and plan of care. Frequency of treatments: Patient will be seen  daily.        Time in 125  Time out  150    Total Treatment Time 25 minutes     CPT codes:    Therapeutic exercises (88103)   25 minutes  2 unit(s)    Martita Day [facility]  LIC# [id number]

## 2021-05-14 NOTE — PROGRESS NOTES
General Surgery Progress Note  Sonali Reed MD, MS    Patient's Name/Date of Birth: Tatyana Eid / [birth year]    Date: May 14, 2021     Surgeon: Buddy Mishra MD    Chief Complaint: s/p exlap, perforated sigmoid colon    Patient Active Problem List   Diagnosis    Glenoid labral tear    Synovitis    Biceps tendon tear    Rotator cuff tear    Subacromial impingement    Encephalopathy    Atrial fibrillation with rapid ventricular response (HCC)    Chronic atrial fibrillation (HCC)    Acute exacerbation of COPD with asthma ([facility] [address]    CAP (community acquired pneumonia)    Cirrhosis of liver not due to alcohol ([facility] [address]    Hypertension    Acute heart failure ([facility] [address]    Dyspnea    Non morbid obesity    Perforated viscus    Chronic renal impairment, stage 3a    Acute on chronic renal insufficiency    Chronic diastolic heart failure (HCC)       Subjective: improved, tolerating diet pain control    Objective:  /63   Pulse 99   Temp 98 °F (36.7 °C) (Oral)   Resp 20   Ht 5' 4\" (1.626 m)   Wt 203 lb 7.8 oz (92.3 kg)   SpO2 99%   BMI 34.93 kg/m²   Labs:  Recent Labs     05/12/21  0550 05/13/21  0445 05/14/21  0450   WBC 6.4 5.2 5.5   HGB 9.5* 9.2* 9.4*   HCT 28.9* 28.1* 28.8*     Lab Results   Component Value Date    CREATININE 3.0 (H) 05/14/2021    BUN 65 (H) 05/14/2021     05/14/2021    K 3.7 05/14/2021     05/14/2021    CO2 27 05/14/2021     No results for input(s): LIPASE, AMYLASE in the last 72 hours.       General appearance:  NAD  Head: NCAT, PERRLA, EOMI, red conjunctiva  Neck: supple, no masses  Lungs: CTAB, equal chest rise bilateral  Heart: Reg rate  Abdomen: soft, obese, midline incision staples well approximated no drainage, ostomy left lower quadrant appears viable  Skin; no lesions  Gu: no cva tenderness  Extremities: extremities normal, atraumatic, no cyanosis or edema      Assessment/Plan:  Tatyana Eid is a 68 y.o. female POD4 exlap, Hartwendi    gen diet  Mercedes per nephrology  She will need rehab on discharge  Continue stoma care bowel regimen    Physician Signature: Electronically signed by Dr. Carol Richardson  [phone] (p)  5/14/2021  2:45 PM

## 2021-05-14 NOTE — CARE COORDINATION
SS NOTE: COVID NEGATIVE 5/9. BD MAX COLLECTED AND PENDING 5/13. Pt has been accepted for admission to [facility], [phone]/ fax [phone] at [facility]. For the transfer to [facility] pt will need NO PRECERT, a signed RAMO and current PT/OT notes. SS to continue. Carli Roman. 5/14/2021.12:01 PM.

## 2021-05-14 NOTE — PROGRESS NOTES
Room #:  0620/0620-01    Date: 2021       Patient Name: Marilin Rodriguez  : 1940      MRN: [id number]     Patient unavailable for physical therapy treatment due to pt eating on first attempt, pt fatigued and asking to rest on second attempt. Declined up to chair. Will attempt pm treatment.  Elizabeth Jacobo PTA  LIC# [id number]       [facility] QALEXSANDRA, PTA

## 2021-05-15 NOTE — PROGRESS NOTES
Nephrology   Progress Note     REASON FOR CONSULTATION:  Acute kidney injury. Interval history:    Patient resting in bed. States she is feeling better and had a good night's sleep.  No complaints.       ALLERGIES:  The patient has multiple drug allergies including allergies  to PENICILLIN, SULFONAMIDE, THEOPHYLLINES, CODEINE, IODINE, VICODIN, and  ADHESIVE TAPE.        Current Facility-Administered Medications   Medication Dose Route Frequency Provider Last Rate Last Admin    metoprolol tartrate (LOPRESSOR) tablet 25 mg  25 mg Oral BID Roseanne Tatum MD   25 mg at 05/14/21 2001    amiodarone (CORDARONE) tablet 200 mg  200 mg Oral Daily Roseanne Tatum MD   200 mg at 05/14/21 1102    rifaximin (XIFAXAN) tablet 550 mg  550 mg Oral BID Roseanne Tatum MD   550 mg at 05/14/21 2002    lactulose (CHRONULAC) 10 GM/15ML solution 20 g  20 g Oral BID Roseanne Tatum MD   20 g at 05/15/21 0527    ipratropium-albuterol (DUONEB) nebulizer solution 1 ampule  1 ampule Inhalation BID Roseanne Tatum MD   1 ampule at 05/14/21 1905    apixaban (ELIQUIS) tablet 5 mg  5 mg Oral BID Roseanne Tatum MD   5 mg at 05/14/21 2002    perflutren lipid microspheres (DEFINITY) injection 1.65 mg  1.5 mL Intravenous ONCE PRN Roseanne Tautm MD        fentaNYL (SUBLIMAZE) injection 50 mcg  50 mcg Intravenous Q2H PRN Roseanne Tatum MD        ciprofloxacin (CIPRO) IVPB 400 mg  400 mg Intravenous Q24H Elvie Lucero MD   Stopped at 05/14/21 1237    cetirizine (ZYRTEC) tablet 10 mg  10 mg Oral Daily Elvie Lucero MD   10 mg at 05/14/21 1102    anastrozole (ARIMIDEX) tablet 1 mg  1 mg Oral Daily Elvie Lucero MD   1 mg at 05/14/21 1123    levothyroxine (SYNTHROID) tablet 50 mcg  50 mcg Oral Daily Elvie Lucero MD   50 mcg at 05/15/21 0527    montelukast (SINGULAIR) tablet 10 mg  10 mg Oral Nightly Elvie Lucero MD   10 mg at 05/14/21 2002    pantoprazole (PROTONIX) tablet 40 mg  40 mg Oral QAM AC Elvie Lucero MD   40 mg at 05/15/21 0527    sodium chloride flush 0.9 % injection 5-40 mL  5-40 mL Intravenous 2 times per day Kyle Bernal MD   10 mL at 05/14/21 2002    sodium chloride flush 0.9 % injection 5-40 mL  5-40 mL Intravenous PRN Kyle Bernal MD   10 mL at 05/13/21 1636    0.9 % sodium chloride infusion  25 mL Intravenous PRN Kyle Bernal MD        metronidazole (FLAGYL) 500 mg in NaCl 100 mL IVPB premix  500 mg Intravenous Philippe Serrano MD   Stopped at 05/15/21 0130    traMADol (ULTRAM) tablet 50 mg  50 mg Oral Q6H PRN Kyle Bernal MD   50 mg at 05/09/21 0859    fluconazole (DIFLUCAN) in 0.9 % sodium chloride IVPB 200 mg  200 mg Intravenous Q24H Umu Botello  mL/hr at 05/14/21 1105 200 mg at 05/14/21 1105    dextrose 5 % in lactated ringers infusion   Intravenous Continuous Flip Yen Carreon  mL/hr at 05/14/21 1634 New Bag at 05/14/21 1634       PHYSICAL EXAMINATION:    General appearance: alert, in no acute distress  Skin: No rashes or lesions on exposed skin  Neck: No JVD  Lungs: Clear upper, diminished lower, no adventitious sounds  Heart: Irregular rate and rhythm  Abdomen: Soft, non-tender, + bowel sounds, dressing present, colostomy with brown liquid stool  Extremities: 2+ BLE  Neurologic: Mental status: Alert, oriented, thought content appropriate     CBC with Differential:    Lab Results   Component Value Date    WBC 6.0 05/15/2021    RBC 2.93 05/15/2021    HGB 9.4 05/15/2021    HCT 29.1 05/15/2021    PLT 97 05/15/2021    MCV 99.3 05/15/2021    MCH 32.1 05/15/2021    MCHC 32.3 05/15/2021    RDW 15.8 05/15/2021    SEGSPCT 57 12/09/2013    METASPCT 0.9 12/07/2019    LYMPHOPCT 6.1 05/14/2021    MONOPCT 6.1 05/14/2021    MYELOPCT 0.9 07/26/2020    BASOPCT 0.2 05/14/2021    MONOSABS 0.33 05/14/2021    LYMPHSABS 0.33 05/14/2021    EOSABS 0.34 05/14/2021    BASOSABS 0.00 05/14/2021     CMP:    Lab Results   Component Value Date     05/15/2021    K 3.7 05/14/2021     05/15/2021    CO2 28 05/15/2021    BUN 62 05/15/2021    CREATININE 2.8 05/15/2021    GFRAA 20 05/15/2021    LABGLOM 16 05/15/2021    GLUCOSE 120 05/15/2021    GLUCOSE 100 05/30/2012    PROT 5.1 05/15/2021    LABALBU 2.1 05/15/2021    LABALBU 4.1 05/30/2012    CALCIUM 8.1 05/15/2021    BILITOT 1.1 05/15/2021    ALKPHOS 89 05/15/2021    AST 22 05/15/2021    ALT 11 05/15/2021     Calcium:    Lab Results   Component Value Date    CALCIUM 8.1 05/15/2021     Ionized Calcium:  No results found for: IONCA  Magnesium:    Lab Results   Component Value Date    MG 2.0 05/13/2021     Phosphorus:    Lab Results   Component Value Date    PHOS 4.2 05/13/2021     Uric Acid:  No results found for: Ale Evangelista  PTT:    Lab Results   Component Value Date    APTT 29.6 08/05/2020   [APTT}  Last 3 Troponin:    Lab Results   Component Value Date    TROPONINI 0.01 05/09/2021    TROPONINI <0.01 04/05/2021    TROPONINI <0.01 07/26/2020     U/A:    Lab Results   Component Value Date    COLORU Yellow 05/12/2021    PROTEINU TRACE 05/12/2021    PHUR 5.5 05/12/2021    LABCAST FEW  07/02/2011    WBCUA 1-3 05/12/2021    WBCUA >20 07/02/2011    RBCUA 10-20 05/12/2021    RBCUA 1-3 07/02/2011    BACTERIA RARE 05/12/2021    CLARITYU Clear 05/12/2021    SPECGRAV >=1.030 05/12/2021    LEUKOCYTESUR TRACE 05/12/2021    UROBILINOGEN 0.2 05/12/2021    BILIRUBINUR Negative 05/12/2021    BILIRUBINUR NEGATIVE 07/02/2011    BLOODU LARGE 05/12/2021    GLUCOSEU Negative 05/12/2021    GLUCOSEU NEGATIVE 07/02/2011     HgBA1c:    Lab Results   Component Value Date    LABA1C 5.5 02/03/2020     Microalbumen/Creatinine ratio:  No components found for: RUCREAT  TSH:    Lab Results   Component Value Date    TSH 1.660 05/09/2021     VITAMIN B12: No components found for: B12  FOLATE:  No results found for: FOLATE  IRON:    Lab Results   Component Value Date    IRON 149 06/11/2019     Iron Saturation:  No components found for: PERCENTFE  TIBC:    Lab Results   Component

## 2021-05-15 NOTE — PROGRESS NOTES
Surgery Progress Note            Chief complaint:   Chief Complaint   Patient presents with    Emesis      Patient Active Problem List   Diagnosis    Glenoid labral tear    Synovitis    Biceps tendon tear    Rotator cuff tear    Subacromial impingement    Encephalopathy    Atrial fibrillation with rapid ventricular response (HCC)    Chronic atrial fibrillation (HCC)    Acute exacerbation of COPD with asthma ([facility] [address]    CAP (community acquired pneumonia)    Cirrhosis of liver not due to alcohol ([facility] [address]    Hypertension    Acute heart failure ([facility] [address]    Dyspnea    Non morbid obesity    Perforated viscus    Chronic renal impairment, stage 3a    Acute on chronic renal insufficiency    Chronic diastolic heart failure ([facility] [address]       S: doing well    O:   Vitals:    05/15/21 0800   BP: (!) 100/51   Pulse: 98   Resp: 20   Temp: 97.5 °F (36.4 °C)   SpO2: 96%       Intake/Output Summary (Last 24 hours) at 5/15/2021 1124  Last data filed at 5/15/2021 1123  Gross per 24 hour   Intake 720 ml   Output 1675 ml   Net -955 ml           Labs:  Lab Results   Component Value Date    WBC 6.0 05/15/2021    WBC 5.5 05/14/2021    WBC 5.2 05/13/2021    HGB 9.4 05/15/2021    HGB 9.4 05/14/2021    HGB 9.2 05/13/2021    HCT 29.1 05/15/2021    HCT 28.8 05/14/2021    HCT 28.1 05/13/2021     Lab Results   Component Value Date    CREATININE 2.8 (H) 05/15/2021    BUN 62 (H) 05/15/2021     05/15/2021    K 3.7 05/15/2021     05/15/2021    CO2 28 05/15/2021     Lab Results   Component Value Date    LIPASE 43 05/09/2021    LIPASE 47 07/26/2020    LIPASE 47 12/07/2019         Physical exam:   BP (!) 100/51   Pulse 98   Temp 97.5 °F (36.4 °C) (Oral)   Resp 20   Ht 5' 4\" (1.626 m)   Wt 203 lb 7.8 oz (92.3 kg)   SpO2 96%   BMI 34.93 kg/m²   General appearance: NAD  Head: NCAT  Neck: supple, no masses  Lungs: equal chest rise bilateral  Heart: S1S2 present  Abdomen: soft, nontender, nondistended,  Incisions clean intact, ostomy

## 2021-05-15 NOTE — PLAN OF CARE
Problem: Falls - Risk of:  Goal: Will remain free from falls  Description: Will remain free from falls  Outcome: Ongoing     Problem: Falls - Risk of:  Goal: Absence of physical injury  Description: Absence of physical injury  Outcome: Ongoing     Problem: Skin Integrity:  Goal: Absence of new skin breakdown  Description: Absence of new skin breakdown  Outcome: Ongoing     Problem: Skin Integrity:  Goal: Will show no infection signs and symptoms  Description: Will show no infection signs and symptoms  Outcome: Ongoing     Problem:  Activity:  Goal: Ability to tolerate increased activity will improve  Description: Ability to tolerate increased activity will improve  Outcome: Ongoing

## 2021-05-16 NOTE — PROGRESS NOTES
Nephrology   Progress Note     REASON FOR CONSULTATION:  Acute kidney injury. Interval history:    Patient resting in bed. States she is feeling nauseated today,received Zofran.  Daughter present.      ALLERGIES:  The patient has multiple drug allergies including allergies  to PENICILLIN, SULFONAMIDE, THEOPHYLLINES, CODEINE, IODINE, VICODIN, and  ADHESIVE TAPE.        Current Facility-Administered Medications   Medication Dose Route Frequency Provider Last Rate Last Admin    0.9 % sodium chloride infusion   Intravenous Continuous Ela Ramirez  mL/hr at 05/16/21 0853 New Bag at 05/16/21 0853    ondansetron (ZOFRAN-ODT) disintegrating tablet 4 mg  4 mg Oral TID PRN Ela Ramirez MD   4 mg at 05/16/21 1526    metoprolol tartrate (LOPRESSOR) tablet 25 mg  25 mg Oral BID Kia Eubanks MD   25 mg at 05/15/21 0685    amiodarone (CORDARONE) tablet 200 mg  200 mg Oral Daily Kia Eubanks MD   200 mg at 05/16/21 3978    rifaximin (XIFAXAN) tablet 550 mg  550 mg Oral BID Kia Eubanks MD   550 mg at 05/16/21 0930    lactulose (CHRONULAC) 10 GM/15ML solution 20 g  20 g Oral BID Kia Eubanks MD   20 g at 05/16/21 0535    ipratropium-albuterol (DUONEB) nebulizer solution 1 ampule  1 ampule Inhalation BID Kia Eubanks MD   1 ampule at 05/16/21 0630    apixaban (ELIQUIS) tablet 5 mg  5 mg Oral BID Kia Eubanks MD   5 mg at 05/16/21 0930    perflutren lipid microspheres (DEFINITY) injection 1.65 mg  1.5 mL Intravenous ONCE PRN Kia Eubanks MD        fentaNYL (SUBLIMAZE) injection 50 mcg  50 mcg Intravenous Q2H PRN Kia Eubanks MD        ciprofloxacin (CIPRO) IVPB 400 mg  400 mg Intravenous Q24H Joey Morejon MD   Stopped at 05/16/21 1033    cetirizine (ZYRTEC) tablet 10 mg  10 mg Oral Daily Joey Morejon MD   10 mg at 05/16/21 0930    anastrozole (ARIMIDEX) tablet 1 mg  1 mg Oral Daily Joey Morejon MD   1 mg at 05/16/21 0929    levothyroxine (SYNTHROID) tablet 50 mcg  50 mcg Oral Daily Kyrie Grant MD   50 mcg at 05/16/21 0535    montelukast (SINGULAIR) tablet 10 mg  10 mg Oral Nightly Kyrie Grant MD   10 mg at 05/15/21 2030    pantoprazole (PROTONIX) tablet 40 mg  40 mg Oral QAM AC Kyrie Grant MD   40 mg at 05/16/21 0535    sodium chloride flush 0.9 % injection 5-40 mL  5-40 mL Intravenous 2 times per day Kyrie Grant MD   10 mL at 05/16/21 0930    sodium chloride flush 0.9 % injection 5-40 mL  5-40 mL Intravenous PRN Kyrie Grant MD   10 mL at 05/13/21 1636    0.9 % sodium chloride infusion  25 mL Intravenous PRN Kyrie Grant MD        metronidazole (FLAGYL) 500 mg in NaCl 100 mL IVPB premix  500 mg Intravenous Sammi Solis MD   Stopped at 05/16/21 1141    traMADol (ULTRAM) tablet 50 mg  50 mg Oral Q6H PRN Kyrie Grant MD   50 mg at 05/09/21 0859    fluconazole (DIFLUCAN) in 0.9 % sodium chloride IVPB 200 mg  200 mg Intravenous Q24H Madelyn Lucio MD   Stopped at 05/16/21 1033       PHYSICAL EXAMINATION:    General appearance: alert, in no acute distress  Skin: No rashes or lesions on exposed skin  Neck: No JVD  Lungs: Clear upper, diminished lower, no adventitious sounds  Heart: Irregular rate and rhythm  Abdomen: Soft, non-tender, + bowel sounds, dressing present, colostomy with brown liquid stool  Extremities: 2+ BLE  Neurologic: Mental status: Alert, oriented, thought content appropriate     CBC with Differential:    Lab Results   Component Value Date    WBC 6.1 05/16/2021    RBC 3.00 05/16/2021    HGB 9.8 05/16/2021    HCT 29.7 05/16/2021     05/16/2021    MCV 99.0 05/16/2021    MCH 32.7 05/16/2021    MCHC 33.0 05/16/2021    RDW 16.0 05/16/2021    SEGSPCT 57 12/09/2013    METASPCT 0.9 12/07/2019    LYMPHOPCT 6.0 05/16/2021    MONOPCT 9.0 05/16/2021    MYELOPCT 0.9 07/26/2020    BASOPCT 0.0 05/16/2021    MONOSABS 0.55 05/16/2021    LYMPHSABS 0.37 05/16/2021    EOSABS 0.24 05/16/2021    BASOSABS 0.00 05/16/2021     CMP:    Lab Results   Component Value Date     05/16/2021    K 3.4 05/16/2021     05/16/2021    CO2 27 05/16/2021    BUN 61 05/16/2021    CREATININE 2.7 05/16/2021    GFRAA 21 05/16/2021    LABGLOM 17 05/16/2021    GLUCOSE 102 05/16/2021    GLUCOSE 100 05/30/2012    PROT 5.1 05/16/2021    LABALBU 2.3 05/16/2021    LABALBU 4.1 05/30/2012    CALCIUM 8.2 05/16/2021    BILITOT 1.2 05/16/2021    ALKPHOS 85 05/16/2021    AST 25 05/16/2021    ALT 10 05/16/2021     Calcium:    Lab Results   Component Value Date    CALCIUM 8.2 05/16/2021     Ionized Calcium:  No results found for: IONCA  Magnesium:    Lab Results   Component Value Date    MG 1.9 05/16/2021     Phosphorus:    Lab Results   Component Value Date    PHOS 4.2 05/13/2021     Uric Acid:  No results found for: Maldonado Na  PTT:    Lab Results   Component Value Date    APTT 29.6 08/05/2020   [APTT}  Last 3 Troponin:    Lab Results   Component Value Date    TROPONINI 0.01 05/09/2021    TROPONINI <0.01 04/05/2021    TROPONINI <0.01 07/26/2020     U/A:    Lab Results   Component Value Date    COLORU Yellow 05/12/2021    PROTEINU TRACE 05/12/2021    PHUR 5.5 05/12/2021    LABCAST FEW  07/02/2011    WBCUA 1-3 05/12/2021    WBCUA >20 07/02/2011    RBCUA 10-20 05/12/2021    RBCUA 1-3 07/02/2011    BACTERIA RARE 05/12/2021    CLARITYU Clear 05/12/2021    SPECGRAV >=1.030 05/12/2021    LEUKOCYTESUR TRACE 05/12/2021    UROBILINOGEN 0.2 05/12/2021    BILIRUBINUR Negative 05/12/2021    BILIRUBINUR NEGATIVE 07/02/2011    BLOODU LARGE 05/12/2021    GLUCOSEU Negative 05/12/2021    GLUCOSEU NEGATIVE 07/02/2011     HgBA1c:    Lab Results   Component Value Date    LABA1C 5.5 02/03/2020     Microalbumen/Creatinine ratio:  No components found for: RUCREAT  TSH:    Lab Results   Component Value Date    TSH 1.660 05/09/2021     VITAMIN B12: No components found for: B12  FOLATE:  No results found for: FOLATE  IRON:    Lab Results   Component Value Date    IRON 149 06/11/2019     Iron Saturation:  No components found for: PERCENTFE  TIBC:    Lab Results   Component Value Date    TIBC 281 06/11/2019     FERRITIN:    Lab Results   Component Value Date    FERRITIN 64 06/11/2019     CINTHYA:    Lab Results   Component Value Date    CINTHYA NEGATIVE 06/11/2019          IMPRESSION:  1. Acute kidney injury. Acute kidney injury in this patient in all  probability secondary to renal hypoperfusion as reflected by urine  electrolytes. There maybe an element of renal hypoperfusion due to borderline low blood pressure readings. Diuretics discontinued  FENA 0.4% supports pre-renal etiology  IV fluids were stopped 5/15 but restarted for hypotension. Strict I&O  Follow urinary output    2. Borderline hypotension. The patient's blood pressures are somewhat  on the low side. We will support with volume. 3.  Anemia, follow hemoglobin and hematocrit. 4.  Hypocalcemia. Hypocalcemia with reflection of hypoalbuminemia. Corrected calcium 9.6  Ionized calcium 1.24    5. Hyperphosphatemia, reflects acute kidney injury. Improving last PO4 is wnl   PTH 81, Vit. D 72    6. Edema with hypoalbuminemia  Bumex 2 mg IV BID today  Good response to diuretics - UO LAST 24 HRS 5200 ML  Still with significant edema       Thank you for allowing me to participate in the care of this patient.      ALEXIS Webb      Electronically signed by TAMARA Zuñiga on 5/16/2021 at 3:50 PM       ===================    Renal Attending Addendum  Seen and examined  Agree with NP note above     Renal function with mild improvement today  She has significant edema/volume overload today, complicated by hypoalbuminemia  Stop IV fluids which were restarted this AM for hypotension; follow BP closely   Had good response to diuretics - dose further if hemodynamically stable   Hypokalemia with diuretics and GI loses - replace and follow  Discussed with family at bedside       Ofelia Velazco MD

## 2021-05-16 NOTE — CONSULTS
CARDIOVASCULAR:  No chest pain or palpitations  RESPIRATORY:   No cough, sob  ENDOCRINE:    No increase thirst, urination   HEME-LYMPH:   No easy bruising or bleeding  GI:     No nausea, vomiting or diarrhea  :     No urinary complaints  NEURO:    No seizures, stroke, HA  MUSCULOSKELETAL:  No muscle aches or pain, no joint pain  SKIN:     No rash or itch  PSYCH:    No depression or anxiety    Medications Prior to Admission: amiodarone (CORDARONE) 200 MG tablet, Take 200 mg by mouth daily  bumetanide (BUMEX) 2 MG tablet, Take 1 tablet by mouth 2 times daily  digoxin (LANOXIN) 250 MCG tablet, Take 1 tablet by mouth See Admin Instructions Take 2 by mouth today then 1 by mouth daily  metOLazone (ZAROXOLYN) 5 MG tablet, Take 1 tablet by mouth daily Take 1 by mouth daily for 5 days and then one half daily as needed for swelling (Patient taking differently: Take 5 mg by mouth daily )  metoprolol tartrate (LOPRESSOR) 100 MG tablet, Take 1 tablet by mouth 2 times daily (Patient taking differently: Take 50 mg by mouth 2 times daily )  apixaban (ELIQUIS) 5 MG TABS tablet, Take 1 tablet by mouth 2 times daily  potassium chloride (KLOR-CON M) 20 MEQ extended release tablet, Take 1 tablet by mouth 3 times daily  LORazepam (ATIVAN) 0.5 MG tablet, Take 0.5 mg by mouth every 8 hours as needed for Anxiety. rifaximin (XIFAXAN) 550 MG tablet, Take 1 tablet by mouth 2 times daily  lactulose (CHRONULAC) 10 GM/15ML solution, Take 30 mLs by mouth 3 times daily Take to achieve 2-3 bowel movement every day. Do not take if more than 3 bowel movement in a day  levothyroxine (SYNTHROID) 50 MCG tablet, Take 50 mcg by mouth Daily  omeprazole (PRILOSEC) 20 MG delayed release capsule, Take 40 mg by mouth daily  Biotin 1000 MCG TABS, Take by mouth  loratadine (CLARITIN) 10 MG tablet, Take 10 mg by mouth daily. Cholecalciferol (VITAMIN D3) 2000 UNIT CAPS, Take  by mouth daily.     montelukast (SINGULAIR) 10 MG tablet, Take 10 mg by mouth sodium chloride flush 0.9 % injection 5-40 mL, 5-40 mL, Intravenous, PRN, Erika Riojas MD, 10 mL at 05/13/21 1636    0.9 % sodium chloride infusion, 25 mL, Intravenous, PRN, Erika Riojas MD    metronidazole (FLAGYL) 500 mg in NaCl 100 mL IVPB premix, 500 mg, Intravenous, Q8H, Erika Riojas MD, Stopped at 05/16/21 1141    traMADol (ULTRAM) tablet 50 mg, 50 mg, Oral, Q6H PRN, Erika Riojas MD, 50 mg at 05/09/21 0859    fluconazole (DIFLUCAN) in 0.9 % sodium chloride IVPB 200 mg, 200 mg, Intravenous, Q24H, Vishal Ariza MD, Stopped at 05/16/21 1033  ALLERGIES     Penicillins, Sulfa antibiotics, Theophyllines, Codeine, Iodine, Tape [adhesive tape], and Vicodin [hydrocodone-acetaminophen]    There is no immunization history on file for this patient.   PAST MEDICAL HISTORY     Past Medical History:   Diagnosis Date    Anxiety     Cancer [facility]) 2/11    right breast    Cirrhosis, non-alcoholic (HCC)     COPD [facility] obstructive [facility] disease) [facility]     Hypertension      SURGICAL HISTORY       Past Surgical History:   Procedure Laterality Date    BREAST SURGERY  2011    right lumpectomy    COLECTOMY N/A 5/9/2021    OPEN BOWEL RESECTION WITH COLOSTOMY performed by Erika Riojas MD at [address] ARTHROSCOPY  09 09 2011    arthroscopy left shoulder rotator cuff repair, subacromial decompression, debridement labrum, synovectomy    TONSILLECTOMY       FAMILY HISTORY       Family History   Problem Relation Age of Onset    COPD Mother     Heart Failure Mother     Cancer Mother         leukemia    Heart Failure Father     COPD Father     Diabetes Father     Alzheimer's Disease Sister     Cancer Brother         esophageal     SOCIAL HISTORY       Social History     Socioeconomic History    Marital status:      Spouse name: None    Number of children: None    Years of education: None    Highest education level: None Mouth: Normal mucosa, no thrush   Neck: Supple, full ROM,    Pulmonary: Lungs clear to auscultation bilaterally. Not in respiratory distress  Cardiovascular:  Regular rate and rhythm, no murmurs, gallops, or rubs. Abdomen: Soft, + BS.    distension. Nontender. colostomy/midline staples no erythema  Extremities: Moves all extremities x 4. Warm and well perfused ble peripheral edema   Pulses:  Distal pulses intact  Skin: Warm and dry left ue bruises  Neurologic:    No focal deficits  Psych: Normal Affect  PIV     DIAGNOSTIC RESULTS   RADIOLOGY:   Echo Complete    Result Date: 5/11/2021  Transthoracic Echocardiography Report (TTE)  Demographics   Patient Name    Alfredo Mary Gender            Female                  R   Medical Record  [id number]      Room Number       IC12  Number   Account #       [de-identified]     Procedure Date    05/11/2021   Corporate ID                  Ordering                                Physician   Accession       [id number]    Referring  Number                        Physician   Date of Birth   1940    [address] [facility]   Age             68 year(s)    Interpreting      Brock 66                                Physician         Physician Cardiology                                                  Stephanie Manzanares MD                                 Any Other  Procedure Type of Study   TTE procedure  Procedure Date Date: 05/11/2021 Start: 10:07 AM Study Location: Portable Technical Quality: Adequate visualization Indications:Congestive heart failure. Patient Status: Routine Height: 64 inches Weight: 203 pounds BSA: 1.97 m^2 BMI: 34.84 kg/m^2 Rhythm: Within normal limits HR: 80 bpm BP: 106/67 mmHg  Findings   Left Ventricle  Normal left ventricular chamber size. Normal left ventricular systolic function. Visually estimated LVEF is 60-65 %. No wall motion abnormalities. Right Ventricle  Grossly normal right ventricle function.    Mitral Valve  Normal mitral valve structure and function. No mitral valve regurgitation. Tricuspid Valve  Moderate tricuspid valve regurgitation. Aortic Valve  Mild aortic valve sclerosis. Trace aortic regurgitation. Pulmonic Valve  Normal pulmonic valve structure and function. No pulmonic valve regurgitation. Pericardial Effusion  No pericardial effusion. Miscellaneous  Plethoric inferior vena cava suggestive of elevated right atrial pressure. Conclusions   Summary  Normal left ventricular chamber size. Normal left ventricular systolic function. Visually estimated LVEF is 60-65 %. No wall motion abnormalities. Grossly normal right ventricle function. Moderate tricuspid valve regurgitation. Plethoric inferior vena cava suggestive of elevated right atrial pressure.    Signature   ----------------------------------------------------------------  Electronically signed by Rosi Santana MD(Interpreting  physician) on 05/11/2021 11:08 AM  ----------------------------------------------------------------  M-Mode/2D Measurements & Calculations   LV Diastolic    LV Systolic Dimension: 2.8   AV Cusp Separation: 1.8 cmLA  Dimension: 4.2  cm                           Dimension: 3.9 cmAO Root  cm              LV Volume Diastolic: 69.0 ml Dimension: 2.9 cm  LV FS:33.3 %    LV Volume Systolic: 83.9 ml  LV PW           LV EDV/LV EDV Index: 46.1  Diastolic: 1.1  FZ/71 GZ/F^2DR ESV/LV ESV  cm              Index: 28.6 ml/15ml/ m^2     RV Diastolic Dimension: 2.6  Septum          EF Calculated: 64.3 %        cm  Diastolic: 1.1  LV Mass Index: 80 l/min*m^2  cm  CO: 3.74 l/min                               LA volume/Index: 64.4 ml  CI: 1.9 l/m*m^2 LVOT: 1.9 cm  LV Mass: 157.06  g  Doppler Measurements & Calculations   MV Peak E-Wave: 1.07 AV Peak Velocity: 1.2 LVOT Peak Velocity: 0.88 m/s  m/s                  m/s                   LVOT Mean Velocity: 0.6 m/s  MV Peak A-Wave: 0.63 AV Peak Gradient:     LVOT Peak Gradient: 3.1  m/s 5.72 mmHg             mmHgLVOT Mean Gradient: 1.7  MV E/A Ratio: 1.71   AV Mean Velocity:     mmHg  MV Peak Gradient:    0.92 m/s              Estimated RVSP: 35.2 mmHg  6.8 mmHg             AV Mean Gradient: 3.7 Estimated RAP:5 mmHg  MV Mean Gradient:    mmHg  3.9 mmHg             AV VTI: 21.8 cm  MV Mean Velocity:    AV Area               TR Velocity:2.75 m/s  0.93 m/s             (Continuity):2.14     TR Gradient:30.21 mmHg  MV Deceleration      cm^2                  PV Peak Velocity: 0.82 m/s  Time: 222.1 msec                           PV Peak Gradient: 2.7 mmHg  MV P1/2t: 43.6 msec  LVOT VTI: 16.5 cm     PV Mean Velocity: 0.63 m/s  MVA by PHT:5.05 cm^2                       PV Mean Gradient: 1.7 mmHg  MV Area              Estimated PASP: 35.21  (continuity): 2.8    mmHg  cm^2  [email or web address] [id number]    CT ABDOMEN PELVIS WO CONTRAST Additional Contrast? None    Result Date: 5/9/2021  EXAMINATION: CT OF THE ABDOMEN AND PELVIS WITHOUT CONTRAST 5/9/2021 12:49 am TECHNIQUE: CT of the abdomen and pelvis was performed without the administration of intravenous contrast. Multiplanar reformatted images are provided for review. Dose modulation, iterative reconstruction, and/or weight based adjustment of the mA/kV was utilized to reduce the radiation dose to as low as reasonably achievable. COMPARISON: None. HISTORY: ORDERING SYSTEM PROVIDED HISTORY: abd pain, vomiting TECHNOLOGIST PROVIDED HISTORY: Additional Contrast?->None Reason for exam:->abd pain, vomiting Decision Support Exception - unselect if not a suspected or confirmed emergency medical condition->Emergency Medical Condition (MA) FINDINGS: The visualized portions of the lung bases are clear. There is moderate amount of free intraperitoneal air. This is compatible with perforated abdominal viscus.   Most likely this is right colonic origin as there is pneumatosis coli involving the right colon from the cecum to the hepatic flexure. This colonic ischemia as a source of pneumatosis and perforation is a consideration. The liver is cirrhotic. There is trace ascites in the abdomen and pelvis. There is cholelithiasis. There are no findings of intestinal obstruction. Appendix not visualized. There is descending and sigmoid diverticulosis. There is some mild inflammatory change involving/abutting the mid to distal descending colon which may represent minimal diverticulitis. This is not likely the source of free air. There is no abscess. Bladder is unremarkable in appearance. There is no abnormal pelvic mass seen. There is a small amount of pelvic ascites. Moderate amount of free intraperitoneal air which is compatible with perforated abdominal viscus. The right colon is the most likely source of free air as there is pneumatosis coli from the cecum to the a [place] flexure. Underlying colonic ischemia is a consideration. There is also diverticular disease involving the left colon. There is some inflammation involving/abutting the mid to distal descending colon which could represent minimal acute diverticulitis. Note that this is minimal and not the source of free air. Cholelithiasis. Cirrhotic liver. Minimal ascites. I discussed findings by phone with Dora Alas at 1:42 a.m. on 05/09/2021. CT HEAD WO CONTRAST    Result Date: 5/9/2021  EXAMINATION: CT OF THE HEAD WITHOUT CONTRAST  5/9/2021 12:49 am TECHNIQUE: CT of the head was performed without the administration of intravenous contrast. Dose modulation, iterative reconstruction, and/or weight based adjustment of the mA/kV was utilized to reduce the radiation dose to as low as reasonably achievable. COMPARISON: None. HISTORY: ORDERING SYSTEM PROVIDED HISTORY: dizzy TECHNOLOGIST PROVIDED HISTORY: Has a \"code stroke\" or \"stroke alert\" been called? ->No Reason for exam:->dizzy Decision and mediastinum are normal for AP technique. Lung volumes are decreased. There are no focal airspace opacities. Blunting of the costophrenic sulci suggest there are small dependent pleural effusions. There are no signs of pneumothorax. FINDINGS: 1. Left IJ catheter appears satisfactory, no signs of pneumothorax. 2. Decreased lung volumes, suspect small pleural effusions. XR CHEST PORTABLE    Result Date: 5/9/2021  EXAMINATION: ONE XRAY VIEW OF THE CHEST 5/9/2021 12:48 am COMPARISON: 04/05/2021 HISTORY: ORDERING SYSTEM PROVIDED HISTORY: chest pain TECHNOLOGIST PROVIDED HISTORY: Reason for exam:->chest pain FINDINGS: The lungs are without acute focal process. There is no effusion or pneumothorax. Cardiomegaly. The osseous structures are without acute process. No acute process. Cardiomegaly. US DUP UPPER EXTREMITY LEFT VENOUS    Result Date: 5/15/2021  EXAMINATION: VENOUS ULTRASOUND OF THE LEFT UPPER EXTREMITY, 5/15/2021 4:45 pm TECHNIQUE: Duplex ultrasound using B-mode/gray scaled imaging and Doppler spectral analysis and color flow was obtained of the deep venous structures of the upper extremity. COMPARISON: None used. HISTORY: ORDERING SYSTEM PROVIDED HISTORY: rule out DVT TECHNOLOGIST PROVIDED HISTORY: Reason for exam:->rule out DVT What reading provider will be dictating this exam?->CRC FINDINGS: There is normal flow and compressibility of the visualized venous structures of the left upper extremity. There is no evidence of echogenic thrombus. The veins demonstrate good compressibility with normal color flow study and spectral analysis. No evidence of DVT in the left upper extremity venous vasculature.      LABS  Recent Labs     05/14/21  0450 05/15/21  0530 05/16/21  0543   WBC 5.5 6.0 6.1   HGB 9.4* 9.4* 9.8*   HCT 28.8* 29.1* 29.7*   MCV 98.6 99.3 99.0   PLT 81* 97* 106*     Recent Labs     05/14/21  0450 05/15/21  0530 05/16/21  0543    139 136   K 3.7 3.7 3.4*    104 101 CO2 27 28 27   BUN 65* 62* 61*   CREATININE 3.0* 2.8* 2.7*   GFRAA 18 20 21   LABGLOM 15 16 17   GLUCOSE 128* 120* 102*   PROT 5.1* 5.1* 5.1*   LABALBU 2.4* 2.1* 2.3*   CALCIUM 8.2* 8.1* 8.2*   BILITOT 1.1 1.1 1.2   ALKPHOS 83 89 85   AST 21 22 25   ALT 11 11 10     No results for input(s): PROCAL in the last 72 hours. Lab Results   Component Value Date    CRP 0.1 05/06/2019     Lab Results   Component Value Date    SEDRATE 58 (H) 05/06/2019     No results found for: [id number]  Lab Results   Component Value Date    COVID19 Not Detected 05/12/2021     COVID-19/TJ-COV2 LABS  Recent Labs     05/14/21  0450 05/15/21  0530 05/16/21  0543   AST 21 22 25   ALT 11 11 10     Lab Results   Component Value Date    CHOL 236 02/08/2021    TRIG 126 02/08/2021    HDL 66 02/08/2021    LDLCALC 145 02/08/2021    LABVLDL 25 02/08/2021         Lab Results   Component Value Date    HEPAIGM Non-Reactive 06/17/2019    HEPBIGM Non-Reactive 06/17/2019    HCVABI Non-Reactive 06/17/2019     Hep C Ab Interp   Date Value Ref Range Status   06/17/2019 Non-Reactive NON REACT Final     MICROBIOLOGY:     Cultures :   Lab Results   Component Value Date    [facility]  05/09/2021     Gram Negative Dominic is too fastidious for Identification and sensitivitey  testing      [facility]  05/09/2021     Too fastidious for Identification and susceptibility testing.     BC 5 Days no growth 04/05/2021    ORG Gram negative dominic 05/09/2021     Lab Results   Component Value Date    BLOODCULT2 5 Days no growth 05/09/2021    BLOODCULT2 5 Days no growth 04/05/2021    ORG Gram negative dominic 05/09/2021           Component Value Date/Time    FUNGSM No Fungal elements seen 05/09/2021 0544         Body Fluid Culture, Sterile   Date Value Ref Range Status   05/09/2021   Final    Growth not present  Neisseria gonorrhoeae not isolated       No results found for: CXSURG  Urine Culture, Routine   Date Value Ref Range Status   07/26/2020 Growth not present  Final   12/07/2019 <10,000 CFU/mL Mixed gram positive organisms    Final   06/09/2019   Final    ,000 CFU/mL  Mixed pratibha isolated. Further workup and sensitivity testing  is not routinely indicated and will not be performed. Mixed pratibha isolated includes:  Gram negative rods  Mixed gram positive organisms       MRSA Culture Only   Date Value Ref Range Status   05/09/2021 Methicillin resistant Staph aureus not isolated  Final       Patient is a 68 y.o. female who presented with   Chief Complaint   Patient presents with    Emesis        FINAL IMPRESSION    Gn septicemia/ Gram Negative Dominic is too fastidious for Identification and sensitivitey   testing   1. Perforated abdominal viscus    2. Longstanding persistent atrial fibrillation ([address]    3. Chronic renal insufficiency, stage 2 (mild)              ciprofloxacin (CIPRO) IVPB 400 mg, Q24H SWITCH TO AZTREONAM      metronidazole (FLAGYL) 500 mg in NaCl 100 mL IVPB premix, Q8H     fluconazole (DIFLUCAN) in 0.9 % sodium chloride IVPB 200 mg,  Change to orals    ESR/CRP/PROCAL      Imaging and labs were reviewed per medical records and any ID pertinent labs were addressed with the patient. The patient/FAMILY  was educated about the diagnosis, prognosis, indications, risks and benefits of treatment. An opportunity to ask questions was given to the patient/FAMILY and questions were answered. Thank you for involving me in the care of Jeanne Yates. Please do not hesitate to call for any questions or concerns.          Electronically signed by Amee Chaidez MD on 5/16/2021 at 12:03 PM

## 2021-05-16 NOTE — PLAN OF CARE
Problem: Falls - Risk of:  Goal: Will remain free from falls  Description: Will remain free from falls  5/16/2021 1535 by Chuy Pavon RN  Outcome: Ongoing  5/16/2021 0642 by Tresa Phillips RN  Outcome: Met This Shift  Goal: Absence of physical injury  Description: Absence of physical injury  5/16/2021 1535 by Chuy Pavon RN  Outcome: Ongoing  5/16/2021 0642 by Tresa Phillips RN  Outcome: Met This Shift     Problem: Skin Integrity:  Goal: Will show no infection signs and symptoms  Description: Will show no infection signs and symptoms  5/16/2021 1535 by Chuy Pavon RN  Outcome: Ongoing  5/16/2021 0642 by Tresa Phillips RN  Outcome: Met This Shift  Goal: Absence of new skin breakdown  Description: Absence of new skin breakdown  5/16/2021 1535 by Chuy Pavon RN  Outcome: Ongoing  5/16/2021 0642 by Tresa Phillips RN  Outcome: Met This Shift     Problem:  Activity:  Goal: Ability to tolerate increased activity will improve  Description: Ability to tolerate increased activity will improve  Outcome: Ongoing

## 2021-05-16 NOTE — PROGRESS NOTES
Progress Note  Date:5/15/2021       Room:20/0620-01  Patient Fantasma Elliott     YOB: 1940     Age:80 y.o. Subjective    Subjective:  Symptoms:  Stable. She reports shortness of breath, malaise, weakness and anxiety. No cough, chest pain, headache, chest pressure, anorexia or diarrhea. Diet:  Adequate intake. Activity level: Impaired due to weakness. Pain:  She complains of pain that is moderate. pt is having bad day today. She was transferfrom icu to 6 floor. Review of Systems   Respiratory: Positive for shortness of breath. Negative for cough. Cardiovascular: Negative for chest pain. Gastrointestinal: Negative for anorexia and diarrhea. Neurological: Positive for weakness. Objective         Vitals Last 24 Hours:  TEMPERATURE:  Temp  Av.6 °F (36.4 °C)  Min: 97.5 °F (36.4 °C)  Max: 97.9 °F (36.6 °C)  RESPIRATIONS RANGE: Resp  Av.6  Min: 16  Max: 20  PULSE OXIMETRY RANGE: SpO2  Av.8 %  Min: 94 %  Max: 98 %  PULSE RANGE: Pulse  Av.8  Min: 92  Max: 100  BLOOD PRESSURE RANGE: Systolic (13QMJ), BLI:586 , Min:98 , XZL:262   ; Diastolic (04ZZS), LUF:74, Min:51, Max:60    I/O (24Hr): Intake/Output Summary (Last 24 hours) at 5/15/2021 2238  Last data filed at 5/15/2021 2000  Gross per 24 hour   Intake 720 ml   Output 4350 ml   Net -3630 ml     Objective:  General Appearance:  Comfortable. Vital signs: (most recent): Blood pressure (!) 104/56, pulse 94, temperature 97.9 °F (36.6 °C), temperature source Oral, resp. rate 18, height 5' 4\" (1.626 m), weight 203 lb 7.8 oz (92.3 kg), SpO2 96 %. Vital signs are normal.    Output: Producing urine. HEENT: Normal HEENT exam.    Lungs:  Normal respiratory rate. Heart: Irregular rhythm. S1 normal and S2 normal.    Abdomen: Abdomen is soft. Bowel sounds are normal.     Extremities: Normal range of motion. Pulses: Distal pulses are intact.     Neurological: Patient is alert and oriented to person, place and time. Normal strength. Pupils:  Pupils are equal, round, and reactive to light. Skin:  Warm. Labs/Imaging/Diagnostics    Labs:  CBC:  Recent Labs     05/13/21  0445 05/14/21  0450 05/15/21  0530   WBC 5.2 5.5 6.0   RBC 2.84* 2.92* 2.93*   HGB 9.2* 9.4* 9.4*   HCT 28.1* 28.8* 29.1*   MCV 98.9 98.6 99.3   RDW 15.4* 15.9* 15.8*   PLT 74* 81* 97*     CHEMISTRIES:  Recent Labs     05/13/21  0445 05/14/21  0450 05/15/21  0530    136 139   K 3.8  3.8 3.7 3.7    101 104   CO2 27 27 28   BUN 66* 65* 62*   CREATININE 3.0* 3.0* 2.8*   GLUCOSE 145* 128* 120*   PHOS 4.2  --   --    MG 2.0  --   --      PT/INR:No results for input(s): PROTIME, INR in the last 72 hours. APTT:No results for input(s): APTT in the last 72 hours. LIVER PROFILE:  Recent Labs     05/13/21  0445 05/14/21  0450 05/15/21  0530   AST 19 21 22   ALT 11 11 11   BILITOT 0.9 1.1 1.1   ALKPHOS 87 83 89       Imaging Last 24 Hours:  US DUP UPPER EXTREMITY LEFT VENOUS    Result Date: 5/15/2021  EXAMINATION: VENOUS ULTRASOUND OF THE LEFT UPPER EXTREMITY, 5/15/2021 4:45 pm TECHNIQUE: Duplex ultrasound using B-mode/gray scaled imaging and Doppler spectral analysis and color flow was obtained of the deep venous structures of the upper extremity. COMPARISON: None used. HISTORY: ORDERING SYSTEM PROVIDED HISTORY: rule out DVT TECHNOLOGIST PROVIDED HISTORY: Reason for exam:->rule out DVT What reading provider will be dictating this exam?->CRC FINDINGS: There is normal flow and compressibility of the visualized venous structures of the left upper extremity. There is no evidence of echogenic thrombus. The veins demonstrate good compressibility with normal color flow study and spectral analysis. No evidence of DVT in the left upper extremity venous vasculature.      Assessment//Plan           Hospital Problems         Last Modified POA    Chronic atrial fibrillation ([address] 5/9/2021 Yes    Cirrhosis of liver not due to alcohol ([facility] 5/9/2021 Yes    Perforated viscus 5/9/2021 Yes    Chronic renal impairment, stage 3a (Chronic) 5/9/2021 Yes    Acute on chronic renal insufficiency 5/9/2021 Yes    Chronic diastolic heart failure ([address] (Chronic) 5/9/2021 Yes        Assessment:  (Problem List as of 5/15/2021 Reviewed: 5/9/2021  6:12 PM by Salina Escudero MD    Chronic renal impairment, stage 3a    Chronic diastolic heart failure (HCC)    Glenoid labral tear    Synovitis    Biceps tendon tear    Rotator cuff tear    Subacromial impingement    Encephalopathy    Atrial fibrillation with rapid ventricular response (HCC)    Chronic atrial fibrillation (HCC)    Acute exacerbation of COPD with asthma ([address]    CAP (community acquired pneumonia)    Cirrhosis of liver not due to alcohol ([address]    Hypertension    Acute heart failure ([address]    Dyspnea    Non morbid obesity    Perforated viscus    Acute on chronic renal insufficiency    ). Plan:   (Cont with tx. Kidneys same).        Electronically signed by Kaycee Perez MD on 5/15/21 at 10:38 PM EDT

## 2021-05-16 NOTE — PROGRESS NOTES
Physical Therapy    Physical Therapy Treatment Note    Room #:  0620/0620-01  Patient Name: Todd Jones  YOB: 1940  MRN: [id number]    Referring Provider:   Mayuri Jiménez MD     Date of Service: 5/16/2021    Evaluating Physical Therapist: Rachel Patterson, PT  #[id number]       Diagnosis:   Perforated viscus [R19.8]   Admitted with  Abdominal pain   Date of Procedure: 5/9/2021    Procedure(s):   OPEN BOWEL RESECTION WITH COLOSTOMY   Surgeon(s): Elpidio Rebolledo MD    Patient Active Problem List   Diagnosis    Glenoid labral tear    Synovitis    Biceps tendon tear    Rotator cuff tear    Subacromial impingement    Encephalopathy    Atrial fibrillation with rapid ventricular response (HCC)    Chronic atrial fibrillation (HCC)    Acute exacerbation of COPD with asthma ([address]    CAP (community acquired pneumonia)    Cirrhosis of liver not due to alcohol ([address]    Hypertension    Acute heart failure ([address]    Dyspnea    Non morbid obesity    Perforated viscus    Chronic renal impairment, stage 3a    Acute on chronic renal insufficiency    Chronic diastolic heart failure ([address]        Tentative placement recommendation: Inpatient Rehab vs home health physical therapy if goals met     Equipment recommendation: Wheelchair      Prior Level of Function: Patient ambulated independently cane  Rehab Potential: good    for baseline    Past medical history:   Past Medical History:   Diagnosis Date    Anxiety     Cancer ([address] 2/11    right breast    Cirrhosis, non-alcoholic ([address]     COPD (chronic obstructive pulmonary disease) ([address]     Hypertension      Past Surgical History:   Procedure Laterality Date    BREAST SURGERY  2011    right lumpectomy    COLECTOMY N/A 5/9/2021    OPEN BOWEL RESECTION WITH COLOSTOMY performed by Elpidio Rebolledo MD at [address] ARTHROSCOPY  09 09 2011    arthroscopy left shoulder rotator cuff repair, subacromial decompression, debridement labrum, synovectomy    TONSILLECTOMY         Precautions: Up as tolerated, falls and alarm ,   Room air    SUBJECTIVE:    Social history: Patient lives alone   in a ranch home  with Ramp  to enter   Equipment owned: Cane, Wheeled Walker and Toilet riser,       [id number] [facility]  Mobility Inpatient   How much difficulty turning over in bed?: A Little  How much difficulty sitting down on / standing up from a chair with arms?: A Little  How much difficulty moving from lying on back to sitting on side of bed?: A Lot  How much help from another person moving to and from a bed to a chair?: A Lot  How much help from another person needed to walk in hospital room?: A Lot  How much help from another person for climbing 3-5 steps with a railing?: A Lot  AM-PAC Inpatient Mobility Raw Score : 14  AM-PAC Inpatient T-Scale Score : 38.1  Mobility Inpatient CMS 0-100% Score: 61.29  Mobility Inpatient CMS G-Code Modifier : CL    Nursing cleared patient for PT treatment. With exercises in chair only. Patient seated in bedside chair at start of session with feet propped on stool. OBJECTIVE;   Initial Evaluation  Date: 5/10/2021 Treatment Date:    5/16/2021    Short Term/ Long Term   Goals   Was pt agreeable to Eval/treatment? Yes   yes To be met in 3 days   Pain level   4/10   abdomen NA     Bed Mobility    Rolling: Moderate assist of 1    Supine to sit: Moderate assist of 1    Sit to supine: Moderate assist of 1    Scooting: Moderate assist of 1   Rolling: Not assessed     Supine to sit: Not assessed     Sit to supine: Not assessed     Scooting: Not assessed    Patient in bedside chair during session    Rolling: Supervision     Supine to sit: Supervision     Sit to supine: Supervision     Scooting: Supervision      Transfers Sit to stand:  Moderate assist of 1   Sit to stand: Not assessed      Sit to stand: Supervision      Ambulation    3-4 steps using  wheeled walker with Minimal assist of 2     not assessed       75 feet using  wheeled walker with Supervision     ROM Within functional limits        Strength BUE:  refer to OT eval  RLE:  3+/5  LLE:  3+/5   Increase strength in affected mm groups by 1/3 grade   Balance Sitting EOB:  fair    Dynamic Standing:  fair minus with wheeled walker  Sitting EOB: not assessed    Dynamic Standing: not assessed     Sitting EOB:  good    Dynamic Standing: fair       Patient is Alert & Oriented x person, place, time and situation and follows directions hard of hearing   Sensation:  Patient  denies numbness and tingling     Edema:  yes bilateral lower extremities, weeping bilateral LE  Endurance: fair       Vitals: room air    Blood Pressure supine  Blood Pressure seated     Heart Rate supine   Heart Rate seated     SPO2  At rest     SPO2 during activity  %     Patient education  Patient educated on role of Physical Therapy, risks of immobility, safety and plan of care, energy conservation,  importance of mobility while in hospital , purse lip breathing and ankle pumps, quad set and glut set for edema control, blood clot prevention      Patient response to education:   Pt verbalized understanding Pt demonstrated skill Pt requires further education in this area   Yes Partial Yes      Treatment:  Patient practiced and was instructed/facilitated in the following treatment: Patient completed seated exercises with cues for pursed lip breathing throughout d/t patient experiencing shortness of breath throughout and reports of fatigue. Therapeutic Exercises:  ankle pumps, heel raises, quad sets, glut sets, long arc quad and seated marching 10-15reps x2sets. At end of session, patient in chair with alarm instructions to use call light for needs, alarm activated  call light and phone within reach,   all lines and tubes intact, nursing notified.       Patient would benefit from continued skilled Physical Therapy to improve functional independence and quality of

## 2021-05-16 NOTE — PROGRESS NOTES
Spoke with Dr Yulia Charles regaurding low Bps this am, new order to begin fluids, improvement in BP noted

## 2021-05-16 NOTE — PROGRESS NOTES
Progress Note  Date:5/15/2021       Room:0620/0620-01  Patient Raf Andujar     YOB: 1940     Age:80 y.o. Subjective    Subjective:  Symptoms:  Stable. She reports shortness of breath, malaise, weakness and anxiety. No cough, chest pain, headache, chest pressure or anorexia. Diet:  Adequate intake. Activity level: Impaired due to weakness. Pain:  She complains of pain that is mild. Review of Systems   Respiratory: Positive for shortness of breath. Negative for cough. Cardiovascular: Negative for chest pain. Gastrointestinal: Negative for anorexia. Neurological: Positive for weakness. Objective         Vitals Last 24 Hours:  TEMPERATURE:  Temp  Av.6 °F (36.4 °C)  Min: 97.5 °F (36.4 °C)  Max: 97.9 °F (36.6 °C)  RESPIRATIONS RANGE: Resp  Av.6  Min: 16  Max: 20  PULSE OXIMETRY RANGE: SpO2  Av.8 %  Min: 94 %  Max: 98 %  PULSE RANGE: Pulse  Av.8  Min: 92  Max: 100  BLOOD PRESSURE RANGE: Systolic (42AOI), FXW:531 , Min:98 , IBO:365   ; Diastolic (70EQR), VET:70, Min:51, Max:60    I/O (24Hr): Intake/Output Summary (Last 24 hours) at 5/15/2021 2241  Last data filed at 5/15/2021 2000  Gross per 24 hour   Intake 720 ml   Output 4350 ml   Net -3630 ml     Objective:  General Appearance:  Comfortable. Vital signs: (most recent): Blood pressure (!) 104/56, pulse 94, temperature 97.9 °F (36.6 °C), temperature source Oral, resp. rate 18, height 5' 4\" (1.626 m), weight 203 lb 7.8 oz (92.3 kg), SpO2 96 %. Vital signs are normal.    Output: Producing urine. HEENT: Normal HEENT exam.    Lungs:  Normal respiratory rate. Heart: Irregular rhythm. S1 normal and S2 normal.    Abdomen: Abdomen is soft. Bowel sounds are normal.   There is no abdominal tenderness. Extremities: Normal range of motion. Pulses: Distal pulses are intact. Neurological: Patient is alert and oriented to person, place and time. Normal strength.     Pupils:  Pupils are equal, round, and reactive to light. Skin:  Warm and dry. Labs/Imaging/Diagnostics    Labs:  CBC:  Recent Labs     05/13/21  0445 05/14/21  0450 05/15/21  0530   WBC 5.2 5.5 6.0   RBC 2.84* 2.92* 2.93*   HGB 9.2* 9.4* 9.4*   HCT 28.1* 28.8* 29.1*   MCV 98.9 98.6 99.3   RDW 15.4* 15.9* 15.8*   PLT 74* 81* 97*     CHEMISTRIES:  Recent Labs     05/13/21  0445 05/14/21  0450 05/15/21  0530    136 139   K 3.8  3.8 3.7 3.7    101 104   CO2 27 27 28   BUN 66* 65* 62*   CREATININE 3.0* 3.0* 2.8*   GLUCOSE 145* 128* 120*   PHOS 4.2  --   --    MG 2.0  --   --      PT/INR:No results for input(s): PROTIME, INR in the last 72 hours. APTT:No results for input(s): APTT in the last 72 hours. LIVER PROFILE:  Recent Labs     05/13/21  0445 05/14/21  0450 05/15/21  0530   AST 19 21 22   ALT 11 11 11   BILITOT 0.9 1.1 1.1   ALKPHOS 87 83 89       Imaging Last 24 Hours:  US DUP UPPER EXTREMITY LEFT VENOUS    Result Date: 5/15/2021  EXAMINATION: VENOUS ULTRASOUND OF THE LEFT UPPER EXTREMITY, 5/15/2021 4:45 pm TECHNIQUE: Duplex ultrasound using B-mode/gray scaled imaging and Doppler spectral analysis and color flow was obtained of the deep venous structures of the upper extremity. COMPARISON: None used. HISTORY: ORDERING SYSTEM PROVIDED HISTORY: rule out DVT TECHNOLOGIST PROVIDED HISTORY: Reason for exam:->rule out DVT What reading provider will be dictating this exam?->CRC FINDINGS: There is normal flow and compressibility of the visualized venous structures of the left upper extremity. There is no evidence of echogenic thrombus. The veins demonstrate good compressibility with normal color flow study and spectral analysis. No evidence of DVT in the left upper extremity venous vasculature.      Assessment//Plan           Hospital Problems         Last Modified POA    Chronic atrial fibrillation ([address] 5/9/2021 Yes    Cirrhosis of liver not due to alcohol ([facility] 5/9/2021 Yes    Perforated viscus 5/9/2021 Yes Chronic renal impairment, stage 3a (Chronic) 5/9/2021 Yes    Acute on chronic renal insufficiency 5/9/2021 Yes    Chronic diastolic heart failure ([address] (Chronic) 5/9/2021 Yes        Assessment:  (Problem List as of 5/15/2021 Reviewed: 5/9/2021  6:12 PM by Buster Rinne, MD    Chronic renal impairment, stage 3a    Chronic diastolic heart failure (HCC)    Glenoid labral tear    Synovitis    Biceps tendon tear    Rotator cuff tear    Subacromial impingement    Encephalopathy    Atrial fibrillation with rapid ventricular response (HCC)    Chronic atrial fibrillation (HCC)    Acute exacerbation of COPD with asthma ([address]    CAP (community acquired pneumonia)    Cirrhosis of liver not due to alcohol ([address]    Hypertension    Acute heart failure ([address]    Dyspnea    Non morbid obesity    Perforated viscus    Acute on chronic renal insufficiency    ). Plan:   (Us arm neg. Kidneys functions better).        Electronically signed by Raf Varela MD on 5/15/21 at 10:41 PM EDT

## 2021-05-17 NOTE — PROGRESS NOTES
05/17/2021    LYMPHSABS 0.26 05/17/2021    EOSABS 0.18 05/17/2021    BASOSABS 0.05 05/17/2021     CMP:    Lab Results   Component Value Date     05/17/2021    K 3.8 05/17/2021     05/17/2021    CO2 27 05/17/2021    BUN 59 05/17/2021    CREATININE 2.6 05/17/2021    GFRAA 21 05/17/2021    LABGLOM 18 05/17/2021    GLUCOSE 106 05/17/2021    GLUCOSE 100 05/30/2012    PROT 5.0 05/17/2021    LABALBU 2.3 05/17/2021    LABALBU 4.1 05/30/2012    CALCIUM 8.1 05/17/2021    BILITOT 1.0 05/17/2021    ALKPHOS 92 05/17/2021    AST 24 05/17/2021    ALT 10 05/17/2021       General appearance:  NAD  Head: NCAT, PERRLA, EOMI, red conjunctiva  Neck: supple, no masses  Lungs: CTAB, equal chest rise bilateral  Heart: Reg rate  Abdomen: soft, nondistended, tender appropriately, incision C/D/I with staples.  LLQ ostomy with stool  Skin; no lesions  Gu: no cva tenderness  Extremities: extremities normal, atraumatic, no cyanosis or edema      Assessment/Plan:  Malcolm García is a 68 y.o. 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

## 2021-05-17 NOTE — PROGRESS NOTES
Nephrology Note  Lela Kaiser MD          Patient seen and examined. No family member is present at bedside. Chart reviewed. Patient is feeling better. Denies shortness of breath. Appetite good. No nausea or dysguesia. Awake and alert . In no acute distress. Vital SignsBP (!) 106/55   Pulse 101   Temp 97.4 °F (36.3 °C) (Oral)   Resp 17   Ht 5' 4\" (1.626 m)   Wt 203 lb 7.8 oz (92.3 kg)   SpO2 99%   BMI 34.93 kg/m²   24HR INTAKE/OUTPUT:    Intake/Output Summary (Last 24 hours) at 5/17/2021 1142  Last data filed at 5/17/2021 0809  Gross per 24 hour   Intake 210 ml   Output 1425 ml   Net -1215 ml         Physical Exam    Neck: No JVD  Lungs: Breath sounds decreased at the bases. No rales or ronchi. Heart: Regular rate and rhythm. No S3 gallop. No  murmrur. Abdomen: Soft non distended, there is present periincisional tenderness, ostomy with liquid stools, normal bowel sounds.    s.  Extremeties: +2 bipedal        ROS:  RESPIRATORY:  negative  CARDIOVASCULAR:  negative  GASTROINTESTINAL:  negative  GENITOURINARY:  negative    Current Facility-Administered Medications   Medication Dose Route Frequency Provider Last Rate Last Admin    ondansetron (ZOFRAN-ODT) disintegrating tablet 4 mg  4 mg Oral TID PRN Danielle Nye MD   4 mg at 05/17/21 0219    aztreonam (AZACTAM) 1,000 mg in sterile water 10 mL IV syringe  1,000 mg Intravenous Q8H Tamara Reid MD   1,000 mg at 05/17/21 4951    metoprolol tartrate (LOPRESSOR) tablet 25 mg  25 mg Oral BID Rommel Orta MD   25 mg at 05/17/21 6675    amiodarone (CORDARONE) tablet 200 mg  200 mg Oral Daily Rommel Orta MD   200 mg at 05/17/21 0850    rifaximin (XIFAXAN) tablet 550 mg  550 mg Oral BID Rommel Orta MD   550 mg at 05/17/21 0850    lactulose (CHRONULAC) 10 GM/15ML solution 20 g  20 g Oral BID Rommel Orta MD   20 g at 05/17/21 0523    ipratropium-albuterol (DUONEB) nebulizer solution 1 ampule  1 ampule Inhalation BID Rommel Orta MD   1 ampule at 05/17/21 0532    apixaban (ELIQUIS) tablet 5 mg  5 mg Oral BID Hansel Garcia MD   5 mg at 05/17/21 9257    perflutren lipid microspheres (DEFINITY) injection 1.65 mg  1.5 mL Intravenous ONCE PRN Hansel Garcia MD        fentaNYL (SUBLIMAZE) injection 50 mcg  50 mcg Intravenous Q2H PRN Hansel Garcia MD        cetirizine (ZYRTEC) tablet 10 mg  10 mg Oral Daily Cher Last MD   10 mg at 05/17/21 0851    anastrozole (ARIMIDEX) tablet 1 mg  1 mg Oral Daily Cher Last MD   1 mg at 05/17/21 0851    levothyroxine (SYNTHROID) tablet 50 mcg  50 mcg Oral Daily Cher Last MD   50 mcg at 05/17/21 0523    montelukast (SINGULAIR) tablet 10 mg  10 mg Oral Nightly Cher Last MD   10 mg at 05/16/21 2052    pantoprazole (PROTONIX) tablet 40 mg  40 mg Oral QAM AC Cher Last MD   40 mg at 05/17/21 0523    sodium chloride flush 0.9 % injection 5-40 mL  5-40 mL Intravenous 2 times per day Cher Last MD   10 mL at 05/17/21 0855    sodium chloride flush 0.9 % injection 5-40 mL  5-40 mL Intravenous PRN Cher Last MD   10 mL at 05/13/21 1636    0.9 % sodium chloride infusion  25 mL Intravenous PRN Cher Last MD        metronidazole (FLAGYL) 500 mg in NaCl 100 mL IVPB premix  500 mg Intravenous Papi Mckenna MD   Stopped at 05/17/21 1056    traMADol (ULTRAM) tablet 50 mg  50 mg Oral Q6H PRN Cher Last MD   50 mg at 05/09/21 0859    fluconazole (DIFLUCAN) in 0.9 % sodium chloride IVPB 200 mg  200 mg Intravenous Q24H Vishal Keith  mL/hr at 05/17/21 0846 200 mg at 05/17/21 0846       US DUP UPPER EXTREMITY LEFT VENOUS   Final Result   No evidence of DVT in the left upper extremity venous vasculature. XR CHEST PORTABLE   Final Result   No significant change. Continued follow-up recommended. XR CHEST PORTABLE   Final Result      CT HEAD WO CONTRAST   Final Result   No acute intracranial abnormality.       Periventricular white matter down.  IV fluids were discontinued. Will monitor off IV fluids. 2.  Hypotension. Improved. Off IV fluids. .    3.   Anemia. Hemoglobin stable. .  Follow hemoglobin hematocrit. 4.   Hypokalemia. Improved. Magnesium levels adequate. 5.  Hyperphosphatemia. Reflects acute kidney injury. Improved. Will follow. 6.   Edema. Improved. Off IV fluids. Dipesh Jimenez MD  Nephrology  Electronically signed by Dipesh Jimenez MD on 5/17/2021 at 11:42 AM      Note: This report was completed utilizing computer voice recognition software. Every effort has been made to ensure accuracy, however; inadvertent computerized transcription errors may be present.

## 2021-05-17 NOTE — PROGRESS NOTES
The diagnosis of sepsis with septic shock was confirmed as present on   admission.     Query created by: Gilson Sahni on 5/11/2021 2:49 PM      Electronically signed by:  Kaitlynn Castellano MD 5/16/2021 8:26 PM

## 2021-05-17 NOTE — PROGRESS NOTES
Physical Therapy    Physical Therapy Treatment Note    Room #:  0620/0620-01  Patient Name: Tylor Izquierdo  YOB: 1940  MRN: [id number]    Referring Provider:   Karen Kam MD     Date of Service: 5/17/2021    Evaluating Physical Therapist: Elmira Tesfaye, PT  #[id number]       Diagnosis:   Perforated viscus [R19.8]   Admitted with  Abdominal pain   Date of Procedure: 5/9/2021    Procedure(s):   OPEN BOWEL RESECTION WITH COLOSTOMY   Surgeon(s): Erika Riojas MD    Patient Active Problem List   Diagnosis    Glenoid labral tear    Synovitis    Biceps tendon tear    Rotator cuff tear    Subacromial impingement    Encephalopathy    Atrial fibrillation with rapid ventricular response (HCC)    Chronic atrial fibrillation (HCC)    Acute exacerbation of COPD with asthma ([address]    CAP (community acquired pneumonia)    Cirrhosis of liver not due to alcohol ([address]    Hypertension    Acute heart failure ([address]    Dyspnea    Non morbid obesity    Perforated viscus    Chronic renal impairment, stage 3a    Acute on chronic renal insufficiency    Chronic diastolic heart failure ([address]        Tentative placement recommendation: Inpatient Rehab vs home health physical therapy if goals met     Equipment recommendation: Wheelchair      Prior Level of Function: Patient ambulated independently cane  Rehab Potential: good    for baseline    Past medical history:   Past Medical History:   Diagnosis Date    Anxiety     Cancer ([address] 2/11    right breast    Cirrhosis, non-alcoholic ([address]     COPD (chronic obstructive pulmonary disease) ([address]     Hypertension      Past Surgical History:   Procedure Laterality Date    BREAST SURGERY  2011    right lumpectomy    COLECTOMY N/A 5/9/2021    OPEN BOWEL RESECTION WITH COLOSTOMY performed by Erika Riojas MD at [address] ARTHROSCOPY  09 09 2011    arthroscopy left shoulder rotator cuff repair, subacromial decompression, debridement labrum, synovectomy    TONSILLECTOMY         Precautions: Up as tolerated, falls and alarm ,   Room air    SUBJECTIVE:    Social history: Patient lives alone   in a ranch home  with Ramp  to enter   Equipment owned: Nargis Layne, Wheeled Walker and Toilet riser,       [id number] [facility]  Mobility Inpatient   How much difficulty turning over in bed?: A Little  How much difficulty sitting down on / standing up from a chair with arms?: A Little  How much difficulty moving from lying on back to sitting on side of bed?: A Little  How much help from another person moving to and from a bed to a chair?: A Little  How much help from another person needed to walk in hospital room?: A Lot  How much help from another person for climbing 3-5 steps with a railing?: A Lot  AM-PAC Inpatient Mobility Raw Score : 16  AM-PAC Inpatient T-Scale Score : 40.78  Mobility Inpatient CMS 0-100% Score: 54.16  Mobility Inpatient CMS G-Code Modifier : CK    Nursing cleared patient for PT treatment. OBJECTIVE;   Initial Evaluation  Date: 5/10/2021 Treatment Date:    5/17/2021    Short Term/ Long Term   Goals   Was pt agreeable to Eval/treatment? Yes   yes To be met in 3 days   Pain level   4/10   abdomen NA     Bed Mobility    Rolling: Moderate assist of 1    Supine to sit: Moderate assist of 1    Sit to supine: Moderate assist of 1    Scooting: Moderate assist of 1   Rolling: Minimal assist of 1    Supine to sit: Minimal assist of 1    Sit to supine: Not assessed     Scooting: Minimal assist of 1    Rolling: Supervision     Supine to sit: Supervision     Sit to supine: Supervision     Scooting: Supervision      Transfers Sit to stand:  Moderate assist of 1   Sit to stand: Minimal assist of 1 cues for hand placement     Sit to stand: Supervision      Ambulation    3-4 steps using  wheeled walker with Minimal assist of 2     2 x 15 feet using  wheeled walker with Minimal assist of 1  Cues for upright posture, pacing, pursed lip breathing, and safety. 75 feet using  wheeled walker with Supervision     ROM Within functional limits        Strength BUE:  refer to OT eval  RLE:  3+/5  LLE:  3+/5   Increase strength in affected mm groups by 1/3 grade   Balance Sitting EOB:  fair    Dynamic Standing:  fair minus with wheeled walker  Sitting EOB: good    Dynamic Standing: fairwheeled walker     Sitting EOB:  good    Dynamic Standing: fair       Patient is Alert & Oriented x person, place, time and situation and follows directions hard of hearing   Sensation:  Patient  denies numbness and tingling     Edema:  yes bilateral lower extremities, weeping bilateral LE  Endurance: fair       Vitals: room air    Blood Pressure supine  Blood Pressure seated     Heart Rate supine   Heart Rate seated     SPO2  At rest     SPO2 during activity  %     Patient education  Patient educated on role of Physical Therapy, risks of immobility, safety and plan of care, energy conservation,  importance of mobility while in hospital , purse lip breathing and ankle pumps, quad set and glut set for edema control, blood clot prevention      Patient response to education:   Pt verbalized understanding Pt demonstrated skill Pt requires further education in this area   Yes Partial Yes      Treatment:  Patient practiced and was instructed/facilitated in the following treatment: Patient assisted to edge of the bed. Pt sat for 10 minutes to increase dynamic sitting balance and activity tolerance. Pt stood, ambulated just outside her door and back to the chair. Pt performed seated exercises. Pt's both legs elevated on a foot stool at end of tx session. Therapeutic Exercises:  ankle pumps, hip abduction/adduction, long arc quad and seated marching, 15 - 20 reps. AROM. Verbal cues for technique.    At end of session, patient in chair with alarm instructions to use call light for needs, alarm activated  call light and phone within reach,   all lines and tubes intact, nursing notified. Patient would benefit from continued skilled Physical Therapy to improve functional independence and quality of life. Patient's/ family goals   home with family support      ASSESSMENT: Patient exhibits decreased strength, balance and endurance impairing functional mobility, transfers, gait , gait distance and tolerance to activity and are barriers to d/c and require skilled intervention during hospital stay ,  pleasant and motivated to participate with ex . Decreased strength and endurance, fatigue  limiting further ambulation distance; pt needing 2 standing rest periods where she rests her forearms on the walker to rest. Patient experienced shortness of breath with each exercises, needing cues for pursed lip breathing throughout as well as inc rest time. Plan of Care:     -Bed Mobility: Lower extremity exercises  and Upper extremity exercises   -Sitting Balance: Incorporate reaching activities to activate trunk muscles   -Standing Balance: Perform strengthening exercises in standing to promote motor control with or without upper extremity support  and Instruct patient on adequate base of support to maintain balance  -Transfers: Provide instruction on proper hand and foot position for adequate transfer of weight onto lower extremities and use of gait device, Cues for hand placement, technique and safety and Provide stabilization to prevent fall   -Gait: Gait training, Standing activities to improve: base of support, weight shift, weight bearing  and Exercises to improve hip and knee control   -Endurance: Utilize Supervised activities to increase level of endurance to allow for safe functional mobility including transfers and gait   -Stairs: Stair training with instruction on proper technique and hand placement on rail  -Instruction in independent management of O2 line    Patient and or family understand(s) diagnosis, prognosis, and plan of care.     Frequency of treatments: Patient will be seen  daily. Time in 10:05  Time out  10:30    Total Treatment Time 25 minutes     CPT codes:    Therapeutic activities (11524)   15 minutes  1 unit(s)  Therapeutic exercises (12452)   10 minutes  1 unit(s)    Dragan Lindsey  [facility]  LIC # [id number]

## 2021-05-17 NOTE — CARE COORDINATION
SS Note: Covid negative 5/9, BD max negative 5/12/21. Pt was accepted for admission to [facility], [phone]/ fax [phone] at [facility]. For the transfer to [facility] pt will need NO PRECERT, a signed RAMO and current PT/OT notes.  Pt was started on Azactam on 5/16, will need to verify with [facility] if still able to accept pt if discharged on Azactam.  Electronically signed by EVERARDO Alcaraz on 5/17/2021 at 5:42 PM

## 2021-05-17 NOTE — PROGRESS NOTES
place and time. Pupils:  Pupils are equal, round, and reactive to light. Skin:  Warm and dry. Labs/Imaging/Diagnostics    Labs:  CBC:  Recent Labs     05/14/21  0450 05/15/21  0530 05/16/21  0543   WBC 5.5 6.0 6.1   RBC 2.92* 2.93* 3.00*   HGB 9.4* 9.4* 9.8*   HCT 28.8* 29.1* 29.7*   MCV 98.6 99.3 99.0   RDW 15.9* 15.8* 16.0*   PLT 81* 97* 106*     CHEMISTRIES:  Recent Labs     05/14/21  0450 05/15/21  0530 05/16/21  0543    139 136   K 3.7 3.7 3.4*    104 101   CO2 27 28 27   BUN 65* 62* 61*   CREATININE 3.0* 2.8* 2.7*   GLUCOSE 128* 120* 102*   MG  --   --  1.9     PT/INR:No results for input(s): PROTIME, INR in the last 72 hours. APTT:No results for input(s): APTT in the last 72 hours. LIVER PROFILE:  Recent Labs     05/14/21  0450 05/15/21  0530 05/16/21  0543   AST 21 22 25   ALT 11 11 10   BILITOT 1.1 1.1 1.2   ALKPHOS 83 89 85       Imaging Last 24 Hours:  US DUP UPPER EXTREMITY LEFT VENOUS    Result Date: 5/15/2021  EXAMINATION: VENOUS ULTRASOUND OF THE LEFT UPPER EXTREMITY, 5/15/2021 4:45 pm TECHNIQUE: Duplex ultrasound using B-mode/gray scaled imaging and Doppler spectral analysis and color flow was obtained of the deep venous structures of the upper extremity. COMPARISON: None used. HISTORY: ORDERING SYSTEM PROVIDED HISTORY: rule out DVT TECHNOLOGIST PROVIDED HISTORY: Reason for exam:->rule out DVT What reading provider will be dictating this exam?->CRC FINDINGS: There is normal flow and compressibility of the visualized venous structures of the left upper extremity. There is no evidence of echogenic thrombus. The veins demonstrate good compressibility with normal color flow study and spectral analysis. No evidence of DVT in the left upper extremity venous vasculature.      Assessment//Plan           Hospital Problems         Last Modified POA    Chronic atrial fibrillation ([address] 5/9/2021 Yes    Cirrhosis of liver not due to alcohol ([facility] 5/9/2021 Yes    Perforated viscus

## 2021-05-17 NOTE — PROGRESS NOTES
ET Nurse  Admit Date: 5/9/2021 12:01 AM    Reason for consult:  colostomy    Significant history:    Past Medical History:   Diagnosis Date    Anxiety     Cancer ([facility] 2/11    right breast    Cirrhosis, non-alcoholic ([facility]     [facility]     Hypertension        Stoma assessment:  Stoma good red colr    Plan:    Reviewed ostomy instruction  Plan to cont with one piece system coloplast Edmond Whitehead RN 5/17/2021 4:06 PM

## 2021-05-17 NOTE — PROGRESS NOTES
[facility]  [facility]  Progress Note    NAME: Nelli Garcia  MR:  [id number]  :   1940  DATE OF SERVICE:21    This is a face to face encounter with Nelli Garcia 68 y.o. female on 21    CHIEF COMPLAINT     ID following for   Chief Complaint   Patient presents with    Emesis     HISTORY OF PRESENT ILLNESS   In bed pale nad no abd pain   Patient is tolerating medications. No reported adverse drug reactions. REVIEW OF SYSTEMS     As stated above in the chief complaint, otherwise negative. CURRENT MEDICATIONS      aztreonam  1,000 mg Intravenous Q8H    metoprolol tartrate  25 mg Oral BID    amiodarone  200 mg Oral Daily    rifaximin  550 mg Oral BID    lactulose  20 g Oral BID    ipratropium-albuterol  1 ampule Inhalation BID    apixaban  5 mg Oral BID    cetirizine  10 mg Oral Daily    anastrozole  1 mg Oral Daily    levothyroxine  50 mcg Oral Daily    montelukast  10 mg Oral Nightly    pantoprazole  40 mg Oral QAM AC    sodium chloride flush  5-40 mL Intravenous 2 times per day    metroNIDAZOLE  500 mg Intravenous Q8H    fluconazole  200 mg Intravenous Q24H     Continuous Infusions:   sodium chloride       PRN Meds:ondansetron, perflutren lipid microspheres, fentanNYL, sodium chloride flush, sodium chloride, traMADol    PHYSICAL EXAM     BP (!) 106/55   Pulse 101   Temp 97.4 °F (36.3 °C) (Oral)   Resp 17   Ht 5' 4\" (1.626 m)   Wt 203 lb 7.8 oz (92.3 kg)   SpO2 99%   BMI 34.93 kg/m²   Temp  Av.8 °F (36.6 °C)  Min: 97.4 °F (36.3 °C)  Max: 98 °F (36.7 °C)  Constitutional:  The patient is awake, alert, and oriented. Skin:    Warm and dry. No rashes were noted. HEENT:   Round and reactive pupils. AT/NC  Neck:    Supple to movements. Chest:   No use of accessory muscles to breathe. Symmetrical expansion. Cardiovascular:  S1 and S2 are rhythmic and regular. No murmurs appreciated.    Abdomen:   Positive bowel sounds to auscultation. Benign to palpation. Extremities:   No clubbing, no cyanosis, no edema. CNS    AAxO   Lines: p      DIAGNOSTIC RESULTS   Radiology:    Recent Labs     05/15/21  0530 05/16/21  0543 05/17/21  0520   WBC 6.0 6.1 5.2   RBC 2.93* 3.00* 2.91*   HGB 9.4* 9.8* 9.5*   HCT 29.1* 29.7* 28.3*   MCV 99.3 99.0 97.3   MCH 32.1 32.7 32.6   MCHC 32.3 33.0 33.6   RDW 15.8* 16.0* 16.2*   PLT 97* 106* 111*   MPV 10.4 10.5 10.5     Recent Labs     05/15/21  0530 05/16/21  0543 05/17/21  0520    136 139   K 3.7 3.4* 3.8    101 105   CO2 28 27 27   BUN 62* 61* 59*   CREATININE 2.8* 2.7* 2.6*   GLUCOSE 120* 102* 106*   PROT 5.1* 5.1* 5.0*   LABALBU 2.1* 2.3* 2.3*   CALCIUM 8.1* 8.2* 8.1*   BILITOT 1.1 1.2 1.0   ALKPHOS 89 85 92   AST 22 25 24   ALT 11 10 10     Lab Results   Component Value Date    CRP 0.1 05/06/2019     Lab Results   Component Value Date    SEDRATE 65 (H) 05/17/2021    SEDRATE 58 (H) 05/06/2019     Recent Labs     05/15/21  0530 05/16/21 0543 05/17/21  0520   PROCAL  --   --  0.24*   AST 22 25 24   ALT 11 10 10     Lab Results   Component Value Date    CHOL 236 02/08/2021    TRIG 126 02/08/2021    HDL 66 02/08/2021    LDLCALC 145 02/08/2021    LABVLDL 25 02/08/2021     Lab Results   Component Value Date/Time    VITD25 72 05/14/2021 04:50 AM       Microbiology:   No results for input(s): COVID19 in the last 72 hours. Lab Results   Component Value Date    BC 24 Hours no growth 05/15/2021    BC  05/09/2021     Gram Negative Dominic is too fastidious for Identification and sensitivitey  testing      [facility]  05/09/2021     Too fastidious for Identification and susceptibility testing.     BLOODCULT2 24 Hours no growth 05/15/2021    BLOODCULT2 5 Days no growth 05/09/2021    BLOODCULT2 5 Days no growth 04/05/2021    ORG Gram negative dominic 05/09/2021     No results found for: [facility]  No results found for: [facility]      Component Value Date/Time    FUNGSM No Fungal elements seen 05/09/2021 0544       MRSA Culture Only   Date Value Ref Range Status   05/09/2021 Methicillin resistant Staph aureus not isolated  Final           FINAL IMPRESSION    Gn septicemia/ Gram Negative Dominic is too fastidious for Identification and sensitivitey   testing   1. Perforated abdominal viscus    2. Longstanding persistent atrial fibrillation ([address]    3. Chronic renal insufficiency, stage 2 (mild)        aztreonam (AZACTAM) 1,000 mg in sterile water 10 mL IV syringe, Q8H  metronidazole (FLAGYL) 500 mg in NaCl 100 mL IVPB premix, Q8H  fluconazole (DIFLUCAN) in 0.9 % sodium chloride IVPB 200 mg, Q24H     ESR65/CRP/PROCAL0.24  Cont atbx feels better less nausea    · Monitor labs    Imaging and labs were reviewed per medical records. The patient was educated about the diagnosis, prognosis, indications, risks and benefits of treatment. An opportunity to ask questions was given to the patient/FAMILY. Thank you for involving me in the care of Lyric Raya will continue to follow. Please do not hesitate to call for any questions or concerns.     Electronically signed by Marina Lewis MD on 5/17/2021 at 2:22 PM

## 2021-05-18 NOTE — PLAN OF CARE
Problem: Falls - Risk of:  Goal: Will remain free from falls  Description: Will remain free from falls  5/18/2021 1311 by Gurwinder Alvarez RN  Outcome: Ongoing     Problem: Skin Integrity:  Goal: Will show no infection signs and symptoms  Description: Will show no infection signs and symptoms  5/18/2021 1311 by Gurwinder Alvarez RN  Outcome: Ongoing  5/18/2021 0258 by Pauline Machuca RN  Outcome: Ongoing     Problem:  Activity:  Goal: Ability to tolerate increased activity will improve  Description: Ability to tolerate increased activity will improve  5/18/2021 1311 by Gurwinder Alvarez RN  Outcome: Ongoing  5/18/2021 0258 by Pauline Machuca RN  Outcome: Met This Shift

## 2021-05-18 NOTE — PROGRESS NOTES
Nephrology Note  [facility]  [facility]          Patient seen and examined. No family member is present at bedside. Chart reviewed. Patient has been hypotensive. She is denying any dizziness or lightheadedness. She is sitting up in the chair. Complaining of nausea and poor appetite. Oral intake has been poor. No abdominal pain. Patient is feeling better. Denies shortness of breath. Appetite good. No nausea or dysguesia. Awake and alert . In no acute distress. Vital SignsBP (!) 81/50   Pulse 84   Temp 98 °F (36.7 °C) (Oral)   Resp 19   Ht 5' 4\" (1.626 m)   Wt 203 lb 7.8 oz (92.3 kg)   SpO2 95%   BMI 34.93 kg/m²   24HR INTAKE/OUTPUT:    Intake/Output Summary (Last 24 hours) at 5/18/2021 1202  Last data filed at 5/18/2021 0842  Gross per 24 hour   Intake 240 ml   Output 1425 ml   Net -1185 ml         Physical Exam    Neck: No JVD  Lungs: Breath sounds decreased at the bases. No rales or ronchi. Heart: Regular rate and rhythm. No S3 gallop. No  murmrur. Abdomen: Soft non distended, there is present periincisional tenderness, ostomy with liquid stools, normal bowel sounds. s.  Extremeties: +2 bipedal          ROS:  RESPIRATORY:  negative  CARDIOVASCULAR:  negative  GASTROINTESTINAL:   Positive for nausea and abdominal pain.   GENITOURINARY:  negative       Current Facility-Administered Medications   Medication Dose Route Frequency Provider Last Rate Last Admin    ondansetron (ZOFRAN-ODT) disintegrating tablet 4 mg  4 mg Oral TID PRN Urban Anglin MD   4 mg at 05/18/21 1102    aztreonam (AZACTAM) 1,000 mg in sterile water 10 mL IV syringe  1,000 mg Intravenous Q8H Marina Lewis MD   1,000 mg at 05/18/21 1018    metoprolol tartrate (LOPRESSOR) tablet 25 mg  25 mg Oral BID Torsten Rivera MD   25 mg at 05/17/21 2056    amiodarone (CORDARONE) tablet 200 mg  200 mg Oral Daily Torsten Rivera MD   200 mg at 05/18/21 0825    rifaximin (XIFAXAN) tablet 550 mg  550 mg Oral BID Torsten Rivera MD   162 vasculature. XR CHEST PORTABLE   Final Result   No significant change. Continued follow-up recommended. XR CHEST PORTABLE   Final Result      CT HEAD WO CONTRAST   Final Result   No acute intracranial abnormality. Periventricular white matter changes consistent chronic microvascular   disease. Diffuse volume loss. CT ABDOMEN PELVIS WO CONTRAST Additional Contrast? None   Final Result   Moderate amount of free intraperitoneal air which is compatible with   perforated abdominal viscus. The right colon is the most likely source of   free air as there is pneumatosis coli from the cecum to the a [place]   flexure. Underlying colonic ischemia is a consideration. There is also diverticular disease involving the left colon. There is some   inflammation involving/abutting the mid to distal descending colon which   could represent minimal acute diverticulitis. Note that this is minimal and   not the source of free air. Cholelithiasis. Cirrhotic liver. Minimal ascites. I discussed findings by phone with Daniel Vásquez at 1:42 a.m. on 05/09/2021. XR CHEST PORTABLE   Final Result   No acute process. Cardiomegaly.                Labs:    CBC:   Recent Labs     05/16/21  0543 05/17/21  0520 05/18/21  0455   WBC 6.1 5.2 5.7   HGB 9.8* 9.5* 9.6*   * 111* 137        Lab Results   Component Value Date    IRON 149 (H) 06/11/2019    TIBC 281 06/11/2019    FERRITIN 64 06/11/2019       Lab Results   Component Value Date    PTH 81 (H) 05/14/2021    CALCIUM 8.6 05/18/2021    CALCIUM 8.1 (L) 05/17/2021    CALCIUM 8.2 (L) 05/16/2021    CAION 1.24 05/16/2021    CAION 1.22 05/14/2021    PHOS 4.2 05/13/2021    PHOS 4.7 (H) 05/12/2021    PHOS 4.9 (H) 05/11/2021    MG 1.9 05/16/2021    MG 2.0 05/13/2021    MG 2.0 05/12/2021       BMP:   Recent Labs     05/16/21  0543 05/17/21  0520 05/18/21  0455    139 139   K 3.4* 3.8 3.8    105 105   CO2 27 27 27   BUN 61* 59*

## 2021-05-18 NOTE — PROGRESS NOTES
Comprehensive Nutrition Assessment    Type and Reason for Visit:  Reassess    Nutrition Recommendations/Plan: Will continue with current nutritional regimen    Nutrition Assessment:  Pt admits w/ Perforated viscus s/p Colostomy w/ pMH of Cirrhosis of Liver 2/2 BROCK, CHF, COPD, Ca breast. Pt tolerating diet w/ improved meal intakes ~50-75%. Will cotninue with current nutritional regimen    Malnutrition Assessment:  Malnutrition Status: At risk for malnutrition (Comment)    Context:  Acute Illness     Findings of the 6 clinical characteristics of malnutrition:  Energy Intake:  7 - 50% or less of estimated energy requirements for 5 or more days  Weight Loss:  No significant weight loss     Body Fat Loss:  No significant body fat loss     Muscle Mass Loss:  No significant muscle mass loss    Fluid Accumulation:  No significant fluid accumulation     Strength:  Not Performed    Estimated Daily Nutrient Needs:  Energy (kcal):  ; Weight Used for Energy Requirements:  Current     Protein (g):  75-85 (x1.3-1.5gm/kg); Weight Used for Protein Requirements:  Ideal        Fluid (ml/day):  ; Method Used for Fluid Requirements:  1 ml/kcal      Nutrition Related Findings:  A/ox4, upper dentures, +BS, ostomy tube, BLE edema +4 pitting, generalized edema +2, renal labs elevated, +I/O +4.3L      Wounds:  Surgical Incision, Wound Consult Pending       Current Nutrition Therapies:    DIET GENERAL;  Dietary Nutrition Supplements: Wound Healing Oral Supplement  Dietary Nutrition Supplements: Diabetic Oral Supplement    Anthropometric Measures:  · Height: 5' 4\" (162.6 cm)  · Current Body Weight: 203 lb (92.1 kg) (5/9 bed scale)     · Ideal Body Weight: 120 lbs; % Ideal Body Weight 169.2 %   · BMI: 34.8  · Adjusted Body Weight:  ; No Adjustment    · BMI Categories: Obese Class 1 (BMI 30.0-34. 9)       Nutrition Diagnosis:   · Increased nutrient needs related to increase demand for energy/nutrients as evidenced by intake 51-75%, wounds (surg. wound)      Nutrition Interventions:   Food and/or Nutrient Delivery:  Continue Current Diet, Continue Oral Nutrition Supplement  Nutrition Education/Counseling:  No recommendation at this time   Coordination of Nutrition Care:  Continue to monitor while inpatient    Goals:  Consume >75% most meals/ONS       Nutrition Monitoring and Evaluation:   Behavioral-Environmental Outcomes:  None Identified   Food/Nutrient Intake Outcomes:  Food and Nutrient Intake, Supplement Intake  Physical Signs/Symptoms Outcomes:  Biochemical Data, Fluid Status or Edema, Weight     Discharge Planning:     Too soon to determine     Electronically signed by Michelle Arellano RD, LD on 5/18/21 at 10:54 AM EDT    Contact: [phone]

## 2021-05-18 NOTE — PROGRESS NOTES
Physical Therapy    Physical Therapy Treatment Note    Room #:  0620/0620-01  Patient Name: Molly Holland  YOB: 1940  MRN: [id number]    Referring Provider:   Ivon Yeung MD     Date of Service: 5/18/2021    Evaluating Physical Therapist: Jason Perales, PT  #[id number]       Diagnosis:   Perforated viscus [R19.8]   Admitted with  Abdominal pain   Date of Procedure: 5/9/2021    Procedure(s):   OPEN BOWEL RESECTION WITH COLOSTOMY   Surgeon(s): Reilly Reyes MD    Patient Active Problem List   Diagnosis    Glenoid labral tear    Synovitis    Biceps tendon tear    Rotator cuff tear    Subacromial impingement    Encephalopathy    Atrial fibrillation with rapid ventricular response (HCC)    Chronic atrial fibrillation (HCC)    Acute exacerbation of COPD with asthma ([address]    CAP (community acquired pneumonia)    Cirrhosis of liver not due to alcohol ([address]    Hypertension    Acute heart failure ([address]    Dyspnea    Non morbid obesity    Perforated viscus    Chronic renal impairment, stage 3a    Acute on chronic renal insufficiency    Chronic diastolic heart failure ([address]        Tentative placement recommendation: Inpatient Rehab vs home health physical therapy if goals met     Equipment recommendation: Wheelchair      Prior Level of Function: Patient ambulated independently cane  Rehab Potential: good    for baseline    Past medical history:   Past Medical History:   Diagnosis Date    Anxiety     Cancer ([address] 2/11    right breast    Cirrhosis, non-alcoholic ([address]     COPD (chronic obstructive pulmonary disease) ([address]     Hypertension      Past Surgical History:   Procedure Laterality Date    BREAST SURGERY  2011    right lumpectomy    COLECTOMY N/A 5/9/2021    OPEN BOWEL RESECTION WITH COLOSTOMY performed by Reilly Reyes MD at [address] ARTHROSCOPY  09 09 2011    arthroscopy left shoulder rotator cuff repair, subacromial decompression, debridement labrum, synovectomy    TONSILLECTOMY         Precautions: Up as tolerated, falls and alarm ,   Room air    SUBJECTIVE:    Social history: Patient lives alone   in a ranch home  with Ramp  to enter   Equipment owned: [employer], Wheeled Walker and Toilet riser,       [id number] [facility]  Mobility Inpatient   How much difficulty turning over in bed?: A Little  How much difficulty sitting down on / standing up from a chair with arms?: A Little  How much difficulty moving from lying on back to sitting on side of bed?: A Little  How much help from another person moving to and from a bed to a chair?: A Little  How much help from another person needed to walk in hospital room?: A Little  How much help from another person for climbing 3-5 steps with a railing?: A Lot  AM-PAC Inpatient Mobility Raw Score : 17  AM-PAC Inpatient T-Scale Score : 42.13  Mobility Inpatient CMS 0-100% Score: 50.57  Mobility Inpatient CMS G-Code Modifier : CK    Nursing cleared patient for PT treatment. Pt in chair and reports nausea, nursing aware and pt was medicated earlier for nausea     OBJECTIVE;   Initial Evaluation  Date: 5/10/2021 Treatment Date:    5/18/2021    Short Term/ Long Term   Goals   Was pt agreeable to Eval/treatment? Yes   yes To be met in 3 days   Pain level   4/10   abdomen unrated     Bed Mobility    Rolling: Moderate assist of 1    Supine to sit: Moderate assist of 1    Sit to supine: Moderate assist of 1    Scooting: Moderate assist of 1   Rolling: Not assessed     Supine to sit: Not assessed     Sit to supine: Not assessed     Scooting: Not assessed     Rolling: Supervision     Supine to sit: Supervision     Sit to supine: Supervision     Scooting: Supervision      Transfers Sit to stand:  Moderate assist of 1   Sit to stand: Minimal assist of 1 cues for hand placement    Sit to stand x 5 reps for increased strength and endurance  Sit to stand: Supervision      Ambulation    3-4 steps using  wheeled walker with Minimal assist of 2     2 x 3 feet using  wheeled walker with Minimal assist of 1  Cues for upright posture, pacing, and safety. Pt shaky and returned to sit in chair       75 feet using  wheeled walker with Supervision     ROM Within functional limits        Strength BUE:  refer to OT kenyetta  RLE:  3+/5  LLE:  3+/5   Increase strength in affected mm groups by 1/3 grade   Balance Sitting EOB:  fair    Dynamic Standing:  fair minus with wheeled walker  Sitting EOB: good    Dynamic Standing: fairwheeled walker     Sitting EOB:  good    Dynamic Standing: fair       Patient is Alert & Oriented x person, place, time and situation and follows directions hard of hearing   Sensation:  Patient  denies numbness and tingling     Edema:  yes bilateral lower extremities, weeping bilateral LE  Endurance: fair       Vitals: room air    Blood Pressure supine  Blood Pressure seated     Heart Rate supine   Heart Rate seated     SPO2  At rest     SPO2 during activity  94-96%     Patient education  Patient educated on role of Physical Therapy, risks of immobility, safety and plan of care, energy conservation,  importance of mobility while in hospital , purse lip breathing and ankle pumps, quad set and glut set for edema control, blood clot prevention      Patient response to education:   Pt verbalized understanding Pt demonstrated skill Pt requires further education in this area   Yes Partial Yes      Treatment:  Patient practiced and was instructed/facilitated in the following treatment:pt in chair at start of treatment. pt performed seated exercises. Pt in chair with alarm activated and set up for lunch tray   Therapeutic Exercises:  ankle pumps, hip abduction/adduction, long arc quad and seated marching, 15 - 20 reps. AROM. Verbal cues for technique.    At end of session, patient in chair with alarm, instructions to use call light for needs, alarm activated  call light and phone within reach,   all lines and tubes intact, nursing notified. Patient would benefit from continued skilled Physical Therapy to improve functional independence and quality of life. Patient's/ family goals   home with family support      ASSESSMENT: Patient exhibits decreased strength, balance and endurance impairing functional mobility, transfers, gait , gait distance and tolerance to activity and are barriers to d/c and require skilled intervention during hospital stay , fatigued and nausea however willing  to participate with ex . Decreased strength and endurance, fatigue and nausea  limiting further functional activity, pt requires seated  rest periods  . Willing to participate with increased time given   Plan of Care:     -Bed Mobility: Lower extremity exercises  and Upper extremity exercises   -Sitting Balance: Incorporate reaching activities to activate trunk muscles   -Standing Balance: Perform strengthening exercises in standing to promote motor control with or without upper extremity support  and Instruct patient on adequate base of support to maintain balance  -Transfers: Provide instruction on proper hand and foot position for adequate transfer of weight onto lower extremities and use of gait device, Cues for hand placement, technique and safety and Provide stabilization to prevent fall   -Gait: Gait training, Standing activities to improve: base of support, weight shift, weight bearing  and Exercises to improve hip and knee control   -Endurance: Utilize Supervised activities to increase level of endurance to allow for safe functional mobility including transfers and gait   -Stairs: Stair training with instruction on proper technique and hand placement on rail  -Instruction in independent management of O2 line    Patient and or family understand(s) diagnosis, prognosis, and plan of care. Frequency of treatments: Patient will be seen  daily.        Time in 1140  Time out  1203    Total Treatment Time  23 minutes     CPT codes:    Therapeutic activities (43963)   12 minutes  1 unit(s)  Therapeutic exercises (22727)   11 minutes  1 unit(s)    Hayden Mohs [facility]  LIC# [id number]

## 2021-05-18 NOTE — PROGRESS NOTES
Physical Therapy  Room #:  0620/0620-01    Date: 2021       Patient Name: Maria Luisa Cardoso  : 1940      MRN: [id number]     Patient unavailable for physical therapy treatment due to pt asking for later treatment due to uncomfortable this morning and did not rest well last night.  Kary Kline PTA  LIC# [id number]       [id number], PTA

## 2021-05-18 NOTE — PLAN OF CARE
Problem: Falls - Risk of:  Goal: Will remain free from falls  Description: Will remain free from falls  Outcome: Met This Shift  Goal: Absence of physical injury  Description: Absence of physical injury  Outcome: Met This Shift     Problem: Skin Integrity:  Goal: Will show no infection signs and symptoms  Description: Will show no infection signs and symptoms  Outcome: Ongoing  Goal: Absence of new skin breakdown  Description: Absence of new skin breakdown  Outcome: Met This Shift     Problem:  Activity:  Goal: Ability to tolerate increased activity will improve  Description: Ability to tolerate increased activity will improve  Outcome: Met This Shift

## 2021-05-18 NOTE — PROGRESS NOTES
Perfect serve to Dr. Medina Santos per [facility] nurse request. Approval given for PICC line insertion today. Plan is for patient to continue IV antibiotics after hospital discharge to rehab. See communication order.

## 2021-05-18 NOTE — PROGRESS NOTES
[facility]  [facility]  Progress Note    NAME: Vitor Booth  MR:  [id number]  :   1940  DATE OF SERVICE:21    This is a face to face encounter with Vitor Booth 68 y.o. female on 21    CHIEF COMPLAINT     ID following for   Chief Complaint   Patient presents with    Emesis     HISTORY OF PRESENT ILLNESS   Has nausea   C/o left le sseeping  No f/c but always cold   Patient is tolerating medications. No reported adverse drug reactions. REVIEW OF SYSTEMS     As stated above in the chief complaint, otherwise negative. CURRENT MEDICATIONS      aztreonam  1,000 mg Intravenous Q8H    metoprolol tartrate  25 mg Oral BID    amiodarone  200 mg Oral Daily    rifaximin  550 mg Oral BID    lactulose  20 g Oral BID    ipratropium-albuterol  1 ampule Inhalation BID    apixaban  5 mg Oral BID    cetirizine  10 mg Oral Daily    anastrozole  1 mg Oral Daily    levothyroxine  50 mcg Oral Daily    montelukast  10 mg Oral Nightly    pantoprazole  40 mg Oral QAM AC    sodium chloride flush  5-40 mL Intravenous 2 times per day    metroNIDAZOLE  500 mg Intravenous Q8H    fluconazole  200 mg Intravenous Q24H     Continuous Infusions:   sodium chloride       PRN Meds:ondansetron, perflutren lipid microspheres, fentanNYL, sodium chloride flush, sodium chloride, traMADol    PHYSICAL EXAM     BP (!) 81/50   Pulse 84   Temp 98 °F (36.7 °C) (Oral)   Resp 19   Ht 5' 4\" (1.626 m)   Wt 203 lb 7.8 oz (92.3 kg)   SpO2 95%   BMI 34.93 kg/m²   Temp  Av.6 °F (36.4 °C)  Min: 97.5 °F (36.4 °C)  Max: 98 °F (36.7 °C)  Constitutional:  The patient is awake, alert, and oriented. Skin:    Warm and dry. No rashes were noted. HEENT:   Glasses AT/NC   Chest:   No use of accessory muscles to breathe. Symmetrical expansion. Dec bs   Cardiovascular:  S1 and S2 are rhythmic and regular. Abdomen:   Positive bowel sounds to auscultation. Benign to palpation.    Extremities: No clubbing, no cyanosis, ble left >right   Edema. ue edema   CNS    AAxO   Lines:left ij tlc      DIAGNOSTIC RESULTS   Radiology:    Recent Labs     05/16/21  0543 05/17/21  0520 05/18/21 0455   WBC 6.1 5.2 5.7   RBC 3.00* 2.91* 3.00*   HGB 9.8* 9.5* 9.6*   HCT 29.7* 28.3* 29.5*   MCV 99.0 97.3 98.3   MCH 32.7 32.6 32.0   MCHC 33.0 33.6 32.5   RDW 16.0* 16.2* 16.4*   * 111* 137   MPV 10.5 10.5 10.3     Recent Labs     05/16/21 0543 05/17/21  0520 05/18/21 0455    139 139   K 3.4* 3.8 3.8    105 105   CO2 27 27 27   BUN 61* 59* 60*   CREATININE 2.7* 2.6* 2.5*   GLUCOSE 102* 106* 111*   PROT 5.1* 5.0* 5.1*   LABALBU 2.3* 2.3* 2.2*   CALCIUM 8.2* 8.1* 8.6   BILITOT 1.2 1.0 1.0   ALKPHOS 85 92 92   AST 25 24 25   ALT 10 10 11     Lab Results   Component Value Date    CRP 0.1 05/06/2019     Lab Results   Component Value Date    SEDRATE 65 (H) 05/17/2021    SEDRATE 58 (H) 05/06/2019     Recent Labs     05/16/21 0543 05/17/21 0520 05/18/21 0455   PROCAL  --  0.24*  --    AST 25 24 25   ALT 10 10 11     Lab Results   Component Value Date    CHOL 236 02/08/2021    TRIG 126 02/08/2021    HDL 66 02/08/2021    LDLCALC 145 02/08/2021    LABVLDL 25 02/08/2021     Lab Results   Component Value Date/Time    VITD25 72 05/14/2021 04:50 AM       Microbiology:   No results for input(s): COVID19 in the last 72 hours. Lab Results   Component Value Date    BC 24 Hours no growth 05/15/2021    BC  05/09/2021     Gram Negative Dominic is too fastidious for Identification and sensitivitey  testing      [facility]  05/09/2021     Too fastidious for Identification and susceptibility testing.     BLOODCULT2 24 Hours no growth 05/15/2021    BLOODCULT2 5 Days no growth 05/09/2021    BLOODCULT2 5 Days no growth 04/05/2021    ORG Gram negative dominic 05/09/2021     No results found for: [facility]  No results found for: [facility]      Component Value Date/Time    FUNGSM No Fungal elements seen 05/09/2021 0544       MRSA Culture Only   Date Value Ref Range Status   05/09/2021 Methicillin resistant Staph aureus not isolated  Final           FINAL IMPRESSION    Gn septicemia/ Gram Negative Dominic is too fastidious for Identification and sensitivity   nenita/ckd  1. Perforated abdominal viscus    2. Longstanding persistent atrial fibrillation ([address]    3. Chronic renal insufficiency, stage 2 (mild)        aztreonam (AZACTAM) 1,000 mg in sterile water 10 mL IV syringe, Q8H  metronidazole (FLAGYL) 500 mg in NaCl 100 mL IVPB premix, Q8H  fluconazole (DIFLUCAN) in 0.9 % sodium chloride IVPB 200 mg, Q24H     ESR65/CRP/PROCAL0.24  Cont atbx feels better less nausea  Wrap ble   picc can d/c  Pt med rec  · Monitor labs    Imaging and labs were reviewed per medical records. The patient was educated about the diagnosis, prognosis, indications, risks and benefits of treatment. An opportunity to ask questions was given to the patient/FAMILY. Thank you for involving me in the care of Peyton Witt will continue to follow. Please do not hesitate to call for any questions or concerns.     Electronically signed by Mary Meneses MD on 5/18/2021 at 11:19 AM

## 2021-05-18 NOTE — PROGRESS NOTES
OT BEDSIDE TREATMENT NOTE      Date:2021  Patient Name: Analisa Ingram  MRN: [id number]  : 1940  Room: [address] [place] [address]     Referring Provider: Caleb Moyer MD     Evaluating OT: Keri Porras OTR/L #[id number]     AM-PAC Daily Activity Raw Score:      Recommended Placement: IP rehab  Recommended Adaptive Equipment: AE for LE dressing; TBD      Diagnosis:   1. Perforated abdominal viscus    2. Longstanding persistent atrial fibrillation ([facility]    3. Chronic renal insufficiency, stage 2 (mild)       Surgery: 21 -  Exploratory laparotomy, sigmoid colectomy, end colostomy, open drainage of pelvic abscess, placement of left internal jugular vein triple-lumen catheter     Pertinent Medical History:    Past Medical History        Past Medical History:   Diagnosis Date    Anxiety      Cancer ([facility]      right breast    Cirrhosis, non-alcoholic (HCC)      COPD (chronic obstructive pulmonary disease) ([facility]      Hypertension           Precautions:  Falls, alarm, O2, abdominal precautions     Home Living: Pt lives alone in a 1 story home with 3 KAYCE with 1 rail(s).  Sister, daughter and granddaughter will be taking care of patient for 3 weeks once d/c  Bathroom setup: walk in shower with grab bars, 2 built in seats   Equipment owned: [facility], cane, elevated toilet     Prior Level of Function: Independent with ADLs , Independent with IADLs; ambulated without AD  Driving: No  Occupation: Not reported     Pain Level:unquantified abdominal pain, as well as c/o nausea; nsg notified  Cognition: A&O: 4/4; Follows 1-2 step directions              Memory:  Fair+              Sequencing:  Fair+              Problem solving:  Fair+              Judgement/safety:  Fair+                Functional Assessment:    Initial Eval Status  Date: 5/10/21 Treatment Status  Date:2021 STGs = LTGs  Time frame: 5-7 days   Feeding Independent      n/t     Grooming Minimal Assist    SBA to brush hair seated in chair  Modified [place]   UB Dressing Minimal Assist     Min An per last report  Modified [place]    LB Dressing Maximal Assist     MOD A to dennys/doff pants requiring assist to thread pants over B LE feet, maintain standing balance to pull up around waist  Moderate Assist    Bathing Maximal Assist    N/T educated with regard to DME, bathing AE  Moderate Assist    Toileting Moderate Assist     MIN A per last report  Minimal Assist    Bed Mobility  Supine to sit: Moderate Assist   Sit to supine: NT, pt up in chair     MIN A supine>sit  MOD A scooting to EOB v/c's for hand placement and fall prevention  Supine to sit: Minimal Assist   Sit to supine: Minimal Assist    Functional Transfers Minimal Assist   Sit<>stand  Bed, chair  Cuing on body mechanics, hand placement and safety Min A for sit to stand transfers to/from EOB v/c's for hand placement and walker sfety  Independent    Functional Mobility Minimal Assist of 2  For safety  Several steps between bed and chair using [employer]    Min A with FWW for household distances in room v/c's for navigation around objects  Independent    Balance Sitting:     Static:  Fair+    Dynamic:fair+  Standing: fair+ Sitting:     Static:  Fair    Dynamic:fair  Standing: fair  Sitting:     Static:  good    Dynamic:good  Standing: good   Activity Tolerance Fair  /65  SpO2      Fair/fair minus; pt limited d/t pain and fatigue; nsg aware Fair+   Visual/  Perceptual Glasses: Yes   WFL          Hand Dominance Not reported       Strength ROM Additional Info:    RUE  4/5  WFL good  and wfl FMC/dexterity noted during ADL tasks         LUE 4/5  WFL good  and wfl FMC/dexterity noted during ADL tasks         Comments: Patient cleared by nursing staff. Upon arrival pt seated in chair with daughter present. Pt agreeable to OT tx session. Pt educated with regards to abdominal precautions, hand placement, safety awareness, static sitting balance,  standing balance, transfer training, , LE dressing  techniques, ECT's. At end of session pt seated in chair with tray table in front of pt,  with all lines and tubes intact, call light within reach. Overall, pt demonstrated decreased independence and safety during completion of ADL/functional transfers/mobility tasks. Pt would benefit from continued skilled OT to increase safety and independence with completion of ADL/IADL tasks for functional independence and quality of life. Pt required cues and education as noted above for safe facilitation and completion of tasks. Therapist provided skilled monitoring of patient's response during treatment session. Prior to and at the end of session, environmental modifications /IV line management completed for patients safety and efficiency of treatment session. Overall, patient demonstrates minimal/moderate difficulties with completion of BADLs and IADLs. Factors contributing to these difficulties include abdominal precautions, pain, fatigue, decreased endurance, and generalized weakness. As noted above, patient likely to benefit from further OT intervention to increase independence, safety, and overall quality of life. Treatment:     ? Functional transfers: Facilitated transfers from various surfaces with cues for body alignment, safety and hand placement. ? ADL completion: Self-care retraining for the above-mentioned ADLs; training on proper hand placement, safety technique, sequencing, and energy conservation techniques. Pt may benefit from use of AE for LE dressing d/t abdominal precautions and difficulty with crossing LE's  ? Postural Balance: Sitting/standing balance retraining to improve righting reactions with postural changes during ADLs.     · Pt has made fair progress towards set goals    · OT 1-3x/week for 5-7 days during hospitalization       Treatment Time In: 1015  Treatment Time Out: 1040          Treatment Charges: Mins Units   ADL/Home [facility]     [address] [address] [place] [address] 1   Ther Ex                 [id number]     Manual Therapy    23259     Neuro Re-ed         [id number]     Orthotic manage/training                               [id number]     Non Billable Time     Total Timed Treatment 25 [facility] 10, 207 [facility]

## 2021-05-18 NOTE — CARE COORDINATION
SOCIAL WORK / DISCHARGE PLANNING:  COVID neg 5/9, BD max neg 5/12. PER [place] REP WOULD NEED ANOTHER BD MAX PRIOR TO DC- WILL ACCEPT PENDING. Accepted to [facility], [address], fax [phone], NO PRECERT needed, bed available. Confirmed they will do the IV Azactam. PICC to be placed next date. RAMO will need signed by physician. Addendum: 329pm - BD max COVID to be done, can accept with pending. [facility], [address], fax [phone], will accept today if Picc line is placed. Oren Rodriguez, rep will need notified [phone] and transport arranged. Sumanth Knapp RN states still possibility of picc line being completed. Ambulance paper completed, faxed to unit in case dc can happen this evening.                Electronically signed by EVERARDO Do on 5/18/2021 at 11:53 AM

## 2021-05-18 NOTE — PROGRESS NOTES
Progress Note  Date:2021       Room:0620/0620-01  Patient Fantasma Elliott     YOB: 1940     Age:80 y.o. Subjective    Subjective:  Symptoms:  Stable. She reports shortness of breath, malaise, chest pain, weakness and anxiety. No headache, chest pressure or anorexia. Diet:  Adequate intake. Activity level: Impaired due to weakness. Pain:  She complains of pain that is moderate. pt is doing ok. She is feeling better. Review of Systems   Respiratory: Positive for shortness of breath. Cardiovascular: Positive for chest pain. Gastrointestinal: Negative for anorexia. Neurological: Positive for weakness. Objective         Vitals Last 24 Hours:  TEMPERATURE:  Temp  Av.7 °F (36.5 °C)  Min: 97.4 °F (36.3 °C)  Max: 98 °F (36.7 °C)  RESPIRATIONS RANGE: Resp  Av.3  Min: 16  Max: 18  PULSE OXIMETRY RANGE: SpO2  Av %  Min: 95 %  Max: 99 %  PULSE RANGE: Pulse  Av.8  Min: 90  Max: 104  BLOOD PRESSURE RANGE: Systolic (09LNL), QBT:241 , Min:96 , OG   ; Diastolic (17XDY), TEL:38, Min:55, Max:62    I/O (24Hr): Intake/Output Summary (Last 24 hours) at 2021  Last data filed at 2021 1810  Gross per 24 hour   Intake 600 ml   Output 1050 ml   Net -450 ml     Objective:  General Appearance:  Comfortable. Vital signs: (most recent): Blood pressure 96/62, pulse 92, temperature 97.5 °F (36.4 °C), temperature source Oral, resp. rate 16, height 5' 4\" (1.626 m), weight 203 lb 7.8 oz (92.3 kg), SpO2 98 %. Vital signs are normal.    Output: Producing urine. Lungs:  Normal respiratory rate. There are decreased breath sounds. Heart: Regular rhythm. S1 normal and S2 normal.    Abdomen: Abdomen is soft. Bowel sounds are normal.   There is no abdominal tenderness. Extremities: Normal range of motion. Pulses: Distal pulses are intact. Neurological: Patient is alert and oriented to person, place and time.     Pupils:  Pupils are equal, Plan:   (Cont with abx).        Electronically signed by Carlos A Mora MD on 5/17/21 at 9:16 PM EDT

## 2021-05-19 NOTE — PROGRESS NOTES
Inpatient wound care note  Pt ostomy pouch is intact  Has supply for discharge    Discussed care with her  States she has not been feeling good

## 2021-05-19 NOTE — PROGRESS NOTES
Patient having emesis, gave zofran. Holding other HS meds until emesis resolves.  No other complaints from patient

## 2021-05-19 NOTE — PROGRESS NOTES
TLC left IJ removed. 4 sutures removed. Tip intact. Tolerated well. BC sent per order. Site from left [facility].

## 2021-05-19 NOTE — CARE COORDINATION
SOCIAL WORK / DISCHARGE PLANNING:  BD MAX COVID will be needed prior to dc. [facility], [address], fax [phone] continues to follow, await medical clearance. NO PRECERT needed. Pt nauseous today. Picc line was placed. Heraclio De Leon rep updated.                    Electronically signed by EVERARDO Glass on 5/19/2021 at 2:55 PM

## 2021-05-19 NOTE — PROGRESS NOTES
[facility]  [facility]  Progress Note    NAME: Lulu De La Garza  MR:  [id number]  :   1940  DATE OF SERVICE:21    This is a face to face encounter with Lulu De La Garza 68 y.o. female on 21    CHIEF COMPLAINT     ID following for   Chief Complaint   Patient presents with    Emesis     HISTORY OF PRESENT ILLNESS   Has nausea still and vomiting  She is considering hospice      No f/c    Patient is tolerating medications. No reported adverse drug reactions. REVIEW OF SYSTEMS     As stated above in the chief complaint, otherwise negative.   CURRENT MEDICATIONS      metoclopramide  5 mg Oral TID AC    midodrine  5 mg Oral TID WC    lidocaine 1 % injection  5 mL Intradermal Once    sodium chloride flush  10 mL Intravenous 2 times per day    fluconazole  200 mg Oral Daily    metroNIDAZOLE  500 mg Oral 3 times per day    aztreonam  1,000 mg Intravenous Q8H    metoprolol tartrate  25 mg Oral BID    amiodarone  200 mg Oral Daily    rifaximin  550 mg Oral BID    lactulose  20 g Oral BID    ipratropium-albuterol  1 ampule Inhalation BID    apixaban  5 mg Oral BID    cetirizine  10 mg Oral Daily    anastrozole  1 mg Oral Daily    levothyroxine  50 mcg Oral Daily    montelukast  10 mg Oral Nightly    pantoprazole  40 mg Oral QAM AC    sodium chloride flush  5-40 mL Intravenous 2 times per day     Continuous Infusions:   sodium chloride 1,000 mL (21 1225)    sodium chloride      sodium chloride       PRN Meds:sodium chloride flush, sodium chloride, ondansetron, perflutren lipid microspheres, fentanNYL, sodium chloride flush, sodium chloride, traMADol    PHYSICAL EXAM     BP (!) 112/56   Pulse 100   Temp 98.1 °F (36.7 °C) (Oral)   Resp 18   Ht 5' 4\" (1.626 m)   Wt 203 lb 7.8 oz (92.3 kg)   SpO2 97%   BMI 34.93 kg/m²   Temp  Av.8 °F (36.6 °C)  Min: 97.4 °F (36.3 °C)  Max: 98.1 °F (36.7 °C)  Constitutional:  The patient is awake, alert, and oriented. Skin:    Warm and dry. HEENT:   Glasses AT/NC   Chest:   No use of accessory muscles to breathe. Symmetrical expansion. Dec bs   Cardiovascular:  S1 and S2 are rhythmic and regular. Abdomen:   Positive bowel sounds to auscultation. Benign to palpation. Extremities:   Dec edena ble left >right   Edema.  ue edema   CNS    AAxO   Lines:left ij tlc , lue picc placed 5/19      DIAGNOSTIC RESULTS   Radiology:    Recent Labs     05/18/21 0455 05/19/21 0430 05/19/21  1137   WBC 5.7 21.4* 20.5*   RBC 3.00* 3.20* 3.13*   HGB 9.6* 10.3* 10.3*   HCT 29.5* 31.9* 31.0*   MCV 98.3 99.7 99.0   MCH 32.0 32.2 32.9   MCHC 32.5 32.3 33.2   RDW 16.4* 16.7* 17.2*    168 175   MPV 10.3 10.5 10.1     Recent Labs     05/17/21 0520 05/18/21 0455 05/19/21  0430    139 138   K 3.8 3.8 4.2    105 104   CO2 27 27 24   BUN 59* 60* 64*   CREATININE 2.6* 2.5* 2.8*   GLUCOSE 106* 111* 118*   PROT 5.0* 5.1* 5.0*   LABALBU 2.3* 2.2* 2.2*   CALCIUM 8.1* 8.6 8.4*   BILITOT 1.0 1.0 1.3*   ALKPHOS 92 92 87   AST 24 25 24   ALT 10 11 9     Lab Results   Component Value Date    CRP 0.1 05/06/2019     Lab Results   Component Value Date    SEDRATE 65 (H) 05/17/2021    SEDRATE 58 (H) 05/06/2019     Recent Labs     05/17/21 0520 05/18/21 0455 05/19/21  0430 05/19/21  0816   PROCAL 0.24*  --   --   --    INR  --   --   --  3.0   PROTIME  --   --   --  35.7*   AST 24 25 24  --    ALT 10 11 9  --      Lab Results   Component Value Date    CHOL 236 02/08/2021    TRIG 126 02/08/2021    HDL 66 02/08/2021    LDLCALC 145 02/08/2021    LABVLDL 25 02/08/2021     Lab Results   Component Value Date/Time    VITD25 72 05/14/2021 04:50 AM       Microbiology:   Recent Labs     05/18/21  1538   COVID19 Not Detected     Lab Results   Component Value Date    BC 24 Hours no growth 05/15/2021    BC  05/09/2021     Gram Negative Dominic is too fastidious for Identification and sensitivitey  testing      [facility]  05/09/2021     Too fastidious for Identification and susceptibility testing. BLOODCULT2 24 Hours no growth 05/15/2021    BLOODCULT2 5 Days no growth 05/09/2021    BLOODCULT2 5 Days no growth 04/05/2021    ORG Gram negative dominic 05/09/2021     No results found for: [facility]  No results found for: [facility]      Component Value Date/Time    FUNGSM No Fungal elements seen 05/09/2021 0544       MRSA Culture Only   Date Value Ref Range Status   05/09/2021 Methicillin resistant Staph aureus not isolated  Final           FINAL IMPRESSION    leukocytosis    Gn septicemia/ Gram Negative Dominic is too fastidious for Identification and sensitivity   nenita/ckd  1. Perforated abdominal viscus    2. Longstanding persistent atrial fibrillation ([facility] [address] [address]    3. Chronic renal insufficiency, stage 2 (mild)        aztreonam (AZACTAM) 1,000 mg in sterile water 10 mL IV syringe, Q8H  metronidazole (FLAGYL) 500 mg in NaCl 100 mL IVPB premix, Q8H  fluconazole (DIFLUCAN) in 0.9 % sodium chloride IVPB 200 mg, Q24H     ESR65/CRP/PROCAL0.24  Cont atbx feels better less nausea  abd xry    Consider CT a/p  Wrap ble   picc        · Monitor labs    Imaging and labs were reviewed per medical records. The patient was educated about the diagnosis, prognosis, indications, risks and benefits of treatment. An opportunity to ask questions was given to the patient/FAMILY. Thank you for involving me in the care of Chicho Aragon will continue to follow. Please do not hesitate to call for any questions or concerns.     Electronically signed by Betty Oconnor MD on 5/19/2021 at 2:30 PM

## 2021-05-19 NOTE — PROGRESS NOTES
Progress Note  Date:2021       Room:0620/0620-01  Patient Milton Murphy     YOB: 1940     Age:80 y.o. Subjective    Subjective:  Symptoms:  Stable. She reports shortness of breath, malaise, weakness, anorexia and anxiety. No cough, chest pain, headache, chest pressure or diarrhea. Diet:  Adequate intake. Activity level: Normal.    Pain:  She complains of pain that is mild. getting picline  Review of Systems   Respiratory: Positive for shortness of breath. Negative for cough. Cardiovascular: Negative for chest pain. Gastrointestinal: Positive for anorexia. Negative for diarrhea. Neurological: Positive for weakness. Objective         Vitals Last 24 Hours:  TEMPERATURE:  Temp  Av.7 °F (36.5 °C)  Min: 97.4 °F (36.3 °C)  Max: 98.1 °F (36.7 °C)  RESPIRATIONS RANGE: Resp  Av.8  Min: 16  Max: 19  PULSE OXIMETRY RANGE: SpO2  Av.8 %  Min: 95 %  Max: 99 %  PULSE RANGE: Pulse  Av.9  Min: 84  Max: 113  BLOOD PRESSURE RANGE: Systolic (88WFE), JUSTIN:30 , Min:81 , AZQ:224   ; Diastolic (28MHU), NGJ:42, Min:50, Max:68    I/O (24Hr): Intake/Output Summary (Last 24 hours) at 2021 2256  Last data filed at 2021 2158  Gross per 24 hour   Intake 180 ml   Output 2075 ml   Net -1895 ml     Objective:  General Appearance:  Comfortable. Vital signs: (most recent): Blood pressure (!) 95/54, pulse 93, temperature 98.1 °F (36.7 °C), temperature source Oral, resp. rate 18, height 5' 4\" (1.626 m), weight 203 lb 7.8 oz (92.3 kg), SpO2 95 %. Vital signs are normal.    Output: Producing urine. HEENT: Normal HEENT exam.    Lungs:  Normal respiratory rate. She is not in respiratory distress. Heart: Irregular rhythm. S1 normal and S2 normal.    Abdomen: Abdomen is soft. Bowel sounds are normal.   There is no abdominal tenderness. Extremities: Normal range of motion. Pulses: Distal pulses are intact.     Neurological: Patient is alert and oriented to

## 2021-05-19 NOTE — PROGRESS NOTES
General Surgery Progress Note  [facility]    Patient's Name/Date of Birth: Todd Jones / 1940    Date: May 19, 2021     Surgeon: Neville Gabriel MD    Chief Complaint: perforated sigmoid colon    Patient Active Problem List   Diagnosis    Glenoid labral tear    Synovitis    Biceps tendon tear    Rotator cuff tear    Subacromial impingement    Encephalopathy    Atrial fibrillation with rapid ventricular response (HCC)    Chronic atrial fibrillation (HCC)    Acute exacerbation of COPD with asthma ([address]    CAP (community acquired pneumonia)    Cirrhosis of liver not due to alcohol ([address]    Hypertension    Acute heart failure ([address]    Dyspnea    Non morbid obesity    Perforated viscus    Chronic renal impairment, stage 3a    Acute on chronic renal insufficiency    Chronic diastolic heart failure ([address]       Subjective: feels better today. Persistent nausea and vomiting    Objective:  BP (!) 112/56   Pulse 100   Temp 98.1 °F (36.7 °C) (Oral)   Resp 18   Ht 5' 4\" (1.626 m)   Wt 203 lb 7.8 oz (92.3 kg)   SpO2 97%   BMI 34.93 kg/m²   Labs:  Recent Labs     05/18/21  0455 05/19/21  0430 05/19/21  1137   WBC 5.7 21.4* 20.5*   HGB 9.6* 10.3* 10.3*   HCT 29.5* 31.9* 31.0*     Lab Results   Component Value Date    CREATININE 2.8 (H) 05/19/2021    BUN 64 (H) 05/19/2021     05/19/2021    K 4.2 05/19/2021     05/19/2021    CO2 24 05/19/2021     No results for input(s): LIPASE, AMYLASE in the last 72 hours.   CBC with Differential:    Lab Results   Component Value Date    WBC 20.5 05/19/2021    RBC 3.13 05/19/2021    HGB 10.3 05/19/2021    HCT 31.0 05/19/2021     05/19/2021    MCV 99.0 05/19/2021    MCH 32.9 05/19/2021    MCHC 33.2 05/19/2021    RDW 17.2 05/19/2021    SEGSPCT 57 12/09/2013    METASPCT 0.9 12/07/2019    LYMPHOPCT 2.1 05/19/2021    MONOPCT 4.2 05/19/2021    MYELOPCT 0.9 07/26/2020    BASOPCT 0.2 05/19/2021    MONOSABS 0.85 05/19/2021    LYMPHSABS 0.42 05/19/2021    EOSABS 0.00 05/19/2021    BASOSABS 0.05 05/19/2021     CMP:    Lab Results   Component Value Date     05/19/2021    K 4.2 05/19/2021     05/19/2021    CO2 24 05/19/2021    BUN 64 05/19/2021    CREATININE 2.8 05/19/2021    GFRAA 20 05/19/2021    LABGLOM 16 05/19/2021    GLUCOSE 118 05/19/2021    GLUCOSE 100 05/30/2012    PROT 5.0 05/19/2021    LABALBU 2.2 05/19/2021    LABALBU 4.1 05/30/2012    CALCIUM 8.4 05/19/2021    BILITOT 1.3 05/19/2021    ALKPHOS 87 05/19/2021    AST 24 05/19/2021    ALT 9 05/19/2021       General appearance:  NAD  Head: NCAT, PERRLA, EOMI, red conjunctiva  Neck: supple, no masses  Lungs: CTAB, equal chest rise bilateral  Heart: Reg rate  Abdomen: soft, mildly distended, tender appropriately, incision C/D/I with staples.  LLQ ostomy with air and stool in appliance  Skin; no lesions  Gu: no cva tenderness  Extremities: extremities normal, atraumatic, no cyanosis or edema      Assessment/Plan:  Penni Bosworth is a 68 y.o. female POD 10 sigmoid colectomy with end colostomy for perforated diverticulitis, YANELIS improving    Leukocytosis today, nausea, vomiting - check CT chest/abd/pel to r/o aspiration pneumonia, ileus, abscess  Clear liquids after CT scan  Nephrology recs appreciated  ID following  Hold discharge to [facility] rehab until stable medically    Jason Hicks MD  5/19/2021  4:20 PM

## 2021-05-19 NOTE — PLAN OF CARE
Problem: Falls - Risk of:  Goal: Absence of physical injury  Description: Absence of physical injury  5/19/2021 1125 by Edy Uribe RN  Outcome: Ongoing  5/19/2021 0154 by Shanta Wren RN  Outcome: Met This Shift     Problem: Falls - Risk of:  Goal: Will remain free from falls  Description: Will remain free from falls  5/19/2021 1125 by Edy Uribe RN  Outcome: Ongoing  5/19/2021 0154 by Shanta Wren RN  Outcome: Met This Shift     Problem: Skin Integrity:  Goal: Will show no infection signs and symptoms  Description: Will show no infection signs and symptoms  5/19/2021 1125 by Edy Uribe RN  Outcome: Ongoing  5/19/2021 0154 by Shanta Wren RN  Outcome: Met This Shift     Problem: Skin Integrity:  Goal: Absence of new skin breakdown  Description: Absence of new skin breakdown  5/19/2021 1125 by Edy Uribe RN  Outcome: Ongoing  5/19/2021 0154 by Shanta Wren RN  Outcome: Met This Shift     Problem:  Activity:  Goal: Ability to tolerate increased activity will improve  Description: Ability to tolerate increased activity will improve  5/19/2021 1125 by Edy Uribe RN  Outcome: Ongoing  5/19/2021 0154 by Shanta Wren RN  Outcome: Met This Shift

## 2021-05-19 NOTE — PROGRESS NOTES
Physical Therapy    Physical Therapy Treatment Note    Room #:  0620/0620-01  Patient Name: Moira Covnigton  YOB: 1940  MRN: [id number]    Referring Provider:   Beto Rodriguez MD     Date of Service: 5/19/2021    Evaluating Physical Therapist: Vinay Thakkar, PT  #[id number]       Diagnosis:   Perforated viscus [R19.8]   Admitted with  Abdominal pain   Date of Procedure: 5/9/2021    Procedure(s):   OPEN BOWEL RESECTION WITH COLOSTOMY   Surgeon(s): Whitney Weinstein MD    Patient Active Problem List   Diagnosis    Glenoid labral tear    Synovitis    Biceps tendon tear    Rotator cuff tear    Subacromial impingement    Encephalopathy    Atrial fibrillation with rapid ventricular response (HCC)    Chronic atrial fibrillation (HCC)    Acute exacerbation of COPD with asthma ([address]    CAP (community acquired pneumonia)    Cirrhosis of liver not due to alcohol ([address]    Hypertension    Acute heart failure ([address]    Dyspnea    Non morbid obesity    Perforated viscus    Chronic renal impairment, stage 3a    Acute on chronic renal insufficiency    Chronic diastolic heart failure ([address]        Tentative placement recommendation: Inpatient Rehab    Equipment recommendation: Wheelchair      Prior Level of Function: Patient ambulated independently cane  Rehab Potential: good    for baseline    Past medical history:   Past Medical History:   Diagnosis Date    Anxiety     Cancer ([address] 2/11    right breast    Cirrhosis, non-alcoholic ([address]     COPD (chronic obstructive pulmonary disease) ([address]     Hypertension      Past Surgical History:   Procedure Laterality Date    BREAST SURGERY  2011    right lumpectomy    COLECTOMY N/A 5/9/2021    OPEN BOWEL RESECTION WITH COLOSTOMY performed by Whitney Weinstein MD at [address] ARTHROSCOPY  09 09 2011    arthroscopy left shoulder rotator cuff repair, subacromial decompression, debridement labrum, synovectomy wheeled walker with Minimal assist of 2     15 feet using  wheeled walker with Minimal assist of 1  Cues for upright posture, pacing, and safety. Very fatigued and grateful for chair to sit      75 feet using  wheeled walker with Supervision     ROM Within functional limits        Strength BUE:  refer to OT kenyetta  RLE:  3+/5  LLE:  3+/5   Increase strength in affected mm groups by 1/3 grade   Balance Sitting EOB:  fair    Dynamic Standing:  fair minus with wheeled walker  Sitting EOB: good    Dynamic Standing: fairwheeled walker     Sitting EOB:  good    Dynamic Standing: fair       Patient is Alert & Oriented x person, place, time and situation and follows directions hard of hearing   Sensation:  Patient  denies numbness and tingling     Edema:  yes bilateral lower extremities, weeping bilateral LE and trunk  Endurance: limited d/t tolerance to upright; shaky and sob despite po2 99%/ hr   99       Patient education  Patient educated on role of Physical Therapy, risks of immobility, safety and plan of care, energy conservation,  importance of mobility while in hospital , purse lip breathing and ankle pumps, quad set and glut set for edema control, blood clot prevention  Reviewed today    Patient response to education:   Pt verbalized understanding Pt demonstrated skill Pt requires further education in this area   Yes Partial Yes      Treatment:  Patient practiced and was instructed/facilitated in the following treatment: gait training, sit to stand to build strength and balalnce  Therapeutic Exercises:  reaching in standing over head, and forward to impact standing tolerance and balance, 15 - 20 reps. Verbal cues for technique. At end of session, patient in chair with alarm, instructions to use call light for needs, alarm activated  call light and phone within reach,   all lines and tubes intact, nursing notified.          Patient's/ family goals   home with family support      ASSESSMENT: Patient exhibits decreased strength, balance and endurance impairing functional mobility, transfers, gait , gait distance and tolerance to activity and are barriers to d/c and require skilled intervention during hospital stay ,     Decreased strength and endurance,   limiting  functional activity, pt requires seated  rest between activities to recover shortness of breath and po2 monitored . Willing to participate with increased time given   Plan of Care:     -Bed Mobility: Lower extremity exercises  and Upper extremity exercises   -Standing Balance: Perform strengthening exercises in standing to promote motor control with or without upper extremity support  and Instruct patient on adequate base of support to maintain balance  -Transfers: Provide instruction on proper hand and foot position for adequate transfer of weight onto lower extremities and use of gait device, Cues for hand placement, technique and safety and Provide stabilization to prevent fall   -Gait: Gait training, Standing activities to improve: base of support, weight shift, weight bearing  and Exercises to improve hip and knee control   -Endurance: Utilize Supervised activities to increase level of endurance to allow for safe functional mobility including transfers and gait   -Stairs: Stair training with instruction on proper technique and hand placement on rail    Patient and or family understand(s) diagnosis, prognosis, and plan of care. Frequency of treatments: Patient will be seen  daily.        Time in 357  Time out  430    Total Treatment Time  33 minutes     CPT codes:    Therapeutic exercises (11714)   15 minutes  1 unit(s)  Gait Training (43259) 8 minutes 1 unit(s)  Neuromuscular reeducation (74050)   5 minutes  0 unit(s)  Non billable time 5 minutes

## 2021-05-19 NOTE — PROGRESS NOTES
Nephrology Note  Donna Suarez MD          Patient seen and examined. No family member is present at bedside. Chart reviewed. Patient states that her nausea is improved but she still has poor appetite. No emesis. She is denying any dizziness or lightheadedness. She is sitting up in the in the bed. .  No abdominal pain. Awake and alert . In no acute distress    Vital SignsBP (!) 86/51   Pulse 102   Temp 97.7 °F (36.5 °C) (Oral)   Resp 18   Ht 5' 4\" (1.626 m)   Wt 203 lb 7.8 oz (92.3 kg)   SpO2 96%   BMI 34.93 kg/m²   24HR INTAKE/OUTPUT:    Intake/Output Summary (Last 24 hours) at 5/19/2021 1115  Last data filed at 5/19/2021 0602  Gross per 24 hour   Intake 180 ml   Output 1275 ml   Net -1095 ml         Physical Exam  HEENT: Dry oral mucosa  Neck: No JVD  Lungs: Breath sounds decreased at the bases. No rales or ronchi. Heart: Regular rate and rhythm. No S3 gallop. No  murmrur. Abdomen: Soft non distended, there is present periincisional tenderness, ostomy with liquid stools, normal bowel sounds.     Extremeties: +2 bipedal          ROS:  RESPIRATORY:  negative  CARDIOVASCULAR:  negative  GASTROINTESTINAL:   Positive for nausea and abdominal pain.   GENITOURINARY:  negative    Current Facility-Administered Medications   Medication Dose Route Frequency Provider Last Rate Last Admin    metoclopramide (REGLAN) tablet 5 mg  5 mg Oral TID [facility] Elina Serrano MD   5 mg at 05/19/21 1036    lidocaine 1 % injection 5 mL  5 mL Intradermal Once Brigette Pearson MD        sodium chloride flush 0.9 % injection 10 mL  10 mL Intravenous 2 times per day Brgiette Pearson MD   10 mL at 05/19/21 1041    sodium chloride flush 0.9 % injection 10 mL  10 mL Intravenous PRN Brigette Pearson MD        0.9 % sodium chloride infusion  25 mL Intravenous PRN Brigette Pearson MD        fluconazole (DIFLUCAN) tablet 200 mg  200 mg Oral Daily Brigette Pearson MD   200 mg at 05/19/21 1038    metroNIDAZOLE (FLAGYL) tablet 500 mg 500 mg Oral 3 times per day Mary Meneses MD   500 mg at 05/18/21 1654    midodrine (PROAMATINE) tablet 2.5 mg  2.5 mg Oral TID WC Flip Leann Merrill MD   2.5 mg at 05/19/21 1036    ondansetron (ZOFRAN-ODT) disintegrating tablet 4 mg  4 mg Oral TID PRN Sheeba Culp MD   4 mg at 05/18/21 2118    aztreonam (AZACTAM) 1,000 mg in sterile water 10 mL IV syringe  1,000 mg Intravenous Q8H Mary Meneses MD   1,000 mg at 05/19/21 5489    metoprolol tartrate (LOPRESSOR) tablet 25 mg  25 mg Oral BID Elan Thompson MD   25 mg at 05/17/21 2056    amiodarone (CORDARONE) tablet 200 mg  200 mg Oral Daily Elan Thompson MD   200 mg at 05/19/21 1038    rifaximin (XIFAXAN) tablet 550 mg  550 mg Oral BID Elan Thompson MD   550 mg at 05/19/21 1037    lactulose ([facility]) 10 GM/15ML solution 20 g  20 g Oral BID Elan Thompson MD   20 g at 05/18/21 1655    ipratropium-albuterol (Ellis Duverney) nebulizer solution 1 ampule  1 ampule Inhalation BID Elan Thompson MD   1 ampule at 05/17/21 0532    apixaban (ELIQUIS) tablet 5 mg  5 mg Oral BID Elan Thompson MD   5 mg at 05/19/21 1039    perflutren lipid microspheres (DEFINITY) injection 1.65 mg  1.5 mL Intravenous ONCE PRN Elan Thompson MD        fentaNYL (SUBLIMAZE) injection 50 mcg  50 mcg Intravenous Q2H PRN Elan Thompson MD        cetirizine (ZYRTEC) tablet 10 mg  10 mg Oral Daily Oren Roe MD   10 mg at 05/19/21 1036    anastrozole (ARIMIDEX) tablet 1 mg  1 mg Oral Daily Oren Roe MD   1 mg at 05/19/21 1038    levothyroxine (SYNTHROID) tablet 50 mcg  50 mcg Oral Daily Oren Roe MD   50 mcg at 05/18/21 0627    montelukast (SINGULAIR) tablet 10 mg  10 mg Oral Nightly Oren Roe MD   10 mg at 05/17/21 2056    pantoprazole (PROTONIX) tablet 40 mg  40 mg Oral QAM AC Oren Roe MD   40 mg at 05/18/21 8313    sodium chloride flush 0.9 % injection 5-40 mL  5-40 mL Intravenous 2 times per day Oren Roe MD   10 mL at 05/19/21 1041    sodium chloride flush 0.9 % injection 5-40 mL  5-40 mL Intravenous PRN Alecia Crawford MD   10 mL at 05/13/21 1636    0.9 % sodium chloride infusion  25 mL Intravenous PRN Alecia Crawford MD        traMADol (ULTRAM) tablet 50 mg  50 mg Oral Q6H PRN Alecia Crawford MD   50 mg at 05/09/21 0859       US DUP UPPER EXTREMITY LEFT VENOUS   Final Result   No evidence of DVT in the left upper extremity venous vasculature. XR CHEST PORTABLE   Final Result   No significant change. Continued follow-up recommended. XR CHEST PORTABLE   Final Result      CT HEAD WO CONTRAST   Final Result   No acute intracranial abnormality. Periventricular white matter changes consistent chronic microvascular   disease. Diffuse volume loss. CT ABDOMEN PELVIS WO CONTRAST Additional Contrast? None   Final Result   Moderate amount of free intraperitoneal air which is compatible with   perforated abdominal viscus. The right colon is the most likely source of   free air as there is pneumatosis coli from the cecum to the a [place]   flexure. Underlying colonic ischemia is a consideration. There is also diverticular disease involving the left colon. There is some   inflammation involving/abutting the mid to distal descending colon which   could represent minimal acute diverticulitis. Note that this is minimal and   not the source of free air. Cholelithiasis. Cirrhotic liver. Minimal ascites. I discussed findings by phone with Sarah Pearson at 1:42 a.m. on 05/09/2021. XR CHEST PORTABLE   Final Result   No acute process. Cardiomegaly.          IR FLUORO GUIDED CVA DEVICE PLMT/REPLACE/REMOVAL    (Results Pending)   IR ULTRASOUND GUIDANCE VASCULAR ACCESS    (Results Pending)         Labs:    CBC:   Recent Labs     05/17/21  0520 05/18/21  0455 05/19/21  0430   WBC 5.2 5.7 21.4*   HGB 9.5* 9.6* 10.3*   * 137 168        Lab Results   Component Value Date    IRON 149 (H) 06/11/2019

## 2021-05-20 NOTE — PROGRESS NOTES
[facility]  [facility]  Progress Note    NAME: Analisa Ingram  MR:  [id number]  :   1940  DATE OF SERVICE:21    This is a face to face encounter with Analisa Ingram 68 y.o. female on 21    CHIEF COMPLAINT     ID following for   Chief Complaint   Patient presents with    Emesis     HISTORY OF PRESENT ILLNESS   In chair awake and alert  Feels better  No f/c    Wbc better 11.7  cr3  Patient is tolerating medications. No reported adverse drug reactions. REVIEW OF SYSTEMS     As stated above in the chief complaint, otherwise negative. CURRENT MEDICATIONS      metoclopramide  5 mg Oral TID AC    midodrine  5 mg Oral TID WC    lidocaine 1 % injection  5 mL Intradermal Once    sodium chloride flush  10 mL Intravenous 2 times per day    fluconazole  200 mg Oral Daily    metroNIDAZOLE  500 mg Oral 3 times per day    aztreonam  1,000 mg Intravenous Q8H    metoprolol tartrate  25 mg Oral BID    amiodarone  200 mg Oral Daily    rifaximin  550 mg Oral BID    lactulose  20 g Oral BID    ipratropium-albuterol  1 ampule Inhalation BID    apixaban  5 mg Oral BID    cetirizine  10 mg Oral Daily    anastrozole  1 mg Oral Daily    levothyroxine  50 mcg Oral Daily    montelukast  10 mg Oral Nightly    pantoprazole  40 mg Oral QAM AC    sodium chloride flush  5-40 mL Intravenous 2 times per day     Continuous Infusions:   sodium chloride      sodium chloride       PRN Meds:sodium chloride flush, sodium chloride, ondansetron, perflutren lipid microspheres, fentanNYL, sodium chloride flush, sodium chloride, traMADol    PHYSICAL EXAM     /63   Pulse 89   Temp 98.1 °F (36.7 °C) (Oral)   Resp 18   Ht 5' 4\" (1.626 m)   Wt 203 lb 7.8 oz (92.3 kg)   SpO2 95%   BMI 34.93 kg/m²   Temp  Av °F (36.7 °C)  Min: 97.4 °F (36.3 °C)  Max: 98.3 °F (36.8 °C)  Constitutional:  The patient is awake, alert, and oriented. Skin:    Warm and dry.    HEENT:   Glasses AT/NC   Chest:   No use of accessory muscles to breathe. Symmetrical expansion. Dec bs   Cardiovascular:  S1 and S2 are rhythmic and regular. Abdomen:   Positive bowel sounds to auscultation. Benign to palpation. Extremities:   Dec edena ble left >right left weeping   Edema. ue edema   CNS    AAxO   Lines: lue picc placed 5/19  Mercedes yellow urine      DIAGNOSTIC RESULTS   Radiology:    Recent Labs     05/19/21  0430 05/19/21  1137 05/20/21  0611   WBC 21.4* 20.5* 11.7*   RBC 3.20* 3.13* 2.78*   HGB 10.3* 10.3* 9.1*   HCT 31.9* 31.0* 28.3*   MCV 99.7 99.0 101.8*   MCH 32.2 32.9 32.7   MCHC 32.3 33.2 32.2   RDW 16.7* 17.2* 17.1*    175 150   MPV 10.5 10.1 10.6     Recent Labs     05/18/21 0455 05/19/21  0430 05/20/21  0611    138 135   K 3.8 4.2 3.7    104 102   CO2 27 24 24   BUN 60* 64* 70*   CREATININE 2.5* 2.8* 3.0*   GLUCOSE 111* 118* 110*   PROT 5.1* 5.0* 4.7*   LABALBU 2.2* 2.2* 1.8*   CALCIUM 8.6 8.4* 8.1*   BILITOT 1.0 1.3* 0.9   ALKPHOS 92 87 74   AST 25 24 16   ALT 11 9 8     Lab Results   Component Value Date    CRP 0.1 05/06/2019     Lab Results   Component Value Date    SEDRATE 65 (H) 05/17/2021    SEDRATE 58 (H) 05/06/2019     Recent Labs     05/18/21  0455 05/19/21  0430 05/19/21  0816 05/20/21  0611   INR  --   --  3.0  --    PROTIME  --   --  35.7*  --    AST 25 24  --  16   ALT 11 9  --  8     Lab Results   Component Value Date    CHOL 236 02/08/2021    TRIG 126 02/08/2021    HDL 66 02/08/2021    LDLCALC 145 02/08/2021    LABVLDL 25 02/08/2021     Lab Results   Component Value Date/Time    VITD25 72 05/14/2021 04:50 AM       Microbiology:   Recent Labs     05/18/21  1538   COVID19 Not Detected     Lab Results   Component Value Date    BC 24 Hours no growth 05/15/2021    BC  05/09/2021     Gram Negative Dominic is too fastidious for Identification and sensitivitey  testing      [facility]  05/09/2021     Too fastidious for Identification and susceptibility testing.     BLOODCULT2 24 Hours no growth 05/15/2021    BLOODCULT2 5 Days no growth 05/09/2021    BLOODCULT2 5 Days no growth 04/05/2021    ORG Gram negative dominic 05/09/2021     No results found for: [facility]  No results found for: [facility]      Component Value Date/Time    FUNGSM No Fungal elements seen 05/09/2021 0544       MRSA Culture Only   Date Value Ref Range Status   05/09/2021 Methicillin resistant Staph aureus not isolated  Final           FINAL IMPRESSION    leukocytosis    Gn septicemia/ Gram Negative Dominic is too fastidious for Identification and sensitivity   nenita/ckd  1. Perforated abdominal viscus    2. Longstanding persistent atrial fibrillation ([address]    3. Chronic renal insufficiency, stage 2 (mild)        aztreonam (AZACTAM) 1,000 mg in sterile water 10 mL IV syringe, Q8H  metronidazole (FLAGYL) 500 mg in NaCl 100 mL IVPB premix, Q8H  fluconazole (DIFLUCAN) in 0.9 % sodium chloride IVPB 200 mg, Q24H     ESR65/CRP/PROCAL0.24  Cont atbx feels better less nausea  abd xry  No evidence of bowel obstruction. Consider CT a/p pending   Wrap ble   picc  in  Urine eos      · Monitor labs    Imaging and labs were reviewed per medical records. The patient was educated about the diagnosis, prognosis, indications, risks and benefits of treatment. An opportunity to ask questions was given to the patient/FAMILY. Thank you for involving me in the care of Jason Jaime will continue to follow. Please do not hesitate to call for any questions or concerns.     Electronically signed by Wally Sen MD on 5/20/2021 at 9:47 AM

## 2021-05-20 NOTE — PROGRESS NOTES
Physical Therapy    Physical Therapy Treatment Note    Room #:  0620/0620-01  Patient Name: Maria Luisa Cardoso  YOB: 1940  MRN: [id number]    Referring Provider:   Nettie Bland MD     Date of Service: 5/20/2021    Evaluating Physical Therapist: Rossi Carroll, PT  #[id number]       Diagnosis:   Perforated viscus [R19.8]   Admitted with  Abdominal pain   Date of Procedure: 5/9/2021    Procedure(s):   OPEN BOWEL RESECTION WITH COLOSTOMY   Surgeon(s): Elan Perry MD    Patient Active Problem List   Diagnosis    Glenoid labral tear    Synovitis    Biceps tendon tear    Rotator cuff tear    Subacromial impingement    Encephalopathy    Atrial fibrillation with rapid ventricular response (HCC)    Chronic atrial fibrillation (HCC)    Acute exacerbation of COPD with asthma ([address]    CAP (community acquired pneumonia)    Cirrhosis of liver not due to alcohol ([address]    Hypertension    Acute heart failure ([address]    Dyspnea    Non morbid obesity    Perforated viscus    Chronic renal impairment, stage 3a    Acute on chronic renal insufficiency    Chronic diastolic heart failure ([address]        Tentative placement recommendation: Inpatient Rehab    Equipment recommendation: Wheelchair      Prior Level of Function: Patient ambulated independently cane  Rehab Potential: good    for baseline    Past medical history:   Past Medical History:   Diagnosis Date    Anxiety     Cancer ([address] 2/11    right breast    Cirrhosis, non-alcoholic ([address]     COPD (chronic obstructive pulmonary disease) ([address]     Hypertension      Past Surgical History:   Procedure Laterality Date    BREAST SURGERY  2011    right lumpectomy    COLECTOMY N/A 5/9/2021    OPEN BOWEL RESECTION WITH COLOSTOMY performed by Elan Perry MD at [address] ARTHROSCOPY  09 09 2011    arthroscopy left shoulder rotator cuff repair, subacromial decompression, debridement labrum, synovectomy  TONSILLECTOMY         Precautions: Up as tolerated, falls and alarm ,   Room air    SUBJECTIVE:    Social history: Patient lives alone   in a ranch home  with Ramp  to enter   Equipment owned: Cane, Wheeled Walker and Toilet riser,       AM-PAC Basic Mobility        AM-[facility] Mobility Inpatient   How much difficulty turning over in bed?: A Little  How much difficulty sitting down on / standing up from a chair with arms?: A Lot  How much difficulty moving from lying on back to sitting on side of bed?: A Lot  How much help from another person moving to and from a bed to a chair?: A Lot  How much help from another person needed to walk in hospital room?: A Little  How much help from another person for climbing 3-5 steps with a railing?: A Lot  AM-PAC Inpatient Mobility Raw Score : 14  AM-PAC Inpatient T-Scale Score : 38.1  Mobility Inpatient CMS 0-100% Score: 61.29  Mobility Inpatient CMS G-Code Modifier : CL    Nursing cleared patient for PT treatment. Patient just finished up with OT in AM and I took over treatment, however d/t ostomy bag leaking, ended treatment. Second time seen patient only allowed bed exercises per nursing d/t low BP. OBJECTIVE;   Initial Evaluation  Date: 5/10/2021 Treatment Date:    5/20/2021    Short Term/ Long Term   Goals   Was pt agreeable to Eval/treatment? Yes   yes To be met in 3 days   Pain level   4/10   abdomen NA    Bed Mobility    Rolling: Moderate assist of 1    Supine to sit: Moderate assist of 1    Sit to supine: Moderate assist of 1    Scooting: Moderate assist of 1   Rolling: Not assessed     Supine to sit: Not assessed     Sit to supine: Moderate assist of  2    Scooting: Minimal assist of 1    Rolling: Supervision     Supine to sit: Supervision     Sit to supine: Supervision     Scooting: Supervision      Transfers Sit to stand:  Moderate assist of 1   Sit to stand: Not assessed   Sit to stand: Supervision      Ambulation    3-4 steps using  wheeled walker with Minimal assist of 2     2x5 feet using  wheeled walker with Minimal assist of 1   from chair to window cues for safety and pacing, patient fatigues quickly    75 feet using  wheeled walker with Supervision     ROM Within functional limits        Strength BUE:  refer to OT eval  RLE:  3+/5  LLE:  3+/5   Increase strength in affected mm groups by 1/3 grade   Balance Sitting EOB:  fair    Dynamic Standing:  fair minus with wheeled walker  Sitting EOB: fair holding on    Dynamic Standing: fairwheeled walker     Sitting EOB:  good    Dynamic Standing: fair       Patient is Alert & Oriented x person, place, time and situation and follows directions hard of hearing   Sensation:  Patient  denies numbness and tingling     Edema:  yes bilateral lower extremities, weeping bilateral LE and trunk  Endurance: poor d/t shakey when upright and low BP         Patient education  Patient educated on role of Physical Therapy, risks of immobility, safety and plan of care, energy conservation,  importance of mobility while in hospital  and ankle pumps, quad set and glut set for edema control, blood clot prevention      Patient response to education:   Pt verbalized understanding Pt demonstrated skill Pt requires further education in this area   Yes Partial Yes      Treatment:  Patient practiced and was instructed/facilitated in the following treatment: Patient amb to window and back to bedside, assisted into bed. Patient performed supine exercises. Therapeutic Exercises:  ankle pumps, quad sets, glut sets, heel slide, hip abduction/adduction and straight leg raise, 15 - 20 reps. Verbal cues for technique. At end of session, patient in bed with nursing present, instructions to use call light for needs, alarm activated  call light and phone within reach,   all lines and tubes intact.          Patient's/ family goals   home with family support      ASSESSMENT: Patient exhibits decreased strength, balance and endurance impairing functional mobility, transfers, gait , gait distance and tolerance to activity and are barriers to d/c and require skilled intervention during hospital stay ,     Decreased strength and endurance/fatigue, limiting  functional activity as well as BP levels. Patient completes supine exercises AROM. Plan of Care:     -Bed Mobility: Lower extremity exercises  and Upper extremity exercises   -Standing Balance: Perform strengthening exercises in standing to promote motor control with or without upper extremity support  and Instruct patient on adequate base of support to maintain balance  -Transfers: Provide instruction on proper hand and foot position for adequate transfer of weight onto lower extremities and use of gait device, Cues for hand placement, technique and safety and Provide stabilization to prevent fall   -Gait: Gait training, Standing activities to improve: base of support, weight shift, weight bearing  and Exercises to improve hip and knee control   -Endurance: Utilize Supervised activities to increase level of endurance to allow for safe functional mobility including transfers and gait   -Stairs: Stair training with instruction on proper technique and hand placement on rail    Patient and or family understand(s) diagnosis, prognosis, and plan of care. Frequency of treatments: Patient will be seen  daily.        Time in 1024 206  Time out  1034 219    Total Treatment Time  23 minutes     CPT codes:    Therapeutic activities (89286)   12 minutes  1 unit(s)  Therapeutic exercises (20378)   11 minutes  1 unit(s)     Ana Coe, PTA  #[id number]

## 2021-05-20 NOTE — PROGRESS NOTES
OT BEDSIDE TREATMENT NOTE      Date:2021  Patient Name: Joanna Domínguez  MRN: [id number]  : 1940  Room: [address] [place] [address]     Referring Provider: Rommel Orta MD     Evaluating OT: Bharat Wheeler OTR/L #[id number]          Attempted occupational therapy, however pt unavailable due to requiring nursing to change ostomy bag due to it leaking. Will continue to assess at later date/time. Continue current POC.       Jayna Marito, [address]  [place]/L [id number]

## 2021-05-20 NOTE — PROGRESS NOTES
OT BEDSIDE TREATMENT NOTE      Date:2021  Patient Name: Danika Candelario  MRN: [id number]  : 1940  Room: [address] [place] [address]     Referring Provider: Betty Pennington MD     Evaluating OT: Gerardo Drake OTR/L #[id number]     AM-PAC Daily Activity Raw Score:      Recommended Placement: IP rehab  Recommended Adaptive Equipment: AE for LE dressing; TBD      Diagnosis:   1. Perforated abdominal viscus    2. Longstanding persistent atrial fibrillation ([facility]    3. Chronic renal insufficiency, stage 2 (mild)       Surgery: 21 -  Exploratory laparotomy, sigmoid colectomy, end colostomy, open drainage of pelvic abscess, placement of left internal jugular vein triple-lumen catheter     Pertinent Medical History:    Past Medical History        Past Medical History:   Diagnosis Date    Anxiety      Cancer ([facility]      right breast    Cirrhosis, non-alcoholic (HCC)      COPD (chronic obstructive pulmonary disease) ([facility]      Hypertension           Precautions:  Falls, alarm, O2, abdominal precautions     Home Living: Pt lives alone in a 1 story home with 3 KAYCE with 1 rail(s).  Sister, daughter and granddaughter will be taking care of patient for 3 weeks once d/c  Bathroom setup: walk in shower with grab bars, 2 built in seats   Equipment owned: [employer], cane, elevated toilet     Prior Level of Function: Independent with ADLs , Independent with IADLs; ambulated without AD  Driving: No  Occupation: Not reported     Pain Level:unquantified abdominal pain, as well as c/o nausea; nsg notified  Cognition: A&O: 4/4; Follows 1-2 step directions              Memory:  Fair+              Sequencing:  Fair+              Problem solving:  Fair+              Judgement/safety:  Fair+                Functional Assessment:    Initial Eval Status  Date: 5/10/21 Treatment Status  Date:2021 STGs = LTGs  Time frame: 5-7 days   Feeding Independent      n/t     Grooming Minimal Assist    SBA simulated   Modified [place]   UB Dressing Minimal Assist     MIN A to [facility] gown simulated jacket requiring assist to thread R UE after bringing gown around back vc/'s for technique  Modified [place]    LB Dressing Maximal Assist     N/t MOD A per last report; pt requiring R LE leg to be wrapped  Moderate Assist    Bathing Maximal Assist    N/T educated with regard to DME, bathing AE  Moderate Assist    Toileting Moderate Assist     MIN A pt requiring assist with hygiene due to ostomy bag leaking   Minimal Assist    Bed Mobility  Supine to sit: Moderate Assist   Sit to supine: NT, pt up in chair     Supine>sit n/t pt seated in chair upon arrival     MOD A sit>supine pt requiring assist to bring B LE into bed  Supine to sit: Minimal Assist   Sit to supine: Minimal Assist    Functional Transfers Minimal Assist   Sit<>stand  Bed, chair  Cuing on body mechanics, hand placement and safety Min A for sit to stand transfers to/from EOB v/c's for hand placement and walker sfety  Independent    Functional Mobility Minimal Assist of 2  For safety  Several steps between bed and chair using [employer]    Min A with FWW for household distances in room v/c's for navigation around objects  Independent    Balance Sitting:     Static:  Fair+    Dynamic:fair+  Standing: fair+ Sitting:     Static:  Fair    Dynamic:fair  Standing: fair  Sitting:     Static:  good    Dynamic:good  Standing: good   Activity Tolerance Fair  /65  SpO2      Fair/fair minus; pt limited d/t pain and fatigue; nsg aware Fair+   Visual/  Perceptual Glasses: Yes   WFL          Hand Dominance Not reported       Strength ROM Additional Info:    RUE  4/5  WFL good  and wfl FMC/dexterity noted during ADL tasks         LUE 4/5  WFL good  and wfl FMC/dexterity noted during ADL tasks         Comments: Patient cleared by nursing staff. Upon arrival pt seated in chair with daughter present. Pt agreeable to OT tx session. Pt educated with regards to abdominal precautions, hand placement, safety awareness, static sitting balance,  standing balance, transfer training, , UE dressing  techniques, ECT's. At end of session pt seated in chair with tray table in front of pt,  with all lines and tubes intact, call light within reach. Overall, pt demonstrated decreased independence and safety during completion of ADL/functional transfers/mobility tasks. Pt would benefit from continued skilled OT to increase safety and independence with completion of ADL/IADL tasks for functional independence and quality of life. Pt required cues and education as noted above for safe facilitation and completion of tasks. Therapist provided skilled monitoring of patient's response during treatment session. Prior to and at the end of session, environmental modifications /IV line management completed for patients safety and efficiency of treatment session. Overall, patient demonstrates minimal/moderate difficulties with completion of BADLs and IADLs. Factors contributing to these difficulties include abdominal precautions, pain, fatigue, decreased endurance, and generalized weakness. As noted above, patient likely to benefit from further OT intervention to increase independence, safety, and overall quality of life. Treatment:     ? Functional transfers: Facilitated transfers from various surfaces with cues for body alignment, safety and hand placement. ? ADL completion: Self-care retraining for the above-mentioned ADLs; training on proper hand placement, safety technique, sequencing, and energy conservation techniques. Pt may benefit from use of AE for LE dressing d/t abdominal precautions and difficulty with crossing LE's  ? Postural Balance: Sitting/standing balance retraining to improve righting reactions with postural changes during ADLs.     · Pt has made fair progress towards set goals    · OT 1-3x/week for 5-7 days during hospitalization       Treatment Time In: 1000  Treatment Time Out: 0937

## 2021-05-20 NOTE — PROGRESS NOTES
mg Oral Daily Emely Watt MD   200 mg at 05/20/21 1009    metroNIDAZOLE (FLAGYL) tablet 500 mg  500 mg Oral 3 times per day Emely Watt MD   500 mg at 05/20/21 0603    ondansetron (ZOFRAN-ODT) disintegrating tablet 4 mg  4 mg Oral TID PRN Charley Limon MD   4 mg at 05/18/21 2118    aztreonam (AZACTAM) 1,000 mg in sterile water 10 mL IV syringe  1,000 mg Intravenous Q8H Emely Watt MD   1,000 mg at 05/20/21 0603    metoprolol tartrate (LOPRESSOR) tablet 25 mg  25 mg Oral BID Alex Holguin MD   25 mg at 05/20/21 1009    amiodarone (CORDARONE) tablet 200 mg  200 mg Oral Daily Alex Holguin MD   200 mg at 05/20/21 1010    rifaximin (XIFAXAN) tablet 550 mg  550 mg Oral BID Alex Holguin MD   550 mg at 05/20/21 1010    lactulose (CHRONULAC) 10 GM/15ML solution 20 g  20 g Oral BID Alex Holguin MD   20 g at 05/20/21 0603    ipratropium-albuterol (DUONEB) nebulizer solution 1 ampule  1 ampule Inhalation BID Alex Holguin MD   1 ampule at 05/17/21 0532    apixaban (ELIQUIS) tablet 5 mg  5 mg Oral BID Alex Holguin MD   5 mg at 05/20/21 1010    perflutren lipid microspheres (DEFINITY) injection 1.65 mg  1.5 mL Intravenous ONCE PRN Alex Holguin MD        fentaNYL (SUBLIMAZE) injection 50 mcg  50 mcg Intravenous Q2H PRN Alex Holguin MD        cetirizine (ZYRTEC) tablet 10 mg  10 mg Oral Daily Whitney Weinstein MD   10 mg at 05/20/21 1010    anastrozole (ARIMIDEX) tablet 1 mg  1 mg Oral Daily Whitney Weinstein MD   1 mg at 05/20/21 1009    levothyroxine (SYNTHROID) tablet 50 mcg  50 mcg Oral Daily Whitney Weinstein MD   50 mcg at 05/20/21 0603    montelukast (SINGULAIR) tablet 10 mg  10 mg Oral Nightly Whitney Weinstein MD   10 mg at 05/19/21 2034    pantoprazole (PROTONIX) tablet 40 mg  40 mg [facility] Whitney Weinstein MD   40 mg at 05/20/21 0603    sodium chloride flush 0.9 % injection 5-40 mL  5-40 mL Intravenous 2 times per day Whitney Weinstein MD   10 mL at 05/20/21 1012    sodium chloride flush 0.9 % injection 5-40 mL  5-40 mL Intravenous PRN Meghann Alexis MD   10 mL at 05/13/21 1636    0.9 % sodium chloride infusion  25 mL Intravenous PRN Meghann Alexis MD        traMADol (ULTRAM) tablet 50 mg  50 mg Oral Q6H PRN Meghann Alexis MD   50 mg at 05/09/21 0859       XR ABDOMEN (KUB) (SINGLE AP VIEW)   Final Result   No evidence of bowel obstruction. IR FLUORO GUIDED CVA DEVICE PLMT/REPLACE/REMOVAL   Final Result   Successful placement of a dual lumen power PICC line. IR ULTRASOUND GUIDANCE VASCULAR ACCESS   Final Result   Successful placement of a dual lumen power PICC line. US DUP UPPER EXTREMITY LEFT VENOUS   Final Result   No evidence of DVT in the left upper extremity venous vasculature. XR CHEST PORTABLE   Final Result   No significant change. Continued follow-up recommended. XR CHEST PORTABLE   Final Result      CT HEAD WO CONTRAST   Final Result   No acute intracranial abnormality. Periventricular white matter changes consistent chronic microvascular   disease. Diffuse volume loss. CT ABDOMEN PELVIS WO CONTRAST Additional Contrast? None   Final Result   Moderate amount of free intraperitoneal air which is compatible with   perforated abdominal viscus. The right colon is the most likely source of   free air as there is pneumatosis coli from the cecum to the a [place]   flexure. Underlying colonic ischemia is a consideration. There is also diverticular disease involving the left colon. There is some   inflammation involving/abutting the mid to distal descending colon which   could represent minimal acute diverticulitis. Note that this is minimal and   not the source of free air. Cholelithiasis. Cirrhotic liver. Minimal ascites. I discussed findings by phone with Elver Munoz at 1:42 a.m. on 05/09/2021. XR CHEST PORTABLE   Final Result   No acute process. Cardiomegaly.          CT ABDOMEN

## 2021-05-20 NOTE — PROGRESS NOTES
Progress Note  Date:2021       Room:0620/0620-01  Patient Kennedy Diaz     YOB: 1940     Age:80 y.o. Subjective    Subjective:  Symptoms:  Stable. She reports shortness of breath, malaise, weakness, anorexia and anxiety. No cough, chest pain, headache, chest pressure or diarrhea. Diet:  Poor intake. Activity level: Impaired due to weakness. Pain:  She complains of pain that is moderate. pt is not doing well. Severe nausea  Review of Systems   Respiratory: Positive for shortness of breath. Negative for cough. Cardiovascular: Negative for chest pain. Gastrointestinal: Positive for anorexia. Negative for diarrhea. Neurological: Positive for weakness. Objective         Vitals Last 24 Hours:  TEMPERATURE:  Temp  Av.9 °F (36.6 °C)  Min: 97.4 °F (36.3 °C)  Max: 98.3 °F (36.8 °C)  RESPIRATIONS RANGE: Resp  Av.9  Min: 18  Max: 20  PULSE OXIMETRY RANGE: SpO2  Av.1 %  Min: 95 %  Max: 97 %  PULSE RANGE: Pulse  Av.6  Min: 93  Max: 132  BLOOD PRESSURE RANGE: Systolic (72OVP), RBH:19 , Min:86 , NZB:352   ; Diastolic (86RTO), SED:28, Min:50, Max:80    I/O (24Hr): Intake/Output Summary (Last 24 hours) at 2021 2213  Last data filed at 2021 1906  Gross per 24 hour   Intake 360 ml   Output 1460 ml   Net -1100 ml     Objective:  General Appearance:  Comfortable. Vital signs: (most recent): Blood pressure 90/62, pulse 93, temperature 98.3 °F (36.8 °C), temperature source Oral, resp. rate 18, height 5' 4\" (1.626 m), weight 203 lb 7.8 oz (92.3 kg), SpO2 95 %. Vital signs are normal.    Output: Producing urine. HEENT: Normal HEENT exam.    Lungs:  Normal respiratory rate. Heart: Regular rhythm. S1 normal and S2 normal.    Abdomen: Abdomen is soft. Bowel sounds are normal.     Extremities: Normal range of motion. Neurological: Patient is alert and oriented to person, place and time.     Pupils:  Pupils are equal, round, and reactive to light.    Skin:  Warm and dry. Labs/Imaging/Diagnostics    Labs:  CBC:  Recent Labs     05/18/21  0455 05/19/21  0430 05/19/21  1137   WBC 5.7 21.4* 20.5*   RBC 3.00* 3.20* 3.13*   HGB 9.6* 10.3* 10.3*   HCT 29.5* 31.9* 31.0*   MCV 98.3 99.7 99.0   RDW 16.4* 16.7* 17.2*    168 175     CHEMISTRIES:  Recent Labs     05/17/21  0520 05/18/21  0455 05/19/21  0430    139 138   K 3.8 3.8 4.2    105 104   CO2 27 27 24   BUN 59* 60* 64*   CREATININE 2.6* 2.5* 2.8*   GLUCOSE 106* 111* 118*   PHOS  --   --  4.9*     PT/INR:  Recent Labs     05/19/21  0816   PROTIME 35.7*   INR 3.0     APTT:  Recent Labs     05/19/21  0816   APTT 35.0     LIVER PROFILE:  Recent Labs     05/17/21  0520 05/18/21  0455 05/19/21  0430   AST 24 25 24   ALT 10 11 9   BILITOT 1.0 1.0 1.3*   ALKPHOS 92 92 87       Imaging Last 24 Hours:  XR ABDOMEN (KUB) (SINGLE AP VIEW)    Result Date: 5/19/2021  EXAMINATION: ONE SUPINE XRAY VIEW(S) OF THE ABDOMEN 5/19/2021 3:39 pm COMPARISON: None. HISTORY: ORDERING SYSTEM PROVIDED HISTORY: nausea/vomiting/abd pain -abd pain TECHNOLOGIST PROVIDED HISTORY: Reason for exam:->nausea/vomiting/abd pain -abd pain FINDINGS: Nonspecific bowel gas pattern without evidence of obstruction. No abnormal calcifications. No acute osseous abnormality. Vertically aligned surgical staples project over the abdominal midline. Mercedes catheter identified. No evidence of bowel obstruction. IR FLUORO GUIDED CVA DEVICE PLMT/REPLACE/REMOVAL    Result Date: 5/19/2021  PROCEDURE: US GUIDED VASCULAR ACCESS; IR FLUOROSCOPY GUIDED CENTRAL VENOUS ACCESS DEVICE PLACEMENT 5/19/2021 HISTORY: ORDERING SYSTEM PROVIDED HISTORY: venous access TECHNOLOGIST PROVIDED HISTORY: Reason for exam:->venous access TECHNIQUE: Fluoroscopic guided ultrasound-guided CONTRAST: None SEDATION: None FLUOROSCOPY DOSE AND TYPE OR TIME AND EXPOSURES: Fluoroscopy time equals 1 minutes.   Total dose equals 28 mGy DESCRIPTION OF PROCEDURE: Informed consent was obtained after a detailed explanation of the procedure including risks, benefits, and alternatives. Universal protocol was observed. FINDINGS: The patient's left arm was prepped and draped in a sterile fashion using a maximum sterile barrier technique. Following the uneventful administration of lidocaine I introduced a micro puncture needle into the left break vein using ultrasound guidance. Through the micropuncture needle a microwire was introduced into the vein. Over the microwire a peel-away sheath was placed within the vein. A catheter measurement was obtained. The catheter was then trimmed to 45 cm and introduced over the wire into the left brachial vein. The catheter tip was placed at the SVC right atrial junction. The peel-away sheath was then removed and the PICC line was secured to the skin. A sterile dressing was applied. The patient tolerated the procedure well and no complications. A fluoroscopic image was obtained which confirms position of the PICC line catheter. Successful placement of a dual lumen power PICC line. IR ULTRASOUND GUIDANCE VASCULAR ACCESS    Result Date: 5/19/2021  PROCEDURE: US GUIDED VASCULAR ACCESS; IR FLUOROSCOPY GUIDED CENTRAL VENOUS ACCESS DEVICE PLACEMENT 5/19/2021 HISTORY: ORDERING SYSTEM PROVIDED HISTORY: venous access TECHNOLOGIST PROVIDED HISTORY: Reason for exam:->venous access TECHNIQUE: Fluoroscopic guided ultrasound-guided CONTRAST: None SEDATION: None FLUOROSCOPY DOSE AND TYPE OR TIME AND EXPOSURES: Fluoroscopy time equals 1 minutes. Total dose equals 28 mGy DESCRIPTION OF PROCEDURE: Informed consent was obtained after a detailed explanation of the procedure including risks, benefits, and alternatives. Universal protocol was observed. FINDINGS: The patient's left arm was prepped and draped in a sterile fashion using a maximum sterile barrier technique.  Following the uneventful administration of lidocaine I introduced a micro puncture needle into the left break vein using ultrasound guidance. Through the micropuncture needle a microwire was introduced into the vein. Over the microwire a peel-away sheath was placed within the vein. A catheter measurement was obtained. The catheter was then trimmed to 45 cm and introduced over the wire into the left brachial vein. The catheter tip was placed at the SVC right atrial junction. The peel-away sheath was then removed and the PICC line was secured to the skin. A sterile dressing was applied. The patient tolerated the procedure well and no complications. A fluoroscopic image was obtained which confirms position of the PICC line catheter. Successful placement of a dual lumen power PICC line. Assessment//Plan           Hospital Problems         Last Modified POA    Chronic atrial fibrillation ([address] 5/9/2021 Yes    Cirrhosis of liver not due to alcohol ([address] 5/9/2021 Yes    Perforated viscus 5/9/2021 Yes    Chronic renal impairment, stage 3a (Chronic) 5/9/2021 Yes    Acute on chronic renal insufficiency 5/9/2021 Yes    Chronic diastolic heart failure ([address] (Chronic) 5/9/2021 Yes        Assessment:  (Problem List as of 5/19/2021 Reviewed: 5/9/2021  6:12 PM by Tiffanie Pina MD    Chronic renal impairment, stage 3a    Chronic diastolic heart failure (HCC)    Glenoid labral tear    Synovitis    Biceps tendon tear    Rotator cuff tear    Subacromial impingement    Encephalopathy    Atrial fibrillation with rapid ventricular response (HCC)    Chronic atrial fibrillation (HCC)    Acute exacerbation of COPD with asthma ([address]    CAP (community acquired pneumonia)    Cirrhosis of liver not due to alcohol ([address]    Hypertension    Acute heart failure ([address]    Dyspnea    Non morbid obesity    Perforated viscus    Acute on chronic renal insufficiency    ). Plan:   (Ct scan abd. Repeat bc).        Electronically signed by Michelle Kaufman MD on 5/19/21 at 10:13 PM EDT

## 2021-05-20 NOTE — PROGRESS NOTES
Spoke to [employer] about blood pressure concerns, New order given to increase midodrine dose to 10mg, have already noted and began order to run fluids at 50ml/hr

## 2021-05-21 NOTE — PROGRESS NOTES
OT BEDSIDE TREATMENT NOTE      Date:2021  Patient Name: Yeny Rosales  MRN: [id number]  : 1940  Room: [address] [place] [address]   Referring Provider: Sakina Reyna MD     Evaluating OT: BIANKA Rothman #[id number]     AM-PAC Daily Activity Raw Score:      Recommended Placement: Subacute rehab    Recommended Adaptive Equipment: TBD      Diagnosis:   1. Perforated abdominal viscus    2. Longstanding persistent atrial fibrillation ([address]    3. Chronic renal insufficiency, stage 2 (mild)       Surgery: 21 -  Exploratory laparotomy, sigmoid colectomy, end colostomy, open drainage of pelvic abscess, placement of left internal jugular vein triple-lumen catheter     Pertinent Medical History:    Past Medical History        Past Medical History:   Diagnosis Date    Anxiety      Cancer ([address]      right breast    Cirrhosis, non-alcoholic (HCC)      COPD [facility]chronic obstructive [facility] disease[facility]      Hypertension           Precautions:  Falls, alarm, O2, abdominal precautions, abdominal staples removed prior to session     Home Living: Pt lives alone in a 1 story home with 3 KAYCE with 1 rail(s).  Sister, daughter and granddaughter will be taking care of patient for 3 weeks once d/c  Bathroom setup: walk in shower with grab bars, 2 built in seats   Equipment owned: [facility], cane, elevated toilet     Prior Level of Function: Independent with ADLs , Independent with IADLs; ambulated without AD  Driving: No  Occupation: Not reported     Pain Level: discomfort in abdominal region 2* to staple removal; unquantified; nsg aware  Cognition: A&O: 4/4; Follows 1-2 step directions              Memory:  Fair+              Sequencing:  Fair+              Problem solving:  Fair+              Judgement/safety:  Fair+                Functional Assessment:    Initial Eval Status  Date: 5/10/21 Treatment Status  Date:2021 STGs = LTGs  Time frame: 5-7 days   Feeding Independent     Independent to bring cup to mouth to rinse after oral hygiene      Grooming Minimal Assist    Pt able to don shampoo cap, wash, towel dry and comb hair with B hands; min A for thoroughness with combing L back side of hair; pt able to manage containers and complete oral hygiene when seated in bed with HOB elevated; assist to rinse dentures at sink  Modified [place]   UB Dressing Minimal Assist     Min A ; pt able to thread B UE's through gown; pt required assist to tie/untie back of gown  Modified [place]    LB Dressing Maximal Assist     N/T Moderate Assist    Bathing Maximal Assist    Mod A ; pt able to wash UB and complete pericare when seated in bed; assist to wash LE's (however distal LE's wrapped) and toes d/t decreased endurance and trunk flexion; rash noted on chest and abdominal region.  Dr. Sheron Carlos, as well Moderate Assist    Toileting Moderate Assist      N/T  Minimal Assist    Bed Mobility  Supine to sit: Moderate Assist   Sit to supine: NT, pt up in chair      Mod A to adjust position in bed during bathing; pt had just RTB prior to session and is fatigued Supine to sit: Minimal Assist   Sit to supine: Minimal Assist    Functional Transfers Minimal Assist   Sit<>stand  Bed, chair  Cuing on body mechanics, hand placement and safety  N/T d/t fatigue, as pt had just RTB Independent    Functional Mobility Minimal Assist of 2  For safety  Several steps between bed and chair using [employer]     N/T ; pt fatigued and had just RTB Independent    Balance Sitting:     Static:  Fair+    Dynamic:fair+  Standing: fair+ Sitting:     Static:  Fair    Dynamic:fair  Standing: N/T Sitting:     Static:  good    Dynamic:good  Standing: good   Activity Tolerance Fair  /65  SpO2     Fair; spo2 was 90-92%  Fair+   Visual/  Perceptual Glasses: Yes   WFL          Hand Dominance Not reported       Strength ROM Additional Info:    RUE  4/5  WFL good  and wfl FMC/dexterity noted during ADL tasks         LUE 4/5  WFL good  and wfl FMC/dexterity noted during ADL tasks      ROM appears WFL for ADL activities at this time       Plan of Care: Instruction/training on adapted ADL techniques and AE recommendations to increase functional independence within precautions  Training on energy conservation strategies/techniques to improve independence/tolerance for self-care routine  Therapeutic activities to facilitate/challenge dynamic balance, stand tolerance, fine motor dexterity/in-hand manipulation for increased independence with ADLs  Positioning to improve functional independence    Balance:   Sitting balance in bed with HOB elevated to increase dynamic sitting balance and activities tolerance with intermittent min A d/t lateral lean   Standing N/T   Endurance: fair minus/poor    Comments: Patient cleared by nursing staff. Upon arrival pt supine in bed. Pt agreeable to OT tx session for in bed activities only d/t fatigue and had just RTB. Pt educated with regards to bed mobility, hand placement, safety awareness, sitting balance,   ADL retraining,  grooming tasks, UE/LE bathing, LE/UE dressing, ECT's. At end of session pt seated in bed with HOB elevated  with all lines and tubes intact, call light within reach. Overall, pt demonstrated decreased independence and safety during completion of ADL/functional transfers/mobility tasks. Pt would benefit from continued skilled OT to increase safety and independence with completion of ADL/IADL tasks for functional independence and quality of life. Pt required cues and education as noted above for safe facilitation and completion of tasks. Therapist provided skilled monitoring of patient's response during treatment session. Prior to and at the end of session, environmental modifications/line management completed for patients safety and efficiency of treatment session. Overall, patient demonstrates moderate difficulties with completion of BADLs and IADLs.  Factors contributing to these difficulties include decreased endurance, and generalized weakness. As noted above, patient likely to benefit from further OT intervention to increase independence, safety, and overall quality of life. Treatment:     ? Bed mobility: Facilitated bed mobility with cues for proper body mechanics and sequencing to prepare for ADL completion. ? ADL completion: Self-care retraining for the above-mentioned ADLs; training on proper hand placement, safety technique, sequencing, and energy conservation techniques. ? Postural Balance: Sitting balance retraining to improve righting reactions with postural changes during ADLs. ? Skilled positioning: Proper positioning to improve interaction with environment, overall functioning and decrease/prevent edema ;LE positioning    · Pt has made fair/fair minus progress towards set goals   ·  OT 1-3x/week for 5-7 days during hospitalization     Treatment Time includes thorough review of current medical information, gathering information on past medical history/social history and prior level of function, informal observation of tasks, assessment of data and education on plan of care and goals.     Treatment Time In: 11:00 AM    Treatment Time Out: 11:26 AM         Treatment Charges: Mins Units   ADL/Home Mgt     [address]     Ther Ex                 [id number]     Manual Therapy    [id number]     Neuro Re-ed         [id number]     Orthotic manage/training                               [id number]     Non Billable Time     Total Timed Treatment 26 [address] [facility], [facility]/L #[id number]

## 2021-05-21 NOTE — PROGRESS NOTES
Patient continues to have seeping 4+ pitting edema in BLE, both legs are wrapped with gauze and ace bandages. Made Dr Arsenio Franco and Dr Jackie Naik (covering) aware of newly noted fluid filled blisters on the top of each foot. Requested to notify oncoming nurse if further treatment insrtuctions will be ordered.  Dr Jackie Naik responded with no new orders at this time

## 2021-05-21 NOTE — PROGRESS NOTES
Patient has increased nausea. Spoke with Dr. Bernardino Peña, new orders given. Also updated of low BP's. Will continue to monitor and update Dr as needed.

## 2021-05-21 NOTE — PROGRESS NOTES
General Surgery Progress Note  [facility]    Patient's Name/Date of Birth: Joanna Avers / 1940    Date: May 20, 2021     Surgeon: True Tripathi MD    Chief Complaint: perforated sigmoid colon    Patient Active Problem List   Diagnosis    Glenoid labral tear    Synovitis    Biceps tendon tear    Rotator cuff tear    Subacromial impingement    Encephalopathy    Atrial fibrillation with rapid ventricular response (HCC)    Chronic atrial fibrillation (HCC)    Acute exacerbation of COPD with asthma ([address]    CAP (community acquired pneumonia)    Cirrhosis of liver not due to alcohol ([address]    Hypertension    Acute heart failure ([address]    Dyspnea    Non morbid obesity    Perforated viscus    Chronic renal impairment, stage 3a    Acute on chronic renal insufficiency    Chronic diastolic heart failure ([address]       Subjective: feels better today. Persistent nausea and vomiting    Objective:  BP (!) 88/54   Pulse 118   Temp 97.6 °F (36.4 °C) (Oral)   Resp 17   Ht 5' 4\" (1.626 m)   Wt 203 lb 7.8 oz (92.3 kg)   SpO2 95% Comment: 95  BMI 34.93 kg/m²   Labs:  Recent Labs     05/19/21  0430 05/19/21  1137 05/20/21  0611   WBC 21.4* 20.5* 11.7*   HGB 10.3* 10.3* 9.1*   HCT 31.9* 31.0* 28.3*     Lab Results   Component Value Date    CREATININE 3.0 (H) 05/20/2021    BUN 70 (H) 05/20/2021     05/20/2021    K 3.7 05/20/2021     05/20/2021    CO2 24 05/20/2021     No results for input(s): LIPASE, AMYLASE in the last 72 hours.   CBC with Differential:    Lab Results   Component Value Date    WBC 11.7 05/20/2021    RBC 2.78 05/20/2021    HGB 9.1 05/20/2021    HCT 28.3 05/20/2021     05/20/2021    .8 05/20/2021    MCH 32.7 05/20/2021    MCHC 32.2 05/20/2021    RDW 17.1 05/20/2021    SEGSPCT 57 12/09/2013    METASPCT 0.9 12/07/2019    LYMPHOPCT 1.7 05/20/2021    MONOPCT 1.7 05/20/2021    MYELOPCT 0.9 07/26/2020    BASOPCT 0.2 05/20/2021    MONOSABS 0.23 05/20/2021    LYMPHSABS 0.23 05/20/2021    EOSABS 0.00 05/20/2021    BASOSABS 0.00 05/20/2021     CMP:    Lab Results   Component Value Date     05/20/2021    K 3.7 05/20/2021     05/20/2021    CO2 24 05/20/2021    BUN 70 05/20/2021    CREATININE 3.0 05/20/2021    GFRAA 18 05/20/2021    LABGLOM 15 05/20/2021    GLUCOSE 110 05/20/2021    GLUCOSE 100 05/30/2012    PROT 4.7 05/20/2021    LABALBU 1.8 05/20/2021    LABALBU 4.1 05/30/2012    CALCIUM 8.1 05/20/2021    BILITOT 0.9 05/20/2021    ALKPHOS 74 05/20/2021    AST 16 05/20/2021    ALT 8 05/20/2021       General appearance:  NAD  Head: NCAT, PERRLA, EOMI, red conjunctiva  Neck: supple, no masses  Lungs: CTAB, equal chest rise bilateral  Heart: Reg rate  Abdomen: soft, mildly distended, tender appropriately, incision C/D/I with staples.  LLQ ostomy with air and stool in appliance  Skin; no lesions  Gu: no cva tenderness  Extremities: extremities normal, atraumatic, no cyanosis or edema      Assessment/Plan:  Todd Jones is a 68 y.o. female POD 6 sigmoid colectomy with end colostomy for perforated diverticulitis, YANELIS     CT reviewed - no concerning findings or abscess  Low fiber diet  Nephrology recs appreciated  ID following  Discharge to [facility] rehab when stable medically    Elpidio Rebolledo MD  5/20/2021  11:11 PM

## 2021-05-21 NOTE — PROGRESS NOTES
Physical Therapy    Physical Therapy Treatment Note    Room #:  0620/0620-01  Patient Name: Malcolm García  YOB: 1940  MRN: [id number]    Referring Provider:   Jose Zavala MD     Date of Service: 5/21/2021    Evaluating Physical Therapist: Jaylon Crowder, PT  #[id number]       Diagnosis:   Perforated viscus [R19.8]   Admitted with  Abdominal pain   Date of Procedure: 5/9/2021    Procedure(s):   OPEN BOWEL RESECTION WITH COLOSTOMY   Surgeon(s): Minesh Aguilera MD    Patient Active Problem List   Diagnosis    Glenoid labral tear    Synovitis    Biceps tendon tear    Rotator cuff tear    Subacromial impingement    Encephalopathy    Atrial fibrillation with rapid ventricular response (HCC)    Chronic atrial fibrillation (HCC)    Acute exacerbation of COPD with asthma ([address]    CAP (community acquired pneumonia)    Cirrhosis of liver not due to alcohol ([address]    Hypertension    Acute heart failure ([address]    Dyspnea    Non morbid obesity    Perforated viscus    Chronic renal impairment, stage 3a    Acute on chronic renal insufficiency    Chronic diastolic heart failure ([address]        Tentative placement recommendation: Inpatient Rehab    Equipment recommendation: Wheelchair      Prior Level of Function: Patient ambulated independently cane  Rehab Potential: good    for baseline    Past medical history:   Past Medical History:   Diagnosis Date    Anxiety     Cancer ([address] 2/11    right breast    Cirrhosis, non-alcoholic ([address]     COPD (chronic obstructive pulmonary disease) ([address]     Hypertension      Past Surgical History:   Procedure Laterality Date    BREAST SURGERY  2011    right lumpectomy    COLECTOMY N/A 5/9/2021    OPEN BOWEL RESECTION WITH COLOSTOMY performed by Minesh Aguilera MD at [address] ARTHROSCOPY  09 09 2011    arthroscopy left shoulder rotator cuff repair, subacromial decompression, debridement labrum, synovectomy  TONSILLECTOMY         Precautions: Up as tolerated, falls and alarm ,   Room air    SUBJECTIVE:    Social history: Patient lives alone   in a ranch home  with Ramp  to enter   Equipment owned: Cane, Wheeled Walker and Toilet riser,       AM-PAC Basic Mobility        AM-[facility] Mobility Inpatient   How much difficulty turning over in bed?: A Little  How much difficulty sitting down on / standing up from a chair with arms?: A Lot  How much difficulty moving from lying on back to sitting on side of bed?: A Lot  How much help from another person moving to and from a bed to a chair?: A Lot  How much help from another person needed to walk in hospital room?: A Lot  How much help from another person for climbing 3-5 steps with a railing?: A Lot  AM-PAC Inpatient Mobility Raw Score : 13  AM-PAC Inpatient T-Scale Score : 36.74  Mobility Inpatient CMS 0-100% Score: 64.91  Mobility Inpatient CMS G-Code Modifier : CL    Nursing cleared patient for PT treatment. Pt with increased reports of fatigue throughout  OBJECTIVE;   Initial Evaluation  Date: 5/10/2021 Treatment Date:    5/21/2021    Short Term/ Long Term   Goals   Was pt agreeable to Eval/treatment? Yes   yes To be met in 3 days   Pain level   4/10   abdomen NA    Bed Mobility    Rolling: Moderate assist of 1    Supine to sit: Moderate assist of 1    Sit to supine: Moderate assist of 1    Scooting: Moderate assist of 1   Rolling: Not assessed     Supine to sit: Moderate assist of 1    Sit to supine: Not assessed     Scooting: Moderate assist of 1    Rolling: Supervision     Supine to sit: Supervision     Sit to supine: Supervision     Scooting: Supervision      Transfers Sit to stand: Moderate assist of 1   Sit to stand:  Moderate assist of 1 from bed with height elevated and max assist from chair  Sit to stand: Supervision      Ambulation    3-4 steps using  wheeled walker with Minimal assist of 2     3 feet using  wheeled walker with Moderate assist of 1   to chair with and tubes intact. Patient's/ family goals   home with family support      ASSESSMENT: Patient exhibits decreased strength, balance and endurance impairing functional mobility, transfers, gait , gait distance and tolerance to activity and are barriers to d/c and require skilled intervention during hospital stay ,     Pt with increased fatigue, decreased strength and endurance limiting further function. At risk for falls. .   Plan of Care:     -Bed Mobility: Lower extremity exercises  and Upper extremity exercises   -Standing Balance: Perform strengthening exercises in standing to promote motor control with or without upper extremity support  and Instruct patient on adequate base of support to maintain balance  -Transfers: Provide instruction on proper hand and foot position for adequate transfer of weight onto lower extremities and use of gait device, Cues for hand placement, technique and safety and Provide stabilization to prevent fall   -Gait: Gait training, Standing activities to improve: base of support, weight shift, weight bearing  and Exercises to improve hip and knee control   -Endurance: Utilize Supervised activities to increase level of endurance to allow for safe functional mobility including transfers and gait   -Stairs: Stair training with instruction on proper technique and hand placement on rail    Patient and or family understand(s) diagnosis, prognosis, and plan of care. Frequency of treatments: Patient will be seen  daily.        Time in 835  Time out  900  Total Treatment Time 25   minutes     CPT codes:    Therapeutic activities (99518)   12 minutes  1 unit(s)  Therapeutic exercises (06352)   13 minutes  1 unit(s)     Branden Griffin [facility]  LIC# [id number]

## 2021-05-21 NOTE — PROGRESS NOTES
Milli Chávez MD        sodium chloride flush 0.9 % injection 10 mL  10 mL Intravenous 2 times per day Ace Figueredo MD   10 mL at 05/21/21 0841    sodium chloride flush 0.9 % injection 10 mL  10 mL Intravenous PRN Ace Figueredo MD        0.9 % sodium chloride infusion  25 mL Intravenous PRN Ace Figueredo MD        fluconazole (DIFLUCAN) tablet 200 mg  200 mg Oral Daily Ace Figueredo MD   200 mg at 05/21/21 0841    metroNIDAZOLE (FLAGYL) tablet 500 mg  500 mg Oral 3 times per day Ace Figueredo MD   500 mg at 05/21/21 2580    ondansetron (ZOFRAN-ODT) disintegrating tablet 4 mg  4 mg Oral TID PRN Raf Varela MD   4 mg at 05/18/21 2118    aztreonam (AZACTAM) 1,000 mg in sterile water 10 mL IV syringe  1,000 mg Intravenous Q8H Ace Figueredo MD   1,000 mg at 05/21/21 8486    metoprolol tartrate (LOPRESSOR) tablet 25 mg  25 mg Oral BID Nhi Briceño MD   25 mg at 05/20/21 1009    amiodarone (CORDARONE) tablet 200 mg  200 mg Oral Daily Nhi Briceño MD   200 mg at 05/21/21 2978    rifaximin (XIFAXAN) tablet 550 mg  550 mg Oral BID Nhi Briceño MD   550 mg at 05/21/21 0841    lactulose (CHRONULAC) 10 GM/15ML solution 20 g  20 g Oral BID Nhi Briceño MD   20 g at 05/21/21 4562    ipratropium-albuterol (DUONEB) nebulizer solution 1 ampule  1 ampule Inhalation BID Nhi Briceño MD   1 ampule at 05/17/21 0532    apixaban (ELIQUIS) tablet 5 mg  5 mg Oral BID Nhi Briceño MD   5 mg at 05/21/21 5642    perflutren lipid microspheres (DEFINITY) injection 1.65 mg  1.5 mL Intravenous ONCE PRN Nhi Briceño MD        fentaNYL (SUBLIMAZE) injection 50 mcg  50 mcg Intravenous Q2H PRN Nhi Briceño MD        cetirizine (ZYRTEC) tablet 10 mg  10 mg Oral Daily Jacob Fox MD   10 mg at 05/21/21 0841    anastrozole (ARIMIDEX) tablet 1 mg  1 mg Oral Daily Jacob Fox MD   1 mg at 05/21/21 0841    levothyroxine (SYNTHROID) tablet 50 mcg  50 mcg Oral Daily Jacob Fox MD   50 mcg at 05/21/21 a dual lumen power PICC line. US DUP UPPER EXTREMITY LEFT VENOUS   Final Result   No evidence of DVT in the left upper extremity venous vasculature. XR CHEST PORTABLE   Final Result   No significant change. Continued follow-up recommended. XR CHEST PORTABLE   Final Result      CT HEAD WO CONTRAST   Final Result   No acute intracranial abnormality. Periventricular white matter changes consistent chronic microvascular   disease. Diffuse volume loss. CT ABDOMEN PELVIS WO CONTRAST Additional Contrast? None   Final Result   Moderate amount of free intraperitoneal air which is compatible with   perforated abdominal viscus. The right colon is the most likely source of   free air as there is pneumatosis coli from the cecum to the a [place]   flexure. Underlying colonic ischemia is a consideration. There is also diverticular disease involving the left colon. There is some   inflammation involving/abutting the mid to distal descending colon which   could represent minimal acute diverticulitis. Note that this is minimal and   not the source of free air. Cholelithiasis. Cirrhotic liver. Minimal ascites. I discussed findings by phone with Megan Rivera at 1:42 a.m. on 05/09/2021. XR CHEST PORTABLE   Final Result   No acute process. Cardiomegaly.                Labs:    CBC:   Recent Labs     05/19/21  1137 05/20/21  0611 05/21/21  0629   WBC 20.5* 11.7* 11.3   HGB 10.3* 9.1* 9.7*    150 171        Lab Results   Component Value Date    IRON 149 (H) 06/11/2019    TIBC 281 06/11/2019    FERRITIN 64 06/11/2019       Lab Results   Component Value Date    PTH 81 (H) 05/14/2021    CALCIUM 8.2 (L) 05/21/2021    CALCIUM 8.1 (L) 05/20/2021    CALCIUM 8.4 (L) 05/19/2021    CAION 1.24 05/16/2021    CAION 1.22 05/14/2021    PHOS 4.7 (H) 05/21/2021    PHOS 4.9 (H) 05/20/2021    PHOS 4.9 (H) 05/19/2021    MG 2.0 05/21/2021    MG 1.9 05/16/2021    MG 2.0

## 2021-05-21 NOTE — PROGRESS NOTES
Progress Note  Date:2021       Room:[facility]0620-  Patient Makayla Uribe     YOB: 1940     Age:80 y.o. Subjective    Subjective:  Symptoms:  Stable. She reports shortness of breath, malaise, weakness and anorexia. No cough, chest pain, headache, chest pressure, diarrhea or anxiety. Diet:  Adequate intake. Activity level: Impaired due to weakness. Pain:  She complains of pain that is moderate. pt is doing better  Review of Systems   Respiratory: Positive for shortness of breath. Negative for cough. Cardiovascular: Negative for chest pain. Gastrointestinal: Positive for anorexia. Negative for diarrhea. Neurological: Positive for weakness. Objective         Vitals Last 24 Hours:  TEMPERATURE:  Temp  Av.6 °F (36.4 °C)  Min: 97.1 °F (36.2 °C)  Max: 98.1 °F (36.7 °C)  RESPIRATIONS RANGE: Resp  Av.7  Min: 17  Max: 18  PULSE OXIMETRY RANGE: SpO2  Av.3 %  Min: 95 %  Max: 96 %  PULSE RANGE: Pulse  Av.1  Min: 85  Max: 118  BLOOD PRESSURE RANGE: Systolic (69PCI), ICJ:91 , Min:73 , QKH:737   ; Diastolic (82RUZ), MUR:23, Min:47, Max:83    I/O (24Hr): Intake/Output Summary (Last 24 hours) at 2021 2321  Last data filed at 2021 2144  Gross per 24 hour   Intake 90 ml   Output 1200 ml   Net -1110 ml     Objective:  General Appearance:  Comfortable. Vital signs: (most recent): Blood pressure (!) 88/54, pulse 118, temperature 97.6 °F (36.4 °C), temperature source Oral, resp. rate 17, height 5' 4\" (1.626 m), weight 203 lb 7.8 oz (92.3 kg), SpO2 95 %. Vital signs are normal.    Output: Producing urine. HEENT: Normal HEENT exam.    Lungs:  Normal respiratory rate. She is not in respiratory distress. Heart: Irregular rhythm. S1 normal and S2 normal.    Abdomen: Abdomen is soft. There is no abdominal tenderness. Extremities: Normal range of motion. Neurological: Patient is alert and oriented to person, place and time.   Normal strength. Pupils:  Pupils are equal, round, and reactive to light. Skin:  Warm and dry. Labs/Imaging/Diagnostics    Labs:  CBC:  Recent Labs     05/19/21  0430 05/19/21  1137 05/20/21  0611   WBC 21.4* 20.5* 11.7*   RBC 3.20* 3.13* 2.78*   HGB 10.3* 10.3* 9.1*   HCT 31.9* 31.0* 28.3*   MCV 99.7 99.0 101.8*   RDW 16.7* 17.2* 17.1*    175 150     CHEMISTRIES:  Recent Labs     05/18/21  0455 05/19/21  0430 05/20/21  0611    138 135   K 3.8 4.2 3.7    104 102   CO2 27 24 24   BUN 60* 64* 70*   CREATININE 2.5* 2.8* 3.0*   GLUCOSE 111* 118* 110*   PHOS  --  4.9* 4.9*     PT/INR:  Recent Labs     05/19/21  0816   PROTIME 35.7*   INR 3.0     APTT:  Recent Labs     05/19/21  0816   APTT 35.0     LIVER PROFILE:  Recent Labs     05/18/21  0455 05/19/21  0430 05/20/21  0611   AST 25 24 16   ALT 11 9 8   BILITOT 1.0 1.3* 0.9   ALKPHOS 92 87 74       Imaging Last 24 Hours:  CT ABDOMEN PELVIS WO CONTRAST Additional Contrast? Oral    Result Date: 5/20/2021  EXAMINATION: CT OF THE ABDOMEN AND PELVIS WITHOUT CONTRAST 5/19/2021 7:38 pm TECHNIQUE: CT of the abdomen and pelvis was performed without the administration of intravenous contrast.  Oral contrast was also administered. Multiplanar reformatted images are provided for review. Dose modulation, iterative reconstruction, and/or weight based adjustment of the mA/kV was utilized to reduce the radiation dose to as low as reasonably achievable. COMPARISON: None. HISTORY: ORDERING SYSTEM PROVIDED HISTORY: ileus, leukocystosis, rule out abscess TECHNOLOGIST PROVIDED HISTORY: Reason for exam:->ileus, leukocystosis, rule out abscess Additional Contrast?->Oral FINDINGS: Moderate ascites which is increased compared to prior. Cholelithiasis, unchanged. No biliary dilatation. Hepatic cirrhosis. No portal venous gas identified. Normal spleen and pancreas. Both kidneys show normal size and position.   No renal or ureteral calculi and no hydronephrosis or midline. Mercedes catheter identified. No evidence of bowel obstruction. CT CHEST WO CONTRAST    Result Date: 5/20/2021  EXAMINATION: CT OF THE CHEST WITHOUT CONTRAST 5/19/2021 7:38 pm TECHNIQUE: CT of the chest was performed without the administration of intravenous contrast. Multiplanar reformatted images are provided for review. Dose modulation, iterative reconstruction, and/or weight based adjustment of the mA/kV was utilized to reduce the radiation dose to as low as reasonably achievable. COMPARISON: 04/06/2021 HISTORY: ORDERING SYSTEM PROVIDED HISTORY: leukocytosis TECHNOLOGIST PROVIDED HISTORY: Reason for exam:->leukocytosis FINDINGS: Lungs and pleura: Small right pleural effusion with right basilar passive atelectasis without significant change. Right middle lobe medial segment mild atelectasis or scarring, unchanged. Posterior left basilar mild dependent atelectasis. The left lung shows no focal infiltrate. No mass or pneumothorax. No abscess seen. Heart and mediastinum: Multi chamber cardiac enlargement. No pericardial effusion. No lymph node enlargement. Coronary artery calcifications. Great vessels: The thoracic aorta is normal in caliber. Borderline enlargement of the pulmonary artery trunk. 1.  Cardiomegaly, small right pleural effusion, and right basilar passive atelectasis without significant change. 2.  No pneumonia or abscess identified. 3.  Right middle lobe mild atelectasis or scarring, unchanged. IR FLUORO GUIDED CVA DEVICE PLMT/REPLACE/REMOVAL    Result Date: 5/19/2021  PROCEDURE: US GUIDED VASCULAR ACCESS; IR FLUOROSCOPY GUIDED CENTRAL VENOUS ACCESS DEVICE PLACEMENT 5/19/2021 HISTORY: ORDERING SYSTEM PROVIDED HISTORY: venous access TECHNOLOGIST PROVIDED HISTORY: Reason for exam:->venous access TECHNIQUE: Fluoroscopic guided ultrasound-guided CONTRAST: None SEDATION: None FLUOROSCOPY DOSE AND TYPE OR TIME AND EXPOSURES: Fluoroscopy time equals 1 minutes.   Total dose equals 28 mGy DESCRIPTION OF PROCEDURE: Informed consent was obtained after a detailed explanation of the procedure including risks, benefits, and alternatives. Universal protocol was observed. FINDINGS: The patient's left arm was prepped and draped in a sterile fashion using a maximum sterile barrier technique. Following the uneventful administration of lidocaine I introduced a micro puncture needle into the left break vein using ultrasound guidance. Through the micropuncture needle a microwire was introduced into the vein. Over the microwire a peel-away sheath was placed within the vein. A catheter measurement was obtained. The catheter was then trimmed to 45 cm and introduced over the wire into the left brachial vein. The catheter tip was placed at the SVC right atrial junction. The peel-away sheath was then removed and the PICC line was secured to the skin. A sterile dressing was applied. The patient tolerated the procedure well and no complications. A fluoroscopic image was obtained which confirms position of the PICC line catheter. Successful placement of a dual lumen power PICC line. IR ULTRASOUND GUIDANCE VASCULAR ACCESS    Result Date: 5/19/2021  PROCEDURE: US GUIDED VASCULAR ACCESS; IR FLUOROSCOPY GUIDED CENTRAL VENOUS ACCESS DEVICE PLACEMENT 5/19/2021 HISTORY: ORDERING SYSTEM PROVIDED HISTORY: venous access TECHNOLOGIST PROVIDED HISTORY: Reason for exam:->venous access TECHNIQUE: Fluoroscopic guided ultrasound-guided CONTRAST: None SEDATION: None FLUOROSCOPY DOSE AND TYPE OR TIME AND EXPOSURES: Fluoroscopy time equals 1 minutes. Total dose equals 28 mGy DESCRIPTION OF PROCEDURE: Informed consent was obtained after a detailed explanation of the procedure including risks, benefits, and alternatives. Universal protocol was observed. FINDINGS: The patient's left arm was prepped and draped in a sterile fashion using a maximum sterile barrier technique.  Following the uneventful administration 11:21 PM EDT

## 2021-05-21 NOTE — PROGRESS NOTES
ET Nurse  Admit Date: 5/9/2021 12:01 AM    Reason for consult:  ostomy    Significant history:    Past Medical History:   Diagnosis Date    Anxiety     Cancer ([facility] 2/11    right breast    Cirrhosis, non-alcoholic ([facility]     COPD [facility]     Hypertension        Stoma assessment:  Changed ostomy pouch  Stoma is a good red color functioning for a large amt of liquid soft stool  Peristomal skin is clear  inst pt to keep pouch empty      Plan:    Cont with one piece  inst pt to empty frequently  Using coloplast one piece system 61098    Halle David RN 5/21/2021 2:11 PM

## 2021-05-21 NOTE — PROGRESS NOTES
[facility]  [facility]  Progress Note    NAME: Supa Mcgarry  MR:  [id number]  :   1940  DATE OF SERVICE:21    This is a face to face encounter with Supa Mcgarry 68 y.o. female on 21    CHIEF COMPLAINT     ID following for   Chief Complaint   Patient presents with    Emesis     HISTORY OF PRESENT ILLNESS   In bed seems more sob o2 sat 97%  Has rash trunk  No f/c    Wbc better 11.7  cr3.2  Patient is tolerating medications. No reported adverse drug reactions. REVIEW OF SYSTEMS     As stated above in the chief complaint, otherwise negative.   CURRENT MEDICATIONS      midodrine  10 mg Oral TID WC    metoclopramide  5 mg Oral TID AC    lidocaine 1 % injection  5 mL Intradermal Once    sodium chloride flush  10 mL Intravenous 2 times per day    fluconazole  200 mg Oral Daily    metroNIDAZOLE  500 mg Oral 3 times per day    aztreonam  1,000 mg Intravenous Q8H    metoprolol tartrate  25 mg Oral BID    amiodarone  200 mg Oral Daily    rifaximin  550 mg Oral BID    lactulose  20 g Oral BID    ipratropium-albuterol  1 ampule Inhalation BID    apixaban  5 mg Oral BID    cetirizine  10 mg Oral Daily    anastrozole  1 mg Oral Daily    levothyroxine  50 mcg Oral Daily    montelukast  10 mg Oral Nightly    pantoprazole  40 mg Oral QAM AC    sodium chloride flush  5-40 mL Intravenous 2 times per day     Continuous Infusions:   sodium chloride 50 mL/hr at 21 1115    sodium chloride      sodium chloride       PRN Meds:ondansetron, sodium chloride flush, sodium chloride, ondansetron, perflutren lipid microspheres, fentanNYL, sodium chloride flush, sodium chloride, traMADol    PHYSICAL EXAM     BP (!) 80/41   Pulse 109   Temp 97.7 °F (36.5 °C) (Oral)   Resp 20   Ht 5' 4\" (1.626 m)   Wt 203 lb 7.8 oz (92.3 kg)   SpO2 97%   BMI 34.93 kg/m²   Temp  Av.5 °F (36.4 °C)  Min: 97.1 °F (36.2 °C)  Max: 97.9 °F (36.6 °C)  Constitutional:  awake, alert, and oriented. Skin:    Warm and dry. evans ue chest flank   HEENT:   Glasses AT/NC   Chest:   No use of accessory muscles to breathe. Symmetrical expansion. Dec bs   Cardiovascular:  S1 and S2 are rhythmic and regular. Abdomen:   Positive bowel sounds to auscultation. Benign to palpation. Extremities:   Overall edema  Inc ble left >right left weeping   Edema.  ue edema   CNS    AAxO   Lines: lue picc placed 5/19  Mercedes yellow urine      DIAGNOSTIC RESULTS   Radiology:    Recent Labs     05/19/21  1137 05/20/21  0611 05/21/21  0629   WBC 20.5* 11.7* 11.3   RBC 3.13* 2.78* 2.98*   HGB 10.3* 9.1* 9.7*   HCT 31.0* 28.3* 29.8*   MCV 99.0 101.8* 100.0*   MCH 32.9 32.7 32.6   MCHC 33.2 32.2 32.6   RDW 17.2* 17.1* 17.2*    150 171   MPV 10.1 10.6 10.4     Recent Labs     05/19/21  0430 05/20/21  0611 05/21/21  0629    135 137   K 4.2 3.7 3.5  3.5    102 103   CO2 24 24 25   BUN 64* 70* 75*   CREATININE 2.8* 3.0* 3.2*   GLUCOSE 118* 110* 99   PROT 5.0* 4.7* 5.1*   LABALBU 2.2* 1.8* 1.8*   CALCIUM 8.4* 8.1* 8.2*   BILITOT 1.3* 0.9 0.9   ALKPHOS 87 74 113*   AST 24 16 17   ALT 9 8 7     Lab Results   Component Value Date    CRP 0.1 05/06/2019     Lab Results   Component Value Date    SEDRATE 65 (H) 05/17/2021    SEDRATE 58 (H) 05/06/2019     Recent Labs     05/19/21  0430 05/19/21  0816 05/20/21  0611 05/21/21  0629   INR  --  3.0  --   --    PROTIME  --  35.7*  --   --    AST 24  --  16 17   ALT 9  --  8 7     Lab Results   Component Value Date    CHOL 236 02/08/2021    TRIG 126 02/08/2021    HDL 66 02/08/2021    LDLCALC 145 02/08/2021    LABVLDL 25 02/08/2021     Lab Results   Component Value Date/Time    VITD25 72 05/14/2021 04:50 AM       Microbiology:   Recent Labs     05/18/21  1538   COVID19 Not Detected     Lab Results   Component Value Date    BC 5 Days no growth 05/15/2021    BC  05/09/2021     Gram Negative Dominic is too fastidious for Identification and sensitivitey  testing      BC 05/09/2021     Too fastidious for Identification and susceptibility testing. BLOODCULT2 24 Hours no growth 05/19/2021    BLOODCULT2 5 Days no growth 05/15/2021    BLOODCULT2 5 Days no growth 05/09/2021    ORG Gram negative dominic 05/09/2021     No results found for: [facility]  No results found for: [facility]      Component Value Date/Time    FUNGSM No Fungal elements seen 05/09/2021 0544       MRSA Culture Only   Date Value Ref Range Status   05/09/2021 Methicillin resistant Staph aureus not isolated  Final           FINAL IMPRESSION    leukocytosis  Rash ?fluconazole orals  Gn septicemia/ Gram Negative Dominic is too fastidious for Identification and sensitivity   nenita/ckd  1. Perforated abdominal viscus     Procedure(s):  Exploratory laparotomy, sigmoid colectomy, end colostomy, open drainage of pelvic abscess, placement of left internal jugular vein triple-lumen catheter       aztreonam (AZACTAM) 1,000 mg in sterile water 10 mL IV syringe, Q8H  metronidazole (FLAGYL) 500 mg in NaCl 100 mL IVPB premix, Q8H  fluconazole (DIFLUCAN) in 0.9 % sodium chloride IVPB 200 mg, Q24H     ESR65/CRP/PROCAL0.24  Adjust atbx to renal failure    Consider CT a/p pending   Wrap ble   picc  in  Urine eos 0 followed by renal    · Monitor labs    Imaging and labs were reviewed per medical records. The patient was educated about the diagnosis, prognosis, indications, risks and benefits of treatment. An opportunity to ask questions was given to the patient/FAMILY. Thank you for involving me in the care of [place]el will continue to follow. Please do not hesitate to call for any questions or concerns.     Electronically signed by Mecca Montana MD on 5/21/2021 at 2:16 PM

## 2021-05-21 NOTE — PROGRESS NOTES
Dr Bishop Kingsley and Dr Elena Engel aware new onset rash noted to trunk this am.  Kenalog applied to rash with stated improved comfort from patient.

## 2021-05-21 NOTE — PROGRESS NOTES
General Surgery Progress Note  [facility]    Patient's Name/Date of Birth: Valeriano Powell / 1940    Date: May 21, 2021     Surgeon: Casie Ortiz MD    Chief Complaint: perforated sigmoid colon    Patient Active Problem List   Diagnosis    Glenoid labral tear    Synovitis    Biceps tendon tear    Rotator cuff tear    Subacromial impingement    Encephalopathy    Atrial fibrillation with rapid ventricular response (HCC)    Chronic atrial fibrillation (HCC)    Acute exacerbation of COPD with asthma ([address]    CAP (community acquired pneumonia)    Cirrhosis of liver not due to alcohol ([address]    Hypertension    Acute heart failure ([address]    Dyspnea    Non morbid obesity    Perforated viscus    Chronic renal impairment, stage 3a    Acute on chronic renal insufficiency    Chronic diastolic heart failure (HCC)       Subjective: no new complaints    Objective:  BP 90/72   Pulse 98   Temp 97.5 °F (36.4 °C) (Oral)   Resp 20   Ht 5' 4\" (1.626 m)   Wt 203 lb 7.8 oz (92.3 kg)   SpO2 97%   BMI 34.93 kg/m²   Labs:  Recent Labs     05/19/21  1137 05/20/21  0611 05/21/21  0629   WBC 20.5* 11.7* 11.3   HGB 10.3* 9.1* 9.7*   HCT 31.0* 28.3* 29.8*     Lab Results   Component Value Date    CREATININE 3.2 (H) 05/21/2021    BUN 75 (H) 05/21/2021     05/21/2021    K 3.5 05/21/2021    K 3.5 05/21/2021     05/21/2021    CO2 25 05/21/2021     No results for input(s): LIPASE, AMYLASE in the last 72 hours.   CBC with Differential:    Lab Results   Component Value Date    WBC 11.3 05/21/2021    RBC 2.98 05/21/2021    HGB 9.7 05/21/2021    HCT 29.8 05/21/2021     05/21/2021    .0 05/21/2021    MCH 32.6 05/21/2021    MCHC 32.6 05/21/2021    RDW 17.2 05/21/2021    SEGSPCT 57 12/09/2013    METASPCT 0.9 12/07/2019    LYMPHOPCT 0.9 05/21/2021    MONOPCT 1.7 05/21/2021    MYELOPCT 0.9 07/26/2020    BASOPCT 0.2 05/21/2021    MONOSABS 0.23 05/21/2021    LYMPHSABS 0.11 05/21/2021    EOSABS 0.10 05/21/2021    BASOSABS 0.00 05/21/2021     CMP:    Lab Results   Component Value Date     05/21/2021    K 3.5 05/21/2021    K 3.5 05/21/2021     05/21/2021    CO2 25 05/21/2021    BUN 75 05/21/2021    CREATININE 3.2 05/21/2021    GFRAA 17 05/21/2021    LABGLOM 14 05/21/2021    GLUCOSE 99 05/21/2021    GLUCOSE 100 05/30/2012    PROT 5.1 05/21/2021    LABALBU 1.8 05/21/2021    LABALBU 4.1 05/30/2012    CALCIUM 8.2 05/21/2021    BILITOT 0.9 05/21/2021    ALKPHOS 113 05/21/2021    AST 17 05/21/2021    ALT 7 05/21/2021       General appearance:  NAD  Head: NCAT, PERRLA, EOMI, red conjunctiva  Neck: supple, no masses  Lungs: CTAB, equal chest rise bilateral  Heart: Reg rate  Abdomen: soft, mildly distended, tender appropriately, incision C/D/I.  LLQ ostomy with air and stool in appliance  Skin; no lesions  Gu: no cva tenderness  Extremities: extremities normal, atraumatic, no cyanosis or edema      Assessment/Plan:  Moira Covington is a 68 y.o. female POD 12 sigmoid colectomy with end colostomy for perforated diverticulitis, YANELIS     Low fiber diet  Nephrology recs appreciated  ID following  Discharge to [place] rehab when kidney function improves    Whitney Weinstein MD  5/21/2021  3:40 PM

## 2021-05-21 NOTE — PROGRESS NOTES
22 staples removed from abdomen without difficulty per Dr Del Real Letters. Scant amount of blood noted from one staple, cleaned with NSS.  Patient tolerated well

## 2021-05-21 NOTE — PLAN OF CARE
Problem: Falls - Risk of:  Goal: Will remain free from falls  Description: Will remain free from falls  5/21/2021 1250 by Elmira Young RN  Outcome: Ongoing  5/20/2021 2323 by Rhoda Haq RN  Outcome: Met This Shift  Goal: Absence of physical injury  Description: Absence of physical injury  5/21/2021 1250 by Elmira Young RN  Outcome: Ongoing  5/20/2021 2323 by Rhoda Haq RN  Outcome: Met This Shift     Problem: Skin Integrity:  Goal: Will show no infection signs and symptoms  Description: Will show no infection signs and symptoms  5/21/2021 1250 by Elmira Young RN  Outcome: Ongoing  5/20/2021 2323 by Rhoda Haq RN  Outcome: Met This Shift  Goal: Absence of new skin breakdown  Description: Absence of new skin breakdown  5/21/2021 1250 by Elmira Young RN  Outcome: Ongoing  5/20/2021 2323 by Rhoda Haq RN  Outcome: Met This Shift     Problem:  Activity:  Goal: Ability to tolerate increased activity will improve  Description: Ability to tolerate increased activity will improve  5/21/2021 1250 by Elmira Young RN  Outcome: Ongoing  5/20/2021 2323 by Rhoda Haq RN  Outcome: Met This Shift

## 2021-05-21 NOTE — PROGRESS NOTES
Dr Miguel Angel Deal and Dr Mason Hendrickson aware of continued low Bps,  persistent edema and seeping extremities.

## 2021-05-22 NOTE — PROGRESS NOTES
Signed [facility]rica [facility] faxed to [employer]. Fax confirmation received    Provider to sign Lamotrigine. person, place and time. Normal strength. Pupils:  Pupils are equal, round, and reactive to light. Skin:  Warm. Labs/Imaging/Diagnostics    Labs:  CBC:  Recent Labs     05/19/21  1137 05/20/21  0611 05/21/21  0629   WBC 20.5* 11.7* 11.3   RBC 3.13* 2.78* 2.98*   HGB 10.3* 9.1* 9.7*   HCT 31.0* 28.3* 29.8*   MCV 99.0 101.8* 100.0*   RDW 17.2* 17.1* 17.2*    150 171     CHEMISTRIES:  Recent Labs     05/19/21  0430 05/20/21  0611 05/21/21  0629    135 137   K 4.2 3.7 3.5  3.5    102 103   CO2 24 24 25   BUN 64* 70* 75*   CREATININE 2.8* 3.0* 3.2*   GLUCOSE 118* 110* 99   PHOS 4.9* 4.9* 4.7*   MG  --   --  2.0     PT/INR:  Recent Labs     05/19/21  0816   PROTIME 35.7*   INR 3.0     APTT:  Recent Labs     05/19/21  0816   APTT 35.0     LIVER PROFILE:  Recent Labs     05/19/21  0430 05/20/21  0611 05/21/21  0629   AST 24 16 17   ALT 9 8 7   BILITOT 1.3* 0.9 0.9   ALKPHOS 87 74 113*       Imaging Last 24 Hours:  No results found.   Assessment//Plan           Hospital Problems         Last Modified POA    Chronic atrial fibrillation ([facility] 5/9/2021 Yes    Cirrhosis of liver not due to alcohol ([facility] [facility] 5/9/2021 Yes    Perforated viscus 5/9/2021 Yes    Chronic renal impairment, stage 3a (Chronic) 5/9/2021 Yes    Acute on chronic renal insufficiency 5/9/2021 Yes    Chronic diastolic heart failure (HCC) (Chronic) 5/9/2021 Yes        Assessment:  (Problem List as of 5/21/2021 Reviewed: 5/9/2021  6:12 PM by Patricia Gilmore MD    Chronic renal impairment, stage 3a    Chronic diastolic heart failure (HCC)    Glenoid labral tear    Synovitis    Biceps tendon tear    Rotator cuff tear    Subacromial impingement    Encephalopathy    Atrial fibrillation with rapid ventricular response (HCC)    Chronic atrial fibrillation (HCC)    Acute exacerbation of COPD with asthma ([address]    CAP (community acquired pneumonia)    Cirrhosis of liver not due to alcohol ([facility]    Hypertension    Acute heart failure ([facility] [address] Dyspnea    Non morbid obesity    Perforated viscus    Acute on chronic renal insufficiency    ). Plan:   (Cont with tx.).        Electronically signed by Danielle Nye MD on 5/21/21 at 11:20 PM EDT anxiety

## 2021-05-22 NOTE — PROGRESS NOTES
Nephrology Progress Note    5/22/21: Pt awake alert states she feels slightly better except for the worsening edema of the feet bilat. Pt dtr at the bedside and updated to renal status and plan      Vital SignsBP 110/73   Pulse 111   Temp 97.4 °F (36.3 °C) (Oral)   Resp 18   Ht 5' 4\" (1.626 m)   Wt 203 lb 7.8 oz (92.3 kg)   SpO2 96%   BMI 34.93 kg/m²   24HR INTAKE/OUTPUT:      Intake/Output Summary (Last 24 hours) at 5/21/2021 2029  Last data filed at 5/21/2021 1801  Gross per 24 hour   Intake 950 ml   Output 2300 ml   Net -1350 ml         Physical Exam    HEENT: Dry oral mucosa  Neck: No JVD  Lungs: Breath sounds decreased at the bases. No rales or ronchi. Erythema of the trunck extending to the shoulders  Heart: Regular rate and rhythm. No S3 gallop. No  murmrur.   Abdomen: Soft non distended, there is present keira-incisional tenderness, staples have been removed, ostomy with liquid stools, normal bowel sounds.     Extremeties: +4 bipedal  edema with bilat bullae on the LE, upper ex with 1-2+ edema, (+) erythema of the legs      ROS:  RESPIRATORY:  negative  CARDIOVASCULAR:  negative  GASTROINTESTINAL:   negative  GENITOURINARY:  negative  INTEGUMENT/BREAST:  positive for rash and pruritus      Current Facility-Administered Medications   Medication Dose Route Frequency Provider Last Rate Last Admin    0.9 % sodium chloride infusion   Intravenous Continuous Flip Lashonda Varner MD 50 mL/hr at 05/21/21 1115 New Bag at 05/21/21 1115    triamcinolone (KENALOG) 0.1 % cream   Topical BID Kaycee Perez MD   Given at 05/21/21 1625    diphenhydrAMINE (BENADRYL) injection 25 mg  25 mg Intravenous Q6H PRN Tucker Garcia MD        [START ON 5/22/2021] aztreonam (AZACTAM) 1,000 mg in sterile water 10 mL IV syringe  1,000 mg Intravenous Q12H MD Valerio Naylor ON 5/22/2021] metroNIDAZOLE (FLAGYL) tablet 500 mg  500 mg Oral 2 times per day Tucker Garcia MD        midodrine (PROAMATINE) tablet 10 mg  10 mg Oral TID  Flip Dalila Richardson MD   10 mg at 05/21/21 1625    ondansetron (ZOFRAN) injection 8 mg  8 mg Intravenous Q6H PRN Rolando Hamlin MD   8 mg at 05/21/21 1159    metoclopramide (REGLAN) tablet 5 mg  5 mg Oral TID [facility] Rolando Hamlin MD   5 mg at 05/21/21 1625    lidocaine 1 % injection 5 mL  5 mL Intradermal Once Lizandro Rosen MD        sodium chloride flush 0.9 % injection 10 mL  10 mL Intravenous 2 times per day Lizandro Rosen MD   10 mL at 05/21/21 0841    sodium chloride flush 0.9 % injection 10 mL  10 mL Intravenous PRN Lizandro Rosen MD        0.9 % sodium chloride infusion  25 mL Intravenous PRN Lizandro Rosen MD        ondansetron (ZOFRAN-ODT) disintegrating tablet 4 mg  4 mg Oral TID PRN Rolando Hamlin MD   4 mg at 05/18/21 2118    metoprolol tartrate (LOPRESSOR) tablet 25 mg  25 mg Oral BID Jose Zavala MD   25 mg at 05/20/21 1009    amiodarone (CORDARONE) tablet 200 mg  200 mg Oral Daily Jose Zavala MD   200 mg at 05/21/21 0841    rifaximin (XIFAXAN) tablet 550 mg  550 mg Oral BID Jose Zavala MD   550 mg at 05/21/21 0841    lactulose (CHRONULAC) 10 GM/15ML solution 20 g  20 g Oral BID Jose Zavala MD   20 g at 05/21/21 1625    ipratropium-albuterol (DUONEB) nebulizer solution 1 ampule  1 ampule Inhalation BID Jose Zavala MD   1 ampule at 05/17/21 0532    apixaban (ELIQUIS) tablet 5 mg  5 mg Oral BID Jose Zavala MD   5 mg at 05/21/21 7067    perflutren lipid microspheres (DEFINITY) injection 1.65 mg  1.5 mL Intravenous ONCE PRN Jose Zavala MD        fentaNYL (SUBLIMAZE) injection 50 mcg  50 mcg Intravenous Q2H PRN Jose Zavala MD        cetirizine (ZYRTEC) tablet 10 mg  10 mg Oral Daily Minesh Aguilera MD   10 mg at 05/21/21 0841    anastrozole (ARIMIDEX) tablet 1 mg  1 mg Oral Daily Minesh Aguilera MD   1 mg at 05/21/21 0841    levothyroxine (SYNTHROID) tablet 50 mcg  50 mcg Oral Daily Minesh Aguilera MD   50 mcg at 05/21/21 0621    montelust (SINGULAIR) tablet 10 mg  10 mg Oral Nightly Letty Hayward MD   10 mg at 05/20/21 2240    pantoprazole (PROTONIX) tablet 40 mg  40 mg Oral QAM AC Letty Hayward MD   40 mg at 05/21/21 6514    sodium chloride flush 0.9 % injection 5-40 mL  5-40 mL Intravenous 2 times per day Letty Hayward MD   10 mL at 05/21/21 0844    sodium chloride flush 0.9 % injection 5-40 mL  5-40 mL Intravenous PRN Letty Hayward MD   10 mL at 05/13/21 1636    0.9 % sodium chloride infusion  25 mL Intravenous PRN Letty Hayward MD        traMADol Susan Jane) tablet 50 mg  50 mg Oral Q6H PRN Letty Hayward MD   50 mg at 05/09/21 0859       CT ABDOMEN PELVIS WO CONTRAST Additional Contrast? Oral   Final Result   1. Partial colectomy with left lower quadrant functioning colostomy. Hepatic flexure colon pneumatosis and with residual small pneumoperitoneum. No extravasation of GI contrast from the bowel seen. No abscess identified. 2.  Left abdomen localized small bowel intussusception without bowel   obstruction. 3.  Cirrhosis. No portal vein gas seen. Moderate volume abdominal and   pelvic ascites, increased compared to prior. 4.  Cholelithiasis. No biliary dilatation. Generalized anasarca pattern. CRITICAL FINDINGS: Abnormal CT findings telephoned by Dr. Tati Laws to the   referring service at 1105 hours. CT CHEST WO CONTRAST   Final Result   1. Cardiomegaly, small right pleural effusion, and right basilar passive   atelectasis without significant change. 2.  No pneumonia or abscess identified. 3.  Right middle lobe mild atelectasis or scarring, unchanged. XR ABDOMEN (KUB) (SINGLE AP VIEW)   Final Result   No evidence of bowel obstruction. IR FLUORO GUIDED CVA DEVICE PLMT/REPLACE/REMOVAL   Final Result   Successful placement of a dual lumen power PICC line.          IR ULTRASOUND GUIDANCE VASCULAR ACCESS   Final Result   Successful placement of a dual lumen power PICC line. US DUP UPPER EXTREMITY LEFT VENOUS   Final Result   No evidence of DVT in the left upper extremity venous vasculature. XR CHEST PORTABLE   Final Result   No significant change. Continued follow-up recommended. XR CHEST PORTABLE   Final Result      CT HEAD WO CONTRAST   Final Result   No acute intracranial abnormality. Periventricular white matter changes consistent chronic microvascular   disease. Diffuse volume loss. CT ABDOMEN PELVIS WO CONTRAST Additional Contrast? None   Final Result   Moderate amount of free intraperitoneal air which is compatible with   perforated abdominal viscus. The right colon is the most likely source of   free air as there is pneumatosis coli from the cecum to the a [place]   flexure. Underlying colonic ischemia is a consideration. There is also diverticular disease involving the left colon. There is some   inflammation involving/abutting the mid to distal descending colon which   could represent minimal acute diverticulitis. Note that this is minimal and   not the source of free air. Cholelithiasis. Cirrhotic liver. Minimal ascites. I discussed findings by phone with Hanna Patel at 1:42 a.m. on 05/09/2021. XR CHEST PORTABLE   Final Result   No acute process. Cardiomegaly.                Labs:    CBC:   Recent Labs     05/19/21  1137 05/20/21  0611 05/21/21  0629   WBC 20.5* 11.7* 11.3   HGB 10.3* 9.1* 9.7*    150 171        Lab Results   Component Value Date    IRON 149 (H) 06/11/2019    TIBC 281 06/11/2019    FERRITIN 64 06/11/2019       Lab Results   Component Value Date    PTH 81 (H) 05/14/2021    CALCIUM 8.2 (L) 05/21/2021    CALCIUM 8.1 (L) 05/20/2021    CALCIUM 8.4 (L) 05/19/2021    CAION 1.24 05/16/2021    CAION 1.22 05/14/2021    PHOS 4.7 (H) 05/21/2021    PHOS 4.9 (H) 05/20/2021    PHOS 4.9 (H) 05/19/2021    MG 2.0 05/21/2021    MG 1.9 05/16/2021    MG 2.0 05/13/2021       BMP:

## 2021-05-22 NOTE — PROGRESS NOTES
General Surgery Progress Note  [facility]    Patient's Name/Date of Birth: Maryellen Armijo / 1940    Date: May 22, 2021     Surgeon: Garrett Smyth MD    Chief Complaint: perforated sigmoid colon    Patient Active Problem List   Diagnosis    Glenoid labral tear    Synovitis    Biceps tendon tear    Rotator cuff tear    Subacromial impingement    Encephalopathy    Atrial fibrillation with rapid ventricular response (HCC)    Chronic atrial fibrillation (HCC)    Acute exacerbation of COPD with asthma ([address]    CAP (community acquired pneumonia)    Cirrhosis of liver not due to alcohol ([address]    Hypertension    Acute heart failure ([address]    Dyspnea    Non morbid obesity    Perforated viscus    Chronic renal impairment, stage 3a    Acute on chronic renal insufficiency    Chronic diastolic heart failure (HCC)       Subjective: no new complaints    Objective:  /63   Pulse 85   Temp 97.8 °F (36.6 °C) (Oral)   Resp 19   Ht 5' 4\" (1.626 m)   Wt 203 lb 7.8 oz (92.3 kg)   SpO2 97%   BMI 34.93 kg/m²   Labs:  Recent Labs     05/20/21  0611 05/21/21  0629 05/22/21  0730   WBC 11.7* 11.3 8.5   HGB 9.1* 9.7* 9.6*   HCT 28.3* 29.8* 29.3*     Lab Results   Component Value Date    CREATININE 3.3 (H) 05/22/2021    BUN 76 (H) 05/22/2021     05/22/2021    K 3.2 (L) 05/22/2021     05/22/2021    CO2 23 05/22/2021     No results for input(s): LIPASE, AMYLASE in the last 72 hours.   CBC with Differential:    Lab Results   Component Value Date    WBC 8.5 05/22/2021    RBC 2.99 05/22/2021    HGB 9.6 05/22/2021    HCT 29.3 05/22/2021     05/22/2021    MCV 98.0 05/22/2021    MCH 32.1 05/22/2021    MCHC 32.8 05/22/2021    RDW 17.2 05/22/2021    SEGSPCT 57 12/09/2013    METASPCT 0.9 12/07/2019    LYMPHOPCT 0.9 05/22/2021    MONOPCT 1.8 05/22/2021    MYELOPCT 0.9 07/26/2020    BASOPCT 0.1 05/22/2021    MONOSABS 0.17 05/22/2021    LYMPHSABS 0.09 05/22/2021    EOSABS 0.30 05/22/2021 BASOSABS 0.00 05/22/2021     CMP:    Lab Results   Component Value Date     05/22/2021    K 3.2 05/22/2021     05/22/2021    CO2 23 05/22/2021    BUN 76 05/22/2021    CREATININE 3.3 05/22/2021    GFRAA 16 05/22/2021    LABGLOM 13 05/22/2021    GLUCOSE 115 05/22/2021    GLUCOSE 100 05/30/2012    PROT 5.1 05/22/2021    LABALBU 1.7 05/22/2021    LABALBU 4.1 05/30/2012    CALCIUM 8.2 05/22/2021    BILITOT 1.0 05/22/2021    ALKPHOS 100 05/22/2021    AST 16 05/22/2021    ALT 8 05/22/2021       General appearance:  NAD  Head: NCAT, PERRLA, EOMI, red conjunctiva  Neck: supple, no masses  Lungs: CTAB, equal chest rise bilateral  Heart: Reg rate  Abdomen: soft, mildly distended, tender appropriately, incision C/D/I.  LLQ ostomy with air and stool in appliance,  Skin; no lesions, rash diffuse  Gu: no cva tenderness  Extremities: extremities normal, atraumatic, no cyanosis or edema      Assessment/Plan:  Todd Jones is a 68 y.o. female 2 weeks s/p sigmoid colectomy with end colostomy for perforated diverticulitis, YANELIS     Low fiber diet  Nephrology recs appreciated  ID following  Discharge to [facility] rehab when kidney function improves  Benedryl PRN itching    Dale Britton MD  5/22/2021  10:25 AM

## 2021-05-22 NOTE — PLAN OF CARE
Problem: Falls - Risk of:  Goal: Will remain free from falls  Description: Will remain free from falls  Outcome: Met This Shift     Problem: Falls - Risk of:  Goal: Absence of physical injury  Description: Absence of physical injury  Outcome: Met This Shift     Problem: Skin Integrity:  Goal: Will show no infection signs and symptoms  Description: Will show no infection signs and symptoms  Outcome: Met This Shift     Problem: Skin Integrity:  Goal: Absence of new skin breakdown  Description: Absence of new skin breakdown  Outcome: Met This Shift     Problem:  Activity:  Goal: Ability to tolerate increased activity will improve  Description: Ability to tolerate increased activity will improve  Outcome: Ongoing

## 2021-05-22 NOTE — PROGRESS NOTES
[facility]  [facility]  Progress Note    NAME: Kennedy Abbott  MR:  [id number]  :   1940  DATE OF SERVICE:21    This is a face to face encounter with Kennedy Abbott 68 y.o. female on 21    CHIEF COMPLAINT     ID following for   Chief Complaint   Patient presents with    Emesis     HISTORY OF PRESENT ILLNESS   RASH PROGRESSING [address] [place]   NO N/V/D  HAS BLISTER DORSAL FEET  DAUGHTER PRESENT   Patient is tolerating medications. No reported adverse drug reactions. REVIEW OF SYSTEMS     As stated above in the chief complaint, otherwise negative.   CURRENT MEDICATIONS      albumin human  25 g Intravenous TID    furosemide  40 mg Intravenous TID    potassium chloride  20 mEq Intravenous Q1H    triamcinolone   Topical BID    aztreonam  1,000 mg Intravenous Q12H    metroNIDAZOLE  500 mg Oral 2 times per day    midodrine  10 mg Oral TID WC    metoclopramide  5 mg Oral TID AC    lidocaine 1 % injection  5 mL Intradermal Once    sodium chloride flush  10 mL Intravenous 2 times per day    metoprolol tartrate  25 mg Oral BID    amiodarone  200 mg Oral Daily    rifaximin  550 mg Oral BID    lactulose  20 g Oral BID    ipratropium-albuterol  1 ampule Inhalation BID    apixaban  5 mg Oral BID    cetirizine  10 mg Oral Daily    anastrozole  1 mg Oral Daily    levothyroxine  50 mcg Oral Daily    montelukast  10 mg Oral Nightly    pantoprazole  40 mg Oral QAM AC    sodium chloride flush  5-40 mL Intravenous 2 times per day     Continuous Infusions:   sodium chloride      sodium chloride       PRN Meds:diphenhydrAMINE, ondansetron, sodium chloride flush, sodium chloride, ondansetron, perflutren lipid microspheres, fentanNYL, sodium chloride flush, sodium chloride, traMADol    PHYSICAL EXAM     /63   Pulse 85   Temp 97.8 °F (36.6 °C) (Oral)   Resp 19   Ht 5' 4\" (1.626 m)   Wt 203 lb 7.8 oz (92.3 kg)   SpO2 97%   BMI 34.93 kg/m²   Temp  Avg: 97.5 °F (36.4 °C)  Min: 97.2 °F (36.2 °C)  Max: 97.8 °F (36.6 °C)  Constitutional:  awake, alert, and oriented. IN BMI  OVERALL EDEMA  Skin:    Warm and dry. evans ue chest flank   HEENT:   Glasses AT/NC   Chest:   No use of accessory muscles to breathe. Symmetrical expansion. Dec bs   Cardiovascular:  S1 and S2 are rhythmic and regular. Abdomen:   Positive bowel sounds to auscultation. Benign to palpation. Extremities:   Overall edema  Inc ble left >right left weeping  FLUID FILL BLIATER DORSAL FEET LEFT >RIGTH REWRAPPED  Edema. ue edema   CNS    AAxO   Lines: lue picc placed 5/19  Mercedes yellow urine      DIAGNOSTIC RESULTS   Radiology:    Recent Labs     05/20/21 0611 05/21/21  0629 05/22/21  0730   WBC 11.7* 11.3 8.5   RBC 2.78* 2.98* 2.99*   HGB 9.1* 9.7* 9.6*   HCT 28.3* 29.8* 29.3*   .8* 100.0* 98.0   MCH 32.7 32.6 32.1   MCHC 32.2 32.6 32.8   RDW 17.1* 17.2* 17.2*    171 176   MPV 10.6 10.4 10.2     Recent Labs     05/20/21  0611 05/21/21  0629 05/22/21  0730    137 137   K 3.7 3.5  3.5 3.2*    103 105   CO2 24 25 23   BUN 70* 75* 76*   CREATININE 3.0* 3.2* 3.3*   GLUCOSE 110* 99 115*   PROT 4.7* 5.1* 5.1*   LABALBU 1.8* 1.8* 1.7*   CALCIUM 8.1* 8.2* 8.2*   BILITOT 0.9 0.9 1.0   ALKPHOS 74 113* 100   AST 16 17 16   ALT 8 7 8     Lab Results   Component Value Date    CRP 0.1 05/06/2019     Lab Results   Component Value Date    SEDRATE 65 (H) 05/17/2021    SEDRATE 58 (H) 05/06/2019     Recent Labs     05/20/21  0611 05/21/21  0629 05/22/21  0730   FERRITIN  --   --  84   AST 16 17 16   ALT 8 7 8     Lab Results   Component Value Date    CHOL 236 02/08/2021    TRIG 126 02/08/2021    HDL 66 02/08/2021    LDLCALC 145 02/08/2021    LABVLDL 25 02/08/2021     Lab Results   Component Value Date/Time    VITD25 57 05/22/2021 07:30 AM       Microbiology:   No results for input(s): COVID19 in the last 72 hours.   Lab Results   Component Value Date    BC 5 Days no growth 05/15/2021    BC 05/09/2021     Gram Negative Dominic is too fastidious for Identification and sensitivitey  testing      [facility]  05/09/2021     Too fastidious for Identification and susceptibility testing. BLOODCULT2 24 Hours no growth 05/19/2021    BLOODCULT2 5 Days no growth 05/15/2021    BLOODCULT2 5 Days no growth 05/09/2021    ORG Gram negative dominic 05/09/2021     No results found for: [facility]  No results found for: [facility]      Component Value Date/Time    FUNGSM No Fungal elements seen 05/09/2021 0544       MRSA Culture Only   Date Value Ref Range Status   05/09/2021 Methicillin resistant Staph aureus not isolated  Final           FINAL IMPRESSION    Leukocytosis BETTER  Rash ?fluconazole orals FOLLOW  Gn septicemia/ Gram Negative Dominic is too fastidious for Identification and sensitivity   nenita/ckd  1. Perforated abdominal viscus     Procedure(s):  Exploratory laparotomy, sigmoid colectomy, end colostomy, open drainage of pelvic abscess, placement of left internal jugular vein triple-lumen catheter    CT  Pneumatosis with mural gas of the hepatic flexure of the   NO ABSCESS  colon and with anterior abdomen persistent small pneumoperitoneum. aztreonam (AZACTAM) 1,000 mg in sterile water 10 mL IV syringe, Q8H  metronidazole (FLAGYL) 500 mg in NaCl 100 mL IVPB premix, Q8H  fluconazole (DIFLUCAN) in 0.9 % sodium chloride IVPB 200 mg, Q24H     ESR65/CRP/PROCAL0.24  Adjust atbx to renal failure  Wrap ble   picc  in  Urine eos 0 followed by renal    ENCOURAGE IS    · Monitor labs    Imaging and labs were reviewed per medical records. The patient was educated about the diagnosis, prognosis, indications, risks and benefits of treatment. An opportunity to ask questions was given to the patient/FAMILY. Thank you for involving me in the care of Karey Hernandez will continue to follow. Please do not hesitate to call for any questions or concerns.     Electronically signed by Miriam Boston MD on 5/22/2021 at 12:22 PM

## 2021-05-23 NOTE — PLAN OF CARE
Problem: Falls - Risk of:  Goal: Will remain free from falls  Description: Will remain free from falls  5/23/2021 0728 by Drake Escamilla RN  Outcome: Met This Shift     Problem: Falls - Risk of:  Goal: Absence of physical injury  Description: Absence of physical injury  5/23/2021 3984 by Drake Escamilla RN  Outcome: Met This Shift     Problem: Skin Integrity:  Goal: Will show no infection signs and symptoms  Description: Will show no infection signs and symptoms  5/23/2021 0728 by Drake Escamilla RN  Outcome: Met This Shift     Problem: Skin Integrity:  Goal: Absence of new skin breakdown  Description: Absence of new skin breakdown  5/23/2021 0728 by Drake Escamilla RN  Outcome: Met This Shift

## 2021-05-23 NOTE — PROGRESS NOTES
Dr. Marcy Kingsley notified of change in colostomy drainage from small amount of green stool an hour ago to 900 mL of serosanguinous fluid in the last 10 minutes. Dr stated that it is no big deal and that they will deal with it in the morning.  Electronically signed by Carole Amaya RN on 5/23/2021 at 4:55 AM

## 2021-05-23 NOTE — CONSULTS
CRITICAL CARE CONSULT NOTE    The patient's case was discussed in multidisciplinary rounds including critical care specialist, nursing, RT and pharmacy. Her evaluation is as follows:     68year old woman with PMH of anxiety, hypertension, COPD, CHF (HFpEF), right breast cancer, cirrhosis, atrial fibrillation, and as described below admitted to hospital for management of bowel perforation s/p exploratory laparotomy, sigmoid colectomy and colostomy, drainage of pelvic abscess. Her hospitalization has been complicated with YANELIS secondary to renal hypoperfusion in the setting of hypotension, anemia, electrolyte abnormalities (hypokalemia, hyperphosphatemia), atrial fibrillation with RVR. Overnight the patient had increased amount of ascites drainage from stoma, feeling unwell with malaise and abdominal pain. Critical care was consulted for evaluation and management of hemoptysis, she was transferred to ICU. Per the patient and nursing staff, she is coughing about one teaspoon of clotted blood at a time and this has happened few times last night and today, associated with epistaxis. Mrs Familia Aguilera feels little dyspnea, she is uncomfortable overall, with abdominal pain mostly, currently not coughing and without epistaxis. The patient received Afrin to nostrils with improvement of epistaxis; she stopped feeling postnasal drip, she has been collecting all sputum produced, there is a smear of blood in the container.      Review of Systems - History obtained from the patient  General ROS: positive for  - malaise and abdominal pain  Psychological ROS: negative  Ophthalmic ROS: negative for - blurry vision or decreased vision  ENT ROS: positive for - epistaxis  Allergy and Immunology ROS: positive for - nasal congestion and postnasal drip  Hematological and Lymphatic ROS: positive for - bleeding problems, bruising and fatigue  Endocrine ROS: negative for - polydipsia/polyuria  Breast ROS: negative for breast lumps Respiratory ROS: Positive for shortness of breath,  no cough or wheezing  Cardiovascular ROS: no chest pain or dyspnea on exertion  Gastrointestinal ROS: positive for - abdominal pain, gas/bloating and nausea/vomiting  Genito-Urinary ROS: no dysuria, trouble voiding, or hematuria  Musculoskeletal ROS: negative for - muscular weakness  Neurological ROS: no TIA or stroke symptoms  Dermatological ROS: negative for - rash    BP (!) 84/52   Pulse 88   Temp 98.1 °F (36.7 °C) (Oral)   Resp 22   Ht 5' 4\" (1.626 m)   Wt 218 lb 12.8 oz (99.2 kg)   SpO2 94%   BMI 37.56 kg/m²   General: Awake, oriented to place, time and person  HEENT: No head lesions, PERRL, EOMI, mouth without lesions, no nasal lesions, no cervical adenopathy palpated  Respiratory: Lungs with equal breath sounds bilaterally, no adventitious sounds auscultated, no accessory muscle use  CV: Regular rate, no murmurs, no JVD, 1+ leg edema, legs on compressive dressings  Abdomen: Soft, non tender, + bowel sounds, ostomy present with brown liquid stool  Skin: Hydrated, adequate turgor, capillary refill <2 seconds  Extremities: Muscular strength 4/5 in 4 limbs, moves 4 limbs spontaneously, distal pulses present  Neurology: Awake and alert, follows commands, moves 4 limbs on command and spontaneously, neck is supple, no meningitic signs present. A/P:  Epistaxis vs hemoptysis  --Per patient and nurse, quantity is about a teaspoonful of blood at a time, few times today and last night.    --Anticoagulation on hold for now  --Oxymetazoline to nostrils, hemoptysis stopped  --Measure all sputum ---> If bleeding more than 150 ml/h will need to intubate to protect airway    Rest of supportive care as per primary team    Atrial fibrillation  --Anticoagulation on hold, can restart tomorrow if there is no more epistaxis    Bowel perforation with pelvic abscess s/p colostomy  --Management by surgery team  --Antimicrobial management as per ID    Cirrhosis  --On rifaximin and lactulose    Breast cancer  --On Anastrozole    Hypothyroidism  --Management with thyroid hormone supplements    Diastolic heart failure  --On metoprolol    DVT prophylaxis with SCDs  Nutrition: PO     Goals of care: Full code    Mrs Matthew Vázquez is on Intermediate Care status and can return to [facility].   I discussed the prior recommendations for management with Dr. Elias Bush    Last 3 CMP:    Recent Labs     05/21/21  0629 05/22/21  0730 05/22/21  1629 05/23/21  0558    137  --  137   K 3.5  3.5 3.2* 3.8 3.6    105  --  104   CO2 25 23  --  24   BUN 75* 76*  --  80*   CREATININE 3.2* 3.3*  --  3.4*   GLUCOSE 99 115*  --  110*   CALCIUM 8.2* 8.2*  --  8.9   PROT 5.1* 5.1*  --  5.4*   LABALBU 1.8* 1.7*  --  2.8*   BILITOT 0.9 1.0  --  1.4*   ALKPHOS 113* 100  --  90   AST 17 16  --  15   ALT 7 8  --  7     Recent Labs     05/23/21  0558 05/23/21  1043   WBC 5.5  --    RBC 2.73*  --    HGB 8.8* 8.2*   HCT 27.2* 25.9*   MCV 99.6  --    MCH 32.2  --    MCHC 32.4  --    RDW 17.9*  --      --    MPV 10.1  --        24 HR INTAKE/OUTPUT:      Intake/Output Summary (Last 24 hours) at 5/23/2021 1343  Last data filed at 5/23/2021 0930  Gross per 24 hour   Intake 10 ml   Output 3570 ml   Net -3560 ml     MEDICATIONS:   polycarbophil  625 mg Oral Daily    [START ON 5/24/2021] lactulose  20 g Oral Daily    metroNIDAZOLE  500 mg Intravenous Q8H    oxymetazoline  2 spray Each Nostril BID    albumin human  25 g Intravenous TID    furosemide  40 mg Intravenous TID    midodrine  5 mg Oral BID WC    triamcinolone   Topical BID    aztreonam  1,000 mg Intravenous Q12H    lidocaine 1 % injection  5 mL Intradermal Once    sodium chloride flush  10 mL Intravenous 2 times per day    metoprolol tartrate  25 mg Oral BID    amiodarone  200 mg Oral Daily    rifaximin  550 mg Oral BID    ipratropium-albuterol  1 ampule Inhalation BID    [Held by provider] apixaban  5 mg Oral BID    cetirizine  10 mg Oral Daily  anastrozole  1 mg Oral Daily    levothyroxine  50 mcg Oral Daily    montelukast  10 mg Oral Nightly    pantoprazole  40 mg Oral QAM AC    sodium chloride flush  5-40 mL Intravenous 2 times per day      sodium chloride      sodium chloride       trimethobenzamide, ondansetron, diphenhydrAMINE, sodium chloride flush, sodium chloride, perflutren lipid microspheres, fentanNYL, sodium chloride flush, sodium chloride, traMADol    OBJECTIVE:  Vitals:    05/23/21 1200   BP:    Pulse: 69   Resp:    Temp:    SpO2:      FiO2 : 95 %  O2 Flow Rate (L/min): 2 L/min  O2 Device: None (Room air)        LABS:  WBC   Date Value Ref Range Status   05/23/2021 5.5 4.5 - 11.5 E9/L Final   05/22/2021 8.5 4.5 - 11.5 E9/L Final   05/21/2021 11.3 4.5 - 11.5 E9/L Final     Hemoglobin   Date Value Ref Range Status   05/23/2021 8.2 (L) 11.5 - 15.5 g/dL Final   05/23/2021 8.8 (L) 11.5 - 15.5 g/dL Final   05/22/2021 9.6 (L) 11.5 - 15.5 g/dL Final     Hematocrit   Date Value Ref Range Status   05/23/2021 25.9 (L) 34.0 - 48.0 % Final   05/23/2021 27.2 (L) 34.0 - 48.0 % Final   05/22/2021 29.3 (L) 34.0 - 48.0 % Final     MCV   Date Value Ref Range Status   05/23/2021 99.6 80.0 - 99.9 fL Final   05/22/2021 98.0 80.0 - 99.9 fL Final   05/21/2021 100.0 (H) 80.0 - 99.9 fL Final     Platelets   Date Value Ref Range Status   05/23/2021 142 130 - 450 E9/L Final   05/22/2021 176 130 - 450 E9/L Final   05/21/2021 171 130 - 450 E9/L Final     Sodium   Date Value Ref Range Status   05/23/2021 137 132 - 146 mmol/L Final   05/22/2021 137 132 - 146 mmol/L Final   05/21/2021 137 132 - 146 mmol/L Final     Potassium   Date Value Ref Range Status   05/22/2021 3.8 3.5 - 5.0 mmol/L Final   05/21/2021 3.5 3.5 - 5.0 mmol/L Final     Potassium reflex Magnesium   Date Value Ref Range Status   05/23/2021 3.6 3.5 - 5.0 mmol/L Final   05/22/2021 3.2 (L) 3.5 - 5.0 mmol/L Final   05/21/2021 3.5 3.5 - 5.0 mmol/L Final     Chloride   Date Value Ref Range Status 05/23/2021 104 98 - 107 mmol/L Final   05/22/2021 105 98 - 107 mmol/L Final   05/21/2021 103 98 - 107 mmol/L Final     CO2   Date Value Ref Range Status   05/23/2021 24 22 - 29 mmol/L Final   05/22/2021 23 22 - 29 mmol/L Final   05/21/2021 25 22 - 29 mmol/L Final     BUN   Date Value Ref Range Status   05/23/2021 80 (H) 6 - 23 mg/dL Final   05/22/2021 76 (H) 6 - 23 mg/dL Final   05/21/2021 75 (H) 6 - 23 mg/dL Final     CREATININE   Date Value Ref Range Status   05/23/2021 3.4 (H) 0.5 - 1.0 mg/dL Final   05/22/2021 3.3 (H) 0.5 - 1.0 mg/dL Final   05/21/2021 3.2 (H) 0.5 - 1.0 mg/dL Final     Glucose   Date Value Ref Range Status   05/23/2021 110 (H) 74 - 99 mg/dL Final   05/22/2021 115 (H) 74 - 99 mg/dL Final   05/21/2021 99 74 - 99 mg/dL Final   05/30/2012 100 70 - 110 mg/dL Final   11/14/2011 85 70 - 110 mg/dL Final   09/06/2011 110 70 - 110 mg/dL Final     Calcium   Date Value Ref Range Status   05/23/2021 8.9 8.6 - 10.2 mg/dL Final   05/22/2021 8.2 (L) 8.6 - 10.2 mg/dL Final   05/21/2021 8.2 (L) 8.6 - 10.2 mg/dL Final     Total Protein   Date Value Ref Range Status   05/23/2021 5.4 (L) 6.4 - 8.3 g/dL Final   05/22/2021 5.1 (L) 6.4 - 8.3 g/dL Final   05/21/2021 5.1 (L) 6.4 - 8.3 g/dL Final     Albumin   Date Value Ref Range Status   05/23/2021 2.8 (L) 3.5 - 5.2 g/dL Final   05/22/2021 1.7 (L) 3.5 - 5.2 g/dL Final   05/21/2021 1.8 (L) 3.5 - 5.2 g/dL Final   05/30/2012 4.1 3.2 - 4.8 g/dL Final   11/14/2011 3.8 3.2 - 4.8 g/dL Final   11/15/2010 3.6 3.2 - 4.8 g/dL Final     Total Bilirubin   Date Value Ref Range Status   05/23/2021 1.4 (H) 0.0 - 1.2 mg/dL Final   05/22/2021 1.0 0.0 - 1.2 mg/dL Final   05/21/2021 0.9 0.0 - 1.2 mg/dL Final     Alkaline Phosphatase   Date Value Ref Range Status   05/23/2021 90 35 - 104 U/L Final   05/22/2021 100 35 - 104 U/L Final   05/21/2021 113 (H) 35 - 104 U/L Final     AST   Date Value Ref Range Status   05/23/2021 15 0 - 31 U/L Final   05/22/2021 16 0 - 31 U/L Final     Comment: Specimen is slightly Hemolyzed. Result may be artificially increased. 05/21/2021 17 0 - 31 U/L Final     Comment:     Specimen is slightly Hemolyzed. Result may be artificially increased. ALT   Date Value Ref Range Status   05/23/2021 7 0 - 32 U/L Final   05/22/2021 8 0 - 32 U/L Final   05/21/2021 7 0 - 32 U/L Final     GFR Non-   Date Value Ref Range Status   05/23/2021 13 >=60 mL/min/1.73 Final     Comment:     Chronic Kidney Disease: less than 60 ml/min/1.73 sq.m. Kidney Failure: less than 15 ml/min/1.73 sq.m. Results valid for patients 18 years and older. 05/22/2021 13 >=60 mL/min/1.73 Final     Comment:     Chronic Kidney Disease: less than 60 ml/min/1.73 sq.m. Kidney Failure: less than 15 ml/min/1.73 sq.m. Results valid for patients 18 years and older. 05/21/2021 14 >=60 mL/min/1.73 Final     Comment:     Chronic Kidney Disease: less than 60 ml/min/1.73 sq.m. Kidney Failure: less than 15 ml/min/1.73 sq.m. Results valid for patients 18 years and older. GFR    Date Value Ref Range Status   05/23/2021 16  Final   05/22/2021 16  Final   05/21/2021 17  Final     Magnesium   Date Value Ref Range Status   05/22/2021 2.1 1.6 - 2.6 mg/dL Final   05/21/2021 2.0 1.6 - 2.6 mg/dL Final   05/16/2021 1.9 1.6 - 2.6 mg/dL Final     Phosphorus   Date Value Ref Range Status   05/23/2021 4.6 (H) 2.5 - 4.5 mg/dL Final   05/22/2021 4.6 (H) 2.5 - 4.5 mg/dL Final   05/21/2021 4.7 (H) 2.5 - 4.5 mg/dL Final     No results for input(s): PH, PO2, PCO2, HCO3, BE, O2SAT in the last 72 hours. RADIOLOGY:  XR CHEST PORTABLE   Final Result   1. Invasive lines and tubes appear satifactory /unremarkable. 2. Persistent bi basilar consolidation favor atelectasis. CT ABDOMEN PELVIS WO CONTRAST Additional Contrast? Oral   Final Result   1. Partial colectomy with left lower quadrant functioning colostomy.    Hepatic flexure colon pneumatosis and with residual small pneumoperitoneum. No extravasation of GI contrast from the bowel seen. No abscess identified. 2.  Left abdomen localized small bowel intussusception without bowel   obstruction. 3.  Cirrhosis. No portal vein gas seen. Moderate volume abdominal and   pelvic ascites, increased compared to prior. 4.  Cholelithiasis. No biliary dilatation. Generalized anasarca pattern. CRITICAL FINDINGS: Abnormal CT findings telephoned by Dr. Carol Stanley to the   referring service at 1105 hours. CT CHEST WO CONTRAST   Final Result   1. Cardiomegaly, small right pleural effusion, and right basilar passive   atelectasis without significant change. 2.  No pneumonia or abscess identified. 3.  Right middle lobe mild atelectasis or scarring, unchanged. XR ABDOMEN (KUB) (SINGLE AP VIEW)   Final Result   No evidence of bowel obstruction. IR FLUORO GUIDED CVA DEVICE PLMT/REPLACE/REMOVAL   Final Result   Successful placement of a dual lumen power PICC line. IR ULTRASOUND GUIDANCE VASCULAR ACCESS   Final Result   Successful placement of a dual lumen power PICC line. US DUP UPPER EXTREMITY LEFT VENOUS   Final Result   No evidence of DVT in the left upper extremity venous vasculature. XR CHEST PORTABLE   Final Result   No significant change. Continued follow-up recommended. XR CHEST PORTABLE   Final Result      CT HEAD WO CONTRAST   Final Result   No acute intracranial abnormality. Periventricular white matter changes consistent chronic microvascular   disease. Diffuse volume loss. CT ABDOMEN PELVIS WO CONTRAST Additional Contrast? None   Final Result   Moderate amount of free intraperitoneal air which is compatible with   perforated abdominal viscus. The right colon is the most likely source of   free air as there is pneumatosis coli from the cecum to the a [place]   flexure. Underlying colonic ischemia is a consideration. There is also diverticular disease involving the left colon. There is some   inflammation involving/abutting the mid to distal descending colon which   could represent minimal acute diverticulitis. Note that this is minimal and   not the source of free air. Cholelithiasis. Cirrhotic liver. Minimal ascites. I discussed findings by phone with Kevin Cooper at 1:42 a.m. on 05/09/2021. XR CHEST PORTABLE   Final Result   No acute process. Cardiomegaly. IR Interventional Radiology Procedure Request    (Results Pending)   US DUP UPPER EXTREMITY LEFT VENOUS    (Results Pending)     PROBLEM LIST:  Active Problems:    Chronic atrial fibrillation (HCC)    Cirrhosis of liver not due to alcohol ([facility] [address]    Perforated viscus    Chronic renal impairment, stage 3a    Acute on chronic renal insufficiency    Chronic diastolic heart failure (HCC)  Resolved Problems:    * No resolved hospital problems.  *      Maya Navarro MD  Pulmonary and Critical Care Medicine

## 2021-05-23 NOTE — PROGRESS NOTES
Patient ID:  Álvaro Lazaro  [id number]  17 y.o.  1940    HPI:  Patient granddaughter at bedside, patient according to nursing staff taking care of the patient had about 1100 cc of dark stool after changing colostomy. Vital signs have been stable. Patient also had when cleaning the bed nosebleed earlier in the morning, left PICC line site slight bruise. Patient on Eliquis. No hematuria. Case was discussed with nephrologist.  Patient has anasarca. Has leg blister. . Questions, answers, and tests reviewed. ROS:  Cardiovascular:   Denies any chest pain, irregular heartbeats, or palpitations. Respiratory:   Denies shortness of breath, coughing, sputum production, hemoptysis, or wheezing. Gastrointestinal:   Denies nausea, vomiting, diarrhea, or constipation. Denies any abdominal pain. Extremities:    edema. Neurology:    Denies any headache or focal neurological deficits. No weakness or paresthesia. Derm:    Left PICC line site with gross but no tenderness. Genitourinary:    Denies any urgency, frequency, hematuria. Voiding without difficulty. Physical Exam:    Vitals:    05/23/21 0726   BP: 106/63   Pulse: 92   Resp: 18   Temp: 97.6 °F (36.4 °C)   SpO2: 97%       HEENT:  PERRLA. EOMI. Sclera clear. Buccal mucosa moist.    Neck:  Supple. Trachea midline. No thyromegaly. No JVD. No bruits. Heart:  Rhythm regular, rate controlled. No murmurs. Lungs:  Symmetrical. Clear to auscultation bilaterally. No wheezes. No rhonchi. No rales. Abdomen: Soft. Non-tender. Non-distended. Bowel sounds positive. No organomegaly or masses. No pain on palpation, colostomy bag going liquid stool. Extremities:  Peripheral pulses presen    Neurologic:  Alert x 3. No focal deficit. Cranial nerves grossly intact. Skin:  No petechia. No hemorrhage. No wounds.     Labs:  CBC:   Lab Results   Component Value Date    WBC 5.5 05/23/2021    RBC 2.73 05/23/2021    HGB 8.8 05/23/2021    HCT 27.2 05/23/2021 MCV 99.6 05/23/2021    MCH 32.2 05/23/2021    MCHC 32.4 05/23/2021    RDW 17.9 05/23/2021     05/23/2021    MPV 10.1 05/23/2021     CMP:    Lab Results   Component Value Date     05/23/2021    K 3.6 05/23/2021     05/23/2021    CO2 24 05/23/2021    BUN 80 05/23/2021    CREATININE 3.4 05/23/2021    GFRAA 16 05/23/2021    LABGLOM 13 05/23/2021    GLUCOSE 110 05/23/2021    GLUCOSE 100 05/30/2012    PROT 5.4 05/23/2021    LABALBU 2.8 05/23/2021    LABALBU 4.1 05/30/2012    CALCIUM 8.9 05/23/2021    BILITOT 1.4 05/23/2021    ALKPHOS 90 05/23/2021    AST 15 05/23/2021    ALT 7 05/23/2021     PT/INR:    Lab Results   Component Value Date    PROTIME 35.7 05/19/2021    INR 3.0 05/19/2021         XR CHEST PORTABLE   Final Result   1. Invasive lines and tubes appear satifactory /unremarkable. 2. Persistent bi basilar consolidation favor atelectasis. CT ABDOMEN PELVIS WO CONTRAST Additional Contrast? Oral   Final Result   1. Partial colectomy with left lower quadrant functioning colostomy. Hepatic flexure colon pneumatosis and with residual small pneumoperitoneum. No extravasation of GI contrast from the bowel seen. No abscess identified. 2.  Left abdomen localized small bowel intussusception without bowel   obstruction. 3.  Cirrhosis. No portal vein gas seen. Moderate volume abdominal and   pelvic ascites, increased compared to prior. 4.  Cholelithiasis. No biliary dilatation. Generalized anasarca pattern. CRITICAL FINDINGS: Abnormal CT findings telephoned by Dr. Naseem Biggs to the   referring service at 1105 hours. CT CHEST WO CONTRAST   Final Result   1. Cardiomegaly, small right pleural effusion, and right basilar passive   atelectasis without significant change. 2.  No pneumonia or abscess identified. 3.  Right middle lobe mild atelectasis or scarring, unchanged.          XR ABDOMEN (KUB) (SINGLE AP VIEW)   Final Result   No evidence of bowel obstruction. IR FLUORO GUIDED CVA DEVICE PLMT/REPLACE/REMOVAL   Final Result   Successful placement of a dual lumen power PICC line. IR ULTRASOUND GUIDANCE VASCULAR ACCESS   Final Result   Successful placement of a dual lumen power PICC line. US DUP UPPER EXTREMITY LEFT VENOUS   Final Result   No evidence of DVT in the left upper extremity venous vasculature. XR CHEST PORTABLE   Final Result   No significant change. Continued follow-up recommended. XR CHEST PORTABLE   Final Result      CT HEAD WO CONTRAST   Final Result   No acute intracranial abnormality. Periventricular white matter changes consistent chronic microvascular   disease. Diffuse volume loss. CT ABDOMEN PELVIS WO CONTRAST Additional Contrast? None   Final Result   Moderate amount of free intraperitoneal air which is compatible with   perforated abdominal viscus. The right colon is the most likely source of   free air as there is pneumatosis coli from the cecum to the a Paddock   flexure. Underlying colonic ischemia is a consideration. There is also diverticular disease involving the left colon. There is some   inflammation involving/abutting the mid to distal descending colon which   could represent minimal acute diverticulitis. Note that this is minimal and   not the source of free air. Cholelithiasis. Cirrhotic liver. Minimal ascites. I discussed findings by phone with Yoli Gaitan at 1:42 a.m. on 05/09/2021. XR CHEST PORTABLE   Final Result   No acute process. Cardiomegaly.              Other Data:      Intake/Output Summary (Last 24 hours) at 5/23/2021 1112  Last data filed at 5/23/2021 0650  Gross per 24 hour   Intake 130 ml   Output 3220 ml   Net -3090 ml         Scheduled Medications:   polycarbophil  625 mg Oral Daily    [START ON 5/24/2021] lactulose  20 g Oral Daily    albumin human  25 g Intravenous TID    furosemide  40 mg Intravenous TID    midodrine  5 mg Oral BID     triamcinolone   Topical BID    aztreonam  1,000 mg Intravenous Q12H    metroNIDAZOLE  500 mg Oral 2 times per day    lidocaine 1 % injection  5 mL Intradermal Once    sodium chloride flush  10 mL Intravenous 2 times per day    metoprolol tartrate  25 mg Oral BID    amiodarone  200 mg Oral Daily    rifaximin  550 mg Oral BID    ipratropium-albuterol  1 ampule Inhalation BID    [Held by provider] apixaban  5 mg Oral BID    cetirizine  10 mg Oral Daily    anastrozole  1 mg Oral Daily    levothyroxine  50 mcg Oral Daily    montelukast  10 mg Oral Nightly    pantoprazole  40 mg Oral QAM AC    sodium chloride flush  5-40 mL Intravenous 2 times per day         Infusion Medications:   sodium chloride      sodium chloride         Assessment:   Patient Active Problem List    Diagnosis Date Noted    Perforated viscus 05/09/2021    Chronic renal impairment, stage 3a 05/09/2021    Acute on chronic renal insufficiency 05/09/2021    Chronic diastolic heart failure ([facility] [address] 05/09/2021    Acute heart failure (HCC)     Dyspnea     Non morbid obesity     Acute exacerbation of COPD with asthma ([facility] [address] 04/06/2021    CAP (community acquired pneumonia) 04/06/2021    Cirrhosis of liver not due to alcohol ([facility] [address] 04/06/2021    Hypertension     Atrial fibrillation with rapid ventricular response ([facility] [address] 04/05/2021    Chronic atrial fibrillation (HCC) 04/05/2021    Encephalopathy 06/09/2019    Glenoid labral tear 09/09/2011    Synovitis 09/09/2011    Biceps tendon tear 09/09/2011    Rotator cuff tear 09/09/2011    Subacromial impingement 09/09/2011         Plan: 1. Advised fluid restriction, continue IV diuretics, case discussed with nephrology. 2.  Atrial fibrillation patient on Eliquis dryness of the nose saline no spray advised. 3.  Acute on chronic renal insufficiency management per nephrology.   4.  Colostomy has bile drainage at present there is no evidence of any bleeding through colostomy. 5.  Nonalcoholic fatty liver with cirrhosis on diagnostics. Continue current therapy. See orders for further plan of care. Completed By:  Dr. Tristen Roe MD, [address]    5/23/2021      Electronically signed by Kiera Heath MD     Patient nurse had called that patient was having hemoptysis with clots patient on Eliquis advised to hold Eliquis, get pro time APTT, portable chest x-ray, H&H, critical care consult for consideration of transfer to protect her airways.

## 2021-05-23 NOTE — PROGRESS NOTES
Patient ID:  Janes Melara  [id number]  27 y.o.  1940    HPI:  Patient awake cooperative, generalized edema, known history of. Questions, answers, and tests recent colon perforation has colostomy, staples were removed, history of nonalcoholic cirrhosis of liver. ROS:  Cardiovascular:   Denies any chest pain, irregular heartbeats, or palpitations. Respiratory:   Denies shortness of breath, coughing, sputum production, hemoptysis, or wheezing. Gastrointestinal:   Denies nausea, vomiting, diarrhea, or constipation. Denies any abdominal pain. Extremities:    edema. Neurology:    Denies any headache or focal neurological deficits. No weakness or paresthesia. Derm:    Denies any rashes, ulcers, or excoriations. Denies bruising. Genitourinary:    Denies any urgency, frequency, hematuria. Voiding without difficulty. Physical Exam:    Vitals:    05/22/21 1739   BP: (!) 142/60   Pulse: 93   Resp: 22   Temp: 97.7 °F (36.5 °C)   SpO2: 96%       HEENT:  PERRLA. EOMI. Sclera clear. Buccal mucosa moist.    Neck:  Supple. Trachea midline. No thyromegaly. No JVD. No bruits. Heart:  Rhythm regular, rate controlled. No murmurs. Lungs:  Symmetrical. Clear to auscultation bilaterally. No wheezes. No rhonchi. No rales. Abdomen: Soft. Non-tender. Non-distended. Bowel sounds positive. No organomegaly or masses. No pain on palpation    Extremities:  Peripheral pulses present. peripheral edema. No ulcers. Neurologic:  Alert x 3. No focal deficit. Cranial nerves grossly intact. Skin:  No petechia. No hemorrhage. No wounds.     Labs:  CBC:   Lab Results   Component Value Date    WBC 8.5 05/22/2021    RBC 2.99 05/22/2021    HGB 9.6 05/22/2021    HCT 29.3 05/22/2021    MCV 98.0 05/22/2021    MCH 32.1 05/22/2021    MCHC 32.8 05/22/2021    RDW 17.2 05/22/2021     05/22/2021    MPV 10.2 05/22/2021     CMP:    Lab Results   Component Value Date     05/22/2021    K 3.8 05/22/2021    K 3.2 05/22/2021     05/22/2021    CO2 23 05/22/2021    BUN 76 05/22/2021    CREATININE 3.3 05/22/2021    GFRAA 16 05/22/2021    LABGLOM 13 05/22/2021    GLUCOSE 115 05/22/2021    GLUCOSE 100 05/30/2012    PROT 5.1 05/22/2021    LABALBU 1.7 05/22/2021    LABALBU 4.1 05/30/2012    CALCIUM 8.2 05/22/2021    BILITOT 1.0 05/22/2021    ALKPHOS 100 05/22/2021    AST 16 05/22/2021    ALT 8 05/22/2021     PT/INR:    Lab Results   Component Value Date    PROTIME 35.7 05/19/2021    INR 3.0 05/19/2021         CT ABDOMEN PELVIS WO CONTRAST Additional Contrast? Oral   Final Result   1. Partial colectomy with left lower quadrant functioning colostomy. Hepatic flexure colon pneumatosis and with residual small pneumoperitoneum. No extravasation of GI contrast from the bowel seen. No abscess identified. 2.  Left abdomen localized small bowel intussusception without bowel   obstruction. 3.  Cirrhosis. No portal vein gas seen. Moderate volume abdominal and   pelvic ascites, increased compared to prior. 4.  Cholelithiasis. No biliary dilatation. Generalized anasarca pattern. CRITICAL FINDINGS: Abnormal CT findings telephoned by Dr. Zach Alejo to the   referring service at 1105 hours. CT CHEST WO CONTRAST   Final Result   1. Cardiomegaly, small right pleural effusion, and right basilar passive   atelectasis without significant change. 2.  No pneumonia or abscess identified. 3.  Right middle lobe mild atelectasis or scarring, unchanged. XR ABDOMEN (KUB) (SINGLE AP VIEW)   Final Result   No evidence of bowel obstruction. IR FLUORO GUIDED CVA DEVICE PLMT/REPLACE/REMOVAL   Final Result   Successful placement of a dual lumen power PICC line. IR ULTRASOUND GUIDANCE VASCULAR ACCESS   Final Result   Successful placement of a dual lumen power PICC line. US DUP UPPER EXTREMITY LEFT VENOUS   Final Result   No evidence of DVT in the left upper extremity venous vasculature. XR CHEST PORTABLE   Final Result   No significant change. Continued follow-up recommended. XR CHEST PORTABLE   Final Result      CT HEAD WO CONTRAST   Final Result   No acute intracranial abnormality. Periventricular white matter changes consistent chronic microvascular   disease. Diffuse volume loss. CT ABDOMEN PELVIS WO CONTRAST Additional Contrast? None   Final Result   Moderate amount of free intraperitoneal air which is compatible with   perforated abdominal viscus. The right colon is the most likely source of   free air as there is pneumatosis coli from the cecum to the a [place]ock   flexure. Underlying colonic ischemia is a consideration. There is also diverticular disease involving the left colon. There is some   inflammation involving/abutting the mid to distal descending colon which   could represent minimal acute diverticulitis. Note that this is minimal and   not the source of free air. Cholelithiasis. Cirrhotic liver. Minimal ascites. I discussed findings by phone with Yisel Hall at 1:42 a.m. on 05/09/2021. XR CHEST PORTABLE   Final Result   No acute process. Cardiomegaly.              Other Data:      Intake/Output Summary (Last 24 hours) at 5/22/2021 2022  Last data filed at 5/22/2021 1411  Gross per 24 hour   Intake 240 ml   Output 1670 ml   Net -1430 ml         Scheduled Medications:   albumin human  25 g Intravenous TID    furosemide  40 mg Intravenous TID    [START ON 5/23/2021] midodrine  5 mg Oral BID     triamcinolone   Topical BID    aztreonam  1,000 mg Intravenous Q12H    metroNIDAZOLE  500 mg Oral 2 times per day    lidocaine 1 % injection  5 mL Intradermal Once    sodium chloride flush  10 mL Intravenous 2 times per day    metoprolol tartrate  25 mg Oral BID    amiodarone  200 mg Oral Daily    rifaximin  550 mg Oral BID    lactulose  20 g Oral BID    ipratropium-albuterol  1 ampule Inhalation BID    apixaban

## 2021-05-23 NOTE — PROGRESS NOTES
Nephrology Progress Note    5/23/21: Pt awake alert overnight she had episodes of hemoptysis, UO decreased this AM with evidence of hematuria. SBP down to 97 at midnight, improved this AM      Vital SignsBP 106/63   Pulse 92   Temp 97.6 °F (36.4 °C) (Oral)   Resp 18   Ht 5' 4\" (1.626 m)   Wt 203 lb 7.8 oz (92.3 kg)   SpO2 97%   BMI 34.93 kg/m²   24HR INTAKE/OUTPUT:      Intake/Output Summary (Last 24 hours) at 5/23/2021 1157  Last data filed at 5/23/2021 0650  Gross per 24 hour   Intake 130 ml   Output 3120 ml   Net -2990 ml         Physical Exam    HEENT: Dry oral mucosa  Neck: No JVD  Lungs: Breath sounds decreased at the bases. No rales or ronchi. Erythema of the trunck extending to the shoulders  Heart: Regular rate and rhythm. No S3 gallop. No  murmrur.   Abdomen: Soft non distended, there is present keira-incisional tenderness, staples have been removed, ostomy with liquid stools, normal bowel sounds.     Extremeties: +4 bipedal  edema with bilat bullae on the LE, upper ex with 1-2+ edema, (+) erythema of the legs      ROS:  RESPIRATORY:  negative  CARDIOVASCULAR:  negative  GASTROINTESTINAL:   negative  GENITOURINARY:  negative  INTEGUMENT/BREAST:  positive for rash and pruritus      Current Facility-Administered Medications   Medication Dose Route Frequency Provider Last Rate Last Admin    polycarbophil (FIBERCON) tablet 625 mg  625 mg Oral Daily Jennifer Chamberlain MD        [START ON 5/24/2021] lactulose (CHRONULAC) 10 GM/15ML solution 20 g  20 g Oral Daily Jennifer Chamberlain MD        metronidazole (FLAGYL) 500 mg in NaCl 100 mL IVPB premix  500 mg Intravenous Q8H Maia Palafox MD        albumin human 25 % IV solution 25 g  25 g Intravenous TID Sunil Mirza  mL/hr at 05/23/21 0845 25 g at 05/23/21 0845    furosemide (LASIX) injection 40 mg  40 mg Intravenous TID Sunil Mirza MD   40 mg at 05/23/21 0846    midodrine (PROAMATINE) tablet 5 mg  5 mg Oral BID  Vishal Massey MD 5 mg at 05/23/21 0846    triamcinolone (KENALOG) 0.1 % cream   Topical BID Yan Pruett MD   Given at 05/23/21 0847    diphenhydrAMINE (BENADRYL) injection 25 mg  25 mg Intravenous Q6H PRN Amee Chaidez MD   25 mg at 05/22/21 2132    aztreonam (AZACTAM) 1,000 mg in sterile water 10 mL IV syringe  1,000 mg Intravenous Q12H Amee Chaidez MD   1,000 mg at 05/23/21 0234    ondansetron (ZOFRAN) injection 8 mg  8 mg Intravenous Q6H PRN Yan Pruett MD   8 mg at 05/23/21 0534    lidocaine 1 % injection 5 mL  5 mL Intradermal Once Amee Chaidez MD        sodium chloride flush 0.9 % injection 10 mL  10 mL Intravenous 2 times per day Amee Chaidez MD   10 mL at 05/23/21 0847    sodium chloride flush 0.9 % injection 10 mL  10 mL Intravenous PRN Amee Chaidez MD        0.9 % sodium chloride infusion  25 mL Intravenous PRN Amee Chaidez MD        ondansetron (ZOFRAN-ODT) disintegrating tablet 4 mg  4 mg Oral TID PRN Yan Pruett MD   4 mg at 05/18/21 2118    metoprolol tartrate (LOPRESSOR) tablet 25 mg  25 mg Oral BID Gege Tavarez MD   25 mg at 05/23/21 4541    amiodarone (CORDARONE) tablet 200 mg  200 mg Oral Daily Gege Tavarez MD   200 mg at 05/23/21 8659    rifaximin (XIFAXAN) tablet 550 mg  550 mg Oral BID Gege Tavarez MD   550 mg at 05/23/21 0846    ipratropium-albuterol (DUONEB) nebulizer solution 1 ampule  1 ampule Inhalation BID Gege Tavarez MD   1 ampule at 05/17/21 0532    [Held by provider] apixaban (ELIQUIS) tablet 5 mg  5 mg Oral BID Gege Tavarez MD   5 mg at 05/23/21 5097    perflutren lipid microspheres (DEFINITY) injection 1.65 mg  1.5 mL Intravenous ONCE PRN Gege Tavarez MD        fentaNYL (SUBLIMAZE) injection 50 mcg  50 mcg Intravenous Q2H PRN Gege Tavarez MD        cetirizine (ZYRTEC) tablet 10 mg  10 mg Oral Daily Miguel Figueredo MD   10 mg at 05/23/21 0846    anastrozole (ARIMIDEX) tablet 1 mg  1 mg Oral Daily Miguel Figueredo MD   1 mg at 05/23/21 PTH 81 (H) 05/14/2021    CALCIUM 8.9 05/23/2021    CALCIUM 8.2 (L) 05/22/2021    CALCIUM 8.2 (L) 05/21/2021    CAION 1.24 05/16/2021    CAION 1.22 05/14/2021    PHOS 4.6 (H) 05/23/2021    PHOS 4.6 (H) 05/22/2021    PHOS 4.7 (H) 05/21/2021    MG 2.1 05/22/2021    MG 2.0 05/21/2021    MG 1.9 05/16/2021       BMP:   Recent Labs     05/21/21  0629 05/22/21  0730 05/22/21  1629 05/23/21  0558    137  --  137   K 3.5  3.5 3.2* 3.8 3.6    105  --  104   CO2 25 23  --  24   BUN 75* 76*  --  80*   CREATININE 3.2* 3.3*  --  3.4*   GLUCOSE 99 115*  --  110*             Assessment: / Plan:    1.  Acute kidney injury.  Acute kidney injury secondary to renal hypoperfusion with low fractional excretion of sodium.    Cr slowly trending up-UO poor despite the initiation of the furosemide with the SPA  PLAN:1.Discussed with the pt and her dtr and at this point recommend initiation of Kidney Replacement Therapy with Dialysis-they are agreeable to the same  2. Consult IR for temp line in the AM    2.  Hypotension.    Improved. PLAN:1. Follow on present midodrine dose     3.   Anemia.  Hemoglobin stable. Alis Restrepo PLAN:1. Await serum iron studies. 2.  Follow hemoglobin hematocrit.     4.   Hypokalemia. Last Magnesium >/=2.0  PLAN:1.Monitor K+ and Mg++     5.  Hyperphosphatemia.  Reflects acute kidney injury.  Improved.    PLAN:1.Will follow PO4 level.     6.   Edema. With a significant element of third spacing as the patient's albumin is 1.7 mg/dL.   PLAN:1.Will Continue  furosemide 40mg IV Q8hrs with 25gr SPA Q8hrs for 7 doses-until dialysis is initiated    Electronically signed by Sam Luo MD

## 2021-05-23 NOTE — PROGRESS NOTES
[facility]  [facility]  Progress Note    NAME: Valeriano Powell  MR:  [id number]  :   1940  DATE OF SERVICE:21    This is a face to face encounter with Valeriano Powell 68 y.o. female on 21    CHIEF COMPLAINT     ID following for   Chief Complaint   Patient presents with    Emesis     HISTORY OF PRESENT ILLNESS   RASH SATBLE  COUGHING UP BLOOD  HEMATURIA   BLOOD TINGE FROM STOMA   DEC H/H  DAUGHTER PRESENT   Patient is tolerating medications. No reported adverse drug reactions. REVIEW OF SYSTEMS     As stated above in the chief complaint, otherwise negative.   CURRENT MEDICATIONS      polycarbophil  625 mg Oral Daily    [START ON 2021] lactulose  20 g Oral Daily    albumin human  25 g Intravenous TID    furosemide  40 mg Intravenous TID    midodrine  5 mg Oral BID WC    triamcinolone   Topical BID    aztreonam  1,000 mg Intravenous Q12H    metroNIDAZOLE  500 mg Oral 2 times per day    lidocaine 1 % injection  5 mL Intradermal Once    sodium chloride flush  10 mL Intravenous 2 times per day    metoprolol tartrate  25 mg Oral BID    amiodarone  200 mg Oral Daily    rifaximin  550 mg Oral BID    ipratropium-albuterol  1 ampule Inhalation BID    apixaban  5 mg Oral BID    cetirizine  10 mg Oral Daily    anastrozole  1 mg Oral Daily    levothyroxine  50 mcg Oral Daily    montelukast  10 mg Oral Nightly    pantoprazole  40 mg Oral QAM AC    sodium chloride flush  5-40 mL Intravenous 2 times per day     Continuous Infusions:   sodium chloride      sodium chloride       PRN Meds:diphenhydrAMINE, ondansetron, sodium chloride flush, sodium chloride, ondansetron, perflutren lipid microspheres, fentanNYL, sodium chloride flush, sodium chloride, traMADol    PHYSICAL EXAM     /63   Pulse 92   Temp 97.6 °F (36.4 °C) (Oral)   Resp 18   Ht 5' 4\" (1.626 m)   Wt 203 lb 7.8 oz (92.3 kg)   SpO2 97%   BMI 34.93 kg/m²   Temp  Av.7 °F (36.5 °C) Min: 97.6 °F (36.4 °C)  Max: 97.8 °F (36.6 °C)  Constitutional:  LETHARGIC BMI  OVERALL EDEMA  Skin:    Warm and dry. Rash ue chest flank LESS RED   HEENT:   Glasses AT/NC   Chest:   ON O2 Symmetrical expansion. Dec bs   Cardiovascular:  S1 and S2 are rhythmic and regular. Abdomen:   Positive bowel sounds to auscultation. Benign to palpation COLOSTOMY TO GRAVITY . Extremities:    Inc ble left >right left weeping  BLE WRAPPED  CNS    AAxO   Lines: lue picc placed 5/19 OLD BLOOD   Mercedes yellow urine      DIAGNOSTIC RESULTS   Radiology:    Recent Labs     05/21/21 0629 05/22/21  0730 05/23/21  0558   WBC 11.3 8.5 5.5   RBC 2.98* 2.99* 2.73*   HGB 9.7* 9.6* 8.8*   HCT 29.8* 29.3* 27.2*   .0* 98.0 99.6   MCH 32.6 32.1 32.2   MCHC 32.6 32.8 32.4   RDW 17.2* 17.2* 17.9*    176 142   MPV 10.4 10.2 10.1     Recent Labs     05/21/21 0629 05/22/21  0730 05/22/21  1629 05/23/21  0558    137  --  137   K 3.5  3.5 3.2* 3.8 3.6    105  --  104   CO2 25 23  --  24   BUN 75* 76*  --  80*   CREATININE 3.2* 3.3*  --  3.4*   GLUCOSE 99 115*  --  110*   PROT 5.1* 5.1*  --  5.4*   LABALBU 1.8* 1.7*  --  2.8*   CALCIUM 8.2* 8.2*  --  8.9   BILITOT 0.9 1.0  --  1.4*   ALKPHOS 113* 100  --  90   AST 17 16  --  15   ALT 7 8  --  7     Lab Results   Component Value Date    CRP 0.1 05/06/2019     Lab Results   Component Value Date    SEDRATE 65 (H) 05/17/2021    SEDRATE 58 (H) 05/06/2019     Recent Labs     05/21/21  0629 05/22/21  0730 05/23/21  0558   FERRITIN  --  84  --    AST 17 16 15   ALT 7 8 7     Lab Results   Component Value Date    CHOL 236 02/08/2021    TRIG 126 02/08/2021    HDL 66 02/08/2021    LDLCALC 145 02/08/2021    LABVLDL 25 02/08/2021     Lab Results   Component Value Date/Time    VITD25 57 05/22/2021 07:30 AM       Microbiology:   No results for input(s): COVID19 in the last 72 hours.   Lab Results   Component Value Date    BC 5 Days no growth 05/15/2021    BC  05/09/2021     Gram Negative Dominic is too fastidious for Identification and sensitivitey  testing      [facility]  05/09/2021     Too fastidious for Identification and susceptibility testing. BLOODCULT2 24 Hours no growth 05/19/2021    BLOODCULT2 5 Days no growth 05/15/2021    BLOODCULT2 5 Days no growth 05/09/2021    ORG Gram negative dominic 05/09/2021     No results found for: WNDABS  No results found for: [facility]      Component Value Date/Time    FUNGSM No Fungal elements seen 05/09/2021 0544       MRSA Culture Only   Date Value Ref Range Status   05/09/2021 Methicillin resistant Staph aureus not isolated  Final           FINAL IMPRESSION    Leukocytosis BETTER  Rash ?fluconazole orals FOLLOW  Gn septicemia/ Gram Negative Dominic is too fastidious for Identification and sensitivity   nenita/ckd  1. Perforated abdominal viscus     Procedure(s):  Exploratory laparotomy, sigmoid colectomy, end colostomy, open drainage of pelvic abscess, placement of left internal jugular vein triple-lumen catheter    CT  Pneumatosis with mural gas of the hepatic flexure of the   NO ABSCESS  colon and with anterior abdomen persistent small pneumoperitoneum. aztreonam (AZACTAM) 1,000 mg in sterile water 10 mL IV syringe, Q8H  metronidazole (FLAGYL) 500 mg in NaCl 100 mL IVPB premix, Q8H  fluconazole (DIFLUCAN) in 0.9 % sodium chloride IVPB 200 mg, Q24H     ESR65/CRP/PROCAL0.24     REEPAT H/H  FOR ICUTRANSFER  BLOOD CX  RESP CX  · Monitor labs    Imaging and labs were reviewed per medical records. The patient was educated about the diagnosis, prognosis, indications, risks and benefits of treatment. An opportunity to ask questions was given to the patient/FAMILY. Thank you for involving me in the care of Mely Kahn will continue to follow. Please do not hesitate to call for any questions or concerns.     Electronically signed by Marilyn Eldridge MD on 5/23/2021 at 10:15 AM

## 2021-05-23 NOTE — PROGRESS NOTES
[facility] when kidney function improves  Benedryl PRN itching    Zach Harden MD  5/23/2021  8:46 AM

## 2021-05-24 NOTE — PROGRESS NOTES
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 SUBJECTIVE:  Patient's epistaxis is nearly resolved. Her PT/INR was elevated 4.3 today but she will still need a temporary dialysis catheter. Therefore, we will give her 2 units of FFP prior to insertion of the dialysis catheter after my discussion with the interventional radiologist.    Patient is also currently on amiodarone midodrine and intravenous aztreonam per infectious disease service.   She is also receiving IV Flagyl because of the possibility of C. difficile infection in the prior evidence of pelvic abscess    MEDICATIONS:   aztreonam  500 mg Intravenous Q12H    polycarbophil  625 mg Oral Daily    lactulose  20 g Oral Daily    metroNIDAZOLE  500 mg Intravenous Q8H    oxymetazoline  2 spray Each Nostril BID    midodrine  5 mg Oral BID WC    triamcinolone   Topical BID    lidocaine 1 % injection  5 mL Intradermal Once    sodium chloride flush  10 mL Intravenous 2 times per day    metoprolol tartrate  25 mg Oral BID    amiodarone  200 mg Oral Daily    rifaximin  550 mg Oral BID    ipratropium-albuterol  1 ampule Inhalation BID    [Held by provider] apixaban  5 mg Oral BID    cetirizine  10 mg Oral Daily    anastrozole  1 mg Oral Daily    levothyroxine  50 mcg Oral Daily    montelukast  10 mg Oral Nightly    pantoprazole  40 mg Oral QAM AC    sodium chloride flush  5-40 mL Intravenous 2 times per day      sodium chloride      sodium chloride      sodium chloride sodium chloride, trimethobenzamide, ondansetron, diphenhydrAMINE, sodium chloride flush, sodium chloride, perflutren lipid microspheres, fentanNYL, sodium chloride flush, sodium chloride, traMADol      REVIEW OF SYSTEMS:  Constitutional: Denies fever, weight loss, night sweats, and fatigue  Skin: Denies pigmentation, dark lesions, and rashes   HEENT: Denies hearing loss, tinnitus, ear drainage, epistaxis, sore throat, and hoarseness. Cardiovascular: Denies palpitations, chest pain, and chest pressure. Respiratory: Denies cough, dyspnea at rest, hemoptysis, apnea, and choking. Gastrointestinal: Denies nausea, vomiting, poor appetite, diarrhea, heartburn or reflux  Genitourinary: Denies dysuria, frequency, urgency or hematuria  Musculoskeletal: Denies myalgias, muscle weakness, and bone pain  Neurological: Denies dizziness, vertigo, headache, and focal weakness  Psychological: Denies anxiety and depression  Endocrine: Denies heat intolerance and cold intolerance  Hematopoietic/Lymphatic: Denies bleeding problems and blood transfusions    OBJECTIVE:  Vitals:    05/24/21 1359   BP: 105/69   Pulse: 72   Resp: 30   Temp: 98 °F (36.7 °C)   SpO2:      FiO2 : 95 %  O2 Flow Rate (L/min): 2 L/min  O2 Device: None (Room air)    PHYSICAL EXAM:  Constitutional: No fever, chills, diaphoresis  Skin: No skin rash, no skin breakdown  HEENT: Unremarkable. No longer having any epistaxis, no hemoptysis  Neck: No JVD, lymphadenopathy, thyromegaly  Cardiovascular: S1, S2 slightly irregular. No S3 murmurs rubs present  Respiratory: Clear to auscultation bilaterally  Gastrointestinal: Soft, flat, nontender. Ostomy intact with good function  Genitourinary: No CVA tenderness  Extremities: No clubbing, cyanosis, or edema  Neurological: Awake, alert, oriented x3.   No evidence of focal motor or sensory deficits  Psychological: Slightly depressed affect but intact overall    LABS:  WBC   Date Value Ref Range Status   05/24/2021 4.8 4.5 05/24/2021 122 (H) 74 - 99 mg/dL Final   05/23/2021 110 (H) 74 - 99 mg/dL Final   05/22/2021 115 (H) 74 - 99 mg/dL Final   05/30/2012 100 70 - 110 mg/dL Final   11/14/2011 85 70 - 110 mg/dL Final   09/06/2011 110 70 - 110 mg/dL Final     Calcium   Date Value Ref Range Status   05/24/2021 9.1 8.6 - 10.2 mg/dL Final   05/23/2021 8.9 8.6 - 10.2 mg/dL Final   05/22/2021 8.2 (L) 8.6 - 10.2 mg/dL Final     Total Protein   Date Value Ref Range Status   05/24/2021 5.6 (L) 6.4 - 8.3 g/dL Final   05/23/2021 5.4 (L) 6.4 - 8.3 g/dL Final   05/22/2021 5.1 (L) 6.4 - 8.3 g/dL Final     Albumin   Date Value Ref Range Status   05/24/2021 3.6 3.5 - 5.2 g/dL Final   05/23/2021 2.8 (L) 3.5 - 5.2 g/dL Final   05/22/2021 1.7 (L) 3.5 - 5.2 g/dL Final   05/30/2012 4.1 3.2 - 4.8 g/dL Final   11/14/2011 3.8 3.2 - 4.8 g/dL Final   11/15/2010 3.6 3.2 - 4.8 g/dL Final     Total Bilirubin   Date Value Ref Range Status   05/24/2021 1.2 0.0 - 1.2 mg/dL Final   05/23/2021 1.4 (H) 0.0 - 1.2 mg/dL Final   05/22/2021 1.0 0.0 - 1.2 mg/dL Final     Alkaline Phosphatase   Date Value Ref Range Status   05/24/2021 76 35 - 104 U/L Final   05/23/2021 90 35 - 104 U/L Final   05/22/2021 100 35 - 104 U/L Final     AST   Date Value Ref Range Status   05/24/2021 13 0 - 31 U/L Final   05/23/2021 15 0 - 31 U/L Final   05/22/2021 16 0 - 31 U/L Final     Comment:     Specimen is slightly Hemolyzed. Result may be artificially increased. ALT   Date Value Ref Range Status   05/24/2021 6 0 - 32 U/L Final   05/23/2021 7 0 - 32 U/L Final   05/22/2021 8 0 - 32 U/L Final     GFR Non-   Date Value Ref Range Status   05/24/2021 12 >=60 mL/min/1.73 Final     Comment:     Chronic Kidney Disease: less than 60 ml/min/1.73 sq.m. Kidney Failure: less than 15 ml/min/1.73 sq.m. Results valid for patients 18 years and older. 05/23/2021 13 >=60 mL/min/1.73 Final     Comment:     Chronic Kidney Disease: less than 60 ml/min/1.73 sq.m.           Kidney Failure: less than 15 ml/min/1.73 sq.m. Results valid for patients 18 years and older. 05/22/2021 13 >=60 mL/min/1.73 Final     Comment:     Chronic Kidney Disease: less than 60 ml/min/1.73 sq.m. Kidney Failure: less than 15 ml/min/1.73 sq.m. Results valid for patients 18 years and older. GFR    Date Value Ref Range Status   05/24/2021 15  Final   05/23/2021 16  Final   05/22/2021 16  Final     Magnesium   Date Value Ref Range Status   05/24/2021 2.2 1.6 - 2.6 mg/dL Final   05/22/2021 2.1 1.6 - 2.6 mg/dL Final   05/21/2021 2.0 1.6 - 2.6 mg/dL Final     Phosphorus   Date Value Ref Range Status   05/24/2021 4.8 (H) 2.5 - 4.5 mg/dL Final   05/23/2021 4.6 (H) 2.5 - 4.5 mg/dL Final   05/22/2021 4.6 (H) 2.5 - 4.5 mg/dL Final     No results for input(s): PH, PO2, PCO2, HCO3, BE, O2SAT in the last 72 hours. RADIOLOGY:  US DUP UPPER EXTREMITY LEFT VENOUS   Final Result   No evidence of DVT. XR CHEST PORTABLE   Final Result   1. Invasive lines and tubes appear satifactory /unremarkable. 2. Persistent bi basilar consolidation favor atelectasis. CT ABDOMEN PELVIS WO CONTRAST Additional Contrast? Oral   Final Result   1. Partial colectomy with left lower quadrant functioning colostomy. Hepatic flexure colon pneumatosis and with residual small pneumoperitoneum. No extravasation of GI contrast from the bowel seen. No abscess identified. 2.  Left abdomen localized small bowel intussusception without bowel   obstruction. 3.  Cirrhosis. No portal vein gas seen. Moderate volume abdominal and   pelvic ascites, increased compared to prior. 4.  Cholelithiasis. No biliary dilatation. Generalized anasarca pattern. CRITICAL FINDINGS: Abnormal CT findings telephoned by Dr. Evie Casillas to the   referring service at 1105 hours. CT CHEST WO CONTRAST   Final Result   1.   Cardiomegaly, small right pleural effusion, and right basilar passive   atelectasis without significant change. 2.  No pneumonia or abscess identified. 3.  Right middle lobe mild atelectasis or scarring, unchanged. XR ABDOMEN (KUB) (SINGLE AP VIEW)   Final Result   No evidence of bowel obstruction. IR FLUORO GUIDED CVA DEVICE PLMT/REPLACE/REMOVAL   Final Result   Successful placement of a dual lumen power PICC line. IR ULTRASOUND GUIDANCE VASCULAR ACCESS   Final Result   Successful placement of a dual lumen power PICC line. US DUP UPPER EXTREMITY LEFT VENOUS   Final Result   No evidence of DVT in the left upper extremity venous vasculature. XR CHEST PORTABLE   Final Result   No significant change. Continued follow-up recommended. XR CHEST PORTABLE   Final Result      CT HEAD WO CONTRAST   Final Result   No acute intracranial abnormality. Periventricular white matter changes consistent chronic microvascular   disease. Diffuse volume loss. CT ABDOMEN PELVIS WO CONTRAST Additional Contrast? None   Final Result   Moderate amount of free intraperitoneal air which is compatible with   perforated abdominal viscus. The right colon is the most likely source of   free air as there is pneumatosis coli from the cecum to the a [place]   flexure. Underlying colonic ischemia is a consideration. There is also diverticular disease involving the left colon. There is some   inflammation involving/abutting the mid to distal descending colon which   could represent minimal acute diverticulitis. Note that this is minimal and   not the source of free air. Cholelithiasis. Cirrhotic liver. Minimal ascites. I discussed findings by phone with Yoli Gaitan at 1:42 a.m. on 05/09/2021. XR CHEST PORTABLE   Final Result   No acute process. Cardiomegaly.          IR Interventional Radiology Procedure Request    (Results Pending)           PROBLEM LIST:  Active Problems:    Chronic atrial fibrillation (HCC)    Cirrhosis of questions answered. This patient has a high probability of sudden, clinically significant deterioration, which requires the highest level of physician preparedness to intervene urgently. I managed/supervised life or organ supporting interventions that required frequent physician assessment. I devoted my full attention to the direct care of this patient for the amount of time indicated below. Time I spent with the family or surrogate(s) is included only if the patient was incapable of providing the necessary information or participating in medical decisions  Time devoted to teaching and to any procedures I billed separately is not included.     CRITICAL CARE TIME:  37 minutes    Electronically signed by Buffy Leon MD on 5/24/2021 at 2:31 PM

## 2021-05-24 NOTE — PROGRESS NOTES
[facility]  [facility]  Progress Note    NAME: Lulu De La Garza  MR:  [id number]  :   1940  DATE OF SERVICE:21    This is a face to face encounter with Lulu De La Garza 68 y.o. female on 21    CHIEF COMPLAINT     ID following for   Chief Complaint   Patient presents with    Emesis     HISTORY OF PRESENT ILLNESS   In icu  RASH chest same   Less sob   ble unwrapped  Patient is tolerating medications. No reported adverse drug reactions. REVIEW OF SYSTEMS     As stated above in the chief complaint, otherwise negative.   CURRENT MEDICATIONS      aztreonam  500 mg Intravenous Q12H    polycarbophil  625 mg Oral Daily    lactulose  20 g Oral Daily    metroNIDAZOLE  500 mg Intravenous Q8H    oxymetazoline  2 spray Each Nostril BID    midodrine  5 mg Oral BID WC    triamcinolone   Topical BID    lidocaine 1 % injection  5 mL Intradermal Once    sodium chloride flush  10 mL Intravenous 2 times per day    metoprolol tartrate  25 mg Oral BID    amiodarone  200 mg Oral Daily    rifaximin  550 mg Oral BID    ipratropium-albuterol  1 ampule Inhalation BID    [Held by provider] apixaban  5 mg Oral BID    cetirizine  10 mg Oral Daily    anastrozole  1 mg Oral Daily    levothyroxine  50 mcg Oral Daily    montelukast  10 mg Oral Nightly    pantoprazole  40 mg Oral QAM AC    sodium chloride flush  5-40 mL Intravenous 2 times per day     Continuous Infusions:   sodium chloride      sodium chloride       PRN Meds:trimethobenzamide, ondansetron, diphenhydrAMINE, sodium chloride flush, sodium chloride, perflutren lipid microspheres, fentanNYL, sodium chloride flush, sodium chloride, traMADol    PHYSICAL EXAM     /76   Pulse 99   Temp 97.9 °F (36.6 °C) (Axillary)   Resp 24   Ht 5' 4\" (1.626 m)   Wt 218 lb 12.8 oz (99.2 kg)   SpO2 95%   BMI 37.56 kg/m²   Temp  Av.8 °F (36.6 °C)  Min: 97.6 °F (36.4 °C)  Max: 98.1 °F (36.7 °C)  Constitutional:   OVERALL EDEMA  Skin:    Warm and dry. Rash ue chest flank LESS RED   HEENT:   Glasses AT/NC   Chest:   ON O2 Symmetrical expansion. Dec bs   Cardiovascular:  S1 and S2 are rhythmic and regular. Abdomen:   Positive bowel sounds to auscultation. Benign to palpation COLOSTOMY TO GRAVITY . Extremities:    Inc ble left >right left weeping  BLE uWRAPPED blister doral feet   CNS    AAxO   Lines: lue picc placed 5/19  Mercedes dark  urine      DIAGNOSTIC RESULTS   Radiology:    Recent Labs     05/23/21 2025 05/24/21  0231 05/24/21  1031   WBC 4.9 4.2* 4.8   RBC 2.46* 2.38* 2.49*   HGB 8.0* 7.7* 8.0*   HCT 24.7* 23.8* 25.5*   .4* 100.0* 102.4*   MCH 32.5 32.4 32.1   MCHC 32.4 32.4 31.4*   RDW 17.7* 17.9* 18.1*   * 106* 110*   MPV 10.0 10.2 10.2     Recent Labs     05/22/21 0730 05/23/21  0558 05/24/21  0511    137 138   K 3.2* 3.6 3.5  3.5    104 105   CO2 23 24 21*   BUN 76* 80* 82*   CREATININE 3.3* 3.4* 3.6*   GLUCOSE 115* 110* 122*   PROT 5.1* 5.4* 5.6*   LABALBU 1.7* 2.8* 3.6   CALCIUM 8.2* 8.9 9.1   BILITOT 1.0 1.4* 1.2   ALKPHOS 100 90 76   AST 16 15 13   ALT 8 7 6     Lab Results   Component Value Date    CRP 0.1 05/06/2019     Lab Results   Component Value Date    SEDRATE 65 (H) 05/17/2021    SEDRATE 58 (H) 05/06/2019     Recent Labs     05/22/21 0730 05/23/21  0558 05/24/21  0511 05/24/21  1031   FERRITIN 84  --   --   --    INR  --   --   --  4.3   PROTIME  --   --   --  51.2*   AST 16 15 13  --    ALT 8 7 6  --      Lab Results   Component Value Date    CHOL 236 02/08/2021    TRIG 126 02/08/2021    HDL 66 02/08/2021    LDLCALC 145 02/08/2021    LABVLDL 25 02/08/2021     Lab Results   Component Value Date/Time    VITD25 57 05/22/2021 07:30 AM       Microbiology:   No results for input(s): COVID19 in the last 72 hours.   Lab Results   Component Value Date    BC 5 Days no growth 05/15/2021    BC  05/09/2021     Gram Negative Dominic is too fastidious for Identification and sensitivitey  testing [facility]  05/09/2021     Too fastidious for Identification and susceptibility testing. BLOODCULT2 24 Hours no growth 05/19/2021    BLOODCULT2 5 Days no growth 05/15/2021    BLOODCULT2 5 Days no growth 05/09/2021    ORG Gram negative dominic 05/09/2021     No results found for: WNDABS  No results found for: RESPSMEAR      Component Value Date/Time    FUNGSM No Fungal elements seen 05/09/2021 0544    LABFUNG No growth after 1 week/s of incubation. 05/09/2021 0544       MRSA Culture Only   Date Value Ref Range Status   05/09/2021 Methicillin resistant Staph aureus not isolated  Final           FINAL IMPRESSION    Leukocytosis BETTER  Rash ?fluconazole orals FOLLOW  Gn septicemia/ Gram Negative Dominic is too fastidious for Identification and sensitivity   [place]/ckd for hemo  1. Perforated abdominal viscus     Procedure(s):  Exploratory laparotomy, sigmoid colectomy, end colostomy, open drainage of pelvic abscess, placement of left internal jugular vein triple-lumen catheter    CT  Pneumatosis with mural gas of the hepatic flexure of the   NO ABSCESS  colon and with anterior abdomen persistent small pneumoperitoneum. aztreonam (AZACTAM) 1,000 mg in sterile water 10 mL IV syringe, Q8H  metronidazole (FLAGYL) 500 mg in NaCl 100 mL IVPB premix, Q8H  fluconazole (DIFLUCAN) in 0.9 % sodium chloride IVPB 200 mg, Q24H     ESR65/CRP/PROCAL0.24        BLOOD CX  RESP CX  · Monitor labs    Imaging and labs were reviewed per medical records. The patient was educated about the diagnosis, prognosis, indications, risks and benefits of treatment. An opportunity to ask questions was given to the patient/FAMILY. Thank you for involving me in the care of Pa Boyd will continue to follow. Please do not hesitate to call for any questions or concerns.     Electronically signed by Emely Watt MD on 5/24/2021 at 12:43 PM

## 2021-05-24 NOTE — PROGRESS NOTES
Patient came down to special procedures for temporary dialysis catheter placement. 1539 Procedure start /77 87 21 97% room air      1546 Procedure end /74 87 20 97% room air     Temporary dialysis catheter to right IJ.  2 x 2 tegaderm applied. ICU nurse [id number] [place] RN down in specials with patient during procedure.

## 2021-05-24 NOTE — PROGRESS NOTES
Nephrology Progress Note    5/24/21: Pt awake alert denied pain, still with SOB, she remains afgreeable to initiation dialysis today      Vital SignsBP 129/76   Pulse 99   Temp 97.9 °F (36.6 °C) (Axillary)   Resp 24   Ht 5' 4\" (1.626 m)   Wt 218 lb 12.8 oz (99.2 kg)   SpO2 95%   BMI 37.56 kg/m²   24HR INTAKE/OUTPUT:      Intake/Output Summary (Last 24 hours) at 5/24/2021 0945  Last data filed at 5/24/2021 0606  Gross per 24 hour   Intake 1630 ml   Output 1202 ml   Net 428 ml         Physical Exam    HEENT: Dry oral mucosa  Neck: No JVD  Lungs: Breath sounds decreased at the bases. No rales or ronchi. Erythema of the trunck extending to the shoulders  Heart: Regular rate and rhythm. No S3 gallop. No  murmrur.   Abdomen: Soft non distended, there is present keira-incisional tenderness, staples have been removed, ostomy with liquid stools, normal bowel sounds.     Extremeties: +4 bipedal  edema with bilat bullae on the LE, upper ex with 1-2+ edema, (+) erythema of the legs      ROS:  RESPIRATORY:  negative  CARDIOVASCULAR:  negative  GASTROINTESTINAL:   negative  GENITOURINARY:  negative  INTEGUMENT/BREAST:  positive for rash and pruritus      Current Facility-Administered Medications   Medication Dose Route Frequency Provider Last Rate Last Admin    polycarbophil (FIBERCON) tablet 625 mg  625 mg Oral Daily Shruti Ott MD        lactulose (CHRONULAC) 10 GM/15ML solution 20 g  20 g Oral Daily Shruti Ott MD        metronidazole (FLAGYL) 500 mg in NaCl 100 mL IVPB premix  500 mg Intravenous Q8H Ella Brady MD   Stopped at 05/24/21 0700    trimethobenzamide (TIGAN) injection 200 mg  200 mg Intramuscular Q6H PRN Wade Gaffney MD   200 mg at 05/23/21 1353    ondansetron (ZOFRAN) injection 4 mg  4 mg Intravenous Q6H PRN Wade Gaffney MD        oxymetazoline (AFRIN) 0.05 % nasal spray 2 spray  2 spray Each Nostril BID Wade Gaffney MD   2 spray at 05/23/21 2051    albumin human 25 % IV solution 25 g  25 g Intravenous TID Sumit Goode  mL/hr at 05/23/21 2031 25 g at 05/23/21 2031    furosemide (LASIX) injection 40 mg  40 mg Intravenous TID Sumit Goode MD   40 mg at 05/23/21 2123    midodrine (PROAMATINE) tablet 5 mg  5 mg Oral BID  Vishal Pinzon MD   5 mg at 05/23/21 1806    triamcinolone (KENALOG) 0.1 % cream   Topical BID Carlos A Mora MD   Given at 05/23/21 2115    diphenhydrAMINE (BENADRYL) injection 25 mg  25 mg Intravenous Q6H PRN Carroll Salvador MD   25 mg at 05/22/21 2132    aztreonam (AZACTAM) 1,000 mg in sterile water 10 mL IV syringe  1,000 mg Intravenous Q12H Carroll Salvaodr MD   1,000 mg at 05/24/21 0234    lidocaine 1 % injection 5 mL  5 mL Intradermal Once Carroll Salvador MD        sodium chloride flush 0.9 % injection 10 mL  10 mL Intravenous 2 times per day Carroll Salvador MD   10 mL at 05/23/21 2039    sodium chloride flush 0.9 % injection 10 mL  10 mL Intravenous PRN Carroll Salvador MD        0.9 % sodium chloride infusion  25 mL Intravenous PRN Carroll Salvador MD        metoprolol tartrate (LOPRESSOR) tablet 25 mg  25 mg Oral BID Judy Soto MD   25 mg at 05/23/21 2051    amiodarone (CORDARONE) tablet 200 mg  200 mg Oral Daily Judy Soto MD   200 mg at 05/23/21 2834    rifaximin (XIFAXAN) tablet 550 mg  550 mg Oral BID Judy Soto MD   550 mg at 05/23/21 2038    ipratropium-albuterol (DUONEB) nebulizer solution 1 ampule  1 ampule Inhalation BID Judy Soto MD   1 ampule at 05/17/21 0532    [Held by provider] apixaban Radha Achilles) tablet 5 mg  5 mg Oral BID Judy Soto MD   5 mg at 05/23/21 2616    perflutren lipid microspheres (DEFINITY) injection 1.65 mg  1.5 mL Intravenous ONCE PRN Judy Soto MD        fentaNYL (SUBLIMAZE) injection 50 mcg  50 mcg Intravenous Q2H PRN Judy Soto MD        cetirizine (ZYRTEC) tablet 10 mg  10 mg Oral Daily Avon Pallas, MD   10 mg at 05/23/21 0892    anastrozole (ARIMIDEX) tablet 1 mg  1 mg Oral Daily Jason Hicks MD   1 mg at 05/23/21 0846    levothyroxine (SYNTHROID) tablet 50 mcg  50 mcg Oral Daily Jason Hicks MD   50 mcg at 05/24/21 0605    montelukast (SINGULAIR) tablet 10 mg  10 mg Oral Nightly Jason Hicks MD   10 mg at 05/23/21 2038    pantoprazole (PROTONIX) tablet 40 mg  40 mg Oral QAM AC Jason Hicks MD   40 mg at 05/24/21 0605    sodium chloride flush 0.9 % injection 5-40 mL  5-40 mL Intravenous 2 times per day Jason Hicks MD   10 mL at 05/23/21 2039    sodium chloride flush 0.9 % injection 5-40 mL  5-40 mL Intravenous PRN Jason Hicks MD   10 mL at 05/13/21 1636    0.9 % sodium chloride infusion  25 mL Intravenous PRN Jason Hicks MD        traMADol Chavez Boron) tablet 50 mg  50 mg Oral Q6H PRN Jason Hicks MD   50 mg at 05/09/21 0859       US DUP UPPER EXTREMITY LEFT VENOUS   Final Result   No evidence of DVT. XR CHEST PORTABLE   Final Result   1. Invasive lines and tubes appear satifactory /unremarkable. 2. Persistent bi basilar consolidation favor atelectasis. CT ABDOMEN PELVIS WO CONTRAST Additional Contrast? Oral   Final Result   1. Partial colectomy with left lower quadrant functioning colostomy. Hepatic flexure colon pneumatosis and with residual small pneumoperitoneum. No extravasation of GI contrast from the bowel seen. No abscess identified. 2.  Left abdomen localized small bowel intussusception without bowel   obstruction. 3.  Cirrhosis. No portal vein gas seen. Moderate volume abdominal and   pelvic ascites, increased compared to prior. 4.  Cholelithiasis. No biliary dilatation. Generalized anasarca pattern. CRITICAL FINDINGS: Abnormal CT findings telephoned by Dr. Kavin Cole to the   referring service at 1105 hours. CT CHEST WO CONTRAST   Final Result   1.   Cardiomegaly, small right pleural effusion, and right basilar passive   atelectasis without significant change. 2.  No pneumonia or abscess identified. 3.  Right middle lobe mild atelectasis or scarring, unchanged. XR ABDOMEN (KUB) (SINGLE AP VIEW)   Final Result   No evidence of bowel obstruction. IR FLUORO GUIDED CVA DEVICE PLMT/REPLACE/REMOVAL   Final Result   Successful placement of a dual lumen power PICC line. IR ULTRASOUND GUIDANCE VASCULAR ACCESS   Final Result   Successful placement of a dual lumen power PICC line. US DUP UPPER EXTREMITY LEFT VENOUS   Final Result   No evidence of DVT in the left upper extremity venous vasculature. XR CHEST PORTABLE   Final Result   No significant change. Continued follow-up recommended. XR CHEST PORTABLE   Final Result      CT HEAD WO CONTRAST   Final Result   No acute intracranial abnormality. Periventricular white matter changes consistent chronic microvascular   disease. Diffuse volume loss. CT ABDOMEN PELVIS WO CONTRAST Additional Contrast? None   Final Result   Moderate amount of free intraperitoneal air which is compatible with   perforated abdominal viscus. The right colon is the most likely source of   free air as there is pneumatosis coli from the cecum to the a [place]   flexure. Underlying colonic ischemia is a consideration. There is also diverticular disease involving the left colon. There is some   inflammation involving/abutting the mid to distal descending colon which   could represent minimal acute diverticulitis. Note that this is minimal and   not the source of free air. Cholelithiasis. Cirrhotic liver. Minimal ascites. I discussed findings by phone with Martin Johnson at 1:42 a.m. on 05/09/2021. XR CHEST PORTABLE   Final Result   No acute process. Cardiomegaly.          IR Interventional Radiology Procedure Request    (Results Pending)         Labs:    CBC:   Recent Labs     05/23/21  0558 05/23/21  1043 05/23/21 2025 05/24/21 0231   WBC 5.5  --  4.9 4.2*   HGB 8.8* 8.2* 8.0* 7.7*     --  119* 106*        Lab Results   Component Value Date    IRON 33 (L) 05/22/2021    TIBC 140 (L) 05/22/2021    FERRITIN 84 05/22/2021       Lab Results   Component Value Date    PTH 81 (H) 05/14/2021    CALCIUM 9.1 05/24/2021    CALCIUM 8.9 05/23/2021    CALCIUM 8.2 (L) 05/22/2021    CAION 1.24 05/16/2021    CAION 1.22 05/14/2021    PHOS 4.8 (H) 05/24/2021    PHOS 4.6 (H) 05/23/2021    PHOS 4.6 (H) 05/22/2021    MG 2.2 05/24/2021    MG 2.1 05/22/2021    MG 2.0 05/21/2021       BMP:   Recent Labs     05/22/21  0730 05/23/21  0558 05/24/21  0511    137 138   K 3.2* 3.6 3.5  3.5    104 105   CO2 23 24 21*   BUN 76* 80* 82*   CREATININE 3.3* 3.4* 3.6*   GLUCOSE 115* 110* 122*             Assessment: / Plan:    1.  Acute kidney injury.  Acute kidney injury secondary to renal hypoperfusion with low fractional excretion of sodium.    Cr slowly trending up-UO poor despite the initiation of the furosemide with the SPA  PLAN:1. For Temp Dialysis Catheter today and then 1st dialtysis  2. 2nd treatment 5/25/21    2.  Hypotension.  Stable   PLAN:1. Follow on present midodrine dose     3.   Anemia.    Ferritin 84, Iron Sat 24%  Vit B12 753  HgB trending down  PLAN:1. Start oral Fe-no IV Fe++ until infection cleared  2.  Follow hemoglobin hematocrit.     4.   Hypokalemia. Magnesium >/=2.0  PLAN:1.Monitor K+ and Mg++     5.  Hyperphosphatemia.  Reflects acute kidney injury.  Improved.    PLAN:1.Will follow PO4 level with the initiation IHD.     6.   Edema. With a significant element of third spacing as the patient's albumin is 1.7 mg/dL.   PLAN:1.Will stop loop + SPA as initiating dialysis    Electronically signed by Donna Zamarripa MD

## 2021-05-24 NOTE — CARE COORDINATION
5-24-Cm note: ( covid neg 5-18) Pt is scheduled for a Temporary Dialysis catheter today, I spoke to liaison at [facility], they cannot accept the pt if she is going to require long term Dialysis . I gave the patient a list of skilled facilities as a back up plan, pt will go over the list with her daughter and give a couple of choices. [facility] is still following pt in case she doesn't need long term HD. No precert required for any facility, pt will require a BD [facility]  [facility] [address] , can take pending .  Electronically signed by Marcus Strong RN on 5/24/2021 at 1:19 PM

## 2021-05-25 NOTE — PROGRESS NOTES
Seen and examined  Tolerating po with normal ostomy function  S/p R IJ dialysis catheter   Feels better since first treatment last night  No other acute general surgery issues  Ok for SNF when other acute issues resolve    Elvie Lucero MD

## 2021-05-25 NOTE — PLAN OF CARE
Problem: Falls - Risk of:  Goal: Will remain free from falls  Description: Will remain free from falls  Outcome: Met This Shift  Goal: Absence of physical injury  Description: Absence of physical injury  Outcome: Met This Shift     Problem: Skin Integrity:  Goal: Will show no infection signs and symptoms  Description: Will show no infection signs and symptoms  Outcome: Met This Shift     Problem:  Activity:  Goal: Ability to tolerate increased activity will improve  Description: Ability to tolerate increased activity will improve  Outcome: Met This Shift

## 2021-05-25 NOTE — FLOWSHEET NOTE
05/24/21 1958   Vital Signs   BP (!) 161/114   Temp 97.4 °F (36.3 °C)   Pulse 110   Resp 20   Weight 202 lb 13.2 oz (92 kg)   Weight Method Bed scale   Percent Weight Change -0.22   Pain Assessment   Pain Assessment 0-10   Post-Hemodialysis Assessment   Post-Treatment Procedures Blood returned;Catheter capped, clamped with Citrate x 2 ports   Machine Disinfection Process Exterior Machine Disinfection;Acid/Vinegar Clean;Bleach   Rinseback Volume (ml) 300 ml   Total Liters Processed (l/min) 23.5 l/min   Dialyzer Clearance Moderately streaked   Duration of Treatment (minutes) 120 minutes   Hemodialysis Intake (ml) 300 ml   Hemodialysis Output (ml) 600 ml   NET Removed (ml) 300 ml   Tolerated Treatment Good   Patient Response to Treatment tolerated well   Bilateral Breath Sounds Diminished   Edema Right upper extremity; Left upper extremity;Right lower extremity; Left lower extremity   RUE Edema +2;Pitting   LUE Edema +4;Pitting   RLE Edema +3;Pitting   LLE Edema +3;Pitting   Physician Notified?  No

## 2021-05-25 NOTE — PROGRESS NOTES
Pt ran 3 hours; 6135 bath; 1150 removed; stable/tolerated well; denies c/o. HD CVC  Citrate locked per lumen fill volume post tx. Next tx 5/26/21.  Neg refill test at end of tx as BV change from -10.8 to -10.9.      /70   Pulse 104   Temp 97.7 °F (36.5 °C)   Resp 23   Ht 5' 4\" (1.626 m)   Wt 203 lb 11.3 oz (92.4 kg)   SpO2 98%   BMI 34.97 kg/m²

## 2021-05-25 NOTE — PROGRESS NOTES
Pulmonary/Critical Care Progress Note    We are following patient for YANELIS superimposed on CKD, status post diverticular rupture with diverticulitis, pelvic abscess that needed to be drained, colostomy for diversion, nonalcoholic cirrhosis requiring rifaximin and lactulose, recent epistaxis secondary to anticoagulation given for atrial fibrillation, coagulopathy,    SUBJECTIVE:  Patient underwent dialysis without complication yesterday with 1100 cc of fluid removed. The patient is very happy that she is no longer as fluid overloaded. She is looking forward to getting out of bed and so far, is suffering no ill effects of any of her medications. Her apixaban continues to be held because of her need for a dialysis catheter. It is still unknown as to whether her kidneys will recover but she will be dialyzed again tomorrow for fluid removal purposes.     MEDICATIONS:   ferrous sulfate  325 mg Oral BID WC    aztreonam  500 mg Intravenous Q12H    polycarbophil  625 mg Oral Daily    lactulose  20 g Oral Daily    metroNIDAZOLE  500 mg Intravenous Q8H    oxymetazoline  2 spray Each Nostril BID    midodrine  5 mg Oral BID WC    triamcinolone   Topical BID    lidocaine 1 % injection  5 mL Intradermal Once    sodium chloride flush  10 mL Intravenous 2 times per day    metoprolol tartrate  25 mg Oral BID    amiodarone  200 mg Oral Daily    rifaximin  550 mg Oral BID    ipratropium-albuterol  1 ampule Inhalation BID    [Held by provider] apixaban  5 mg Oral BID    cetirizine  10 mg Oral Daily    anastrozole  1 mg Oral Daily    levothyroxine  50 mcg Oral Daily    montelukast  10 mg Oral Nightly    pantoprazole  40 mg Oral QAM AC    sodium chloride flush  5-40 mL Intravenous 2 times per day      sodium chloride      sodium chloride      sodium chloride      sodium chloride       citrate dextrose, sodium chloride, sodium chloride, trimethobenzamide, ondansetron, diphenhydrAMINE, sodium chloride flush, 05/25/2021 4.4 (L) 4.5 - 11.5 E9/L Final     Hemoglobin   Date Value Ref Range Status   05/25/2021 8.2 (L) 11.5 - 15.5 g/dL Final   05/25/2021 7.7 (L) 11.5 - 15.5 g/dL Final   05/25/2021 7.5 (L) 11.5 - 15.5 g/dL Final     Hematocrit   Date Value Ref Range Status   05/25/2021 25.6 (L) 34.0 - 48.0 % Final   05/25/2021 23.7 (L) 34.0 - 48.0 % Final   05/25/2021 23.2 (L) 34.0 - 48.0 % Final     MCV   Date Value Ref Range Status   05/25/2021 100.8 (H) 80.0 - 99.9 fL Final   05/25/2021 100.0 (H) 80.0 - 99.9 fL Final   05/25/2021 100.4 (H) 80.0 - 99.9 fL Final     Platelets   Date Value Ref Range Status   05/25/2021 89 (L) 130 - 450 E9/L Final   05/25/2021 79 (L) 130 - 450 E9/L Final   05/25/2021 94 (L) 130 - 450 E9/L Final     Sodium   Date Value Ref Range Status   05/25/2021 138 132 - 146 mmol/L Final   05/24/2021 138 132 - 146 mmol/L Final   05/23/2021 137 132 - 146 mmol/L Final     Potassium   Date Value Ref Range Status   05/24/2021 3.5 3.5 - 5.0 mmol/L Final     Potassium reflex Magnesium   Date Value Ref Range Status   05/25/2021 3.5 3.5 - 5.0 mmol/L Final   05/24/2021 3.5 3.5 - 5.0 mmol/L Final   05/23/2021 3.6 3.5 - 5.0 mmol/L Final     Chloride   Date Value Ref Range Status   05/25/2021 103 98 - 107 mmol/L Final   05/24/2021 105 98 - 107 mmol/L Final   05/23/2021 104 98 - 107 mmol/L Final     CO2   Date Value Ref Range Status   05/25/2021 24 22 - 29 mmol/L Final   05/24/2021 21 (L) 22 - 29 mmol/L Final   05/23/2021 24 22 - 29 mmol/L Final     BUN   Date Value Ref Range Status   05/25/2021 67 (H) 6 - 23 mg/dL Final   05/24/2021 82 (H) 6 - 23 mg/dL Final   05/23/2021 80 (H) 6 - 23 mg/dL Final     CREATININE   Date Value Ref Range Status   05/25/2021 3.1 (H) 0.5 - 1.0 mg/dL Final   05/24/2021 3.6 (H) 0.5 - 1.0 mg/dL Final   05/23/2021 3.4 (H) 0.5 - 1.0 mg/dL Final     Glucose   Date Value Ref Range Status   05/25/2021 103 (H) 74 - 99 mg/dL Final   05/24/2021 122 (H) 74 - 99 mg/dL Final   05/23/2021 110 (H) 74 - 99 mg/dL Final   05/30/2012 100 70 - 110 mg/dL Final   11/14/2011 85 70 - 110 mg/dL Final   09/06/2011 110 70 - 110 mg/dL Final     Calcium   Date Value Ref Range Status   05/25/2021 8.8 8.6 - 10.2 mg/dL Final   05/24/2021 9.1 8.6 - 10.2 mg/dL Final   05/23/2021 8.9 8.6 - 10.2 mg/dL Final     Total Protein   Date Value Ref Range Status   05/25/2021 5.5 (L) 6.4 - 8.3 g/dL Final   05/24/2021 5.6 (L) 6.4 - 8.3 g/dL Final   05/23/2021 5.4 (L) 6.4 - 8.3 g/dL Final     Albumin   Date Value Ref Range Status   05/25/2021 3.4 (L) 3.5 - 5.2 g/dL Final   05/24/2021 3.6 3.5 - 5.2 g/dL Final   05/23/2021 2.8 (L) 3.5 - 5.2 g/dL Final   05/30/2012 4.1 3.2 - 4.8 g/dL Final   11/14/2011 3.8 3.2 - 4.8 g/dL Final   11/15/2010 3.6 3.2 - 4.8 g/dL Final     Total Bilirubin   Date Value Ref Range Status   05/25/2021 1.3 (H) 0.0 - 1.2 mg/dL Final   05/24/2021 1.2 0.0 - 1.2 mg/dL Final   05/23/2021 1.4 (H) 0.0 - 1.2 mg/dL Final     Alkaline Phosphatase   Date Value Ref Range Status   05/25/2021 74 35 - 104 U/L Final   05/24/2021 76 35 - 104 U/L Final   05/23/2021 90 35 - 104 U/L Final     AST   Date Value Ref Range Status   05/25/2021 18 0 - 31 U/L Final   05/24/2021 13 0 - 31 U/L Final   05/23/2021 15 0 - 31 U/L Final     ALT   Date Value Ref Range Status   05/25/2021 6 0 - 32 U/L Final   05/24/2021 6 0 - 32 U/L Final   05/23/2021 7 0 - 32 U/L Final     GFR Non-   Date Value Ref Range Status   05/25/2021 15 >=60 mL/min/1.73 Final     Comment:     Chronic Kidney Disease: less than 60 ml/min/1.73 sq.m. Kidney Failure: less than 15 ml/min/1.73 sq.m. Results valid for patients 18 years and older. 05/24/2021 12 >=60 mL/min/1.73 Final     Comment:     Chronic Kidney Disease: less than 60 ml/min/1.73 sq.m. Kidney Failure: less than 15 ml/min/1.73 sq.m. Results valid for patients 18 years and older. 05/23/2021 13 >=60 mL/min/1.73 Final     Comment:     Chronic Kidney Disease: less than 60 ml/min/1.73 sq.m. Kidney Failure: less than 15 ml/min/1.73 sq.m. Results valid for patients 18 years and older. GFR    Date Value Ref Range Status   05/25/2021 18  Final   05/24/2021 15  Final   05/23/2021 16  Final     Magnesium   Date Value Ref Range Status   05/25/2021 2.1 1.6 - 2.6 mg/dL Final   05/24/2021 2.2 1.6 - 2.6 mg/dL Final   05/22/2021 2.1 1.6 - 2.6 mg/dL Final     Phosphorus   Date Value Ref Range Status   05/25/2021 4.2 2.5 - 4.5 mg/dL Final   05/24/2021 4.8 (H) 2.5 - 4.5 mg/dL Final   05/23/2021 4.6 (H) 2.5 - 4.5 mg/dL Final     No results for input(s): PH, PO2, PCO2, HCO3, BE, O2SAT in the last 72 hours. RADIOLOGY:  IR FLUORO GUIDED CVA DEVICE PLMT/REPLACE/REMOVAL   Final Result   Status post temporary hemodialysis catheter placement as described above. The catheter is available for immediate use. IR ULTRASOUND GUIDANCE VASCULAR ACCESS   Final Result   Status post temporary hemodialysis catheter placement as described above. The catheter is available for immediate use. US DUP UPPER EXTREMITY LEFT VENOUS   Final Result   No evidence of DVT. XR CHEST PORTABLE   Final Result   1. Invasive lines and tubes appear satifactory /unremarkable. 2. Persistent bi basilar consolidation favor atelectasis. CT ABDOMEN PELVIS WO CONTRAST Additional Contrast? Oral   Final Result   1. Partial colectomy with left lower quadrant functioning colostomy. Hepatic flexure colon pneumatosis and with residual small pneumoperitoneum. No extravasation of GI contrast from the bowel seen. No abscess identified. 2.  Left abdomen localized small bowel intussusception without bowel   obstruction. 3.  Cirrhosis. No portal vein gas seen. Moderate volume abdominal and   pelvic ascites, increased compared to prior. 4.  Cholelithiasis. No biliary dilatation. Generalized anasarca pattern.       CRITICAL FINDINGS: Abnormal CT findings telephoned by Dr. Jaclyn Cruz to the   referring service at 1105 hours. CT CHEST WO CONTRAST   Final Result   1. Cardiomegaly, small right pleural effusion, and right basilar passive   atelectasis without significant change. 2.  No pneumonia or abscess identified. 3.  Right middle lobe mild atelectasis or scarring, unchanged. XR ABDOMEN (KUB) (SINGLE AP VIEW)   Final Result   No evidence of bowel obstruction. IR FLUORO GUIDED CVA DEVICE PLMT/REPLACE/REMOVAL   Final Result   Successful placement of a dual lumen power PICC line. IR ULTRASOUND GUIDANCE VASCULAR ACCESS   Final Result   Successful placement of a dual lumen power PICC line. US DUP UPPER EXTREMITY LEFT VENOUS   Final Result   No evidence of DVT in the left upper extremity venous vasculature. XR CHEST PORTABLE   Final Result   No significant change. Continued follow-up recommended. XR CHEST PORTABLE   Final Result      CT HEAD WO CONTRAST   Final Result   No acute intracranial abnormality. Periventricular white matter changes consistent chronic microvascular   disease. Diffuse volume loss. CT ABDOMEN PELVIS WO CONTRAST Additional Contrast? None   Final Result   Moderate amount of free intraperitoneal air which is compatible with   perforated abdominal viscus. The right colon is the most likely source of   free air as there is pneumatosis coli from the cecum to the a [place]   flexure. Underlying colonic ischemia is a consideration. There is also diverticular disease involving the left colon. There is some   inflammation involving/abutting the mid to distal descending colon which   could represent minimal acute diverticulitis. Note that this is minimal and   not the source of free air. Cholelithiasis. Cirrhotic liver. Minimal ascites. I discussed findings by phone with Bessy Addison at 1:42 a.m. on 05/09/2021. XR CHEST PORTABLE   Final Result   No acute process. Cardiomegaly. 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 *      IMPRESSION:  1. Status post colostomy secondary to perforated diverticulitis and pelvic abscess  2. Volume depletion likely contributing to YANELIS  3. YANELIS requiring dialysis  4. Atrial fibrillation with controlled ventricular response  5. COPD/asthma by history  6. Diastolic dysfunction  7. Status post right lumpectomy for breast cancer approximately 10 years ago  8. Cirrhosis not due to alcohol  9. General body fluid overload likely secondary to impaired GFR from YANELIS/CKD    PLAN:  1. Continue rifaximin and lactulose  2. Dialysis today and probably again tomorrow  3. Increase activity such as getting into chair  4. Question need for long-term dialysis  5. Labs in a.m. 6. Monitor for further signs of bleeding  7. Will eventually need to go back on Eliquis for atrial fibrillation    ATTESTATION:  ICU Staff Physician note of personal involvement in Care  As the attending physician, I certify that I personally reviewed the patients history and personally examined the patient to confirm the physical findings described above,  And that I reviewed the relevant imaging studies and available reports. I also discussed the differential diagnosis and all of the proposed management plans with the patient and individuals accompanying the patient to this visit. They had the opportunity to ask questions about the proposed management plans and to have those questions answered. This patient has a high probability of sudden, clinically significant deterioration, which requires the highest level of physician preparedness to intervene urgently.   I managed/supervised life or organ supporting interventions that required frequent physician assessment. I devoted my full attention to the direct care of this patient for the amount of time indicated below. Time I spent with the family or surrogate(s) is included only if the patient was incapable of providing the necessary information or participating in medical decisions  Time devoted to teaching and to any procedures I billed separately is not included.     CRITICAL CARE TIME:  32 minutes    Electronically signed by Mayuri Jiménez MD on 5/25/2021 at 3:40 PM

## 2021-05-25 NOTE — PROGRESS NOTES
Nephrology Progress Note    5/25/21: Pt awake alert she states she feels better after the 1st IHD      Vital SignsBP 97/60   Pulse 95   Temp 98.4 °F (36.9 °C)   Resp 28   Ht 5' 4\" (1.626 m)   Wt 210 lb 12.2 oz (95.6 kg)   SpO2 98%   BMI 36.18 kg/m²   24HR INTAKE/OUTPUT:      Intake/Output Summary (Last 24 hours) at 5/25/2021 7668  Last data filed at 5/25/2021 0548  Gross per 24 hour   Intake 1778 ml   Output 1160 ml   Net 618 ml         Physical Exam    HEENT: Dry oral mucosa  Neck: No JVD  Lungs: Breath sounds decreased at the bases. No rales or ronchi. Erythema of the trunck extending to the shoulders  Heart: Regular rate and rhythm. No S3 gallop. No  murmrur.   Abdomen: Soft non distended, there is present keira-incisional tenderness, staples have been removed, ostomy with liquid stools, normal bowel sounds.     Extremeties: +4 bipedal  edema with bilat bullae on the LE, upper ex with 1-2+ edema, (+) erythema of the legs      ROS:  RESPIRATORY:  negative  CARDIOVASCULAR:  negative  GASTROINTESTINAL:   negative  GENITOURINARY:  negative  INTEGUMENT/BREAST:  positive for rash and pruritus      Current Facility-Administered Medications   Medication Dose Route Frequency Provider Last Rate Last Admin    citrate dextrose (ACD Formula A) 0.73-2.45-2.2 GM/100ML solution 3.1 mL  3.1 mL Intracatheter PRN Flip Pichardo MD   3.1 mL at 05/25/21 0904    aztreonam (AZACTAM) 500 mg in dextrose 5 % 50 mL IVPB  500 mg Intravenous Q12H Yesica Otero MD   Stopped at 05/25/21 0330    0.9 % sodium chloride infusion   Intravenous PRN Yesica Otero MD        0.9 % sodium chloride infusion   Intravenous PRN Yesica Otero MD        polycarbophil [facility]) tablet 625 mg  625 mg Oral Daily Cyndy Whitmore MD   625 mg at 05/24/21 1112    lactulose (CHRONULAC) 10 GM/15ML solution 20 g  20 g Oral Daily Cyndy Whitmore MD   20 g at 05/25/21 0700    metronidazole (FLAGYL) 500 mg in NaCl 100 mL IVPB premix  500 mg Intravenous Q8H Ching Hong MD   Stopped at 05/25/21 0753    trimethobenzamide (TIGAN) injection 200 mg  200 mg Intramuscular Q6H PRN Flavia Pepe MD   200 mg at 05/23/21 1353    ondansetron (ZOFRAN) injection 4 mg  4 mg Intravenous Q6H PRN Flavia Pepe MD        oxymetazoline (AFRIN) 0.05 % nasal spray 2 spray  2 spray Each Nostril BID Flavia Pepe MD   2 spray at 05/24/21 2058    midodrine (PROAMATINE) tablet 5 mg  5 mg Oral BID  Vishal Yuan MD   5 mg at 05/25/21 0751    triamcinolone (KENALOG) 0.1 % cream   Topical BID Raf Varela MD   Given at 05/24/21 2100    diphenhydrAMINE (BENADRYL) injection 25 mg  25 mg Intravenous Q6H PRN Ching Hong MD   25 mg at 05/22/21 2132    lidocaine 1 % injection 5 mL  5 mL Intradermal Once Ching Hong MD        sodium chloride flush 0.9 % injection 10 mL  10 mL Intravenous 2 times per day Ching Hong MD   10 mL at 05/24/21 2100    sodium chloride flush 0.9 % injection 10 mL  10 mL Intravenous PRN Ching Hong MD        0.9 % sodium chloride infusion  25 mL Intravenous PRN Ching Hong MD        metoprolol tartrate (LOPRESSOR) tablet 25 mg  25 mg Oral BID Nhi Briceño MD   25 mg at 05/24/21 2105    amiodarone (CORDARONE) tablet 200 mg  200 mg Oral Daily Nhi Briceño MD   200 mg at 05/25/21 1975    rifaximin (XIFAXAN) tablet 550 mg  550 mg Oral BID Nhi Briceño MD   550 mg at 05/24/21 2108    ipratropium-albuterol (DUONEB) nebulizer solution 1 ampule  1 ampule Inhalation BID Nhi Briceño MD   1 ampule at 05/17/21 0532    [Held by provider] apixaban Lennox Cowboy) tablet 5 mg  5 mg Oral BID Nhi Briceño MD   5 mg at 05/23/21 6484    perflutren lipid microspheres (DEFINITY) injection 1.65 mg  1.5 mL Intravenous ONCE PRN Nhi Briceño MD        fentaNYL (SUBLIMAZE) injection 50 mcg  50 mcg Intravenous Q2H PRN Nhi Briceño MD        cetirizine (ZYRTEC) tablet 10 mg  10 mg Oral Daily Jacob Fox MD   10 mg at 05/25/21 0752    anastrozole (ARIMIDEX) tablet 1 mg  1 mg Oral Daily Kyle Bernal MD   1 mg at 05/24/21 1112    levothyroxine (SYNTHROID) tablet 50 mcg  50 mcg Oral Daily Kyle Bernal MD   50 mcg at 05/25/21 0634    montelukast (SINGULAIR) tablet 10 mg  10 mg Oral Nightly Kyle Bernal MD   10 mg at 05/24/21 2108    pantoprazole (PROTONIX) tablet 40 mg  40 mg Oral QAM AC Kyle Bernal MD   40 mg at 05/25/21 5750    sodium chloride flush 0.9 % injection 5-40 mL  5-40 mL Intravenous 2 times per day Kyle Bernal MD   10 mL at 05/24/21 2059    sodium chloride flush 0.9 % injection 5-40 mL  5-40 mL Intravenous PRN Kyle Bernal MD   10 mL at 05/13/21 1636    0.9 % sodium chloride infusion  25 mL Intravenous PRN Kyle Bernal MD        traMADol Darnelle Raja) tablet 50 mg  50 mg Oral Q6H PRN Kyle Bernal MD   50 mg at 05/25/21 0325       IR FLUORO GUIDED CVA DEVICE PLMT/REPLACE/REMOVAL   Final Result   Status post temporary hemodialysis catheter placement as described above. The catheter is available for immediate use. IR ULTRASOUND GUIDANCE VASCULAR ACCESS   Final Result   Status post temporary hemodialysis catheter placement as described above. The catheter is available for immediate use. US DUP UPPER EXTREMITY LEFT VENOUS   Final Result   No evidence of DVT. XR CHEST PORTABLE   Final Result   1. Invasive lines and tubes appear satifactory /unremarkable. 2. Persistent bi basilar consolidation favor atelectasis. CT ABDOMEN PELVIS WO CONTRAST Additional Contrast? Oral   Final Result   1. Partial colectomy with left lower quadrant functioning colostomy. Hepatic flexure colon pneumatosis and with residual small pneumoperitoneum. No extravasation of GI contrast from the bowel seen. No abscess identified. 2.  Left abdomen localized small bowel intussusception without bowel   obstruction. 3.  Cirrhosis. No portal vein gas seen. free air. Cholelithiasis. Cirrhotic liver. Minimal ascites. I discussed findings by phone with Juvencio Tolentino at 1:42 a.m. on 05/09/2021. XR CHEST PORTABLE   Final Result   No acute process. Cardiomegaly. Labs:    CBC:   Recent Labs     05/24/21  1829 05/25/21  0036 05/25/21  0632   WBC 3.2* 4.4* 4.2*   HGB 7.4* 7.5* 7.7*   PLT 92* 94* 79*        Lab Results   Component Value Date    IRON 33 (L) 05/22/2021    TIBC 140 (L) 05/22/2021    FERRITIN 84 05/22/2021       Lab Results   Component Value Date    PTH 81 (H) 05/14/2021    CALCIUM 8.8 05/25/2021    CALCIUM 9.1 05/24/2021    CALCIUM 8.9 05/23/2021    CAION 1.24 05/16/2021    CAION 1.22 05/14/2021    PHOS 4.2 05/25/2021    PHOS 4.8 (H) 05/24/2021    PHOS 4.6 (H) 05/23/2021    MG 2.1 05/25/2021    MG 2.2 05/24/2021    MG 2.1 05/22/2021       BMP:   Recent Labs     05/23/21  0558 05/24/21  0511 05/25/21  0632    138 138   K 3.6 3.5  3.5 3.5    105 103   CO2 24 21* 24   BUN 80* 82* 67*   CREATININE 3.4* 3.6* 3.1*   GLUCOSE 110* 122* 103*             Assessment: / Plan:    1.  Acute kidney injury.  Acute kidney injury secondary to renal hypoperfusion with low fractional excretion of sodium.    Cr slowly trending up-UO poor despite the initiation of the furosemide with the SPA  S/P R IJ Temp line and 1st IHD 5/24/21  PLAN:1. 2nd treatment 5/25/21with 1.5L vol removal  2. Next IHD 5/26/21    2.  Hypotension.  Stable   PLAN:1. Follow on present midodrine dose     3.   Anemia.    Ferritin 84, Iron Sat 24%  Vit B12 753  HgB trending down  PLAN:1. Start oral Fe-no IV Fe++ until infection cleared  2.  Follow hemoglobin hematocrit.     4.   Hypokalemia. Magnesium >/=2.0  PLAN:1.Monitor K+ and Mg++     5.  Hyperphosphatemia.  Reflects acute kidney injury.  Improved.    PLAN:1.Will follow PO4 level with the initiation IHD.     6.   Edema.  With a significant element of third spacing as the patient's albumin is 1.7 mg/dL now up to 3.4 S/P SPA.   PLAN:1.Follow with vol removal on IHD    Electronically signed by Reuben Brady MD

## 2021-05-25 NOTE — PROGRESS NOTES
Progress Note  Date:2021       Room:[place] [address]  Patient Wilberto Ek     YOB: 1940     Age:80 y.o. Subjective    Subjective:  Symptoms:  Stable. She reports shortness of breath, malaise, weakness and anxiety. No cough, chest pain, headache, chest pressure or anorexia. Diet:  Adequate intake. Activity level: Impaired due to weakness. Pain:  She complains of pain that is moderate. pt is doing better . For temp dyalysis  Review of Systems   Respiratory: Positive for shortness of breath. Negative for cough. Cardiovascular: Negative for chest pain. Gastrointestinal: Negative for anorexia. Neurological: Positive for weakness. Objective         Vitals Last 24 Hours:  TEMPERATURE:  Temp  Av.4 °F (36.3 °C)  Min: 96.5 °F (35.8 °C)  Max: 98 °F (36.7 °C)  RESPIRATIONS RANGE: Resp  Av.9  Min: 12  Max: 30  PULSE OXIMETRY RANGE: SpO2  Av %  Min: 87 %  Max: 100 %  PULSE RANGE: Pulse  Av.6  Min: 72  Max: 125  BLOOD PRESSURE RANGE: Systolic (03DYQ), PID:514 , Min:101 , YYT:515   ; Diastolic (40ZUK), YSI:77, Min:60, Max:114    I/O (24Hr): Intake/Output Summary (Last 24 hours) at 2021 2344  Last data filed at 2021 2231  Gross per 24 hour   Intake 1728 ml   Output 1037 ml   Net 691 ml     Objective:  General Appearance:  Comfortable. Vital signs: (most recent): Blood pressure 116/88, pulse 107, temperature 97.4 °F (36.3 °C), temperature source Axillary, resp. rate 21, height 5' 4\" (1.626 m), weight 202 lb 13.2 oz (92 kg), SpO2 (!) 87 %. Vital signs are normal.    Output: Producing urine. Lungs:  Normal respiratory rate. Heart: Irregular rhythm. S1 normal and S2 normal.    Abdomen: Abdomen is soft. Bowel sounds are normal.     Extremities: Normal range of motion. Neurological: Patient is alert and oriented to person, place and time. Normal strength.       Labs/Imaging/Diagnostics    Labs:  CBC:  Recent Labs     21  1031 05/24/21  1645 05/24/21  1829   WBC 4.8 See Below* 3.2*   RBC 2.49* See Below* 2.31*   HGB 8.0* See Below* 7.4*   HCT 25.5* See Below* 23.1*   .4* See Below* 100.0*   RDW 18.1* See Below* 18.1*   * See Below* 92*     CHEMISTRIES:  Recent Labs     05/22/21  0730 05/23/21  0558 05/24/21  0511    137 138   K 3.2* 3.6 3.5  3.5    104 105   CO2 23 24 21*   BUN 76* 80* 82*   CREATININE 3.3* 3.4* 3.6*   GLUCOSE 115* 110* 122*   PHOS 4.6* 4.6* 4.8*   MG 2.1  --  2.2     PT/INR:  Recent Labs     05/24/21  1031   PROTIME 51.2*   INR 4.3     APTT:  Recent Labs     05/23/21  1043   APTT 36.5*     LIVER PROFILE:  Recent Labs     05/22/21  0730 05/23/21  0558 05/24/21  0511   AST 16 15 13   ALT 8 7 6   BILITOT 1.0 1.4* 1.2   ALKPHOS 100 90 76       Imaging Last 24 Hours:  IR FLUORO GUIDED CVA DEVICE PLMT/REPLACE/REMOVAL    Result Date: 5/24/2021  EXAMINATION: Non-tunneled right sided internal jugular vein ultrasound guided temporary dialysis catheter placement. 5/24/2021 3:53 pm TECHNIQUE: After obtaining informed consent, the right neck was then cleansed and draped in the usual sterile fashion. 1% Lidocaine was used for local anesthesia. Under ultrasound guidance, the right internal jugular vein was visualized and cannulated using a micropuncture needle. An .018 wire was then placed into the vein. A 5-Syriac micropuncture dilator was then introduced into the vein under fluoroscopic guidance. An 0.35 guide wire was then advanced into the vein and placed within the superior vena cava. Next, a dual lumen 20 cm long 13.5 [place] hemodialysis catheter was placed into the vein over the wire using a standard Seldinger technique. The catheter was then stabilized to the skin using silk sutures. The patient tolerated the procedure well without any immediate complications. All ports flushed and aspirated blood without any problems. PHYSICIANS: Radha Clarke MD COMPARISON: None.  HISTORY: ORDERING SYSTEM VASCULAR ACCESS    Result Date: 5/24/2021  EXAMINATION: Non-tunneled right sided internal jugular vein ultrasound guided temporary dialysis catheter placement. 5/24/2021 3:53 pm TECHNIQUE: After obtaining informed consent, the right neck was then cleansed and draped in the usual sterile fashion. 1% Lidocaine was used for local anesthesia. Under ultrasound guidance, the right internal jugular vein was visualized and cannulated using a micropuncture needle. An .018 wire was then placed into the vein. A 5-Croatian micropuncture dilator was then introduced into the vein under fluoroscopic guidance. An 0.35 guide wire was then advanced into the vein and placed within the superior vena cava. Next, a dual lumen 20 cm long 13.5 [place] hemodialysis catheter was placed into the vein over the wire using a standard Seldinger technique. The catheter was then stabilized to the skin using silk sutures. The patient tolerated the procedure well without any immediate complications. All ports flushed and aspirated blood without any problems. PHYSICIANS: Kirsten Calderon MD COMPARISON: None. HISTORY: ORDERING SYSTEM PROVIDED HISTORY: Temporary Dialysis Catheter TECHNOLOGIST PROVIDED HISTORY: Reason for exam:->Temporary Dialysis Catheter FINDINGS: Patent right internal jugular vein. FLUOROSCOPY TIME: Fluoroscopy time 0.6 minute, dose area product: 8954 mGy cm squared. 2 fluoroscopic images were saved. Status post temporary hemodialysis catheter placement as described above. The catheter is available for immediate use.      Assessment//Plan           Hospital Problems         Last Modified POA    Chronic atrial fibrillation ([facility] [address] 5/9/2021 Yes    Cirrhosis of liver not due to alcohol ([address] 5/9/2021 Yes    Perforated viscus 5/9/2021 Yes    Chronic renal impairment, stage 3a (Chronic) 5/9/2021 Yes    Acute on chronic renal insufficiency 5/9/2021 Yes    Chronic diastolic heart failure (HCC) (Chronic) 5/9/2021 Yes        Assessment: (Problem List as of 5/24/2021 Reviewed: 5/9/2021  6:12 PM by Troy Ryder MD    Chronic renal impairment, stage 3a    Chronic diastolic heart failure (HCC)    Glenoid labral tear    Synovitis    Biceps tendon tear    Rotator cuff tear    Subacromial impingement    Encephalopathy    Atrial fibrillation with rapid ventricular response (HCC)    Chronic atrial fibrillation (HCC)    Acute exacerbation of COPD with asthma ([address]    CAP (community acquired pneumonia)    Cirrhosis of liver not due to alcohol ([address]    Hypertension    Acute heart failure ([address]    Dyspnea    Non morbid obesity    Perforated viscus    Acute on chronic renal insufficiency    ). Plan:   (Cont with tx).        Electronically signed by Carlos A Mora MD on 5/24/21 at 11:44 PM EDT

## 2021-05-26 NOTE — PROGRESS NOTES
Physical Therapy    Physical Therapy RE-Evaluation    Room #:  IC12/IC12-01  Patient Name: Valeriano Powell  YOB: 1940  MRN: [id number]    Referring Provider:   Milo Palmer MD     Date of Service: 5/26/2021    Evaluating Physical Therapist: Kristie Travis, PT  #[id number]       Diagnosis:   Perforated viscus [R19.8]   Admitted with  Abdominal pain   Date of Procedure: 5/9/2021    Procedure(s):   OPEN BOWEL RESECTION WITH COLOSTOMY   Surgeon(s): Benjamin Seth MD  05/25/21 1115   PT eval and treat Start: 05/25/21 1115, End: 05/25/21 1115, ONE TIME, Standing Count: 1 Occurrences, Juanita Rodriguez MD  trf to icu 5/23/21 uncontrolled bleeding from nose with emesis of clots;      Patient Active Problem List   Diagnosis    Glenoid labral tear    Synovitis    Biceps tendon tear    Rotator cuff tear    Subacromial impingement    Encephalopathy    Atrial fibrillation with rapid ventricular response (HCC)    Chronic atrial fibrillation (HCC)    Acute exacerbation of COPD with asthma ([address]    CAP (community acquired pneumonia)    Cirrhosis of liver not due to alcohol ([address]    Hypertension    Acute heart failure ([address]    Dyspnea    Non morbid obesity    Perforated viscus    Chronic renal impairment, stage 3a    Acute on chronic renal insufficiency    Chronic diastolic heart failure (HCC)        Tentative placement recommendation: Inpatient Rehab    Equipment recommendation: Wheelchair      Prior Level of Function: Patient ambulated independently cane  Rehab Potential: good    for baseline    Past medical history:   Past Medical History:   Diagnosis Date    Anxiety     Cancer ([address] 2/11    right breast    Cirrhosis, non-alcoholic ([address]     COPD (chronic obstructive pulmonary disease) ([address]     Hypertension      Past Surgical History:   Procedure Laterality Date    BREAST SURGERY  2011    right lumpectomy    COLECTOMY N/A 5/9/2021    OPEN BOWEL RESECTION WITH COLOSTOMY performed by Ambulation    3-4 steps using  wheeled walker with Minimal assist of 2     2 side steps using  wheeled walker with Moderate assist of 1    Poor standing posture and ability to manage wheeled walker d/t strength deficits     75 feet using  wheeled walker with Supervision     ROM Within functional limits   wfl     Strength BUE:  refer to OT eval  RLE:  3+/5  LLE:  3+/5 BUE:   2+  RLE:  2+/5  LLE:  2+/5   Increase strength in affected mm groups by 1/3 grade   Balance Sitting EOB:  fair    Dynamic Standing:  fair minus with wheeled walker  Sitting EOB:  fair minus   Dynamic Standing: poor with wheeled walker   Sitting EOB:  good    Dynamic Standing: fair       Patient is Alert & Oriented x person, place, time and situation and follows directions hard of hearing   Sensation:  Patient  denies numbness and tingling     Edema:  yes bilateral lower extremities and left lower extremity, weeping bilateral LE and trunk; blisters dorsum bilateral feet   Endurance: poor d/t shakey when upright           Patient education  Patient educated on role of Physical Therapy, risks of immobility, safety and plan of care, energy conservation,  importance of mobility while in hospital  and ankle pumps, quad set and glut set for edema control, blood clot prevention      Patient response to education:   Pt verbalized understanding Pt demonstrated skill Pt requires further education in this area   Yes Partial Yes      Treatment:  Patient practiced and was instructed/facilitated in the following treatment:  trf training, rom all extremities Patient performed supine exercises. Therapeutic Exercises:  see above,   Stood 30-60 seconds  Edge of bed 15 min with min a for balance   At end of session, patient in bed with nursing present, instructions to use call light for needs, alarm activated  call light and phone within reach,   all lines and tubes intact.          Patient's/ family goals   rehab with family support      ASSESSMENT: Patient

## 2021-05-26 NOTE — PLAN OF CARE
Problem: Falls - Risk of:  Goal: Will remain free from falls  Description: Will remain free from falls  Outcome: Met This Shift  Goal: Absence of physical injury  Description: Absence of physical injury  Outcome: Met This Shift     Problem: Skin Integrity:  Goal: Will show no infection signs and symptoms  Description: Will show no infection signs and symptoms  Outcome: Met This Shift  Goal: Absence of new skin breakdown  Description: Absence of new skin breakdown  Outcome: Met This Shift  Goal: Status of oral mucous membranes will improve  Description: Status of oral mucous membranes will improve  Outcome: Met This Shift  Goal: Skin integrity will be maintained  Description: Skin integrity will be maintained  Outcome: Met This Shift     Problem: Activity:  Goal: Risk for activity intolerance will decrease  Description: Risk for activity intolerance will decrease  Outcome: Met This Shift     Problem: Bleeding:  Goal: Will show no signs and symptoms of excessive bleeding  Description: Will show no signs and symptoms of excessive bleeding  Outcome: Met This Shift     Problem: Physical Regulation:  Goal: Ability to maintain clinical measurements within normal limits will improve  Description: Ability to maintain clinical measurements within normal limits will improve  Outcome: Met This Shift  Goal: Complications related to the disease process, condition or treatment will be avoided or minimized  Description: Complications related to the disease process, condition or treatment will be avoided or minimized  Outcome: Met This Shift     Problem:  Bowel/Gastric:  Goal: Gastrointestinal status for postoperative course will improve  Description: Gastrointestinal status for postoperative course will improve  Outcome: Met This Shift     Problem: Coping:  Goal: Ability to cope will improve  Description: Ability to cope will improve  Outcome: Met This Shift     Problem: Sensory:  Goal: Pain level will decrease  Description: Pain level will decrease  Outcome: Met This Shift     Problem: Cardiac:  Goal: Ability to maintain an adequate cardiac output will improve  Description: Ability to maintain an adequate cardiac output will improve  Outcome: Met This Shift  Goal: Hemodynamic stability will improve  Description: Hemodynamic stability will improve  Outcome: Met This Shift

## 2021-05-26 NOTE — PROGRESS NOTES
[facility]  [facility]  Progress Note    NAME: Vitor Booth  MR:  [id number]  :   1940  DATE OF SERVICE:21    This is a face to face encounter with Vitor Booth 68 y.o. female on 21    CHIEF COMPLAINT     ID following for   Chief Complaint   Patient presents with    Emesis     HISTORY OF PRESENT ILLNESS   Late note  Pt was seen when daughter was present  For hmeo today   Pt feels better less swelling  Patient is tolerating medications. No reported adverse drug reactions. REVIEW OF SYSTEMS     As stated above in the chief complaint, otherwise negative.   CURRENT MEDICATIONS      metroNIDAZOLE  500 mg Intravenous Q8H    aztreonam  500 mg Intravenous Q12H    ferrous sulfate  325 mg Oral BID WC    polycarbophil  625 mg Oral Daily    lactulose  20 g Oral Daily    midodrine  5 mg Oral BID WC    triamcinolone   Topical BID    lidocaine 1 % injection  5 mL Intradermal Once    sodium chloride flush  10 mL Intravenous 2 times per day    metoprolol tartrate  25 mg Oral BID    amiodarone  200 mg Oral Daily    rifaximin  550 mg Oral BID    ipratropium-albuterol  1 ampule Inhalation BID    [Held by provider] apixaban  5 mg Oral BID    cetirizine  10 mg Oral Daily    anastrozole  1 mg Oral Daily    levothyroxine  50 mcg Oral Daily    montelukast  10 mg Oral Nightly    pantoprazole  40 mg Oral QAM AC    sodium chloride flush  5-40 mL Intravenous 2 times per day     Continuous Infusions:   sodium chloride      sodium chloride       PRN Meds:citrate dextrose, trimethobenzamide, ondansetron, diphenhydrAMINE, sodium chloride flush, sodium chloride, perflutren lipid microspheres, fentanNYL, sodium chloride flush, sodium chloride, traMADol    PHYSICAL EXAM     BP (!) 87/59   Pulse 113   Temp 97.3 °F (36.3 °C) (Oral)   Resp 22   Ht 5' 4\" (1.626 m)   Wt 192 lb 14.4 oz (87.5 kg)   SpO2 98%   BMI 33.11 kg/m²   Temp  Av.7 °F (36.5 °C)  Min: 97.3 °F (36.3 °C)  Max: 97.9 °F (36.6 °C)  Constitutional:  The patient is awake, alert, and oriented. Skin:    Warm and dry. No rashes were noted. HEENT:   Round and reactive pupils. AT/NC  Neck:    Supple to movements. Chest:   No use of accessory muscles to breathe. Symmetrical expansion. Cardiovascular:  S1 and S2 are rhythmic and regular. No murmurs appreciated. Abdomen:   Positive bowel sounds to auscultation. Benign to palpation. Extremities:   No clubbing, no cyanosis,   Edema. Blister left dorsal foot  CNS    AAxO   Lines: piv  ij hd ctah       DIAGNOSTIC RESULTS   Radiology:    Recent Labs     05/25/21  1304 05/25/21  2120 05/26/21  0410   WBC 5.8 4.4* 3.2*   RBC 2.54* 2.41* 2.39*   HGB 8.2* 7.7* 7.7*   HCT 25.6* 23.8* 24.2*   .8* 98.8 101.3*   MCH 32.3 32.0 32.2   MCHC 32.0 32.4 31.8*   RDW 18.4* 18.4* 18.4*   PLT 89* 84* 75*   MPV 10.1 10.2 10.3     Recent Labs     05/24/21  0511 05/25/21  0632 05/26/21  0410    138 139   K 3.5  3.5 3.5 3.8    103 104   CO2 21* 24 26   BUN 82* 67* 48*   CREATININE 3.6* 3.1* 2.7*   GLUCOSE 122* 103* 115*   PROT 5.6* 5.5* 5.5*   LABALBU 3.6 3.4* 3.2*   CALCIUM 9.1 8.8 8.6   BILITOT 1.2 1.3* 1.3*   ALKPHOS 76 74 84   AST 13 18 19   ALT 6 6 6     Lab Results   Component Value Date    CRP 0.1 05/06/2019     Lab Results   Component Value Date    SEDRATE 65 (H) 05/17/2021    SEDRATE 58 (H) 05/06/2019     Recent Labs     05/24/21  0511 05/24/21  1031 05/25/21  0036 05/25/21  0632 05/26/21  0410   INR  --  4.3 3.1 3.2  --    PROTIME  --  51.2* 36.9* 37.8*  --    AST 13  --   --  18 19   ALT 6  --   --  6 6     Lab Results   Component Value Date    CHOL 236 02/08/2021    TRIG 126 02/08/2021    HDL 66 02/08/2021    LDLCALC 145 02/08/2021    LABVLDL 25 02/08/2021     Lab Results   Component Value Date/Time    VITD25 57 05/22/2021 07:30 AM       Microbiology:   No results for input(s): COVID19 in the last 72 hours.   Lab Results   Component Value Date    BC 24 Hours no growth 05/23/2021    BC 5 Days no growth 05/15/2021    BC  05/09/2021     Gram Negative Dominic is too fastidious for Identification and sensitivitey  testing      [facility]  05/09/2021     Too fastidious for Identification and susceptibility testing. BLOODCULT2 24 Hours no growth 05/23/2021    BLOODCULT2 5 Days no growth 05/19/2021    BLOODCULT2 5 Days no growth 05/15/2021    ORG Gram negative dominic 05/09/2021            Component Value Date/Time    FUNGSM No Fungal elements seen 05/09/2021 0544    LABFUNG No growth after 1 week/s of incubation. 05/09/2021 0544       MRSA Culture Only   Date Value Ref Range Status   05/09/2021 Methicillin resistant Staph aureus not isolated  Final        FINAL IMPRESSION    Leukopenia  Rash ?fluconazole stable   nenita on hemo   On rx gn septicemia unable to ID aztreonam/flagyk   S/p Perforated abdominal viscus     Procedure(s):  Exploratory laparotomy, sigmoid colectomy, end colostomy, open drainage of pelvic abscess, placement of left internal jugular vein triple-lumen catheter  -atbx to be cont   Hd again today       · Monitor labs    Imaging and labs were reviewed per medical records. The patient was educated about the diagnosis, prognosis, indications, risks and benefits of treatment. An opportunity to ask questions was given to the patient/FAMILY. Thank you for involving me in the care of Lynn Graff will continue to follow. Please do not hesitate to call for any questions or concerns.     Electronically signed by Jasmyn Rey MD on 5/26/2021 at 4:04 PM

## 2021-05-26 NOTE — PROGRESS NOTES
Pulmonary/Critical Care Progress Note    We are following patient for YANELIS superimposed on CKD following septic shock from pelvic abscess resulting from diverticular rupture requiring a diverting colostomy, nonalcoholic cirrhosis, previous epistaxis now stable, history of atrial fibrillation    SUBJECTIVE:  Patient has undergone dialysis for the third day in a row. Approximately 2500 cc have been removed with 1100 yesterday and 5-600 the day before. She is visibly less edematous and says she feels better than she has in some time. She remains on multiple antibiotics including aztreonam and Flagyl. She is tolerating rifaximin and lactulose without difficulty and is not exhibiting any signs of encephalopathy. The patient has been transition to amiodarone for some time and has a controlled rate of her atrial fibrillation. She is probably stable for transfer to a monitored floor.     MEDICATIONS:   metroNIDAZOLE  500 mg Intravenous Q8H    aztreonam  500 mg Intravenous Q12H    ferrous sulfate  325 mg Oral BID WC    polycarbophil  625 mg Oral Daily    lactulose  20 g Oral Daily    midodrine  5 mg Oral BID WC    triamcinolone   Topical BID    lidocaine 1 % injection  5 mL Intradermal Once    sodium chloride flush  10 mL Intravenous 2 times per day    metoprolol tartrate  25 mg Oral BID    amiodarone  200 mg Oral Daily    rifaximin  550 mg Oral BID    ipratropium-albuterol  1 ampule Inhalation BID    [Held by provider] apixaban  5 mg Oral BID    cetirizine  10 mg Oral Daily    anastrozole  1 mg Oral Daily    levothyroxine  50 mcg Oral Daily    montelukast  10 mg Oral Nightly    pantoprazole  40 mg Oral QAM AC    sodium chloride flush  5-40 mL Intravenous 2 times per day      sodium chloride      sodium chloride       citrate dextrose, trimethobenzamide, ondansetron, diphenhydrAMINE, sodium chloride flush, sodium chloride, perflutren lipid microspheres, fentanNYL, sodium chloride flush, sodium chloride, traMADol      REVIEW OF SYSTEMS:  Constitutional: Denies fever, weight loss, night sweats, and fatigue  Skin: Denies pigmentation, dark lesions, and rashes   HEENT: Denies hearing loss, tinnitus, ear drainage, epistaxis, sore throat, and hoarseness. Cardiovascular: Denies palpitations, chest pain, and chest pressure. Respiratory: Denies cough, dyspnea at rest, hemoptysis, apnea, and choking. Gastrointestinal: Denies nausea, vomiting, poor appetite, diarrhea, heartburn or reflux  Genitourinary: Denies dysuria, frequency, urgency or hematuria  Musculoskeletal: Denies myalgias, muscle weakness, and bone pain  Neurological: Denies dizziness, vertigo, headache, and focal weakness  Psychological: Denies anxiety and depression  Endocrine: Denies heat intolerance and cold intolerance  Hematopoietic/Lymphatic: Denies bleeding problems and blood transfusions    OBJECTIVE:  Vitals:    05/26/21 1500   BP: (!) 87/59   Pulse: 113   Resp: 22   Temp:    SpO2: 98%     FiO2 : 98 %  O2 Flow Rate (L/min): 2 L/min  O2 Device: None (Room air)    PHYSICAL EXAM:  Constitutional: No fever, chills, diaphoresis  Skin: Skin rash, no skin breakdown  HEENT: Unremarkable  Neck: No JVD, lymphadenopathy, thyromegaly  Cardiovascular: S1, S2 normal.  No S3 murmurs rubs present  Respiratory: Clear to auscultation bilaterally  Gastrointestinal: Colostomy site intact. Nontender  Genitourinary: No CVA tenderness  Extremities: 2+ edema feet legs and anklesgradually coming down with dialysis  Neurological: Awake, alert, oriented x3. No evidence of focal motor or sensory deficits  Psychological: Affect appropriate.   In good spirits    LABS:  WBC   Date Value Ref Range Status   05/26/2021 3.2 (L) 4.5 - 11.5 E9/L Final   05/25/2021 4.4 (L) 4.5 - 11.5 E9/L Final   05/25/2021 5.8 4.5 - 11.5 E9/L Final     Hemoglobin   Date Value Ref Range Status   05/26/2021 7.7 (L) 11.5 - 15.5 g/dL Final   05/25/2021 7.7 (L) 11.5 - 15.5 g/dL Final   05/25/2021 8.2 (L) 11.5 - 15.5 g/dL Final     Hematocrit   Date Value Ref Range Status   05/26/2021 24.2 (L) 34.0 - 48.0 % Final   05/25/2021 23.8 (L) 34.0 - 48.0 % Final   05/25/2021 25.6 (L) 34.0 - 48.0 % Final     MCV   Date Value Ref Range Status   05/26/2021 101.3 (H) 80.0 - 99.9 fL Final   05/25/2021 98.8 80.0 - 99.9 fL Final   05/25/2021 100.8 (H) 80.0 - 99.9 fL Final     Platelets   Date Value Ref Range Status   05/26/2021 75 (L) 130 - 450 E9/L Final   05/25/2021 84 (L) 130 - 450 E9/L Final   05/25/2021 89 (L) 130 - 450 E9/L Final     Sodium   Date Value Ref Range Status   05/26/2021 139 132 - 146 mmol/L Final   05/25/2021 138 132 - 146 mmol/L Final   05/24/2021 138 132 - 146 mmol/L Final     Potassium   Date Value Ref Range Status   05/24/2021 3.5 3.5 - 5.0 mmol/L Final     Potassium reflex Magnesium   Date Value Ref Range Status   05/26/2021 3.8 3.5 - 5.0 mmol/L Final   05/25/2021 3.5 3.5 - 5.0 mmol/L Final   05/24/2021 3.5 3.5 - 5.0 mmol/L Final     Chloride   Date Value Ref Range Status   05/26/2021 104 98 - 107 mmol/L Final   05/25/2021 103 98 - 107 mmol/L Final   05/24/2021 105 98 - 107 mmol/L Final     CO2   Date Value Ref Range Status   05/26/2021 26 22 - 29 mmol/L Final   05/25/2021 24 22 - 29 mmol/L Final   05/24/2021 21 (L) 22 - 29 mmol/L Final     BUN   Date Value Ref Range Status   05/26/2021 48 (H) 6 - 23 mg/dL Final   05/25/2021 67 (H) 6 - 23 mg/dL Final   05/24/2021 82 (H) 6 - 23 mg/dL Final     CREATININE   Date Value Ref Range Status   05/26/2021 2.7 (H) 0.5 - 1.0 mg/dL Final   05/25/2021 3.1 (H) 0.5 - 1.0 mg/dL Final   05/24/2021 3.6 (H) 0.5 - 1.0 mg/dL Final     Glucose   Date Value Ref Range Status   05/26/2021 115 (H) 74 - 99 mg/dL Final   05/25/2021 103 (H) 74 - 99 mg/dL Final   05/24/2021 122 (H) 74 - 99 mg/dL Final   05/30/2012 100 70 - 110 mg/dL Final   11/14/2011 85 70 - 110 mg/dL Final   09/06/2011 110 70 - 110 mg/dL Final     Calcium   Date Value Ref Range Status   05/26/2021 8.6 8.6 - 10.2 mg/dL Final   05/25/2021 8.8 8.6 - 10.2 mg/dL Final   05/24/2021 9.1 8.6 - 10.2 mg/dL Final     Total Protein   Date Value Ref Range Status   05/26/2021 5.5 (L) 6.4 - 8.3 g/dL Final   05/25/2021 5.5 (L) 6.4 - 8.3 g/dL Final   05/24/2021 5.6 (L) 6.4 - 8.3 g/dL Final     Albumin   Date Value Ref Range Status   05/26/2021 3.2 (L) 3.5 - 5.2 g/dL Final   05/25/2021 3.4 (L) 3.5 - 5.2 g/dL Final   05/24/2021 3.6 3.5 - 5.2 g/dL Final   05/30/2012 4.1 3.2 - 4.8 g/dL Final   11/14/2011 3.8 3.2 - 4.8 g/dL Final   11/15/2010 3.6 3.2 - 4.8 g/dL Final     Total Bilirubin   Date Value Ref Range Status   05/26/2021 1.3 (H) 0.0 - 1.2 mg/dL Final   05/25/2021 1.3 (H) 0.0 - 1.2 mg/dL Final   05/24/2021 1.2 0.0 - 1.2 mg/dL Final     Alkaline Phosphatase   Date Value Ref Range Status   05/26/2021 84 35 - 104 U/L Final   05/25/2021 74 35 - 104 U/L Final   05/24/2021 76 35 - 104 U/L Final     AST   Date Value Ref Range Status   05/26/2021 19 0 - 31 U/L Final   05/25/2021 18 0 - 31 U/L Final   05/24/2021 13 0 - 31 U/L Final     ALT   Date Value Ref Range Status   05/26/2021 6 0 - 32 U/L Final   05/25/2021 6 0 - 32 U/L Final   05/24/2021 6 0 - 32 U/L Final     GFR Non-   Date Value Ref Range Status   05/26/2021 17 >=60 mL/min/1.73 Final     Comment:     Chronic Kidney Disease: less than 60 ml/min/1.73 sq.m. Kidney Failure: less than 15 ml/min/1.73 sq.m. Results valid for patients 18 years and older. 05/25/2021 15 >=60 mL/min/1.73 Final     Comment:     Chronic Kidney Disease: less than 60 ml/min/1.73 sq.m. Kidney Failure: less than 15 ml/min/1.73 sq.m. Results valid for patients 18 years and older. 05/24/2021 12 >=60 mL/min/1.73 Final     Comment:     Chronic Kidney Disease: less than 60 ml/min/1.73 sq.m. Kidney Failure: less than 15 ml/min/1.73 sq.m. Results valid for patients 18 years and older.        GFR    Date Value Ref Range Status   05/26/2021 21  Final 05/25/2021 18  Final   05/24/2021 15  Final     Magnesium   Date Value Ref Range Status   05/25/2021 2.1 1.6 - 2.6 mg/dL Final   05/24/2021 2.2 1.6 - 2.6 mg/dL Final   05/22/2021 2.1 1.6 - 2.6 mg/dL Final     Phosphorus   Date Value Ref Range Status   05/26/2021 3.4 2.5 - 4.5 mg/dL Final   05/25/2021 4.2 2.5 - 4.5 mg/dL Final   05/24/2021 4.8 (H) 2.5 - 4.5 mg/dL Final     No results for input(s): PH, PO2, PCO2, HCO3, BE, O2SAT in the last 72 hours. RADIOLOGY:  IR FLUORO GUIDED CVA DEVICE PLMT/REPLACE/REMOVAL   Final Result   Status post temporary hemodialysis catheter placement as described above. The catheter is available for immediate use. IR ULTRASOUND GUIDANCE VASCULAR ACCESS   Final Result   Status post temporary hemodialysis catheter placement as described above. The catheter is available for immediate use. US DUP UPPER EXTREMITY LEFT VENOUS   Final Result   No evidence of DVT. XR CHEST PORTABLE   Final Result   1. Invasive lines and tubes appear satifactory /unremarkable. 2. Persistent bi basilar consolidation favor atelectasis. CT ABDOMEN PELVIS WO CONTRAST Additional Contrast? Oral   Final Result   1. Partial colectomy with left lower quadrant functioning colostomy. Hepatic flexure colon pneumatosis and with residual small pneumoperitoneum. No extravasation of GI contrast from the bowel seen. No abscess identified. 2.  Left abdomen localized small bowel intussusception without bowel   obstruction. 3.  Cirrhosis. No portal vein gas seen. Moderate volume abdominal and   pelvic ascites, increased compared to prior. 4.  Cholelithiasis. No biliary dilatation. Generalized anasarca pattern. CRITICAL FINDINGS: Abnormal CT findings telephoned by Dr. Zach Alejo to the   referring service at 1105 hours. CT CHEST WO CONTRAST   Final Result   1.   Cardiomegaly, small right pleural effusion, and right basilar passive   atelectasis without significant change. 2.  No pneumonia or abscess identified. 3.  Right middle lobe mild atelectasis or scarring, unchanged. XR ABDOMEN (KUB) (SINGLE AP VIEW)   Final Result   No evidence of bowel obstruction. IR FLUORO GUIDED CVA DEVICE PLMT/REPLACE/REMOVAL   Final Result   Successful placement of a dual lumen power PICC line. IR ULTRASOUND GUIDANCE VASCULAR ACCESS   Final Result   Successful placement of a dual lumen power PICC line. US DUP UPPER EXTREMITY LEFT VENOUS   Final Result   No evidence of DVT in the left upper extremity venous vasculature. XR CHEST PORTABLE   Final Result   No significant change. Continued follow-up recommended. XR CHEST PORTABLE   Final Result      CT HEAD WO CONTRAST   Final Result   No acute intracranial abnormality. Periventricular white matter changes consistent chronic microvascular   disease. Diffuse volume loss. CT ABDOMEN PELVIS WO CONTRAST Additional Contrast? None   Final Result   Moderate amount of free intraperitoneal air which is compatible with   perforated abdominal viscus. The right colon is the most likely source of   free air as there is pneumatosis coli from the cecum to the a [place]   flexure. Underlying colonic ischemia is a consideration. There is also diverticular disease involving the left colon. There is some   inflammation involving/abutting the mid to distal descending colon which   could represent minimal acute diverticulitis. Note that this is minimal and   not the source of free air. Cholelithiasis. Cirrhotic liver. Minimal ascites. I discussed findings by phone with Troi Valdez at 1:42 a.m. on 05/09/2021. XR CHEST PORTABLE   Final Result   No acute process. Cardiomegaly.                  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 *      IMPRESSION:  1. 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 Septic shock secondary to pelvic abscess  3. Acute kidney injury  4. Atrial fibrillation with controlled ventricular response  5. COPD/asthma  6. Diastolic dysfunction  7. Status post right lumpectomy for breast cancer approximately 10 years ago  8. Nonalcoholic cirrhosis  9. Fluid overload secondary to kidney disease in large fluid requirements during her period of hypotension/septic shock    PLAN:  1. Dialysis per nephrology  2. Continue rifaximin and lactulose for nonalcoholic cirrhosis  3. Physical therapy  4. Monitor for bleeding  5. Consider returning to [facility] for atrial fibrillation  6. Can transfer to monitored floor    ATTESTATION:  ICU Staff Physician note of personal involvement in Care  As the attending physician, I certify that I personally reviewed the patients history and personally examined the patient to confirm the physical findings described above,  And that I reviewed the relevant imaging studies and available reports. I also discussed the differential diagnosis and all of the proposed management plans with the patient and individuals accompanying the patient to this visit. They had the opportunity to ask questions about the proposed management plans and to have those questions answered. This patient has a high probability of sudden, clinically significant deterioration, which requires the highest level of physician preparedness to intervene urgently. I managed/supervised life or organ supporting interventions that required frequent physician assessment. I devoted my full attention to the direct care of this patient for the amount of time indicated below.   Time I spent with the family or surrogate(s) is included only if the patient was incapable of providing the necessary information or participating in medical decisions  Time devoted to teaching and to any procedures I billed separately is not included.     CRITICAL CARE TIME:  37 minutes    Electronically signed by Lita Boone MD on 5/26/2021 at 3:29 PM

## 2021-05-26 NOTE — PLAN OF CARE
Problem: Falls - Risk of:  Goal: Will remain free from falls  Description: Will remain free from falls  5/26/2021 0006 by Dino Roldan RN  Outcome: Met This Shift  5/25/2021 1408 by Samm Juarez RN  Outcome: Met This Shift  Goal: Absence of physical injury  Description: Absence of physical injury  5/26/2021 0006 by Dino Roldan RN  Outcome: Met This Shift  5/25/2021 1408 by Samm Juarez RN  Outcome: Met This Shift     Problem: Skin Integrity:  Goal: Will show no infection signs and symptoms  Description: Will show no infection signs and symptoms  5/26/2021 0006 by Dino Roldan RN  Outcome: Met This Shift  5/25/2021 1408 by Samm Juarez RN  Outcome: Met This Shift  Goal: Absence of new skin breakdown  Description: Absence of new skin breakdown  5/25/2021 1408 by Samm Juarez RN  Outcome: Met This Shift  Goal: Status of oral mucous membranes will improve  Description: Status of oral mucous membranes will improve  5/25/2021 1408 by Samm Juarez RN  Outcome: Met This Shift  Goal: Skin integrity will be maintained  Description: Skin integrity will be maintained  5/25/2021 1408 by Samm Juarez RN  Outcome: Met This Shift     Problem: Increased nutrient needs (NI-5.1)  Goal: Food and/or Nutrient Delivery  Description: Individualized approach for food/nutrient provision.   5/25/2021 1106 by Kavin Quach RD, LD  Outcome: Met This Shift     Problem: Bleeding:  Goal: Will show no signs and symptoms of excessive bleeding  Description: Will show no signs and symptoms of excessive bleeding  5/26/2021 0006 by Dino Roldan RN  Outcome: Met This Shift  5/25/2021 1408 by Samm Juarez RN  Outcome: Met This Shift     Problem: Fluid Volume:  Goal: Will show no signs or symptoms of fluid imbalance  Description: Will show no signs or symptoms of fluid imbalance  5/25/2021 1408 by Samm Juarez RN  Outcome: Met This Shift  Goal: Ability to achieve a balanced intake and output will improve  Description: Ability to achieve a balanced intake and output will improve  Outcome: Met This Shift

## 2021-05-26 NOTE — PROGRESS NOTES
Pt ran 4 hours; 3689 bath; 2550 removed; stable/tolerated well; denies c/o. HD CVC heplocked per lumen fill volume post tx. Next tx 5/28/21. POS refill test at end of tx as BV change from -17.2 to -16. 4.       /78   Pulse 107   Temp 97.9 °F (36.6 °C)   Resp 21   Ht 5' 4\" (1.626 m)   Wt 192 lb 14.4 oz (87.5 kg)   SpO2 99%   BMI 33.11 kg/m²

## 2021-05-26 NOTE — PROGRESS NOTES
Coordination  [] ROM  [] Delirium                   [] Motor Control     OT PLAN OF CARE   OT POC based on physician orders, patient diagnosis and results of clinical assessment    Frequency/Duration 1-3 days/wk for 2 weeks PRN   Specific OT Treatment Interventions to include: Instruction/training on adapted ADL techniques and AE recommendations to increase functional independence within precautions  Training on energy conservation strategies, correct breathing pattern and techniques to improve independence/tolerance for self-care routine  Functional transfer/mobility training/DME recommendations for increased independence, safety, and fall prevention  Patient/Family education to increase follow through with safety techniques and functional independence  Recommendation of environmental modifications for increased safety with functional transfers/mobility and ADLs  Therapeutic exercise to improve motor endurance, ROM, and functional strength for ADLs/functional transfers  Therapeutic activities to facilitate/challenge dynamic balance, stand tolerance for increased safety and independence with ADLs  Therapeutic activities to facilitate gross/fine motor skills for increased independence with ADLs  Neuro-muscular re-education: facilitation of righting/equilibrium reactions, midline orientation, scapular stability/mobility, normalization of muscle tone, and facilitation of volitional active controled movement    Recommended Adaptive Equipment: TBD     Home Living: Pt lives alone in a 1 story home with 3 KAYCE with 1 rail(s).   Bathroom setup: walk in shower with grab bars, 2 built in seats   Equipment owned: [employer], cane, elevated toilet     Prior Level of Function: Independent with ADLs , Independent with IADLs; ambulated without AD  Driving: No  Occupation: Not reported     Pain Level: -/10  Cognition: A&O: 4/4; Follows 1-2 step directions              Memory:  Fair+              Sequencing:  Fair+              Problem solving:  Fair+              Judgement/safety:  Fair+     Functional Assessment:  AM-PAC Daily Activity Raw Score: 16/24   Re-Eval Status  Date: 5/26/21 Treatment Status  Date: STGs = LTGs  Time frame: 10-14 days   Feeding Independent      Grooming Minimal Assist  Completed bed level grooming tasks in chair position, pt able to open and apply toothpaste and complete dental/partial hygiene. Washing hair using shampoo cap; Assist for combing hair due to increased SOB/fatgiue with overhead tasks. Independent    UB Dressing Minimal Assist   Independent    LB Dressing Maximal Assist   Minimal Assist   Bathing Maximal Assist  Minimal Assist    Toileting Maximal Assist   Minimal Assist    Bed Mobility  Supine to sit: NT   Sit to supine: NT  Per nursing, Pt recently up with PT (assist of 2), caused increased weeping. Supine to sit: Minimal Assist   Sit to supine: Minimal Assist    Functional Transfers NT     Minimal Assist    Functional Mobility NT  Minimal Assist    Balance Sitting:     Static:  NT    Dynamic:NT  Standing: NT  Pt maintaining midline in chair position throughout session/  Sitting:     Static:  good    Dynamic:good  Standing: fair+   Activity Tolerance Fair  Limited due to increased swelling/weeping, pt SOB with light bed level activity  Fair+   Visual/  Perceptual Glasses: Yes          Hand Dominance Not reported   AROM (PROM) Strength Additional Info:    RUE  WFL 4/5 good  and wfl FMC/dexterity noted during ADL tasks       LUE WFL 4/5 good  and wfl FMC/dexterity noted during ADL tasks       Hearing: WFL   Sensation:  No c/o numbness or tingling   Tone: WFL   Edema: None noted     Comments:   Nursing approved therapy session. Upon arrival, patient supine in bed and agreeable to OT session. Therapist educated pt on role of OT. At end of session, patient supine in bed with call light and phone within reach, all lines and tubes intact; nursing aware.  Pt demonstrated fair+ understanding of education/techniques and decreased independence and safety during completion of ADL/functional transfer/mobility tasks. Pt would benefit from continued skilled OT to increase safety and independence with completion of ADL/IADL tasks for functional independence and quality of life. Rehab Potential: Good for established goals     Patient / Family Goal: Not reported      Patient and/or family were instructed on functional diagnosis, prognosis/goals and OT plan of care. Demonstrated fair+ understanding. Eval Complexity: Re-Evaluation    Time In: 1422  Time Out: 1437  Total Treatment Time: 0    Min Units   OT Eval Low 81179       OT Eval Medium 11219      OT Eval High 80539      OT Re-Eval [id number]  X 1    Therapeutic Ex 43990       Therapeutic Activities 57880       ADL/Self Care 29848       Orthotic Management 31702       Manual [id number]     Neuro Re-Ed [id number]       Non-Billable Time          Evaluation Time additionally includes thorough review of current medical information, gathering information on past medical history/social history and prior level of function, interpretation of standardized testing/informal observation of tasks, assessment of data and development of plan of care and goals.             Molly Odom OTR/L #[id number]

## 2021-05-26 NOTE — PROGRESS NOTES
BMP:    Recent Labs     05/24/21  0511 05/25/21  0632 05/26/21  0410    138 139   K 3.5  3.5 3.5 3.8    103 104   CO2 21* 24 26   BUN 82* 67* 48*   CREATININE 3.6* 3.1* 2.7*   GLUCOSE 122* 103* 115*     Hepatic:   Recent Labs     05/24/21  0511 05/25/21  0632 05/26/21  0410   AST 13 18 19   ALT 6 6 6   BILITOT 1.2 1.3* 1.3*   ALKPHOS 76 74 84     Troponin: No results for input(s): TROPONINI in the last 72 hours. BNP: No results for input(s): BNP in the last 72 hours. Lipids: No results for input(s): CHOL, HDL in the last 72 hours. Invalid input(s): LDLCALCU  ABGs: No results found for: PHART, PO2ART, DCC0ZOD  INR:   Recent Labs     05/24/21  1031 05/25/21  0036 05/25/21  0632   INR 4.3 3.1 3.2       -----------------------------------------------------------------  RAD: CT ABDOMEN PELVIS WO CONTRAST Additional Contrast? None    Result Date: 5/9/2021  EXAMINATION: CT OF THE ABDOMEN AND PELVIS WITHOUT CONTRAST 5/9/2021 12:49 am TECHNIQUE: CT of the abdomen and pelvis was performed without the administration of intravenous contrast. Multiplanar reformatted images are provided for review. Dose modulation, iterative reconstruction, and/or weight based adjustment of the mA/kV was utilized to reduce the radiation dose to as low as reasonably achievable. COMPARISON: None. HISTORY: ORDERING SYSTEM PROVIDED HISTORY: abd pain, vomiting TECHNOLOGIST PROVIDED HISTORY: Additional Contrast?->None Reason for exam:->abd pain, vomiting Decision Support Exception - unselect if not a suspected or confirmed emergency medical condition->Emergency Medical Condition (MA) FINDINGS: The visualized portions of the lung bases are clear. There is moderate amount of free intraperitoneal air. This is compatible with perforated abdominal viscus. Most likely this is right colonic origin as there is pneumatosis coli involving the right colon from the cecum to the hepatic flexure.   This colonic ischemia as a source of pneumatosis and perforation is a consideration. The liver is cirrhotic. There is trace ascites in the abdomen and pelvis. There is cholelithiasis. There are no findings of intestinal obstruction. Appendix not visualized. There is descending and sigmoid diverticulosis. There is some mild inflammatory change involving/abutting the mid to distal descending colon which may represent minimal diverticulitis. This is not likely the source of free air. There is no abscess. Bladder is unremarkable in appearance. There is no abnormal pelvic mass seen. There is a small amount of pelvic ascites. Moderate amount of free intraperitoneal air which is compatible with perforated abdominal viscus. The right colon is the most likely source of free air as there is pneumatosis coli from the cecum to the a Paddock flexure. Underlying colonic ischemia is a consideration. There is also diverticular disease involving the left colon. There is some inflammation involving/abutting the mid to distal descending colon which could represent minimal acute diverticulitis. Note that this is minimal and not the source of free air. Cholelithiasis. Cirrhotic liver. Minimal ascites. I discussed findings by phone with Tori Valdez at 1:42 a.m. on 05/09/2021. CT HEAD WO CONTRAST    Result Date: 5/9/2021  EXAMINATION: CT OF THE HEAD WITHOUT CONTRAST  5/9/2021 12:49 am TECHNIQUE: CT of the head was performed without the administration of intravenous contrast. Dose modulation, iterative reconstruction, and/or weight based adjustment of the mA/kV was utilized to reduce the radiation dose to as low as reasonably achievable. COMPARISON: None. HISTORY: ORDERING SYSTEM PROVIDED HISTORY: dizzy TECHNOLOGIST PROVIDED HISTORY: Has a \"code stroke\" or \"stroke alert\" been called? ->No Reason for exam:->dizzy Decision Support Exception - unselect if not a suspected or confirmed emergency medical condition->Emergency Medical Condition (MA) FINDINGS: structures are without acute process. No acute process. Cardiomegaly. Objective:   Vitals: /79   Pulse 118   Temp 97.9 °F (36.6 °C) (Oral)   Resp 30   Ht 5' 4\" (1.626 m)   Wt 197 lb 12 oz (89.7 kg)   SpO2 96%   BMI 33.94 kg/m²   General appearance: appears stated age   Skin:  No rashes or lesions  HEENT: Head: Normocephalic, no lesions, without obvious abnormality. Neck: no adenopathy, no carotid bruit, no JVD, supple, symmetrical, trachea midline and thyroid not enlarged, symmetric, no tenderness/mass/nodules  Lungs: diminished breath sounds bibasilar  Heart: tachycardia  Abdomen: soft, non-tender; bowel sounds normal; no masses,  no organomegaly  Extremities: edema +++  Neurologic: Mental status: Alert, oriented, thought content appropriate    Assessment:   Patient Active Problem List:     Glenoid labral tear     Synovitis     Biceps tendon tear     Rotator cuff tear     Subacromial impingement     Encephalopathy     Atrial fibrillation with rapid ventricular response (HCC)     Chronic atrial fibrillation (HCC)     Acute exacerbation of COPD with asthma ([address]     CAP (community acquired pneumonia)     Cirrhosis of liver not due to alcohol ([address]     Hypertension     Acute heart failure (HCC)     Dyspnea     Non morbid obesity     Perforated viscus     Chronic renal impairment, stage 3a     Acute on chronic renal insufficiency     Chronic diastolic heart failure (HCC)    Plan:   Assessment: / Plan:     1.  Acute kidney injury.  Acute kidney injury secondary to renal hypoperfusion with low fractional excretion of sodium.    Cr slowly trending up-UO poor despite the initiation of the furosemide with the SPA  S/P R IJ Temp line and 1st IHD 5/24/21  PLAN:1. 2nd treatment 5/25/21with 1.5L vol removal  2.  IHD 5/26/21-- UF as tolerated Goal 2 lit      2.  Hypotension.  Stable   PLAN:1. Follow on present midodrine dose     3.   Anemia.  HB 7.7  Ferritin 84, Iron Sat 24%  Vit B12 753  HgB trending down  PLAN:1. Start oral Fe-no IV Fe++ until infection cleared  2.  Follow hemoglobin hematocrit.     4.   Hypokalemia. HD with 3 K bath   Magnesium >/=2.0  PLAN:1.Monitor K+ and Mg++     5.  Hyperphosphatemia.  Reflects acute kidney injury.  Improved.    PLAN:1.Will follow PO4 level with the initiation IHD.     6.   Edema. With a significant element of third spacing as the patient's albumin is 1.7 mg/dL now up to 3.2 S/P SPA.   PLAN:1.Follow with vol removal on IHD         Dennis Hauser MD

## 2021-05-26 NOTE — PROGRESS NOTES
[facility]  [facility]  Progress Note    NAME: Valeriano Powell  MR:  [id number]  :   1940  DATE OF SERVICE:21    This is a face to face encounter with Valeriano Powell 68 y.o. female on 21    CHIEF COMPLAINT     ID following for   Chief Complaint   Patient presents with    Emesis     HISTORY OF PRESENT ILLNESS   Pt presenetd with Perforated abdominal viscus    S/p Exploratory laparotomy, sigmoid colectomy, end colostomy, open drainage of pelvic abscess, placement of left internal jugular vein triple-lumen catheter  Gn septicmeia rash from dilfican   nenita on hemo     Pt is in ICU   On hemo   nad  Blister broke   Patient is tolerating medications. No reported adverse drug reactions. REVIEW OF SYSTEMS     As stated above in the chief complaint, otherwise negative.   CURRENT MEDICATIONS      metroNIDAZOLE  500 mg Intravenous Q8H    aztreonam  500 mg Intravenous Q12H    ferrous sulfate  325 mg Oral BID WC    polycarbophil  625 mg Oral Daily    lactulose  20 g Oral Daily    midodrine  5 mg Oral BID WC    triamcinolone   Topical BID    lidocaine 1 % injection  5 mL Intradermal Once    sodium chloride flush  10 mL Intravenous 2 times per day    metoprolol tartrate  25 mg Oral BID    amiodarone  200 mg Oral Daily    rifaximin  550 mg Oral BID    ipratropium-albuterol  1 ampule Inhalation BID    [Held by provider] apixaban  5 mg Oral BID    cetirizine  10 mg Oral Daily    anastrozole  1 mg Oral Daily    levothyroxine  50 mcg Oral Daily    montelukast  10 mg Oral Nightly    pantoprazole  40 mg Oral QAM AC    sodium chloride flush  5-40 mL Intravenous 2 times per day     Continuous Infusions:   sodium chloride      sodium chloride       PRN Meds:citrate dextrose, trimethobenzamide, ondansetron, diphenhydrAMINE, sodium chloride flush, sodium chloride, perflutren lipid microspheres, fentanNYL, sodium chloride flush, sodium chloride, traMADol    PHYSICAL EXAM BP (!) 87/59   Pulse 113   Temp 97.3 °F (36.3 °C) (Oral)   Resp 22   Ht 5' 4\" (1.626 m)   Wt 192 lb 14.4 oz (87.5 kg)   SpO2 98%   BMI 33.11 kg/m²   Temp  Av.7 °F (36.5 °C)  Min: 97.3 °F (36.3 °C)  Max: 97.9 °F (36.6 °C)  Constitutional:  The patient is awake, alert, and oriented. Skin:    Warm and dry. No rashes were noted. HEENT:   Round and reactive pupils. AT/NC glasses  Neck:    Supple to movements. Chest:   No use of accessory muscles to breathe. Symmetrical expansion. Dec bs ant   Cardiovascular:  S1 and S2 are rhythmic and regular. No murmurs appreciated. Abdomen:   Positive bowel sounds to auscultation. Benign to palpation dressed. colostomy  Extremities:   No clubbing, no cyanosis,   Edema.  Blister broke  CNS    AAxO   Lines: picc   Hd cath   Mercedes brown sediments      DIAGNOSTIC RESULTS   Radiology:    Recent Labs     21  1304 21  2120 21  0410   WBC 5.8 4.4* 3.2*   RBC 2.54* 2.41* 2.39*   HGB 8.2* 7.7* 7.7*   HCT 25.6* 23.8* 24.2*   .8* 98.8 101.3*   MCH 32.3 32.0 32.2   MCHC 32.0 32.4 31.8*   RDW 18.4* 18.4* 18.4*   PLT 89* 84* 75*   MPV 10.1 10.2 10.3     Recent Labs     21  0511 21  0632 21  0410    138 139   K 3.5  3.5 3.5 3.8    103 104   CO2 21* 24 26   BUN 82* 67* 48*   CREATININE 3.6* 3.1* 2.7*   GLUCOSE 122* 103* 115*   PROT 5.6* 5.5* 5.5*   LABALBU 3.6 3.4* 3.2*   CALCIUM 9.1 8.8 8.6   BILITOT 1.2 1.3* 1.3*   ALKPHOS 76 74 84   AST 13 18 19   ALT 6 6 6     Lab Results   Component Value Date    CRP 0.1 2019     Lab Results   Component Value Date    SEDRATE 65 (H) 2021    SEDRATE 58 (H) 2019     Recent Labs     21  0511 21  1031 21  0036 21  0632 21  0410   INR  --  4.3 3.1 3.2  --    PROTIME  --  51.2* 36.9* 37.8*  --    AST 13  --   --  18 19   ALT 6  --   --  6 6     Lab Results   Component Value Date    CHOL 236 2021    TRIG 126 2021    HDL 66 02/08/2021    LDLCALC 145 02/08/2021    LABVLDL 25 02/08/2021     Lab Results   Component Value Date/Time    VITD25 57 05/22/2021 07:30 AM       Microbiology:   No results for input(s): COVID19 in the last 72 hours. Lab Results   Component Value Date    BC 24 Hours no growth 05/23/2021    BC 5 Days no growth 05/15/2021    BC  05/09/2021     Gram Negative Dominic is too fastidious for Identification and sensitivitey  testing      [facility]  05/09/2021     Too fastidious for Identification and susceptibility testing. BLOODCULT2 24 Hours no growth 05/23/2021    BLOODCULT2 5 Days no growth 05/19/2021    BLOODCULT2 5 Days no growth 05/15/2021    ORG Gram negative dominic 05/09/2021     No results found for: WNDABS  No results found for: RESPSMEAR      Component Value Date/Time    FUNGSM No Fungal elements seen 05/09/2021 0544    LABFUNG No growth after 1 week/s of incubation. 05/09/2021 0544       MRSA Culture Only   Date Value Ref Range Status   05/09/2021 Methicillin resistant Staph aureus not isolated  Final        FINAL IMPRESSION    Leukopenia follow   S/p Perforated abdominal viscus     Procedure(s):  Exploratory laparotomy, sigmoid colectomy, end colostomy, open drainage of pelvic abscess, placement of left internal jugular vein triple-lumen catheter  Gn septicemia - Too fastidious for Identification and susceptibility testing    metronidazole (FLAGYL) 500 mg in NaCl 100 mL IVPB premix, Q8H  aztreonam (AZACTAM) 500 mg in dextrose 5 % 50 mL IVPB, Q12H     -can stop atbx in few more days  Wound care    · Monitor labs    Imaging and labs were reviewed per medical records. The patient was educated about the diagnosis, prognosis, indications, risks and benefits of treatment. An opportunity to ask questions was given to the patient/FAMILY. Thank you for involving me in the care of Dayne Talbot will continue to follow. Please do not hesitate to call for any questions or concerns.     Electronically signed by Carolina Umana Rommel Starks MD on 5/26/2021 at 4:07 PM

## 2021-05-26 NOTE — CARE COORDINATION
5-26-Cm note: ( covid neg 5-18) pt has finished her 3rd dialysis session, CM/SS following to see if outpt HD needs to be set up . Pt's daughter will provide transport to HD from the facility , Amanda Ureña is following, no precert needed, pt will need IV Azactam  for 4 more days. CM/SS will follow for transition.  Electronically signed by Lacho Martins RN on 5/26/2021 at 11:57 AM

## 2021-05-26 NOTE — PROGRESS NOTES
Progress Note  Date:2021       Room:[place] [address]  Patient Mariola Servin     YOB: 1940     Age:80 y.o. Subjective    Subjective:  Symptoms:  Stable. She reports shortness of breath, malaise, weakness and anxiety. No chest pain, chest pressure or anorexia. Diet:  Adequate intake. Activity level: Normal.       Review of Systems   Respiratory: Positive for shortness of breath. Cardiovascular: Negative for chest pain. Gastrointestinal: Negative for anorexia. Neurological: Positive for weakness. Objective         Vitals Last 24 Hours:  TEMPERATURE:  Temp  Av.6 °F (36.4 °C)  Min: 96.9 °F (36.1 °C)  Max: 98.4 °F (36.9 °C)  RESPIRATIONS RANGE: Resp  Av.9  Min: 12  Max: 28  PULSE OXIMETRY RANGE: SpO2  Av.1 %  Min: 87 %  Max: 98 %  PULSE RANGE: Pulse  Av  Min: 75  Max: 114  BLOOD PRESSURE RANGE: Systolic (39BBS), WST:453 , Min:93 , KYH:054   ; Diastolic (65QLS), NZW:00, Min:52, Max:105    I/O (24Hr): Intake/Output Summary (Last 24 hours) at 2021 2305  Last data filed at 2021 2245  Gross per 24 hour   Intake 1280 ml   Output 2130 ml   Net -850 ml     Objective:  General Appearance:  Comfortable. Vital signs: (most recent): Blood pressure (!) 123/57, pulse 96, temperature 97.6 °F (36.4 °C), temperature source Axillary, resp. rate 24, height 5' 4\" (1.626 m), weight 203 lb 11.3 oz (92.4 kg), SpO2 97 %. Output: Producing urine. HEENT: Normal HEENT exam.    Heart: Regular rhythm. S1 normal and S2 normal.    Abdomen: Abdomen is soft. Bowel sounds are normal.     Extremities: Normal range of motion. Pulses: Distal pulses are intact.       Labs/Imaging/Diagnostics    Labs:  CBC:  Recent Labs     21  0632 21  1304 21  2120   WBC 4.2* 5.8 4.4*   RBC 2.37* 2.54* 2.41*   HGB 7.7* 8.2* 7.7*   HCT 23.7* 25.6* 23.8*   .0* 100.8* 98.8   RDW 18.1* 18.4* 18.4*   PLT 79* 89* 84*     CHEMISTRIES:  Recent Labs     21 minute, dose area product: 8954 mGy cm squared. 2 fluoroscopic images were saved. Status post temporary hemodialysis catheter placement as described above. The catheter is available for immediate use. IR ULTRASOUND GUIDANCE VASCULAR ACCESS    Result Date: 5/24/2021  EXAMINATION: Non-tunneled right sided internal jugular vein ultrasound guided temporary dialysis catheter placement. 5/24/2021 3:53 pm TECHNIQUE: After obtaining informed consent, the right neck was then cleansed and draped in the usual sterile fashion. 1% Lidocaine was used for local anesthesia. Under ultrasound guidance, the right internal jugular vein was visualized and cannulated using a micropuncture needle. An .018 wire was then placed into the vein. A 5-Greenlandic micropuncture dilator was then introduced into the vein under fluoroscopic guidance. An 0.35 guide wire was then advanced into the vein and placed within the superior vena cava. Next, a dual lumen 20 cm long 13.5 [place] hemodialysis catheter was placed into the vein over the wire using a standard Seldinger technique. The catheter was then stabilized to the skin using silk sutures. The patient tolerated the procedure well without any immediate complications. All ports flushed and aspirated blood without any problems. PHYSICIANS: Olesya Mcnair MD COMPARISON: None. HISTORY: ORDERING SYSTEM PROVIDED HISTORY: Temporary Dialysis Catheter TECHNOLOGIST PROVIDED HISTORY: Reason for exam:->Temporary Dialysis Catheter FINDINGS: Patent right internal jugular vein. FLUOROSCOPY TIME: Fluoroscopy time 0.6 minute, dose area product: 8954 mGy cm squared. 2 fluoroscopic images were saved. Status post temporary hemodialysis catheter placement as described above. The catheter is available for immediate use.      Assessment//Plan           Hospital Problems         Last Modified POA    Chronic atrial fibrillation ([address] 5/9/2021 Yes    Cirrhosis of liver not due to alcohol ([address] 5/9/2021 Yes Perforated viscus 5/9/2021 Yes    Chronic renal impairment, stage 3a (Chronic) 5/9/2021 Yes    Acute on chronic renal insufficiency 5/9/2021 Yes    Chronic diastolic heart failure ([address] (Chronic) 5/9/2021 Yes        Assessment:  (Problem List as of 5/25/2021 Reviewed: 5/9/2021  6:12 PM by Joshua Britt MD    Chronic renal impairment, stage 3a    Chronic diastolic heart failure (HCC)    Glenoid labral tear    Synovitis    Biceps tendon tear    Rotator cuff tear    Subacromial impingement    Encephalopathy    Atrial fibrillation with rapid ventricular response (HCC)    Chronic atrial fibrillation (HCC)    Acute exacerbation of COPD with asthma ([address]    CAP (community acquired pneumonia)    Cirrhosis of liver not due to alcohol ([address]    Hypertension    Acute heart failure ([address]    Dyspnea    Non morbid obesity    Perforated viscus    Acute on chronic renal insufficiency    ). Plan:   (Snf).        Electronically signed by Yan Pruett MD on 5/25/21 at 11:05 PM EDT

## 2021-05-27 NOTE — PROGRESS NOTES
cirrhotic. There is trace ascites in the abdomen and pelvis. There is cholelithiasis. There are no findings of intestinal obstruction. Appendix not visualized. There is descending and sigmoid diverticulosis. There is some mild inflammatory change involving/abutting the mid to distal descending colon which may represent minimal diverticulitis. This is not likely the source of free air. There is no abscess. Bladder is unremarkable in appearance. There is no abnormal pelvic mass seen. There is a small amount of pelvic ascites. Moderate amount of free intraperitoneal air which is compatible with perforated abdominal viscus. The right colon is the most likely source of free air as there is pneumatosis coli from the cecum to the a Paddock flexure. Underlying colonic ischemia is a consideration. There is also diverticular disease involving the left colon. There is some inflammation involving/abutting the mid to distal descending colon which could represent minimal acute diverticulitis. Note that this is minimal and not the source of free air. Cholelithiasis. Cirrhotic liver. Minimal ascites. I discussed findings by phone with Kevin Cooper at 1:42 a.m. on 05/09/2021. CT HEAD WO CONTRAST    Result Date: 5/9/2021  EXAMINATION: CT OF THE HEAD WITHOUT CONTRAST  5/9/2021 12:49 am TECHNIQUE: CT of the head was performed without the administration of intravenous contrast. Dose modulation, iterative reconstruction, and/or weight based adjustment of the mA/kV was utilized to reduce the radiation dose to as low as reasonably achievable. COMPARISON: None. HISTORY: ORDERING SYSTEM PROVIDED HISTORY: dizzy TECHNOLOGIST PROVIDED HISTORY: Has a \"code stroke\" or \"stroke alert\" been called? ->No Reason for exam:->dizzy Decision Support Exception - unselect if not a suspected or confirmed emergency medical condition->Emergency Medical Condition (MA) FINDINGS: BRAIN/VENTRICLES: There is no acute intracranial hemorrhage, mass effect or midline shift. No abnormal extra-axial fluid collection. The gray-white differentiation is maintained without evidence of an acute infarct. There is no evidence of hydrocephalus. Periventricular white matter changes consistent chronic microvascular disease. Diffuse volume loss. ORBITS: The visualized portion of the orbits demonstrate no acute abnormality. SINUSES: The visualized paranasal sinuses and mastoid air cells demonstrate no acute abnormality. SOFT TISSUES/SKULL:  No acute abnormality of the visualized skull or soft tissues. No acute intracranial abnormality. Periventricular white matter changes consistent chronic microvascular disease. Diffuse volume loss. XR CHEST PORTABLE    Result Date: 5/9/2021  EXAMINATION: ONE XRAY VIEW OF THE CHEST 5/9/2021 9:01 am COMPARISON: Radiograph performed 10 hours earlier. HISTORY: ORDERING SYSTEM PROVIDED HISTORY: s/p triple lumen catheter TECHNOLOGIST PROVIDED HISTORY: Reason for exam:->s/p triple lumen catheter A left internal jugular catheter has its tip near the superior cavoatrial junction. Subdiaphragmatic free air has decreased markedly. The heart and mediastinum are normal for AP technique. Lung volumes are decreased. There are no focal airspace opacities. Blunting of the costophrenic sulci suggest there are small dependent pleural effusions. There are no signs of pneumothorax. FINDINGS: 1. Left IJ catheter appears satisfactory, no signs of pneumothorax. 2. Decreased lung volumes, suspect small pleural effusions. XR CHEST PORTABLE    Result Date: 5/9/2021  EXAMINATION: ONE XRAY VIEW OF THE CHEST 5/9/2021 12:48 am COMPARISON: 04/05/2021 HISTORY: ORDERING SYSTEM PROVIDED HISTORY: chest pain TECHNOLOGIST PROVIDED HISTORY: Reason for exam:->chest pain FINDINGS: The lungs are without acute focal process. There is no effusion or pneumothorax. Cardiomegaly. The osseous structures are without acute process. No acute process. Cardiomegaly. Objective:   Vitals: BP 98/64   Pulse 89   Temp 97.6 °F (36.4 °C) (Oral)   Resp 28   Ht 5' 4\" (1.626 m)   Wt 192 lb 14.4 oz (87.5 kg)   SpO2 97%   BMI 33.11 kg/m²   General appearance: appears stated age   Skin:  No rashes or lesions  HEENT: Head: Normocephalic, no lesions, without obvious abnormality.   Neck: no adenopathy, no carotid bruit, no JVD, supple, symmetrical, trachea midline and thyroid not enlarged, symmetric, no tenderness/mass/nodules  Lungs: diminished breath sounds bibasilar  Heart: regular rate and rhythm, S1, S2 normal, no murmur, click, rub or gallop  Abdomen: soft, non-tender; bowel sounds normal; no masses,  no organomegaly  Extremities: edema ++  Neurologic: Mental status: Alert, oriented, thought content appropriate    Assessment:   Patient Active Problem List:     Glenoid labral tear     Synovitis     Biceps tendon tear     Rotator cuff tear     Subacromial impingement     Encephalopathy     Atrial fibrillation with rapid ventricular response (HCC)     Chronic atrial fibrillation (HCC)     Acute exacerbation of COPD with asthma ([address]     CAP (community acquired pneumonia)     Cirrhosis of liver not due to alcohol ([address]     Hypertension     Acute heart failure (HCC)     Dyspnea     Non morbid obesity     Perforated viscus     Chronic renal impairment, stage 3a     Acute on chronic renal insufficiency     Chronic diastolic heart failure ([address]    Plan:   Assessment: / Plan:     1.  Acute kidney injury.  Acute kidney injury secondary to renal hypoperfusion with low fractional excretion of sodium.    Cr slowly trending up-UO poor despite the initiation of the furosemide with the SPA  S/P R IJ Temp line and 1st IHD 5/24/21  IHD  UF as tolerated,  2 lit removed yesterday , Albumin with dialysis as needed       2.  Hypotension.  Stable   PLAN:1. Follow on present midodrine dose     3.   Anemia.  HB 8.3   Ferritin 84, Iron Sat 24%  Vit B12 753  HgB trending down  PLAN:1. Start oral Fe-no IV Fe++ until infection cleared  2.  Follow hemoglobin hematocrit.     4.   Hypokalemia. HD with 3 K bath  k better   Magnesium >/=2.0  PLAN:1.Monitor K+ and Mg++     5.  Hyperphosphatemia.  Reflects acute kidney injury.  Improved.      6.   Edema. With a significant element of third spacing as the patient's albumin is 1.7 mg/dL now up to 3.2 S/P SPA.     PLAN:1.Follow with vol removal on IHD, next dialysis tomorrow     Koffi Camacho MD

## 2021-05-27 NOTE — CARE COORDINATION
SOCIAL WORK / DISCHARGE PLANNING:  HENS form completed for [address]                    Electronically signed by EVERARDO Greene on 5/27/2021 at 4:26 PM

## 2021-05-27 NOTE — CARE COORDINATION
5-27-Cm note: ( covid neg 5-18) Pt is schedued for a tunnelled HD cath in am, Eduardo Conti with  called [address] for a chair time as outpt, she left a message, will await return call with chair time, [address] is following for discharge. No precert needed , SS doing a HENS , need signed RAMO. Pt's daughter will provide trnsport to HD when pt requires a wheelchair, at this time she will need a stretcher, liaison is aware of dtr taking to HD when needed.  Electronically signed by Aisha Ralph RN on 5/27/2021 at 4:10 PM

## 2021-05-27 NOTE — PLAN OF CARE
Problem: Falls - Risk of:  Goal: Will remain free from falls  Description: Will remain free from falls  Outcome: Met This Shift  Goal: Absence of physical injury  Description: Absence of physical injury  Outcome: Met This Shift     Problem: Skin Integrity:  Goal: Will show no infection signs and symptoms  Description: Will show no infection signs and symptoms  Outcome: Met This Shift  Goal: Absence of new skin breakdown  Description: Absence of new skin breakdown  Outcome: Met This Shift  Goal: Status of oral mucous membranes will improve  Description: Status of oral mucous membranes will improve  Outcome: Met This Shift  Goal: Skin integrity will be maintained  Description: Skin integrity will be maintained  Outcome: Met This Shift  Goal: Signs of wound healing will improve  Description: Signs of wound healing will improve  Outcome: Met This Shift  Goal: Risk for impaired skin integrity will decrease  Description: Risk for impaired skin integrity will decrease  Outcome: Met This Shift     Problem:  Activity:  Goal: Ability to tolerate increased activity will improve  Description: Ability to tolerate increased activity will improve  Outcome: Met This Shift  Goal: Risk for activity intolerance will decrease  Description: Risk for activity intolerance will decrease  Outcome: Met This Shift     Problem: Bleeding:  Goal: Will show no signs and symptoms of excessive bleeding  Description: Will show no signs and symptoms of excessive bleeding  Outcome: Met This Shift     Problem: Physical Regulation:  Goal: Ability to maintain clinical measurements within normal limits will improve  Description: Ability to maintain clinical measurements within normal limits will improve  Outcome: Met This Shift

## 2021-05-27 NOTE — CARE COORDINATION
SOCIAL WORK / DISCHARGE PLANNING:  COVID neg 5/18. Sw made referral via voice message to [facility] rep to initiate setting up outpt HD chairtime for pt . [facility] vs [address] [place] [address] as available. Pt to have tunnel cath placed next date per Mera MAN. Hep panel completed.                Electronically signed by EVERARDO Greene on 5/27/2021 at 4:03 PM

## 2021-05-27 NOTE — PROGRESS NOTES
OT BEDSIDE TREATMENT NOTE      Date:2021  Patient Name: Álvaro Lazaro  MRN: [id number]  : 1940  Room: [place] [address]   Referring Provider: Yesica Otero MD     Evaluating OT: Maria C Morse OTR/L #[id number]     AM-PAC Daily Activity Raw Score:      Recommended Placement: IP rehab    Recommended Adaptive Equipment: TBD      Diagnosis:   1. Perforated abdominal viscus    2. Longstanding persistent atrial fibrillation ([facility]    3. Chronic renal insufficiency, stage 2 (mild)       Surgery: 21 -  Exploratory laparotomy, sigmoid colectomy, end colostomy, open drainage of pelvic abscess, placement of left internal jugular vein triple-lumen catheter     Pertinent Medical History:    Past Medical History        Past Medical History:   Diagnosis Date    Anxiety      [facility] ([facility]      right breast    Cirrhosis, non-alcoholic (HCC)      COPD (chronic obstructive pulmonary disease) ([facility]      Hypertension           Precautions:  Falls, alarm, O2, abdominal precautions     Home Living: Pt lives alone in a 1 story home with 3 KAYCE with 1 rail(s).  Sister, daughter and granddaughter will be taking care of patient for 3 weeks once d/c  Bathroom setup: walk in shower with grab bars, 2 built in seats   Equipment owned: [employer], cane, elevated toilet     Prior Level of Function: Independent with ADLs , Independent with IADLs; ambulated without AD  Driving: No  Occupation: Not reported     Pain Level: discomfort in abdominal region and pain at location of blisters on dorsal side of B feet; nsg aware; unquantified  Cognition: A&O: 4/4; Follows 1-2 step directions              Memory:  Fair+              Sequencing:  Fair+              Problem solving:  Fair+              Judgement/safety:  Fair+                Functional Assessment:    Initial Eval Status  Date: 5/10/21 Treatment Status  Date:2021 STGs = LTGs  Time frame: 5-7 days   Feeding Independent     Independent to bring ice chips to mouth      Grooming Minimal Assist    N/T Modified [place]   UB Dressing Minimal Assist     Min A ;  assist to tie/untie back of gown  Modified [place]    LB Dressing Maximal Assist     Max A to don socks when long seated in bed; assist d/t decreased endurance and blisters on dorsal side of B feet  Moderate Assist    Bathing Maximal Assist    N/T Moderate Assist    Toileting Moderate Assist      N/T  Minimal Assist    Bed Mobility  Supine to sit: Moderate Assist   Sit to supine: NT, pt up in chair      Mod A for supine to sit with assist to guide LE's and UB to EOB; scooting at mod A; sit to supine N/T as pt seated in chair; nsg aware and present Supine to sit: Minimal Assist   Sit to supine: Minimal Assist    Functional Transfers Minimal Assist   Sit<>stand  Bed, chair  Cuing on body mechanics, hand placement and safety  Mod A x 2 with FWW for sit to stand from EOB; transfer to chair at mod A x 2 with FWW; cuing for sequencing upright position; assist for walker navigation; positioning of B feet on footstool for edema management when seated in chair Independent    Functional Mobility Minimal Assist of 2  For safety  Several steps between bed and chair using WW     Mod A x 2 for short household distances from EOB to chair with FWW; pt c/o weakness in B LE's Independent    Balance Sitting:     Static:  Fair+    Dynamic:fair+  Standing: fair+ Sitting:     Static:  Fair    Dynamic:fair  Standing: fair /fair minus with Mod A x 2 and FWW Sitting:     Static:  good    Dynamic:good  Standing: good   Activity Tolerance Fair  /65  SpO2     Fair minus d/t weakness in B LE's and pain at blister site of B feet  Fair+   Visual/  Perceptual Glasses: Yes   WFL               Plan of Care:   Instruction/training on adapted ADL techniques and AE recommendations to increase functional independence within precautions  Training on energy conservation strategies/techniques to improve independence/tolerance for self-care routine and transfer training  Therapeutic activities to facilitate/challenge dynamic balance, stand tolerance, fine motor dexterity/in-hand manipulation for increased independence with ADLs  Positioning to improve functional independence    Comments: Patient cleared by nursing staff. Upon arrival pt supine in bed. Pt agreeable to OT tx session. Pt educated with regards to bed mobility, hand placement, safety awareness, sitting balance,   ADL retraining,  Self feeding, LE/UE dressing, transfer training, functional mobility, positioning, ECT's. At end of session pt seated inchair with B LE's positioned on footstool  with all lines and tubes intact, call light within reach. Overall, pt demonstrated decreased independence and safety during completion of ADL/functional transfers/mobility tasks. Pt would benefit from continued skilled OT to increase safety and independence with completion of ADL/IADL tasks for functional independence and quality of life. Pt required cues and education as noted above for safe facilitation and completion of tasks. Therapist provided skilled monitoring of patient's response during treatment session. Prior to and at the end of session, environmental modifications/line management completed for patients safety and efficiency of treatment session. Overall, patient demonstrates moderate difficulties with completion of BADLs and IADLs. Factors contributing to these difficulties include decreased endurance, and generalized weakness. As noted above, patient likely to benefit from further OT intervention to increase independence, safety, and overall quality of life. Treatment:     ? Bed mobility: Facilitated bed mobility with cues for proper body mechanics and sequencing to prepare for ADL completion. ? ADL completion: Self-care retraining for the above-mentioned ADLs; training on proper hand placement, safety technique, sequencing, and energy conservation techniques. ?  Postural Balance: Sitting/standing balance retraining to improve righting reactions with postural changes during ADLs. ? Skilled positioning: Proper positioning to improve interaction with environment, overall functioning and decrease/prevent edema ;LE positioning  ? Functional transfers: Facilitated transfers from various surfaces with cues for body alignment, safety and hand placement. · Pt has made fair/fair minus progress towards set goals   ·  OT 1-3x/week for 5-7 days during hospitalization     Treatment Time includes thorough review of current medical information, gathering information on past medical history/social history and prior level of function, informal observation of tasks, assessment of data and education on plan of care and goals.     Treatment Time In: 2:09 PM   Treatment Time Out: 2:28 PM         Treatment Charges: Mins Units   ADL/Home Mgt     [address]     [id number]   Ther Ex                 [id number]     Manual Therapy    [id number]     Neuro Re-ed         [id number]     Orthotic manage/training                               [id number]     Non Billable Time     Total Timed Treatment 19 2160 CATARINO Anderson/BELLO #[id number]

## 2021-05-27 NOTE — PLAN OF CARE
Problem: Falls - Risk of:  Goal: Will remain free from falls  Description: Will remain free from falls  5/26/2021 2122 by Digna Figueroa RN  Outcome: Met This Shift  5/26/2021 1431 by Jess Escalante RN  Outcome: Met This Shift  Goal: Absence of physical injury  Description: Absence of physical injury  5/26/2021 2122 by Digna Figueroa RN  Outcome: Met This Shift  5/26/2021 1431 by Jess Escalante RN  Outcome: Met This Shift     Problem: Skin Integrity:  Goal: Will show no infection signs and symptoms  Description: Will show no infection signs and symptoms  5/26/2021 2122 by Digna Figueroa RN  Outcome: Met This Shift  5/26/2021 1431 by Jess Escalante RN  Outcome: Met This Shift  Goal: Absence of new skin breakdown  Description: Absence of new skin breakdown  5/26/2021 2122 by Digna Figueroa RN  Outcome: Met This Shift  5/26/2021 1431 by Jess Escalante RN  Outcome: Met This Shift  Goal: Status of oral mucous membranes will improve  Description: Status of oral mucous membranes will improve  5/26/2021 2122 by Digna Figueroa RN  Outcome: Met This Shift  5/26/2021 1431 by Jess Escalante RN  Outcome: Met This Shift  Goal: Skin integrity will be maintained  Description: Skin integrity will be maintained  5/26/2021 2122 by Digna Figueroa RN  Outcome: Met This Shift  5/26/2021 1431 by Jess Escalante RN  Outcome: Met This Shift  Goal: Signs of wound healing will improve  Description: Signs of wound healing will improve  5/26/2021 2122 by Digna Figueroa RN  Outcome: Met This Shift  5/26/2021 1431 by Jess Escalante RN  Outcome: Not Met This Shift  Goal: Risk for impaired skin integrity will decrease  Description: Risk for impaired skin integrity will decrease  5/26/2021 2122 by Digna Figueroa RN  Outcome: Met This Shift  5/26/2021 1431 by Jess Escalante RN  Outcome: Not Met This Shift     Problem: Bleeding:  Goal: Will show no signs and symptoms of excessive bleeding  Description: Will show no signs and symptoms of excessive bleeding  5/26/2021 2122 by Tierra Nuno RN  Outcome: Met This Shift  5/26/2021 1431 by Jaime Frazier RN  Outcome: Met This Shift     Problem: Fluid Volume:  Goal: Will show no signs or symptoms of fluid imbalance  Description: Will show no signs or symptoms of fluid imbalance  5/26/2021 2122 by Tierra Nuno RN  Outcome: Met This Shift  5/26/2021 1431 by Jaime Frazier RN  Outcome: Not Met This Shift  Goal: Ability to achieve a balanced intake and output will improve  Description: Ability to achieve a balanced intake and output will improve  5/26/2021 2122 by Tierra Nuno RN  Outcome: Met This Shift  5/26/2021 1431 by Jaime Frazier RN  Outcome: Not Met This Shift  Goal: Ability to achieve and maintain adequate urine output will improve  5/26/2021 2122 by Tierra Nuno RN  Outcome: Met This Shift  5/26/2021 1431 by Jaime Frazier RN  Outcome: Not Met This Shift     Problem:  Bowel/Gastric:  Goal: Gastrointestinal status for postoperative course will improve  Description: Gastrointestinal status for postoperative course will improve  5/26/2021 2122 by Tierra Nuno RN  Outcome: Met This Shift  5/26/2021 1431 by Jaime Frazier RN  Outcome: Met This Shift     Problem: Coping:  Goal: Ability to cope will improve  Description: Ability to cope will improve  5/26/2021 2122 by Tierra Nuno RN  Outcome: Completed  5/26/2021 1431 by Jaime Frazier RN  Outcome: Met This Shift     Problem: Health Behavior:  Goal: Ability to manage health-related needs will improve  Description: Ability to manage health-related needs will improve  5/26/2021 2122 by Tierra Nuno RN  Outcome: Completed  5/26/2021 1431 by Jaime Frazier RN  Outcome: Not Met This Shift     Problem: Cardiac:  Goal: Ability to maintain an adequate cardiac output will improve  Description: Ability to maintain an adequate cardiac output will improve  5/26/2021 2122 by Tierra Nuno RN  Outcome: Met This Shift  5/26/2021 1431 by Ismael Rangel RN  Outcome: Met This Shift  Goal: Hemodynamic stability will improve  Description: Hemodynamic stability will improve  5/26/2021 2122 by Gerri Butler RN  Outcome: Met This Shift  5/26/2021 1431 by Ismael Rangel RN  Outcome: Met This Shift     Problem: Respiratory:  Goal: Respiratory status will improve  Description: Respiratory status will improve  5/26/2021 2122 by Gerri Butler RN  Outcome: Completed  5/26/2021 1431 by Ismael Rangel RN  Outcome: Not Met This Shift

## 2021-05-27 NOTE — PROGRESS NOTES
Pulmonary/Critical Care Progress Note    We are following patient for YANELIS superimposed on CKD, fluid overload (improving with dialysis), following septic shock from pelvic abscess resulting from diverticular rupture requiring diverting colostomy. We are also following the patient for nonalcoholic cirrhosis, previous epistaxis now stable, history of atrial fibrillation    SUBJECTIVE:  Patient is doing extraordinarily well. She is responded well to dialysis but still has a significant amount of extravascular fluid, particularly in her legs and abdominal wall. Since there has been no epistaxis for the last 3 to 4 days, we will begin a apixaban for her atrial fibrillation the rate of which is adequately controlled on metoprolol and amiodarone. She remains on rifaximin and lactulose for her nonalcoholic hepatic cirrhosis.     MEDICATIONS:   apixaban  2.5 mg Oral BID    metroNIDAZOLE  500 mg Intravenous Q8H    aztreonam  500 mg Intravenous Q12H    ferrous sulfate  325 mg Oral BID WC    polycarbophil  625 mg Oral Daily    lactulose  20 g Oral Daily    midodrine  5 mg Oral BID WC    triamcinolone   Topical BID    lidocaine 1 % injection  5 mL Intradermal Once    sodium chloride flush  10 mL Intravenous 2 times per day    metoprolol tartrate  25 mg Oral BID    amiodarone  200 mg Oral Daily    rifaximin  550 mg Oral BID    ipratropium-albuterol  1 ampule Inhalation BID    [Held by provider] apixaban  5 mg Oral BID    cetirizine  10 mg Oral Daily    anastrozole  1 mg Oral Daily    levothyroxine  50 mcg Oral Daily    montelukast  10 mg Oral Nightly    pantoprazole  40 mg Oral QAM AC    sodium chloride flush  5-40 mL Intravenous 2 times per day      sodium chloride      sodium chloride       citrate dextrose, trimethobenzamide, ondansetron, diphenhydrAMINE, sodium chloride flush, sodium chloride, perflutren lipid microspheres, fentanNYL, sodium chloride flush, sodium chloride, traMADol      REVIEW OF SYSTEMS:  Constitutional: Denies fever, weight loss, night sweats, and fatigue  Skin: Denies pigmentation, dark lesions, and rashes   HEENT: Denies hearing loss, tinnitus, ear drainage, epistaxis, sore throat, and hoarseness. Cardiovascular: Denies palpitations, chest pain, and chest pressure. Respiratory: Denies cough, dyspnea at rest, hemoptysis, apnea, and choking. Gastrointestinal: Denies nausea, vomiting, poor appetite, diarrhea, heartburn or reflux  Genitourinary: Denies dysuria, frequency, urgency or hematuria  Musculoskeletal: Denies myalgias, muscle weakness, and bone pain  Neurological: Denies dizziness, vertigo, headache, and focal weakness  Psychological: Denies anxiety and depression  Endocrine: Denies heat intolerance and cold intolerance  Hematopoietic/Lymphatic: Denies bleeding problems and blood transfusions    OBJECTIVE:  Vitals:    05/27/21 1000   BP:    Pulse: 83   Resp: 28   Temp:    SpO2: 97%     FiO2 : 98 %  O2 Flow Rate (L/min): 2 L/min  O2 Device: Nasal cannula    PHYSICAL EXAM:  Constitutional: No fever, chills, diaphoresis  Skin: No skin rash, no skin breakdown  HEENT: Unremarkable  Neck: No JVD, lymphadenopathy, thyromegaly  Cardiovascular: S1, S2 normal.  No S3 murmurs rubs present  Respiratory: Clear to auscultation bilaterally  Gastrointestinal: Soft, obese, nontender  Genitourinary: No CVA tenderness  Extremities: 2+ edema feet legs and ankles. No clubbing or cyanosis present  Neurological: Awake, alert, oriented x3. No evidence of focal motor or sensory deficits  Psychological: Appropriate affect. In good spirits.     LABS:  WBC   Date Value Ref Range Status   05/27/2021 4.9 4.5 - 11.5 E9/L Final   05/26/2021 3.2 (L) 4.5 - 11.5 E9/L Final   05/25/2021 4.4 (L) 4.5 - 11.5 E9/L Final     Hemoglobin   Date Value Ref Range Status   05/27/2021 8.3 (L) 11.5 - 15.5 g/dL Final   05/26/2021 7.7 (L) 11.5 - 15.5 g/dL Final   05/25/2021 7.7 (L) 11.5 - 15.5 g/dL Final     Hematocrit   Date Value Ref Range Status   05/27/2021 27.2 (L) 34.0 - 48.0 % Final   05/26/2021 24.2 (L) 34.0 - 48.0 % Final   05/25/2021 23.8 (L) 34.0 - 48.0 % Final     MCV   Date Value Ref Range Status   05/27/2021 103.8 (H) 80.0 - 99.9 fL Final   05/26/2021 101.3 (H) 80.0 - 99.9 fL Final   05/25/2021 98.8 80.0 - 99.9 fL Final     Platelets   Date Value Ref Range Status   05/27/2021 79 (L) 130 - 450 E9/L Final   05/26/2021 75 (L) 130 - 450 E9/L Final   05/25/2021 84 (L) 130 - 450 E9/L Final     Sodium   Date Value Ref Range Status   05/27/2021 138 132 - 146 mmol/L Final   05/26/2021 139 132 - 146 mmol/L Final   05/25/2021 138 132 - 146 mmol/L Final     Potassium reflex Magnesium   Date Value Ref Range Status   05/27/2021 3.8 3.5 - 5.0 mmol/L Final   05/26/2021 3.8 3.5 - 5.0 mmol/L Final   05/25/2021 3.5 3.5 - 5.0 mmol/L Final     Chloride   Date Value Ref Range Status   05/27/2021 101 98 - 107 mmol/L Final   05/26/2021 104 98 - 107 mmol/L Final   05/25/2021 103 98 - 107 mmol/L Final     CO2   Date Value Ref Range Status   05/27/2021 27 22 - 29 mmol/L Final   05/26/2021 26 22 - 29 mmol/L Final   05/25/2021 24 22 - 29 mmol/L Final     BUN   Date Value Ref Range Status   05/27/2021 27 (H) 6 - 23 mg/dL Final   05/26/2021 48 (H) 6 - 23 mg/dL Final   05/25/2021 67 (H) 6 - 23 mg/dL Final     CREATININE   Date Value Ref Range Status   05/27/2021 2.4 (H) 0.5 - 1.0 mg/dL Final   05/26/2021 2.7 (H) 0.5 - 1.0 mg/dL Final   05/25/2021 3.1 (H) 0.5 - 1.0 mg/dL Final     Glucose   Date Value Ref Range Status   05/27/2021 119 (H) 74 - 99 mg/dL Final   05/26/2021 115 (H) 74 - 99 mg/dL Final   05/25/2021 103 (H) 74 - 99 mg/dL Final   05/30/2012 100 70 - 110 mg/dL Final   11/14/2011 85 70 - 110 mg/dL Final   09/06/2011 110 70 - 110 mg/dL Final     Calcium   Date Value Ref Range Status   05/27/2021 8.3 (L) 8.6 - 10.2 mg/dL Final   05/26/2021 8.6 8.6 - 10.2 mg/dL Final   05/25/2021 8.8 8.6 - 10.2 mg/dL Final     Total Protein   Date Value Ref Range Status   05/27/2021 5.4 (L) 6.4 - 8.3 g/dL Final   05/26/2021 5.5 (L) 6.4 - 8.3 g/dL Final   05/25/2021 5.5 (L) 6.4 - 8.3 g/dL Final     Albumin   Date Value Ref Range Status   05/27/2021 3.0 (L) 3.5 - 5.2 g/dL Final   05/26/2021 3.2 (L) 3.5 - 5.2 g/dL Final   05/25/2021 3.4 (L) 3.5 - 5.2 g/dL Final   05/30/2012 4.1 3.2 - 4.8 g/dL Final   11/14/2011 3.8 3.2 - 4.8 g/dL Final   11/15/2010 3.6 3.2 - 4.8 g/dL Final     Total Bilirubin   Date Value Ref Range Status   05/27/2021 1.4 (H) 0.0 - 1.2 mg/dL Final   05/26/2021 1.3 (H) 0.0 - 1.2 mg/dL Final   05/25/2021 1.3 (H) 0.0 - 1.2 mg/dL Final     Alkaline Phosphatase   Date Value Ref Range Status   05/27/2021 87 35 - 104 U/L Final   05/26/2021 84 35 - 104 U/L Final   05/25/2021 74 35 - 104 U/L Final     AST   Date Value Ref Range Status   05/27/2021 21 0 - 31 U/L Final   05/26/2021 19 0 - 31 U/L Final   05/25/2021 18 0 - 31 U/L Final     ALT   Date Value Ref Range Status   05/27/2021 7 0 - 32 U/L Final   05/26/2021 6 0 - 32 U/L Final   05/25/2021 6 0 - 32 U/L Final     GFR Non-   Date Value Ref Range Status   05/27/2021 19 >=60 mL/min/1.73 Final     Comment:     Chronic Kidney Disease: less than 60 ml/min/1.73 sq.m. Kidney Failure: less than 15 ml/min/1.73 sq.m. Results valid for patients 18 years and older. 05/26/2021 17 >=60 mL/min/1.73 Final     Comment:     Chronic Kidney Disease: less than 60 ml/min/1.73 sq.m. Kidney Failure: less than 15 ml/min/1.73 sq.m. Results valid for patients 18 years and older. 05/25/2021 15 >=60 mL/min/1.73 Final     Comment:     Chronic Kidney Disease: less than 60 ml/min/1.73 sq.m. Kidney Failure: less than 15 ml/min/1.73 sq.m. Results valid for patients 18 years and older.        GFR    Date Value Ref Range Status   05/27/2021 24  Final   05/26/2021 21  Final   05/25/2021 18  Final     Magnesium   Date Value Ref Range Status   05/25/2021 2.1 1.6 - 2.6 mg/dL Final 05/24/2021 2.2 1.6 - 2.6 mg/dL Final   05/22/2021 2.1 1.6 - 2.6 mg/dL Final     Phosphorus   Date Value Ref Range Status   05/27/2021 2.8 2.5 - 4.5 mg/dL Final   05/26/2021 3.4 2.5 - 4.5 mg/dL Final   05/25/2021 4.2 2.5 - 4.5 mg/dL Final     No results for input(s): PH, PO2, PCO2, HCO3, BE, O2SAT in the last 72 hours. RADIOLOGY:  IR FLUORO GUIDED CVA DEVICE PLMT/REPLACE/REMOVAL   Final Result   Status post temporary hemodialysis catheter placement as described above. The catheter is available for immediate use. IR ULTRASOUND GUIDANCE VASCULAR ACCESS   Final Result   Status post temporary hemodialysis catheter placement as described above. The catheter is available for immediate use. US DUP UPPER EXTREMITY LEFT VENOUS   Final Result   No evidence of DVT. XR CHEST PORTABLE   Final Result   1. Invasive lines and tubes appear satifactory /unremarkable. 2. Persistent bi basilar consolidation favor atelectasis. CT ABDOMEN PELVIS WO CONTRAST Additional Contrast? Oral   Final Result   1. Partial colectomy with left lower quadrant functioning colostomy. Hepatic flexure colon pneumatosis and with residual small pneumoperitoneum. No extravasation of GI contrast from the bowel seen. No abscess identified. 2.  Left abdomen localized small bowel intussusception without bowel   obstruction. 3.  Cirrhosis. No portal vein gas seen. Moderate volume abdominal and   pelvic ascites, increased compared to prior. 4.  Cholelithiasis. No biliary dilatation. Generalized anasarca pattern. CRITICAL FINDINGS: Abnormal CT findings telephoned by Dr. Logna Schmitt to the   referring service at 1105 hours. CT CHEST WO CONTRAST   Final Result   1. Cardiomegaly, small right pleural effusion, and right basilar passive   atelectasis without significant change. 2.  No pneumonia or abscess identified. 3.  Right middle lobe mild atelectasis or scarring, unchanged. XR ABDOMEN (KUB) (SINGLE AP VIEW)   Final Result   No evidence of bowel obstruction. IR FLUORO GUIDED CVA DEVICE PLMT/REPLACE/REMOVAL   Final Result   Successful placement of a dual lumen power PICC line. IR ULTRASOUND GUIDANCE VASCULAR ACCESS   Final Result   Successful placement of a dual lumen power PICC line. US DUP UPPER EXTREMITY LEFT VENOUS   Final Result   No evidence of DVT in the left upper extremity venous vasculature. XR CHEST PORTABLE   Final Result   No significant change. Continued follow-up recommended. XR CHEST PORTABLE   Final Result      CT HEAD WO CONTRAST   Final Result   No acute intracranial abnormality. Periventricular white matter changes consistent chronic microvascular   disease. Diffuse volume loss. CT ABDOMEN PELVIS WO CONTRAST Additional Contrast? None   Final Result   Moderate amount of free intraperitoneal air which is compatible with   perforated abdominal viscus. The right colon is the most likely source of   free air as there is pneumatosis coli from the cecum to the a Paddock   flexure. Underlying colonic ischemia is a consideration. There is also diverticular disease involving the left colon. There is some   inflammation involving/abutting the mid to distal descending colon which   could represent minimal acute diverticulitis. Note that this is minimal and   not the source of free air. Cholelithiasis. Cirrhotic liver. Minimal ascites. I discussed findings by phone with Bessy Addison at 1:42 a.m. on 05/09/2021. XR CHEST PORTABLE   Final Result   No acute process. Cardiomegaly.                  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 *      IMPRESSION:  1. 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 Septic shock secondary to pelvic abscess  3. Acute kidney injury  4. Atrial fibrillation with controlled ventricular response  5. COPD/asthma, stable  6. Diastolic dysfunction  7. Status post right lumpectomy for breast cancer approximately 10 years ago on Arimidex  8. Nonalcoholic cirrhosis  9. Fluid overload secondary to kidney disease secondary to her large fluid requirement during her period of hypotension/septic shock    PLAN:  1. Hold dialysis today but probably resume tomorrow  2. Continue inhaled bronchodilators  3. Restart Eliquis at lower dose of 2.5 p.o. twice daily for atrial fibrillation  4. Can probably transfer to floor  5. Continue rifaximin and lactulose  6. Physical and Occupational Therapy  7. Check labs in a.m. ATTESTATION:  ICU Staff Physician note of personal involvement in Care  As the attending physician, I certify that I personally reviewed the patients history and personally examined the patient to confirm the physical findings described above,  And that I reviewed the relevant imaging studies and available reports. I also discussed the differential diagnosis and all of the proposed management plans with the patient and individuals accompanying the patient to this visit. They had the opportunity to ask questions about the proposed management plans and to have those questions answered. This patient has a high probability of sudden, clinically significant deterioration, which requires the highest level of physician preparedness to intervene urgently. I managed/supervised life or organ supporting interventions that required frequent physician assessment. I devoted my full attention to the direct care of this patient for the amount of time indicated below.   Time I spent with the family or surrogate(s) is included only if the patient was incapable of providing the necessary

## 2021-05-27 NOTE — PROGRESS NOTES
[facility]  [facility]  Progress Note    NAME: Akhil Ervin  MR:  [id number]  :   1940  DATE OF SERVICE:21    This is a face to face encounter with Akhil Ervin 68 y.o. female on 21    CHIEF COMPLAINT     ID following for   Chief Complaint   Patient presents with    Emesis     HISTORY OF PRESENT ILLNESS   Pt presenetd with Perforated abdominal viscus    S/p Exploratory laparotomy, sigmoid colectomy, end colostomy, open drainage of pelvic abscess, placement of left internal jugular vein triple-lumen catheter  Gn septicmeia rash from dilfican   nenita on hemo     Pt is in ICU   Family present  nad  Overall edema  Rash ble    Patient is tolerating medications. No reported adverse drug reactions. REVIEW OF SYSTEMS     As stated above in the chief complaint, otherwise negative.   CURRENT MEDICATIONS      apixaban  2.5 mg Oral BID    metroNIDAZOLE  500 mg Intravenous Q8H    aztreonam  500 mg Intravenous Q12H    ferrous sulfate  325 mg Oral BID WC    polycarbophil  625 mg Oral Daily    lactulose  20 g Oral Daily    midodrine  5 mg Oral BID WC    triamcinolone   Topical BID    lidocaine 1 % injection  5 mL Intradermal Once    sodium chloride flush  10 mL Intravenous 2 times per day    metoprolol tartrate  25 mg Oral BID    amiodarone  200 mg Oral Daily    rifaximin  550 mg Oral BID    ipratropium-albuterol  1 ampule Inhalation BID    [Held by provider] apixaban  5 mg Oral BID    cetirizine  10 mg Oral Daily    anastrozole  1 mg Oral Daily    levothyroxine  50 mcg Oral Daily    montelukast  10 mg Oral Nightly    pantoprazole  40 mg Oral QAM AC    sodium chloride flush  5-40 mL Intravenous 2 times per day     Continuous Infusions:   sodium chloride      sodium chloride       PRN Meds:citrate dextrose, trimethobenzamide, ondansetron, diphenhydrAMINE, sodium chloride flush, sodium chloride, perflutren lipid microspheres, fentanNYL, sodium chloride flush, sodium chloride, traMADol    PHYSICAL EXAM     BP 98/64   Pulse 81   Temp 97.6 °F (36.4 °C) (Oral)   Resp 23   Ht 5' 4\" (1.626 m)   Wt 192 lb 14.4 oz (87.5 kg)   SpO2 94%   BMI 33.11 kg/m²   Temp  Av.6 °F (36.4 °C)  Min: 97.6 °F (36.4 °C)  Max: 97.6 °F (36.4 °C)  Constitutional:  The patient is awake, alert, and oriented. Skin:    Warm and dry. HEENT:   AT/NC glasses   Chest:   No use of accessory muscles to breathe. Symmetrical expansion. Dec bs ant   Cardiovascular:  S1 and S2 are rhythmic and regular. Abdomen:   Positive bowel sounds to auscultation. Benign to palpation dressed. colostomy  Extremities:      Edema.  Blister broke left foot right foot blister  CNS    AAxO   Lines: picc   Hd cath   Mercedes brown sediments      DIAGNOSTIC RESULTS   Radiology:    Recent Labs     21   WBC 4.4* 3.2* 4.9   RBC 2.41* 2.39* 2.62*   HGB 7.7* 7.7* 8.3*   HCT 23.8* 24.2* 27.2*   MCV 98.8 101.3* 103.8*   MCH 32.0 32.2 31.7   MCHC 32.4 31.8* 30.5*   RDW 18.4* 18.4* 18.7*   PLT 84* 75* 79*   MPV 10.2 10.3 10.8     Recent Labs     21    139 138   K 3.5 3.8 3.8    104 101   CO2 24 26 27   BUN 67* 48* 27*   CREATININE 3.1* 2.7* 2.4*   GLUCOSE 103* 115* 119*   PROT 5.5* 5.5* 5.4*   LABALBU 3.4* 3.2* 3.0*   CALCIUM 8.8 8.6 8.3*   BILITOT 1.3* 1.3* 1.4*   ALKPHOS 74 84 87   AST 18 19 21   ALT 6 6 7     Lab Results   Component Value Date    CRP 0.1 2019     Lab Results   Component Value Date    SEDRATE 65 (H) 2021    SEDRATE 58 (H) 2019     Recent Labs     21  0036 21  6364 21  0410 21  0521 21  1053   INR 3.1 3.2  --   --  2.0   PROTIME 36.9* 37.8*  --   --  23.1*   AST  --  18 19 21  --    ALT  --  6 6 7  --      Lab Results   Component Value Date    CHOL 236 2021    TRIG 126 2021    HDL 66 2021    LDLCALC 145 2021    LABVLDL 25 2021

## 2021-05-28 NOTE — PROGRESS NOTES
136   K 3.8 3.8 3.7  3.7    101 101   CO2 26 27 27   BUN 48* 27* 34*   CREATININE 2.7* 2.4* 3.1*   GLUCOSE 115* 119* 114*     Hepatic:   Recent Labs     05/26/21  0410 05/27/21  0521 05/28/21  0627   AST 19 21 19   ALT 6 7 7   BILITOT 1.3* 1.4* 1.4*   ALKPHOS 84 87 85     Troponin: No results for input(s): TROPONINI in the last 72 hours. BNP: No results for input(s): BNP in the last 72 hours. Lipids: No results for input(s): CHOL, HDL in the last 72 hours. Invalid input(s): LDLCALCU  ABGs: No results found for: PHART, PO2ART, RKS4GLB  INR:   Recent Labs     05/27/21  1053 05/28/21  0627   INR 2.0 2.1       -----------------------------------------------------------------  RAD: CT ABDOMEN PELVIS WO CONTRAST Additional Contrast? None    Result Date: 5/9/2021  EXAMINATION: CT OF THE ABDOMEN AND PELVIS WITHOUT CONTRAST 5/9/2021 12:49 am TECHNIQUE: CT of the abdomen and pelvis was performed without the administration of intravenous contrast. Multiplanar reformatted images are provided for review. Dose modulation, iterative reconstruction, and/or weight based adjustment of the mA/kV was utilized to reduce the radiation dose to as low as reasonably achievable. COMPARISON: None. HISTORY: ORDERING SYSTEM PROVIDED HISTORY: abd pain, vomiting TECHNOLOGIST PROVIDED HISTORY: Additional Contrast?->None Reason for exam:->abd pain, vomiting Decision Support Exception - unselect if not a suspected or confirmed emergency medical condition->Emergency Medical Condition (MA) FINDINGS: The visualized portions of the lung bases are clear. There is moderate amount of free intraperitoneal air. This is compatible with perforated abdominal viscus. Most likely this is right colonic origin as there is pneumatosis coli involving the right colon from the cecum to the hepatic flexure. This colonic ischemia as a source of pneumatosis and perforation is a consideration. The liver is cirrhotic.   There is trace ascites in the abdomen and pelvis. There is cholelithiasis. There are no findings of intestinal obstruction. Appendix not visualized. There is descending and sigmoid diverticulosis. There is some mild inflammatory change involving/abutting the mid to distal descending colon which may represent minimal diverticulitis. This is not likely the source of free air. There is no abscess. Bladder is unremarkable in appearance. There is no abnormal pelvic mass seen. There is a small amount of pelvic ascites. Moderate amount of free intraperitoneal air which is compatible with perforated abdominal viscus. The right colon is the most likely source of free air as there is pneumatosis coli from the cecum to the a Paddock flexure. Underlying colonic ischemia is a consideration. There is also diverticular disease involving the left colon. There is some inflammation involving/abutting the mid to distal descending colon which could represent minimal acute diverticulitis. Note that this is minimal and not the source of free air. Cholelithiasis. Cirrhotic liver. Minimal ascites. I discussed findings by phone with Bessy Addison at 1:42 a.m. on 05/09/2021. CT HEAD WO CONTRAST    Result Date: 5/9/2021  EXAMINATION: CT OF THE HEAD WITHOUT CONTRAST  5/9/2021 12:49 am TECHNIQUE: CT of the head was performed without the administration of intravenous contrast. Dose modulation, iterative reconstruction, and/or weight based adjustment of the mA/kV was utilized to reduce the radiation dose to as low as reasonably achievable. COMPARISON: None. HISTORY: ORDERING SYSTEM PROVIDED HISTORY: dizzy TECHNOLOGIST PROVIDED HISTORY: Has a \"code stroke\" or \"stroke alert\" been called? ->No Reason for exam:->dizzy Decision Support Exception - unselect if not a suspected or confirmed emergency medical condition->Emergency Medical Condition (MA) FINDINGS: BRAIN/VENTRICLES: There is no acute intracranial hemorrhage, mass effect or midline shift.   No abnormal extra-axial Temp 97.9 °F (36.6 °C)   Resp 16   Ht 5' 4\" (1.626 m)   Wt 200 lb 6.4 oz (90.9 kg)   SpO2 98%   BMI 34.40 kg/m²   General appearance: appears stated age   Skin:  No rashes or lesions  HEENT: Head: Normocephalic, no lesions, without obvious abnormality. Neck: no adenopathy, no carotid bruit, no JVD, supple, symmetrical, trachea midline and thyroid not enlarged, symmetric, no tenderness/mass/nodules  Lungs: diminished breath sounds bibasilar  Heart: regular rate and rhythm, S1, S2 normal, no murmur, click, rub or gallop  Abdomen: soft, non-tender; bowel sounds normal; no masses,  no organomegaly  Extremities: edema ++  Neurologic: Mental status: Alert, oriented, thought content appropriate    Assessment:   Patient Active Problem List:     Glenoid labral tear     Synovitis     Biceps tendon tear     Rotator cuff tear     Subacromial impingement     Encephalopathy     Atrial fibrillation with rapid ventricular response (HCC)     Chronic atrial fibrillation (HCC)     Acute exacerbation of COPD with asthma ([address]     CAP (community acquired pneumonia)     Cirrhosis of liver not due to alcohol ([address]     Hypertension     Acute heart failure (HCC)     Dyspnea     Non morbid obesity     Perforated viscus     Chronic renal impairment, stage 3a     Acute on chronic renal insufficiency     Chronic diastolic heart failure ([address]    Plan:   Assessment: / Plan:     1.  Acute kidney injury.  Acute kidney injury secondary to renal hypoperfusion with low fractional excretion of sodium.    Cr slowly trending up-UO poor despite the initiation of the furosemide with the SPA  S/P R IJ Temp line and 1st IHD 5/24/21  IHD  UF as tolerated,  2 lit goal  , Albumin with dialysis as needed       2.  Hypotension.  Stable   PLAN:1. Follow on present midodrine dose     3.   Anemia.  HB 8.4   Ferritin 84, Iron Sat 24%  Vit B12 753  HgB trending down  PLAN:1. Start oral Fe-no IV Fe++ until infection cleared  2.  Follow hemoglobin hematocrit.    4.   Hypokalemia. HD with 3 K bath  k better   Magnesium >/=2.0  PLAN:1.Monitor K+ and Mg++     5.  Hyperphosphatemia.  Reflects acute kidney injury.  Improved.       6.   Edema.  With a significant element of third spacing as the patient's albumin is 1.7 mg/dL now up to 2.7 S/P SPA.     PLAN:1.Follow with vol removal on IHD    Needs a tunneled catheter prior to discharge , pt took eliquis yesterday , procedure cancelled   By IR this am        Any Parker MD

## 2021-05-28 NOTE — PROGRESS NOTES
Progress Note  Date:2021       Room:[place] [address]  Patient Gustabo Mayen     YOB: 1940     Age:80 y.o. Subjective    Subjective:  Symptoms:  Stable. She reports shortness of breath, malaise, weakness and anxiety. No cough, chest pain, headache, chest pressure, anorexia or diarrhea. Diet:  Adequate intake. Activity level: Impaired due to weakness. Pain:  She complains of pain that is mild. pt is doing better  Review of Systems   Respiratory: Positive for shortness of breath. Negative for cough. Cardiovascular: Negative for chest pain. Gastrointestinal: Negative for anorexia and diarrhea. Neurological: Positive for weakness. Objective         Vitals Last 24 Hours:  TEMPERATURE:  Temp  Av.6 °F (36.4 °C)  Min: 97.6 °F (36.4 °C)  Max: 97.6 °F (36.4 °C)  RESPIRATIONS RANGE: Resp  Av.1  Min: 13  Max: 28  PULSE OXIMETRY RANGE: SpO2  Av.5 %  Min: 93 %  Max: 100 %  PULSE RANGE: Pulse  Av.6  Min: 77  Max: 112  BLOOD PRESSURE RANGE: Systolic (05SVW), OGL:95 , Min:98 , ST   ; Diastolic (78DST), PGX:46, Min:64, Max:64    I/O (24Hr): Intake/Output Summary (Last 24 hours) at 2021 2332  Last data filed at 2021 2148  Gross per 24 hour   Intake 473.33 ml   Output 444 ml   Net 29.33 ml     Objective:  General Appearance:  Comfortable. Vital signs: (most recent): Blood pressure 98/64, pulse 103, temperature 97.6 °F (36.4 °C), temperature source Oral, resp. rate 14, height 5' 4\" (1.626 m), weight 192 lb 14.4 oz (87.5 kg), SpO2 98 %. Vital signs are normal.    Output: Producing urine. HEENT: Normal HEENT exam.    Lungs:  Normal effort. She is not in respiratory distress. Heart: Irregular rhythm. S1 normal and S2 normal.    Abdomen: Abdomen is soft. Bowel sounds are normal.   There is no abdominal tenderness. Extremities: Normal range of motion. Pulses: Distal pulses are intact.       Labs/Imaging/Diagnostics    Labs:  CBC: dialysis).        Electronically signed by Danica He MD on 5/27/21 at 11:32 PM EDT

## 2021-05-28 NOTE — PLAN OF CARE
Problem: Falls - Risk of:  Goal: Will remain free from falls  Description: Will remain free from falls  5/27/2021 2016 by Al Coles RN  Outcome: Met This Shift  5/27/2021 1441 by Tiffany Clarke RN  Outcome: Met This Shift  Goal: Absence of physical injury  Description: Absence of physical injury  5/27/2021 2016 by Al Coles RN  Outcome: Met This Shift  5/27/2021 1441 by Tiffany Clarke RN  Outcome: Met This Shift     Problem: Skin Integrity:  Goal: Will show no infection signs and symptoms  Description: Will show no infection signs and symptoms  5/27/2021 2016 by Al Coles RN  Outcome: Met This Shift  5/27/2021 1441 by Tiffany Clarke RN  Outcome: Met This Shift  Goal: Absence of new skin breakdown  Description: Absence of new skin breakdown  5/27/2021 2016 by Al Coles RN  Outcome: Met This Shift  5/27/2021 1441 by Tiffany Clarke RN  Outcome: Met This Shift  Goal: Status of oral mucous membranes will improve  Description: Status of oral mucous membranes will improve  5/27/2021 2016 by Al Coles RN  Outcome: Met This Shift  5/27/2021 1441 by Tiffany Clarke RN  Outcome: Met This Shift  Goal: Skin integrity will be maintained  Description: Skin integrity will be maintained  5/27/2021 2016 by Al Coles RN  Outcome: Met This Shift  5/27/2021 1441 by Tiffany Clarke RN  Outcome: Met This Shift  Goal: Signs of wound healing will improve  Description: Signs of wound healing will improve  5/27/2021 2016 by Al Coles RN  Outcome: Met This Shift  5/27/2021 1441 by Tiffany Clarke RN  Outcome: Met This Shift  Goal: Risk for impaired skin integrity will decrease  Description: Risk for impaired skin integrity will decrease  5/27/2021 2016 by Al Coles RN  Outcome: Met This Shift  5/27/2021 1441 by Tiffany Clarke RN  Outcome: Met This Shift     Problem:  Activity:  Goal: Ability to tolerate increased activity will improve  Description: Ability to tolerate increased activity will improve  5/27/2021 2016 by Anuja Rain RN  Outcome: Met This Shift  5/27/2021 1441 by Alfonso Hernández RN  Outcome: Met This Shift  Goal: Fatigue will decrease  Description: Fatigue will decrease  5/27/2021 2016 by Anuja Rain RN  Outcome: Met This Shift  5/27/2021 1441 by Alfonso Hernández RN  Outcome: Not Met This Shift  Goal: Risk for activity intolerance will decrease  Description: Risk for activity intolerance will decrease  5/27/2021 2016 by Anuja Rain RN  Outcome: Met This Shift  5/27/2021 1441 by Alfonso Hernández RN  Outcome: Met This Shift     Problem: Bleeding:  Goal: Will show no signs and symptoms of excessive bleeding  Description: Will show no signs and symptoms of excessive bleeding  5/27/2021 2016 by Anuja Rain RN  Outcome: Met This Shift  5/27/2021 1441 by Alfonso Hernández RN  Outcome: Met This Shift     Problem: Fluid Volume:  Goal: Will show no signs or symptoms of fluid imbalance  Description: Will show no signs or symptoms of fluid imbalance  5/27/2021 1441 by Alfonso Hernández RN  Outcome: Not Met This Shift  Goal: Ability to achieve a balanced intake and output will improve  Description: Ability to achieve a balanced intake and output will improve  5/27/2021 1809 by Anuja Rain RN  Outcome: Completed  5/27/2021 1441 by Alfonso Hernández RN  Outcome: Not Met This Shift  Goal: Ability to achieve and maintain adequate urine output will improve  5/27/2021 1809 by Anuja Rain RN  Outcome: Completed  5/27/2021 1441 by Alfonso Hernández RN  Outcome: Not Met This Shift

## 2021-05-28 NOTE — PROGRESS NOTES
[facility]  [facility]  Progress Note    NAME: Todd Jones  MR:  [id number]  :   1940  DATE OF SERVICE:21    This is a face to face encounter with Todd Jones 68 y.o. female on 21    CHIEF COMPLAINT     ID following for   Chief Complaint   Patient presents with    Emesis     HISTORY OF PRESENT ILLNESS   Pt presenetd with Perforated abdominal viscus    S/p Exploratory laparotomy, sigmoid colectomy, end colostomy, open drainage of pelvic abscess, placement of left internal jugular vein triple-lumen catheter  Gn septicmeia rash from dilfican   nenita on hemo     Pt is in ICU   nad  Overall edema  Rash ble  Same   On hemo   Patient is tolerating medications. No reported adverse drug reactions. REVIEW OF SYSTEMS     As stated above in the chief complaint, otherwise negative.   CURRENT MEDICATIONS      [Held by provider] apixaban  2.5 mg Oral BID    aztreonam  500 mg Intravenous Q12H    ferrous sulfate  325 mg Oral BID WC    polycarbophil  625 mg Oral Daily    lactulose  20 g Oral Daily    midodrine  5 mg Oral BID WC    triamcinolone   Topical BID    lidocaine 1 % injection  5 mL Intradermal Once    sodium chloride flush  10 mL Intravenous 2 times per day    metoprolol tartrate  25 mg Oral BID    amiodarone  200 mg Oral Daily    rifaximin  550 mg Oral BID    ipratropium-albuterol  1 ampule Inhalation BID    [Held by provider] apixaban  5 mg Oral BID    cetirizine  10 mg Oral Daily    anastrozole  1 mg Oral Daily    levothyroxine  50 mcg Oral Daily    montelukast  10 mg Oral Nightly    pantoprazole  40 mg Oral QAM AC    sodium chloride flush  5-40 mL Intravenous 2 times per day     Continuous Infusions:   sodium chloride      sodium chloride       PRN Meds:albumin human, citrate dextrose, trimethobenzamide, ondansetron, diphenhydrAMINE, sodium chloride flush, sodium chloride, perflutren lipid microspheres, fentanNYL, sodium chloride flush, sodium chloride, traMADol    PHYSICAL EXAM     BP (!) 106/95   Pulse 100   Temp 97.5 °F (36.4 °C)   Resp 16   Ht 5' 4\" (1.626 m)   Wt 192 lb 3.9 oz (87.2 kg)   SpO2 100%   BMI 33.00 kg/m²   Temp  Av.6 °F (36.4 °C)  Min: 97.3 °F (36.3 °C)  Max: 97.9 °F (36.6 °C)  Constitutional:  The patient is awake, alert, and oriented. Skin:    Warm and dry. HEENT:   AT/NC glasses   Chest:   Symmetrical expansion. Dec bs ant   Cardiovascular:  S1 and S2 are rhythmic and regular. Abdomen:   Benign to palpation dressed. colostomy  Extremities:      Edema.  Blister broke left foot right foot blister  CNS    AAxO   Lines: picc   Hd cath   Mercedes brown sediments      DIAGNOSTIC RESULTS   Radiology:    Recent Labs     21  0521   WBC 3.2* 4.9 5.3   RBC 2.39* 2.62* 2.62*   HGB 7.7* 8.3* 8.4*   HCT 24.2* 27.2* 26.8*   .3* 103.8* 102.3*   MCH 32.2 31.7 32.1   MCHC 31.8* 30.5* 31.3*   RDW 18.4* 18.7* 18.6*   PLT 75* 79* 83*   MPV 10.3 10.8 10.6     Recent Labs     21    138 136   K 3.8 3.8 3.7  3.7    101 101   CO2 26 27 27   BUN 48* 27* 34*   CREATININE 2.7* 2.4* 3.1*   GLUCOSE 115* 119* 114*   PROT 5.5* 5.4* 5.1*   LABALBU 3.2* 3.0* 2.7*   CALCIUM 8.6 8.3* 8.4*   BILITOT 1.3* 1.4* 1.4*   ALKPHOS 84 87 85   AST 19 21 19   ALT 6 7 7     Lab Results   Component Value Date    CRP 0.1 2019     Lab Results   Component Value Date    SEDRATE 65 (H) 2021    SEDRATE 58 (H) 2019     Recent Labs     21  0410 21  0521 21  1053 21  0627   INR  --   --  2.0 2.1   PROTIME  --   --  23.1* 24.8*   AST 19 21  --  19   ALT 6 7  --  7     Lab Results   Component Value Date    CHOL 236 2021    TRIG 126 2021    HDL 66 2021    LDLCALC 145 2021    LABVLDL 25 2021     Lab Results   Component Value Date/Time    VITD25 57 2021 07:30 AM       Microbiology:   No results for input(s): COVID19 in the last 72 hours. Lab Results   Component Value Date    BC 24 Hours no growth 05/23/2021    BC 5 Days no growth 05/15/2021    BC  05/09/2021     Gram Negative Dominic is too fastidious for Identification and sensitivitey  testing      [facility]  05/09/2021     Too fastidious for Identification and susceptibility testing. BLOODCULT2 24 Hours no growth 05/23/2021    BLOODCULT2 5 Days no growth 05/19/2021    BLOODCULT2 5 Days no growth 05/15/2021    ORG Gram negative dominic 05/09/2021     No results found for: WNDABS  No results found for: RESPSMEAR      Component Value Date/Time    FUNGSM No Fungal elements seen 05/09/2021 0544    LABFUNG No growth after 1 week/s of incubation. 05/09/2021 0544       MRSA Culture Only   Date Value Ref Range Status   05/09/2021 Methicillin resistant Staph aureus not isolated  Final        FINAL IMPRESSION    Leukopenia follow   S/p Perforated abdominal viscus     Procedure(s):  Exploratory laparotomy, sigmoid colectomy, end colostomy, open drainage of pelvic abscess, placement of left internal jugular vein triple-lumen catheter  Gn septicemia - Too fastidious for Identification and susceptibility testing      aztreonam (AZACTAM) 500 mg in dextrose 5 % 50 mL IVPB, Q12H til 5/30   watch rash   Wound care    · Monitor labs    Imaging and labs were reviewed per medical records. The patient was educated about the diagnosis, prognosis, indications, risks and benefits of treatment. An opportunity to ask questions was given to the patient/FAMILY. Thank you for involving me in the care of Negrito Sweet will continue to follow. Please do not hesitate to call for any questions or concerns.     Electronically signed by Robert Alarcon MD on 5/28/2021 at 1:47 PM

## 2021-05-28 NOTE — PROGRESS NOTES
Pulmonary/Critical Care Progress Note    We are following patient for YANELIS superimposed on CKD, fluid overload (improving with dialysis), following septic shock from pelvic abscess resulting from diverticular rupture requiring diverting colostomy. We are also following the patient for nonalcoholic cirrhosis, previous epistaxis now stable, and atrial fibrillation    SUBJECTIVE:  Patient is doing extraordinarily well. She is currently receiving HD. No events overnight, continues to maintain hemodynamic stability. HR is 110-120 but likely due to dialysis and BB haven't been given yet. She felt nauseated with the start of dialysis.      MEDICATIONS:   [Held by provider] apixaban  2.5 mg Oral BID    aztreonam  500 mg Intravenous Q12H    ferrous sulfate  325 mg Oral BID WC    polycarbophil  625 mg Oral Daily    lactulose  20 g Oral Daily    midodrine  5 mg Oral BID WC    triamcinolone   Topical BID    lidocaine 1 % injection  5 mL Intradermal Once    sodium chloride flush  10 mL Intravenous 2 times per day    metoprolol tartrate  25 mg Oral BID    amiodarone  200 mg Oral Daily    rifaximin  550 mg Oral BID    ipratropium-albuterol  1 ampule Inhalation BID    [Held by provider] apixaban  5 mg Oral BID    cetirizine  10 mg Oral Daily    anastrozole  1 mg Oral Daily    levothyroxine  50 mcg Oral Daily    montelukast  10 mg Oral Nightly    pantoprazole  40 mg Oral QAM AC    sodium chloride flush  5-40 mL Intravenous 2 times per day      sodium chloride      sodium chloride       albumin human, citrate dextrose, trimethobenzamide, ondansetron, diphenhydrAMINE, sodium chloride flush, sodium chloride, perflutren lipid microspheres, fentanNYL, sodium chloride flush, sodium chloride, traMADol      REVIEW OF SYSTEMS:  Constitutional: Denies fever, weight loss, night sweats, and fatigue  Skin: Denies pigmentation, dark lesions, and rashes   HEENT: Denies hearing loss, tinnitus, ear drainage, epistaxis, sore throat, and hoarseness. Cardiovascular: Denies palpitations, chest pain, and chest pressure. Respiratory: Denies cough, dyspnea at rest, hemoptysis, apnea, and choking. Gastrointestinal: Nausea, but no vomiting, poor appetite, diarrhea, heartburn or reflux  Genitourinary: Denies dysuria, frequency, urgency or hematuria  Musculoskeletal: Denies myalgias, muscle weakness, and bone pain  Neurological: Denies dizziness, vertigo, headache, and focal weakness  Psychological: Denies anxiety and depression  Endocrine: Denies heat intolerance and cold intolerance  Hematopoietic/Lymphatic: Denies bleeding problems and blood transfusions    OBJECTIVE:  Vitals:    05/28/21 1150   BP: (!) 106/95   Pulse: 105   Resp: 21   Temp:    SpO2: 100%     FiO2 : 98 %  O2 Flow Rate (L/min): 2 L/min  O2 Device: None (Room air)    PHYSICAL EXAM:  Constitutional: No fever, chills, diaphoresis  Skin: No skin rash, no skin breakdown  HEENT: Unremarkable  Neck: No JVD, lymphadenopathy, thyromegaly  Cardiovascular: S1, S2 normal.  No S3 murmurs rubs present  Respiratory: Clear to auscultation bilaterally  Gastrointestinal: Stoma in place, Soft, obese, nontender  Genitourinary: No CVA tenderness  Extremities: 2+ edema feet legs and ankles. No clubbing or cyanosis present  Neurological: Awake, alert, oriented x3. No evidence of focal motor or sensory deficits  Psychological: Appropriate affect. In good spirits. 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 *      IMPRESSION:  1.  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 Septic shock secondary to pelvic abscess- resolved   3. Acute renal failure on HD   4. Atrial fibrillation with controlled ventricular response  5. COPD/asthma, stable  6. Diastolic dysfunction  7. Status post right lumpectomy for breast cancer approximately 10 years ago on Arimidex  8. Nonalcoholic cirrhosis  9. Fluid overload secondary to kidney disease secondary to her large fluid requirement during her period of hypotension/septic shock    PLAN:  1. Continue HD per nephrology   2. Hold eliquis for tunneled HD cath placement   3. Transfer to floor  4. Continue rifaximin and lactulose  5. Physical and Occupational Therapy  6. Check labs in a.m. ATTESTATION:  ICU Staff Physician note of personal involvement in Care  As the attending physician, I certify that I personally reviewed the patients history and personally examined the patient to confirm the physical findings described above,  And that I reviewed the relevant imaging studies and available reports.         Electronically signed by Oliver Stafford MD on 5/28/2021 at 12:04 PM

## 2021-05-28 NOTE — PROGRESS NOTES
Physical Therapy      Patient unavailable due to being on dialysis. Therapy will check back at a later time.      Bernice Aceves PTA  #[id number]

## 2021-05-29 NOTE — PROGRESS NOTES
[facility]  [facility]  Progress Note    NAME: Janes Melara  MR:  [id number]  :   1940  DATE OF SERVICE:21    This is a face to face encounter with Janes Melara 68 y.o. female on 21    CHIEF COMPLAINT     ID following for   Chief Complaint   Patient presents with    Emesis     HISTORY OF PRESENT ILLNESS   Pt presenetd with Perforated abdominal viscus    S/p Exploratory laparotomy, sigmoid colectomy, end colostomy, open drainage of pelvic abscess, placement of left internal jugular vein triple-lumen catheter  Gn septicmeia rash from dilfican   nenita on hemo     nad  Overall edema BETTER  Rash ble  Same   RIJ HD CATH    Patient is tolerating medications. No reported adverse drug reactions. REVIEW OF SYSTEMS     As stated above in the chief complaint, otherwise negative.   CURRENT MEDICATIONS      [Held by provider] apixaban  2.5 mg Oral BID    aztreonam  500 mg Intravenous Q12H    ferrous sulfate  325 mg Oral BID WC    polycarbophil  625 mg Oral Daily    lactulose  20 g Oral Daily    midodrine  5 mg Oral BID WC    triamcinolone   Topical BID    lidocaine 1 % injection  5 mL Intradermal Once    sodium chloride flush  10 mL Intravenous 2 times per day    metoprolol tartrate  25 mg Oral BID    amiodarone  200 mg Oral Daily    rifaximin  550 mg Oral BID    ipratropium-albuterol  1 ampule Inhalation BID    [Held by provider] apixaban  5 mg Oral BID    cetirizine  10 mg Oral Daily    anastrozole  1 mg Oral Daily    levothyroxine  50 mcg Oral Daily    montelukast  10 mg Oral Nightly    pantoprazole  40 mg Oral QAM AC    sodium chloride flush  5-40 mL Intravenous 2 times per day     Continuous Infusions:   sodium chloride      sodium chloride       PRN Meds:albumin human, citrate dextrose, trimethobenzamide, ondansetron, diphenhydrAMINE, sodium chloride flush, sodium chloride, perflutren lipid microspheres, fentanNYL, sodium chloride flush, sodium chloride, traMADol    PHYSICAL EXAM     BP (!) 118/52   Pulse 109   Temp 97.8 °F (36.6 °C) (Oral)   Resp 18   Ht 5' 4\" (1.626 m)   Wt 196 lb 6.4 oz (89.1 kg)   SpO2 99%   BMI 33.71 kg/m²   Temp  Av.6 °F (36.4 °C)  Min: 97.5 °F (36.4 °C)  Max: 97.8 °F (36.6 °C)  Constitutional:  The patient is awake, alert, and oriented. Skin:    Warm and dry. HEENT:   AT/NC glasses   Chest:   Symmetrical expansion. Dec bs ant   Cardiovascular:  S1 and S2 are rhythmic and regular. Abdomen:   Benign to palpation dressed. colostomy  Extremities:      Edema.  FOOT DRESSED RASH SAME   CNS    AAxO   Lines: picc   Hd cath   Mercedes brown sediments      DIAGNOSTIC RESULTS   Radiology:    Recent Labs     21  0621  0626   WBC 4.9 5.3 5.3   RBC 2.62* 2.62* 2.62*   HGB 8.3* 8.4* 8.3*   HCT 27.2* 26.8* 26.7*   .8* 102.3* 101.9*   MCH 31.7 32.1 31.7   MCHC 30.5* 31.3* 31.1*   RDW 18.7* 18.6* 18.6*   PLT 79* 83* 74*   MPV 10.8 10.6 11.1     Recent Labs     21  0521  0621  0626    136 138   K 3.8 3.7  3.7 4.0    101 102   CO2 27 27 27   BUN 27* 34* 21   CREATININE 2.4* 3.1* 2.7*   GLUCOSE 119* 114* 103*   PROT 5.4* 5.1* 5.4*   LABALBU 3.0* 2.7* 3.0*   CALCIUM 8.3* 8.4* 8.2*   BILITOT 1.4* 1.4* 1.5*   ALKPHOS 87 85 90   AST 21 19 22   ALT 7 7 6     Lab Results   Component Value Date    CRP 0.1 2019     Lab Results   Component Value Date    SEDRATE 65 (H) 2021    SEDRATE 58 (H) 2019     Recent Labs     21  0521 21  1053 21  0627 21  0626   INR  --  2.0 2.1  --    PROTIME  --  23.1* 24.8*  --    AST 21  --  19 22   ALT 7  --  7 6     Lab Results   Component Value Date    CHOL 236 2021    TRIG 126 2021    HDL 66 2021    LDLCALC 145 2021    LABVLDL 25 2021     Lab Results   Component Value Date/Time    VITD25 57 2021 07:30 AM       Microbiology:   No results for input(s): COVID19 in the last 72 hours. Lab Results   Component Value Date    BC 5 Days no growth 05/23/2021    BC 5 Days no growth 05/15/2021    BC  05/09/2021     Gram Negative Dominic is too fastidious for Identification and sensitivitey  testing      [facility]  05/09/2021     Too fastidious for Identification and susceptibility testing. BLOODCULT2 5 Days no growth 05/23/2021    BLOODCULT2 5 Days no growth 05/19/2021    BLOODCULT2 5 Days no growth 05/15/2021    ORG Gram negative dominic 05/09/2021     No results found for: WNDABS  No results found for: RESPSMEAR      Component Value Date/Time    FUNGSM No Fungal elements seen 05/09/2021 0544    LABFUNG No growth after 1 week/s of incubation. 05/09/2021 0544       MRSA Culture Only   Date Value Ref Range Status   05/09/2021 Methicillin resistant Staph aureus not isolated  Final        FINAL IMPRESSION    Leukopenia follow   S/p Perforated abdominal viscus     Procedure(s):  Exploratory laparotomy, sigmoid colectomy, end colostomy, open drainage of pelvic abscess, placement of left internal jugular vein triple-lumen catheter  Gn septicemia - Too fastidious for Identification and susceptibility testing  RASH     aztreonam (AZACTAM) 500 mg in dextrose 5 % 50 mL IVPB, Q12H til 5/30  NO NEW PLAN FROM ID  Wound care    · Monitor labs    Imaging and labs were reviewed per medical records. The patient was educated about the diagnosis, prognosis, indications, risks and benefits of treatment. An opportunity to ask questions was given to the patient/FAMILY. Thank you for involving me in the care of Prasad Singh will continue to follow. Please do not hesitate to call for any questions or concerns.     Electronically signed by Enma Goodwin MD on 5/29/2021 at 11:01 AM

## 2021-05-29 NOTE — PROGRESS NOTES
05/29/21  0626    136 138   K 3.8 3.7  3.7 4.0    101 102   CO2 27 27 27   BUN 27* 34* 21   CREATININE 2.4* 3.1* 2.7*   GLUCOSE 119* 114* 103*     Hepatic:   Recent Labs     05/27/21  0521 05/28/21  0627 05/29/21  0626   AST 21 19 22   ALT 7 7 6   BILITOT 1.4* 1.4* 1.5*   ALKPHOS 87 85 90     Troponin: No results for input(s): TROPONINI in the last 72 hours. BNP: No results for input(s): BNP in the last 72 hours. Lipids: No results for input(s): CHOL, HDL in the last 72 hours. Invalid input(s): LDLCALCU  ABGs: No results found for: PHART, PO2ART, EYA4RBU  INR:   Recent Labs     05/27/21  1053 05/28/21 0627   INR 2.0 2.1       -----------------------------------------------------------------  RAD: CT ABDOMEN PELVIS WO CONTRAST Additional Contrast? None    Result Date: 5/9/2021  EXAMINATION: CT OF THE ABDOMEN AND PELVIS WITHOUT CONTRAST 5/9/2021 12:49 am TECHNIQUE: CT of the abdomen and pelvis was performed without the administration of intravenous contrast. Multiplanar reformatted images are provided for review. Dose modulation, iterative reconstruction, and/or weight based adjustment of the mA/kV was utilized to reduce the radiation dose to as low as reasonably achievable. COMPARISON: None. HISTORY: ORDERING SYSTEM PROVIDED HISTORY: abd pain, vomiting TECHNOLOGIST PROVIDED HISTORY: Additional Contrast?->None Reason for exam:->abd pain, vomiting Decision Support Exception - unselect if not a suspected or confirmed emergency medical condition->Emergency Medical Condition (MA) FINDINGS: The visualized portions of the lung bases are clear. There is moderate amount of free intraperitoneal air. This is compatible with perforated abdominal viscus. Most likely this is right colonic origin as there is pneumatosis coli involving the right colon from the cecum to the hepatic flexure. This colonic ischemia as a source of pneumatosis and perforation is a consideration. The liver is cirrhotic.   There is trace ascites in the abdomen and pelvis. There is cholelithiasis. There are no findings of intestinal obstruction. Appendix not visualized. There is descending and sigmoid diverticulosis. There is some mild inflammatory change involving/abutting the mid to distal descending colon which may represent minimal diverticulitis. This is not likely the source of free air. There is no abscess. Bladder is unremarkable in appearance. There is no abnormal pelvic mass seen. There is a small amount of pelvic ascites. Moderate amount of free intraperitoneal air which is compatible with perforated abdominal viscus. The right colon is the most likely source of free air as there is pneumatosis coli from the cecum to the a Paddock flexure. Underlying colonic ischemia is a consideration. There is also diverticular disease involving the left colon. There is some inflammation involving/abutting the mid to distal descending colon which could represent minimal acute diverticulitis. Note that this is minimal and not the source of free air. Cholelithiasis. Cirrhotic liver. Minimal ascites. I discussed findings by phone with Juvencio Tolentino at 1:42 a.m. on 05/09/2021. CT HEAD WO CONTRAST    Result Date: 5/9/2021  EXAMINATION: CT OF THE HEAD WITHOUT CONTRAST  5/9/2021 12:49 am TECHNIQUE: CT of the head was performed without the administration of intravenous contrast. Dose modulation, iterative reconstruction, and/or weight based adjustment of the mA/kV was utilized to reduce the radiation dose to as low as reasonably achievable. COMPARISON: None. HISTORY: ORDERING SYSTEM PROVIDED HISTORY: dizzy TECHNOLOGIST PROVIDED HISTORY: Has a \"code stroke\" or \"stroke alert\" been called? ->No Reason for exam:->dizzy Decision Support Exception - unselect if not a suspected or confirmed emergency medical condition->Emergency Medical Condition (MA) FINDINGS: BRAIN/VENTRICLES: There is no acute intracranial hemorrhage, mass effect or midline shift.  No abnormal extra-axial fluid collection. The gray-white differentiation is maintained without evidence of an acute infarct. There is no evidence of hydrocephalus. Periventricular white matter changes consistent chronic microvascular disease. Diffuse volume loss. ORBITS: The visualized portion of the orbits demonstrate no acute abnormality. SINUSES: The visualized paranasal sinuses and mastoid air cells demonstrate no acute abnormality. SOFT TISSUES/SKULL:  No acute abnormality of the visualized skull or soft tissues. No acute intracranial abnormality. Periventricular white matter changes consistent chronic microvascular disease. Diffuse volume loss. XR CHEST PORTABLE    Result Date: 5/9/2021  EXAMINATION: ONE XRAY VIEW OF THE CHEST 5/9/2021 9:01 am COMPARISON: Radiograph performed 10 hours earlier. HISTORY: ORDERING SYSTEM PROVIDED HISTORY: s/p triple lumen catheter TECHNOLOGIST PROVIDED HISTORY: Reason for exam:->s/p triple lumen catheter A left internal jugular catheter has its tip near the superior cavoatrial junction. Subdiaphragmatic free air has decreased markedly. The heart and mediastinum are normal for AP technique. Lung volumes are decreased. There are no focal airspace opacities. Blunting of the costophrenic sulci suggest there are small dependent pleural effusions. There are no signs of pneumothorax. FINDINGS: 1. Left IJ catheter appears satisfactory, no signs of pneumothorax. 2. Decreased lung volumes, suspect small pleural effusions. XR CHEST PORTABLE    Result Date: 5/9/2021  EXAMINATION: ONE XRAY VIEW OF THE CHEST 5/9/2021 12:48 am COMPARISON: 04/05/2021 HISTORY: ORDERING SYSTEM PROVIDED HISTORY: chest pain TECHNOLOGIST PROVIDED HISTORY: Reason for exam:->chest pain FINDINGS: The lungs are without acute focal process. There is no effusion or pneumothorax. Cardiomegaly. The osseous structures are without acute process. No acute process. Cardiomegaly.        Objective: Vitals: BP (!) 118/52   Pulse 109   Temp 97.8 °F (36.6 °C) (Oral)   Resp 18   Ht 5' 4\" (1.626 m)   Wt 196 lb 6.4 oz (89.1 kg)   SpO2 99%   BMI 33.71 kg/m²   General appearance: appears stated age   Skin:  No rashes or lesions  HEENT: Head: Normocephalic, no lesions, without obvious abnormality.   Neck: no adenopathy, no carotid bruit, no JVD, supple, symmetrical, trachea midline and thyroid not enlarged, symmetric, no tenderness/mass/nodules  Lungs: clear to auscultation bilaterally  Heart: regular rate and rhythm, S1, S2 normal, no murmur, click, rub or gallop  Abdomen: soft, non-tender; bowel sounds normal; no masses,  no organomegaly  Extremities: extremities normal, atraumatic, no cyanosis or edema  Neurologic: Mental status: Alert, oriented, thought content appropriate    Assessment:   Patient Active Problem List:     Glenoid labral tear     Synovitis     Biceps tendon tear     Rotator cuff tear     Subacromial impingement     Encephalopathy     Atrial fibrillation with rapid ventricular response (HCC)     Chronic atrial fibrillation (HCC)     Acute exacerbation of COPD with asthma ([facility] [address]     CAP (community acquired pneumonia)     Cirrhosis of liver not due to alcohol ([address]     Hypertension     Acute heart failure (HCC)     Dyspnea     Non morbid obesity     Perforated viscus     Chronic renal impairment, stage 3a     Acute on chronic renal insufficiency     Chronic diastolic heart failure (HCC)    Plan:   Assessment: / Plan:     1.  Acute kidney injury.  Acute kidney injury secondary to renal hypoperfusion with low fractional excretion of sodium.    Cr slowly trending up-UO poor despite the initiation of the furosemide with the SPA  S/P R IJ Temp line and 1st IHD 5/24/21  IHD  UF as tolerated,  2 lit goal  , Albumin with dialysis as needed       2.  Hypotension.  Stable   PLAN:1. Follow on present midodrine dose     3.   Anemia.  HB 8.4   Ferritin 84, Iron Sat 24%  Vit B12 753  HgB trending down PLAN:1. Start oral Fe-no IV Fe++ until infection cleared  2.  Follow hemoglobin hematocrit.     4.   Hypokalemia. HD with 3 K bath  k better   Magnesium >/=2.0  PLAN:1.Monitor K+ and Mg++     5.  Hyperphosphatemia.  Reflects acute kidney injury.  Improved.       6.   Edema.  With a significant element of third spacing as the patient's albumin is 1.7 mg/dL now up to 2.7 S/P SPA.     PLAN:1.Follow with vol removal on IHD     Needs a tunneled catheter prior to discharge , pt took eliquis yesterday , procedure cancelled   By IR     Transferred out of ICU , doing well , continue present care , Hemodialysis planned for Monday     Beverley Rubi MD

## 2021-05-30 NOTE — PROGRESS NOTES
Patient's BP 98/59 before morning Midodrine and Amiodarone given, metoprolol not given. New BP 91/52. Dr. Martins Stager notified of low BP readings during rounding. Dr anderson Metoprolol held and Amiodarone changed to BID.

## 2021-05-30 NOTE — PROGRESS NOTES
Progress Note  5/30/2021 10:55 AM  Subjective:   Admit Date: 5/9/2021  PCP: Kezia Low MD  Interval History: patient examined doing well feels ok , tired after some exercise     Diet: DIET GENERAL;  Dietary Nutrition Supplements: Wound Healing Oral Supplement  Dietary Nutrition Supplements: Diabetic Oral Supplement  Diet NPO Time Specified    Data:   Scheduled Meds:   [Held by provider] apixaban  2.5 mg Oral BID    ferrous sulfate  325 mg Oral BID WC    polycarbophil  625 mg Oral Daily    lactulose  20 g Oral Daily    midodrine  5 mg Oral BID WC    triamcinolone   Topical BID    lidocaine 1 % injection  5 mL Intradermal Once    sodium chloride flush  10 mL Intravenous 2 times per day    metoprolol tartrate  25 mg Oral BID    amiodarone  200 mg Oral Daily    rifaximin  550 mg Oral BID    ipratropium-albuterol  1 ampule Inhalation BID    [Held by provider] apixaban  5 mg Oral BID    cetirizine  10 mg Oral Daily    anastrozole  1 mg Oral Daily    levothyroxine  50 mcg Oral Daily    montelukast  10 mg Oral Nightly    pantoprazole  40 mg Oral QAM AC    sodium chloride flush  5-40 mL Intravenous 2 times per day     Continuous Infusions:   sodium chloride      sodium chloride       PRN Meds:albumin human, citrate dextrose, trimethobenzamide, ondansetron, diphenhydrAMINE, sodium chloride flush, sodium chloride, perflutren lipid microspheres, fentanNYL, sodium chloride flush, sodium chloride, traMADol  I/O last 3 completed shifts: In: 1090 [P.O.:840; IV Piggyback:250]  Out: 400 [Urine:250; Stool:150]  No intake/output data recorded.     Intake/Output Summary (Last 24 hours) at 5/30/2021 1055  Last data filed at 5/29/2021 2045  Gross per 24 hour   Intake 850 ml   Output 400 ml   Net 450 ml     CBC:   Recent Labs     05/28/21  0627 05/29/21  0626 05/30/21  0618   WBC 5.3 5.3 5.9   HGB 8.4* 8.3* 8.3*   PLT 83* 74* 86*     BMP:    Recent Labs     05/28/21  0627 05/29/21  0626 05/30/21  0618    138 136   K 3.7  3.7 4.0 4.1    102 101   CO2 27 27 27   BUN 34* 21 28*   CREATININE 3.1* 2.7* 3.5*   GLUCOSE 114* 103* 106*     Hepatic:   Recent Labs     05/28/21  0627 05/29/21  0626 05/30/21  0618   AST 19 22 24   ALT 7 6 6   BILITOT 1.4* 1.5* 1.4*   ALKPHOS 85 90 96     Troponin: No results for input(s): TROPONINI in the last 72 hours. BNP: No results for input(s): BNP in the last 72 hours. Lipids: No results for input(s): CHOL, HDL in the last 72 hours. Invalid input(s): LDLCALCU  ABGs: No results found for: PHART, PO2ART, AJP0RSF  INR:   Recent Labs     05/28/21 0627   INR 2.1       -----------------------------------------------------------------  RAD: CT ABDOMEN PELVIS WO CONTRAST Additional Contrast? None    Result Date: 5/9/2021  EXAMINATION: CT OF THE ABDOMEN AND PELVIS WITHOUT CONTRAST 5/9/2021 12:49 am TECHNIQUE: CT of the abdomen and pelvis was performed without the administration of intravenous contrast. Multiplanar reformatted images are provided for review. Dose modulation, iterative reconstruction, and/or weight based adjustment of the mA/kV was utilized to reduce the radiation dose to as low as reasonably achievable. COMPARISON: None. HISTORY: ORDERING SYSTEM PROVIDED HISTORY: abd pain, vomiting TECHNOLOGIST PROVIDED HISTORY: Additional Contrast?->None Reason for exam:->abd pain, vomiting Decision Support Exception - unselect if not a suspected or confirmed emergency medical condition->Emergency Medical Condition (MA) FINDINGS: The visualized portions of the lung bases are clear. There is moderate amount of free intraperitoneal air. This is compatible with perforated abdominal viscus. Most likely this is right colonic origin as there is pneumatosis coli involving the right colon from the cecum to the hepatic flexure. This colonic ischemia as a source of pneumatosis and perforation is a consideration. The liver is cirrhotic. There is trace ascites in the abdomen and pelvis.  There is cholelithiasis. There are no findings of intestinal obstruction. Appendix not visualized. There is descending and sigmoid diverticulosis. There is some mild inflammatory change involving/abutting the mid to distal descending colon which may represent minimal diverticulitis. This is not likely the source of free air. There is no abscess. Bladder is unremarkable in appearance. There is no abnormal pelvic mass seen. There is a small amount of pelvic ascites. Moderate amount of free intraperitoneal air which is compatible with perforated abdominal viscus. The right colon is the most likely source of free air as there is pneumatosis coli from the cecum to the a Paddock flexure. Underlying colonic ischemia is a consideration. There is also diverticular disease involving the left colon. There is some inflammation involving/abutting the mid to distal descending colon which could represent minimal acute diverticulitis. Note that this is minimal and not the source of free air. Cholelithiasis. Cirrhotic liver. Minimal ascites. I discussed findings by phone with Mello Alas at 1:42 a.m. on 05/09/2021. CT HEAD WO CONTRAST    Result Date: 5/9/2021  EXAMINATION: CT OF THE HEAD WITHOUT CONTRAST  5/9/2021 12:49 am TECHNIQUE: CT of the head was performed without the administration of intravenous contrast. Dose modulation, iterative reconstruction, and/or weight based adjustment of the mA/kV was utilized to reduce the radiation dose to as low as reasonably achievable. COMPARISON: None. HISTORY: ORDERING SYSTEM PROVIDED HISTORY: dizzy TECHNOLOGIST PROVIDED HISTORY: Has a \"code stroke\" or \"stroke alert\" been called? ->No Reason for exam:->dizzy Decision Support Exception - unselect if not a suspected or confirmed emergency medical condition->Emergency Medical Condition (MA) FINDINGS: BRAIN/VENTRICLES: There is no acute intracranial hemorrhage, mass effect or midline shift. No abnormal extra-axial fluid collection. The gray-white differentiation is maintained without evidence of an acute infarct. There is no evidence of hydrocephalus. Periventricular white matter changes consistent chronic microvascular disease. Diffuse volume loss. ORBITS: The visualized portion of the orbits demonstrate no acute abnormality. SINUSES: The visualized paranasal sinuses and mastoid air cells demonstrate no acute abnormality. SOFT TISSUES/SKULL:  No acute abnormality of the visualized skull or soft tissues. No acute intracranial abnormality. Periventricular white matter changes consistent chronic microvascular disease. Diffuse volume loss. XR CHEST PORTABLE    Result Date: 5/9/2021  EXAMINATION: ONE XRAY VIEW OF THE CHEST 5/9/2021 9:01 am COMPARISON: Radiograph performed 10 hours earlier. HISTORY: ORDERING SYSTEM PROVIDED HISTORY: s/p triple lumen catheter TECHNOLOGIST PROVIDED HISTORY: Reason for exam:->s/p triple lumen catheter A left internal jugular catheter has its tip near the superior cavoatrial junction. Subdiaphragmatic free air has decreased markedly. The heart and mediastinum are normal for AP technique. Lung volumes are decreased. There are no focal airspace opacities. Blunting of the costophrenic sulci suggest there are small dependent pleural effusions. There are no signs of pneumothorax. FINDINGS: 1. Left IJ catheter appears satisfactory, no signs of pneumothorax. 2. Decreased lung volumes, suspect small pleural effusions. XR CHEST PORTABLE    Result Date: 5/9/2021  EXAMINATION: ONE XRAY VIEW OF THE CHEST 5/9/2021 12:48 am COMPARISON: 04/05/2021 HISTORY: ORDERING SYSTEM PROVIDED HISTORY: chest pain TECHNOLOGIST PROVIDED HISTORY: Reason for exam:->chest pain FINDINGS: The lungs are without acute focal process. There is no effusion or pneumothorax. Cardiomegaly. The osseous structures are without acute process. No acute process. Cardiomegaly.        Objective:   Vitals: BP (!) 98/59   Pulse 98   Temp 98 °F (36.7 °C) (Oral)   Resp 18   Ht 5' 4\" (1.626 m)   Wt 195 lb 9.6 oz (88.7 kg)   SpO2 100%   BMI 33.57 kg/m²   General appearance: appears stated age   Skin:  No rashes or lesions  HEENT: Head: Normocephalic, no lesions, without obvious abnormality.   Neck: no adenopathy, no carotid bruit, no JVD, supple, symmetrical, trachea midline and thyroid not enlarged, symmetric, no tenderness/mass/nodules  Lungs: clear to auscultation bilaterally  Heart: regular rate and rhythm, S1, S2 normal, no murmur, click, rub or gallop  Abdomen: soft, non-tender; bowel sounds normal; no masses,  no organomegaly  Extremities: edema ++  Neurologic: Mental status: Alert, oriented, thought content appropriate    Assessment:   Patient Active Problem List:     Glenoid labral tear     Synovitis     Biceps tendon tear     Rotator cuff tear     Subacromial impingement     Encephalopathy     Atrial fibrillation with rapid ventricular response (HCC)     Chronic atrial fibrillation (HCC)     Acute exacerbation of COPD with asthma ([address]     CAP (community acquired pneumonia)     Cirrhosis of liver not due to alcohol ([address]     Hypertension     Acute heart failure (HCC)     Dyspnea     Non morbid obesity     Perforated viscus     Chronic renal impairment, stage 3a     Acute on chronic renal insufficiency     Chronic diastolic heart failure ([address]    Plan:   Assessment: / Plan:     1.  Acute kidney injury.  Acute kidney injury secondary to renal hypoperfusion with low fractional excretion of sodium.    Cr slowly trending up-UO poor despite the initiation of the furosemide with the SPA  S/P R IJ Temp line and 1st IHD 5/24/21  IHD  UF as tolerated,  2 lit goal  , Albumin with dialysis as needed       2.  Hypotension.  Stable   PLAN:1. Follow on present midodrine dose     3.   Anemia.  HB 8.3   Ferritin 84, Iron Sat 24%  Vit B12 753  HgB trending down  PLAN:1. Start oral Fe-no IV Fe++ until infection cleared  2.  Follow hemoglobin hematocrit.    4.   Hypokalemia. HD with 3 K bath  k better   Magnesium >/=2.0  PLAN:1.Monitor K+ and Mg++     5.  Hyperphosphatemia.  Reflects acute kidney injury.  Improved.       6.   Edema.  With a significant element of third spacing as the patient's albumin is 1.7 mg/dL now up to 2.7 S/P SPA.     PLAN:1.Follow with vol removal on IHD     Needs a tunneled catheter prior to discharge , pt took eliquis  , procedure cancelled   By IR , possible tunneled catheter Tuesday       next dialysis planned tomorrow     Marcell Knapp MD

## 2021-05-30 NOTE — PROGRESS NOTES
EXAM     BP (!) 91/52   Pulse 98   Temp 98 °F (36.7 °C) (Oral)   Resp 18   Ht 5' 4\" (1.626 m)   Wt 195 lb 9.6 oz (88.7 kg)   SpO2 100%   BMI 33.57 kg/m²   Temp  Av.1 °F (36.7 °C)  Min: 98 °F (36.7 °C)  Max: 98.2 °F (36.8 °C)  Constitutional:  The patient is awake, alert, and oriented. Skin:    Warm and dry. HEENT:   AT/NC glasses   Chest:   Symmetrical expansion. Dec bs ant   Cardiovascular:  S1 and S2 are rhythmic and regular. Abdomen:   Benign to palpation dressed. Midline healed colostomy  Extremities:    Edema. FOOT DRESSED RASH SAME   CNS    AAxO   Lines: picc   Hd cath rij  Mercedes brown sediments      DIAGNOSTIC RESULTS   Radiology:    Recent Labs     21  0618   WBC 5.3 5.3 5.9   RBC 2.62* 2.62* 2.61*   HGB 8.4* 8.3* 8.3*   HCT 26.8* 26.7* 27.2*   .3* 101.9* 104.2*   MCH 32.1 31.7 31.8   MCHC 31.3* 31.1* 30.5*   RDW 18.6* 18.6* 18.8*   PLT 83* 74* 86*   MPV 10.6 11.1 10.4     Recent Labs     21  0621  0618    138 136   K 3.7  3.7 4.0 4.1    102 101   CO2 27 27 27   BUN 34* 21 28*   CREATININE 3.1* 2.7* 3.5*   GLUCOSE 114* 103* 106*   PROT 5.1* 5.4* 5.3*   LABALBU 2.7* 3.0* 2.8*   CALCIUM 8.4* 8.2* 8.2*   BILITOT 1.4* 1.5* 1.4*   ALKPHOS 85 90 96   AST 19 22 24   ALT 7 6 6     Lab Results   Component Value Date    CRP 0.1 2019     Lab Results   Component Value Date    SEDRATE 65 (H) 2021    SEDRATE 58 (H) 2019     Recent Labs     21  0627 21  0626 21  0618   INR 2.1  --   --    PROTIME 24.8*  --   --    AST 19 22 24   ALT 7 6 6     Lab Results   Component Value Date    CHOL 236 2021    TRIG 126 2021    HDL 66 2021    LDLCALC 145 2021    LABVLDL 25 2021     Lab Results   Component Value Date/Time    VITD25 57 2021 07:30 AM       Microbiology:   No results for input(s): COVID19 in the last 72 hours.   Lab Results   Component Value Date    [facility] 5 Days no growth 05/23/2021    BC 5 Days no growth 05/15/2021    BC  05/09/2021     Gram Negative Dominic is too fastidious for Identification and sensitivitey  testing      [facility]  05/09/2021     Too fastidious for Identification and susceptibility testing. BLOODCULT2 5 Days no growth 05/23/2021    BLOODCULT2 5 Days no growth 05/19/2021    BLOODCULT2 5 Days no growth 05/15/2021    ORG Gram negative dominic 05/09/2021            Component Value Date/Time    FUNGSM No Fungal elements seen 05/09/2021 0544    LABFUNG No growth after 1 week/s of incubation. 05/09/2021 0544       MRSA Culture Only   Date Value Ref Range Status   05/09/2021 Methicillin resistant Staph aureus not isolated  Final        FINAL IMPRESSION    Leukopenia follow   S/p Perforated abdominal viscus     Procedure(s):  Exploratory laparotomy, sigmoid colectomy, end colostomy, open drainage of pelvic abscess, placement of left internal jugular vein triple-lumen catheter  Gn septicemia - Too fastidious for Identification and susceptibility testing  RASH   Stop atbx  Wound care  Watch rash  vanco pre hew HD cath     · Monitor labs    Imaging and labs were reviewed per medical records. The patient was educated about the diagnosis, prognosis, indications, risks and benefits of treatment. An opportunity to ask questions was given to the patient/FAMILY. Thank you for involving me in the care of Jarrett Rout will continue to follow. Please do not hesitate to call for any questions or concerns.     Electronically signed by Tucker Garcia MD on 5/30/2021 at 2:20 PM

## 2021-05-31 NOTE — FLOWSHEET NOTE
Pt ran 3 hours; 4231 bath; 2.9 L removed; stable/tolerated well; denies c/o.  HD CVC heparinlocked per lumen fill volume, capped & clamped post Tx. good Access flow no issues achieving prescribed BFR.         05/31/21 1215   Vital Signs   /81   Temp 97.6 °F (36.4 °C)   Pulse 119   Resp 18   Pain Assessment   Pain Assessment 0-10   Pain Level 0   Post-Hemodialysis Assessment   Post-Treatment Procedures Blood returned;Catheter capped, clamped and heparinized x 2 ports   Machine Disinfection Process Exterior Machine Disinfection   Rinseback Volume (ml) 300 ml   Total Liters Processed (l/min) 57 l/min   Dialyzer Clearance Heavily streaked   Duration of Treatment (minutes) 180 minutes   Heparin amount administered during treatment (units) 0 units   Hemodialysis Intake (ml) 400 ml   Hemodialysis Output (ml) 3300 ml   NET Removed (ml) 2900 ml   Tolerated Treatment Good   Bilateral Breath Sounds Diminished   Edema Generalized   Edema Generalized +1

## 2021-05-31 NOTE — PROGRESS NOTES
Progress Note  5/31/2021 12:44 PM  Subjective:   Admit Date: 5/9/2021  PCP: Tico Cuellar MD  Interval History: seen on dialysis , doing better no complains     Diet: DIET GENERAL;  Dietary Nutrition Supplements: Wound Healing Oral Supplement  Dietary Nutrition Supplements: Diabetic Oral Supplement  Diet NPO Time Specified    Data:   Scheduled Meds:   albumin human  25 g Intravenous Once    amiodarone  200 mg Oral BID    [Held by provider] apixaban  2.5 mg Oral BID    ferrous sulfate  325 mg Oral BID WC    polycarbophil  625 mg Oral Daily    lactulose  20 g Oral Daily    midodrine  5 mg Oral BID WC    triamcinolone   Topical BID    lidocaine 1 % injection  5 mL Intradermal Once    sodium chloride flush  10 mL Intravenous 2 times per day    metoprolol tartrate  25 mg Oral BID    rifaximin  550 mg Oral BID    ipratropium-albuterol  1 ampule Inhalation BID    [Held by provider] apixaban  5 mg Oral BID    cetirizine  10 mg Oral Daily    anastrozole  1 mg Oral Daily    levothyroxine  50 mcg Oral Daily    montelukast  10 mg Oral Nightly    pantoprazole  40 mg Oral QAM AC    sodium chloride flush  5-40 mL Intravenous 2 times per day     Continuous Infusions:   sodium chloride      sodium chloride       PRN Meds:albumin human, citrate dextrose, trimethobenzamide, ondansetron, diphenhydrAMINE, sodium chloride flush, sodium chloride, perflutren lipid microspheres, fentanNYL, sodium chloride flush, sodium chloride, traMADol  I/O last 3 completed shifts: In: 65 [P.O.:600;  I.V.:10; IV Piggyback:50]  Out: 475 [Urine:125; Stool:350]  I/O this shift:  In: 180 [P.O.:180]  Out: -     Intake/Output Summary (Last 24 hours) at 5/31/2021 1244  Last data filed at 5/31/2021 0930  Gross per 24 hour   Intake 660 ml   Output 275 ml   Net 385 ml     CBC:   Recent Labs     05/29/21  0626 05/30/21  0618 05/31/21  0620   WBC 5.3 5.9 6.1   HGB 8.3* 8.3* 8.2*   PLT 74* 86* 101*     BMP:    Recent Labs     05/29/21  5074 05/30/21  0618 05/31/21  0620    136 137   K 4.0 4.1 4.0    101 102   CO2 27 27 26   BUN 21 28* 33*   CREATININE 2.7* 3.5* 4.0*   GLUCOSE 103* 106* 104*     Hepatic:   Recent Labs     05/29/21  0626 05/30/21  0618 05/31/21  0620   AST 22 24 20   ALT 6 6 7   BILITOT 1.5* 1.4* 1.3*   ALKPHOS 90 96 100     Troponin: No results for input(s): TROPONINI in the last 72 hours. BNP: No results for input(s): BNP in the last 72 hours. Lipids: No results for input(s): CHOL, HDL in the last 72 hours. Invalid input(s): LDLCALCU  ABGs: No results found for: PHART, PO2ART, GOO7OUT  INR: No results for input(s): INR in the last 72 hours. -----------------------------------------------------------------  RAD: CT ABDOMEN PELVIS WO CONTRAST Additional Contrast? None    Result Date: 5/9/2021  EXAMINATION: CT OF THE ABDOMEN AND PELVIS WITHOUT CONTRAST 5/9/2021 12:49 am TECHNIQUE: CT of the abdomen and pelvis was performed without the administration of intravenous contrast. Multiplanar reformatted images are provided for review. Dose modulation, iterative reconstruction, and/or weight based adjustment of the mA/kV was utilized to reduce the radiation dose to as low as reasonably achievable. COMPARISON: None. HISTORY: ORDERING SYSTEM PROVIDED HISTORY: abd pain, vomiting TECHNOLOGIST PROVIDED HISTORY: Additional Contrast?->None Reason for exam:->abd pain, vomiting Decision Support Exception - unselect if not a suspected or confirmed emergency medical condition->Emergency Medical Condition (MA) FINDINGS: The visualized portions of the lung bases are clear. There is moderate amount of free intraperitoneal air. This is compatible with perforated abdominal viscus. Most likely this is right colonic origin as there is pneumatosis coli involving the right colon from the cecum to the hepatic flexure. This colonic ischemia as a source of pneumatosis and perforation is a consideration. The liver is cirrhotic.   There is trace ascites in the abdomen and pelvis. There is cholelithiasis. There are no findings of intestinal obstruction. Appendix not visualized. There is descending and sigmoid diverticulosis. There is some mild inflammatory change involving/abutting the mid to distal descending colon which may represent minimal diverticulitis. This is not likely the source of free air. There is no abscess. Bladder is unremarkable in appearance. There is no abnormal pelvic mass seen. There is a small amount of pelvic ascites. Moderate amount of free intraperitoneal air which is compatible with perforated abdominal viscus. The right colon is the most likely source of free air as there is pneumatosis coli from the cecum to the a [place] flexure. Underlying colonic ischemia is a consideration. There is also diverticular disease involving the left colon. There is some inflammation involving/abutting the mid to distal descending colon which could represent minimal acute diverticulitis. Note that this is minimal and not the source of free air. Cholelithiasis. Cirrhotic liver. Minimal ascites. I discussed findings by phone with Elton Black at 1:42 a.m. on 05/09/2021. CT HEAD WO CONTRAST    Result Date: 5/9/2021  EXAMINATION: CT OF THE HEAD WITHOUT CONTRAST  5/9/2021 12:49 am TECHNIQUE: CT of the head was performed without the administration of intravenous contrast. Dose modulation, iterative reconstruction, and/or weight based adjustment of the mA/kV was utilized to reduce the radiation dose to as low as reasonably achievable. COMPARISON: None. HISTORY: ORDERING SYSTEM PROVIDED HISTORY: dizzy TECHNOLOGIST PROVIDED HISTORY: Has a \"code stroke\" or \"stroke alert\" been called? ->No Reason for exam:->dizzy Decision Support Exception - unselect if not a suspected or confirmed emergency medical condition->Emergency Medical Condition (MA) FINDINGS: BRAIN/VENTRICLES: There is no acute intracranial hemorrhage, mass effect or midline shift. Vitals: BP (!) 141/56   Pulse 117   Temp 97.5 °F (36.4 °C)   Resp 16   Ht 5' 4\" (1.626 m)   Wt 195 lb 9.6 oz (88.7 kg)   SpO2 98%   BMI 33.57 kg/m²   General appearance: appears stated age   Skin:  No rashes or lesions  HEENT: Head: Normocephalic, no lesions, without obvious abnormality.   Neck: no adenopathy, no carotid bruit, no JVD, supple, symmetrical, trachea midline and thyroid not enlarged, symmetric, no tenderness/mass/nodules  Lungs: clear to auscultation bilaterally  Heart: regular rate and rhythm, S1, S2 normal, no murmur, click, rub or gallop  Abdomen: soft, non-tender; bowel sounds normal; no masses,  no organomegaly  Extremities: edema ++  Neurologic: Mental status: Alert, oriented, thought content appropriate    Assessment:   Patient Active Problem List:     Glenoid labral tear     Synovitis     Biceps tendon tear     Rotator cuff tear     Subacromial impingement     Encephalopathy     Atrial fibrillation with rapid ventricular response (HCC)     Chronic atrial fibrillation (HCC)     Acute exacerbation of COPD with asthma ([address]     CAP (community acquired pneumonia)     Cirrhosis of liver not due to alcohol ([address]     Hypertension     Acute heart failure (HCC)     Dyspnea     Non morbid obesity     Perforated viscus     Chronic renal impairment, stage 3a     Acute on chronic renal insufficiency     Chronic diastolic heart failure ([address]    Plan:     Assessment: / Plan:     1.  Acute kidney injury.  Acute kidney injury secondary to renal hypoperfusion with low fractional excretion of sodium.    Cr slowly trending up-UO poor despite the initiation of the furosemide with the SPA  S/P R IJ Temp line and 1st IHD 5/24/21  IHD  UF as tolerated,  2 lit goal  , Albumin with dialysis as needed       2.  Hypotension.  Stable   PLAN:1. Follow on present midodrine dose     3.   Anemia.  HB 8.3   Ferritin 84, Iron Sat 24%  Vit B12 753  HgB trending down  PLAN:1. Start oral Fe-no IV Fe++ until infection cleared

## 2021-05-31 NOTE — PROGRESS NOTES
Pulse 140   Temp 97.5 °F (36.4 °C)   Resp 16   Ht 5' 4\" (1.626 m)   Wt 195 lb 9.6 oz (88.7 kg)   SpO2 98%   BMI 33.57 kg/m²   Temp  Av.6 °F (36.4 °C)  Min: 97.5 °F (36.4 °C)  Max: 97.8 °F (36.6 °C)  Constitutional:  The patient is awake   Skin:    Warm and dry. HEENT:   AT/NC glasses   Chest:   Symmetrical expansion. Dec bs ant no wheeze  Cardiovascular:  S1 and S2 are rhythmic and regular. Abdomen:   Benign to palpation dressed. Midline healed colostomy stools  Extremities:    Edema. FOOT DRESSED RASH SAME   CNS    AAxO   Lines: picc   Hd cath rij  Mercedes brown sediments      DIAGNOSTIC RESULTS   Radiology:    Recent Labs     21  0621  0618 21  0620   WBC 5.3 5.9 6.1   RBC 2.62* 2.61* 2.55*   HGB 8.3* 8.3* 8.2*   HCT 26.7* 27.2* 26.6*   .9* 104.2* 104.3*   MCH 31.7 31.8 32.2   MCHC 31.1* 30.5* 30.8*   RDW 18.6* 18.8* 18.6*   PLT 74* 86* 101*   MPV 11.1 10.4 10.5     Recent Labs     21  0621  0618 21  0620    136 137   K 4.0 4.1 4.0    101 102   CO2 27 27 26   BUN 21 28* 33*   CREATININE 2.7* 3.5* 4.0*   GLUCOSE 103* 106* 104*   PROT 5.4* 5.3* 5.3*   LABALBU 3.0* 2.8* 2.8*   CALCIUM 8.2* 8.2* 8.7   BILITOT 1.5* 1.4* 1.3*   ALKPHOS 90 96 100   AST 22 24 20   ALT 6 6 7     Lab Results   Component Value Date    CRP 0.1 2019     Lab Results   Component Value Date    SEDRATE 65 (H) 2021    SEDRATE 58 (H) 2019     Recent Labs     21  0626 21  0618 21  0620   AST 22 24 20   ALT 6 6 7     Lab Results   Component Value Date    CHOL 236 2021    TRIG 126 2021    HDL 66 2021    LDLCALC 145 2021    LABVLDL 25 2021     Lab Results   Component Value Date/Time    VITD25 57 2021 07:30 AM       Microbiology:   No results for input(s): COVID19 in the last 72 hours.   Lab Results   Component Value Date    BC 5 Days no growth 2021    BC 5 Days no growth 05/15/2021    BC  2021 Gram Negative Dominic is too fastidious for Identification and sensitivitey  testing      [facility]  05/09/2021     Too fastidious for Identification and susceptibility testing. BLOODCULT2 5 Days no growth 05/23/2021    BLOODCULT2 5 Days no growth 05/19/2021    BLOODCULT2 5 Days no growth 05/15/2021    ORG Gram negative dominic 05/09/2021            Component Value Date/Time    FUNGSM No Fungal elements seen 05/09/2021 0544    LABFUNG  05/09/2021 0544     No growth after 1 week/s of incubation. No growth after 2 week/s of incubation. MRSA Culture Only   Date Value Ref Range Status   05/09/2021 Methicillin resistant Staph aureus not isolated  Final        FINAL IMPRESSION    Leukopenia follow   S/p Perforated abdominal viscus     Procedure(s):  Exploratory laparotomy, sigmoid colectomy, end colostomy, open drainage of pelvic abscess, placement of left internal jugular vein triple-lumen catheter  Gn septicemia - Too fastidious for Identification and susceptibility testing  RASH   thrombocytopenia plts inc  Stop atbx  Wound care  Watch rash     Off atbx  Follow clinically    · Monitor labs    Imaging and labs were reviewed per medical records. The patient was educated about the diagnosis, prognosis, indications, risks and benefits of treatment. An opportunity to ask questions was given to the patient/FAMILY. Thank you for involving me in the care of Ryder Boswell will continue to follow. Please do not hesitate to call for any questions or concerns.     Electronically signed by Thong Ashraf MD on 5/31/2021 at 10:32 AM

## 2021-05-31 NOTE — PROGRESS NOTES
Progress Note  Date:2021       Room:0414/0414-02  Patient Tino Osei     YOB: 1940     Age:80 y.o. Subjective    Subjective:  Symptoms:  Stable. She reports shortness of breath, malaise, weakness and anorexia. No cough, chest pain, headache, chest pressure, diarrhea or anxiety. Diet:  Poor intake. Activity level: Impaired due to weakness. Pain:  She complains of pain that is mild. pt is weak today  Review of Systems   Respiratory: Positive for shortness of breath. Negative for cough. Cardiovascular: Negative for chest pain. Gastrointestinal: Positive for anorexia. Negative for diarrhea. Neurological: Positive for weakness. Objective         Vitals Last 24 Hours:  TEMPERATURE:  Temp  Av °F (36.7 °C)  Min: 98 °F (36.7 °C)  Max: 98 °F (36.7 °C)  RESPIRATIONS RANGE: Resp  Av  Min: 18  Max: 18  PULSE OXIMETRY RANGE: SpO2  Av.5 %  Min: 97 %  Max: 100 %  PULSE RANGE: Pulse  Av.5  Min: 62  Max: 98  BLOOD PRESSURE RANGE: Systolic (99WSP), MHN:023 , Min:91 , CM   ; Diastolic (14MDH), YRO:26, Min:49, Max:69    I/O (24Hr): Intake/Output Summary (Last 24 hours) at 2021 2345  Last data filed at 2021 2103  Gross per 24 hour   Intake 310 ml   Output 350 ml   Net -40 ml     Objective:  General Appearance:  Comfortable. Vital signs: (most recent): Blood pressure (!) 98/55, pulse 90, temperature 98 °F (36.7 °C), temperature source Oral, resp. rate 18, height 5' 4\" (1.626 m), weight 195 lb 9.6 oz (88.7 kg), SpO2 100 %. Vital signs are normal.    Output: Producing urine. HEENT: Normal HEENT exam.    Lungs:  She is not in respiratory distress. Heart: Irregular rhythm. S1 normal and S2 normal.    Abdomen: Abdomen is soft. Bowel sounds are normal.   There is no abdominal tenderness. Extremities: Normal range of motion. Pulses: Distal pulses are intact.     Neurological: Patient is alert and oriented to person, place and time.    Pupils:  Pupils are equal, round, and reactive to light. Labs/Imaging/Diagnostics    Labs:  CBC:  Recent Labs     05/28/21 0627 05/29/21 0626 05/30/21  0618   WBC 5.3 5.3 5.9   RBC 2.62* 2.62* 2.61*   HGB 8.4* 8.3* 8.3*   HCT 26.8* 26.7* 27.2*   .3* 101.9* 104.2*   RDW 18.6* 18.6* 18.8*   PLT 83* 74* 86*     CHEMISTRIES:  Recent Labs     05/28/21 0627 05/29/21 0626 05/30/21 0618    138 136   K 3.7  3.7 4.0 4.1    102 101   CO2 27 27 27   BUN 34* 21 28*   CREATININE 3.1* 2.7* 3.5*   GLUCOSE 114* 103* 106*   PHOS 3.2 2.9 3.6     PT/INR:  Recent Labs     05/28/21 0627   PROTIME 24.8*   INR 2.1     APTT:  Recent Labs     05/28/21 0627   APTT 30.3     LIVER PROFILE:  Recent Labs     05/28/21 0627 05/29/21 0626 05/30/21 0618   AST 19 22 24   ALT 7 6 6   BILITOT 1.4* 1.5* 1.4*   ALKPHOS 85 90 96       Imaging Last 24 Hours:  No results found.   Assessment//Plan           Hospital Problems         Last Modified POA    Chronic atrial fibrillation ([facility] 5/9/2021 Yes    Cirrhosis of liver not due to alcohol ([address] 5/9/2021 Yes    Perforated viscus 5/9/2021 Yes    Chronic renal impairment, stage 3a (Chronic) 5/9/2021 Yes    Acute on chronic renal insufficiency 5/9/2021 Yes    Chronic diastolic heart failure ([address] (Chronic) 5/9/2021 Yes        Assessment:  (Problem List as of 5/30/2021 Reviewed: 5/9/2021  6:12 PM by Cielo Solorzano MD    Chronic renal impairment, stage 3a    Chronic diastolic heart failure (HCC)    Glenoid labral tear    Synovitis    Biceps tendon tear    Rotator cuff tear    Subacromial impingement    Encephalopathy    Atrial fibrillation with rapid ventricular response (HCC)    Chronic atrial fibrillation (HCC)    Acute exacerbation of COPD with asthma ([address]    CAP (community acquired pneumonia)    Cirrhosis of liver not due to alcohol ([address]    Hypertension    Acute heart failure ([address]    Dyspnea    Non morbid obesity    Perforated viscus    Acute on chronic renal insufficiency    ). Plan:   (Cont with tx).        Electronically signed by Michelle Kaufman MD on 5/30/21 at 11:45 PM EDT

## 2021-05-31 NOTE — PROGRESS NOTES
Progress Note  Date:2021       Room:0414/0414-02  Patient Milton Murphy     YOB: 1940     Age:80 y.o. Subjective    Subjective:  Symptoms:  Stable. She reports shortness of breath, malaise, weakness and anorexia. No cough, chest pain, headache, chest pressure or diarrhea. Diet:  Adequate intake. Activity level: Impaired due to weakness. Pain:  She complains of pain that is mild. pt is weak but nausea is better  Review of Systems   Respiratory: Positive for shortness of breath. Negative for cough. Cardiovascular: Negative for chest pain. Gastrointestinal: Positive for anorexia. Negative for diarrhea. Neurological: Positive for weakness. Objective         Vitals Last 24 Hours:  TEMPERATURE:  Temp  Av °F (36.7 °C)  Min: 98 °F (36.7 °C)  Max: 98 °F (36.7 °C)  RESPIRATIONS RANGE: Resp  Av  Min: 18  Max: 18  PULSE OXIMETRY RANGE: SpO2  Av.5 %  Min: 97 %  Max: 100 %  PULSE RANGE: Pulse  Av.5  Min: 62  Max: 98  BLOOD PRESSURE RANGE: Systolic (02NHQ), LKM:447 , Min:91 , SJF:955   ; Diastolic (82ZJR), LHD:31, Min:49, Max:69    I/O (24Hr): Intake/Output Summary (Last 24 hours) at 2021 2338  Last data filed at 2021 2103  Gross per 24 hour   Intake 310 ml   Output 350 ml   Net -40 ml     Objective:  General Appearance:  Ill-appearing. Vital signs: (most recent): Blood pressure (!) 98/55, pulse 90, temperature 98 °F (36.7 °C), temperature source Oral, resp. rate 18, height 5' 4\" (1.626 m), weight 195 lb 9.6 oz (88.7 kg), SpO2 100 %. Vital signs are normal.    Output: Producing urine. HEENT: Normal HEENT exam.    Lungs:  She is not in respiratory distress. No decreased breath sounds. Heart: Regular rhythm. S1 normal and S2 normal.    Abdomen: Abdomen is soft. Bowel sounds are normal.     Extremities: Normal range of motion. Pulses: Distal pulses are intact.     Neurological: Patient is alert and oriented to person, place and time.    Pupils:  Pupils are equal, round, and reactive to light. Labs/Imaging/Diagnostics    Labs:  CBC:  Recent Labs     05/28/21 0627 05/29/21 0626 05/30/21  0618   WBC 5.3 5.3 5.9   RBC 2.62* 2.62* 2.61*   HGB 8.4* 8.3* 8.3*   HCT 26.8* 26.7* 27.2*   .3* 101.9* 104.2*   RDW 18.6* 18.6* 18.8*   PLT 83* 74* 86*     CHEMISTRIES:  Recent Labs     05/28/21 0627 05/29/21 0626 05/30/21 0618    138 136   K 3.7  3.7 4.0 4.1    102 101   CO2 27 27 27   BUN 34* 21 28*   CREATININE 3.1* 2.7* 3.5*   GLUCOSE 114* 103* 106*   PHOS 3.2 2.9 3.6     PT/INR:  Recent Labs     05/28/21 0627   PROTIME 24.8*   INR 2.1     APTT:  Recent Labs     05/28/21 0627   APTT 30.3     LIVER PROFILE:  Recent Labs     05/28/21 0627 05/29/21 0626 05/30/21 0618   AST 19 22 24   ALT 7 6 6   BILITOT 1.4* 1.5* 1.4*   ALKPHOS 85 90 96       Imaging Last 24 Hours:  No results found.   Assessment//Plan           Hospital Problems         Last Modified POA    Chronic atrial fibrillation ([facility] [address] 5/9/2021 Yes    Cirrhosis of liver not due to alcohol ([facility] [address] 5/9/2021 Yes    Perforated viscus 5/9/2021 Yes    Chronic renal impairment, stage 3a (Chronic) 5/9/2021 Yes    Acute on chronic renal insufficiency 5/9/2021 Yes    Chronic diastolic heart failure ([address] (Chronic) 5/9/2021 Yes        Assessment:  (Problem List as of 5/30/2021 Reviewed: 5/9/2021  6:12 PM by Buster Rinne, MD    Chronic renal impairment, stage 3a    Chronic diastolic heart failure (HCC)    Glenoid labral tear    Synovitis    Biceps tendon tear    Rotator cuff tear    Subacromial impingement    Encephalopathy    Atrial fibrillation with rapid ventricular response (HCC)    Chronic atrial fibrillation (HCC)    Acute exacerbation of COPD with asthma ([address]    CAP (community acquired pneumonia)    Cirrhosis of liver not due to alcohol ([address]    Hypertension    Acute heart failure ([address]    Dyspnea    Non morbid obesity    Perforated viscus    Acute on chronic renal insufficiency    ). Plan:   (Cont with tx).        Electronically signed by Yaniv Christianson MD on 5/30/21 at 11:38 PM EDT

## 2021-06-01 NOTE — PROGRESS NOTES
[facility]  [facility]  Progress Note    NAME: Vinayak Perez  MR:  [id number]  :   1940  DATE OF SERVICE:21    This is a face to face encounter with Vinayak Perez 68 y.o. female on 21    CHIEF COMPLAINT     ID following for   Chief Complaint   Patient presents with    Emesis     HISTORY OF PRESENT ILLNESS   Pt presenetd with Perforated abdominal viscus    S/p Exploratory laparotomy, sigmoid colectomy, end colostomy, open drainage of pelvic abscess, placement of left internal jugular vein triple-lumen catheter  Gn septicmeia rash from dilfican   nenita on hemo     No Family present  Asleep but arousable   Has no c/o  Patient is tolerating medications. No reported adverse drug reactions. REVIEW OF SYSTEMS     As stated above in the chief complaint, otherwise negative.   CURRENT MEDICATIONS      albumin human  25 g Intravenous Once    amiodarone  200 mg Oral BID    [Held by provider] apixaban  2.5 mg Oral BID    ferrous sulfate  325 mg Oral BID WC    polycarbophil  625 mg Oral Daily    lactulose  20 g Oral Daily    midodrine  5 mg Oral BID WC    triamcinolone   Topical BID    lidocaine 1 % injection  5 mL Intradermal Once    sodium chloride flush  10 mL Intravenous 2 times per day    metoprolol tartrate  25 mg Oral BID    rifaximin  550 mg Oral BID    ipratropium-albuterol  1 ampule Inhalation BID    [Held by provider] apixaban  5 mg Oral BID    cetirizine  10 mg Oral Daily    anastrozole  1 mg Oral Daily    levothyroxine  50 mcg Oral Daily    montelukast  10 mg Oral Nightly    pantoprazole  40 mg Oral QAM AC    sodium chloride flush  5-40 mL Intravenous 2 times per day     Continuous Infusions:   sodium chloride      sodium chloride       PRN Meds:albumin human, citrate dextrose, trimethobenzamide, ondansetron, diphenhydrAMINE, sodium chloride flush, sodium chloride, perflutren lipid microspheres, fentanNYL, sodium chloride flush, sodium chloride, traMADol    PHYSICAL EXAM     BP (!) 106/52   Pulse 91   Temp 97.9 °F (36.6 °C) (Oral)   Resp 18   Ht 5' 4\" (1.626 m)   Wt 195 lb 9.6 oz (88.7 kg)   SpO2 99%   BMI 33.57 kg/m²   Temp  Av.9 °F (36.6 °C)  Min: 97.6 °F (36.4 °C)  Max: 98.3 °F (36.8 °C)  Constitutional:  The patient is awake   Skin:    Warm and dry. HEENT:   AT/NC glasses   Chest:   Symmetrical expansion. Dec bs ant no wheeze on   Cardiovascular:  S1 and S2 are rhythmic and regular. Abdomen: Jeanne Rued Midline healed colostomy stools  Extremities:   Left >right  Edema.  FOOT DRESSED RASH SAME   CNS    AAxO   Lines: picc   Hd cath rij  Mercedes brown sediments      DIAGNOSTIC RESULTS   Radiology:    Recent Labs     21  0621  0620 21  0555   WBC 5.9 6.1 5.7   RBC 2.61* 2.55* 2.46*   HGB 8.3* 8.2* 7.9*   HCT 27.2* 26.6* 25.8*   .2* 104.3* 104.9*   MCH 31.8 32.2 32.1   MCHC 30.5* 30.8* 30.6*   RDW 18.8* 18.6* 18.7*   PLT 86* 101* 98*   MPV 10.4 10.5 10.0     Recent Labs     21  0618 21  0620 21  0555    137 136   K 4.1 4.0 4.2    102 100   CO2 27 26 28   BUN 28* 33* 24*   CREATININE 3.5* 4.0* 3.5*   GLUCOSE 106* 104* 103*   PROT 5.3* 5.3* 5.4*   LABALBU 2.8* 2.8* 3.0*   CALCIUM 8.2* 8.7 8.5*   BILITOT 1.4* 1.3* 1.6*   ALKPHOS 96 100 116*   AST 24 20 24   ALT 6 7 6     Lab Results   Component Value Date    CRP 0.1 2019     Lab Results   Component Value Date    SEDRATE 65 (H) 2021    SEDRATE 58 (H) 2019     Recent Labs     21  0618 21  0620 21  0555 21  0945   INR  --   --   --  1.4   PROTIME  --   --   --  16.5*   AST 24 20 24  --    ALT 6 7 6  --      Lab Results   Component Value Date    CHOL 236 2021    TRIG 126 2021    HDL 66 2021    LDLCALC 145 2021    LABVLDL 25 2021     Lab Results   Component Value Date/Time    VITD25 57 2021 07:30 AM       Microbiology:   No results for input(s): COVID19 in the last 72 hours. Lab Results   Component Value Date    BC 5 Days no growth 05/23/2021    BC 5 Days no growth 05/15/2021    BC  05/09/2021     Gram Negative Dominic is too fastidious for Identification and sensitivitey  testing      [facility]  05/09/2021     Too fastidious for Identification and susceptibility testing. BLOODCULT2 5 Days no growth 05/23/2021    BLOODCULT2 5 Days no growth 05/19/2021    BLOODCULT2 5 Days no growth 05/15/2021    ORG Gram negative dominic 05/09/2021            Component Value Date/Time    FUNGSM No Fungal elements seen 05/09/2021 0544    LABFUNG  05/09/2021 0544     No growth after 1 week/s of incubation. No growth after 2 week/s of incubation. MRSA Culture Only   Date Value Ref Range Status   05/09/2021 Methicillin resistant Staph aureus not isolated  Final        FINAL IMPRESSION    Leukopenia follow   S/p Perforated abdominal viscus     Procedure(s):  Exploratory laparotomy, sigmoid colectomy, end colostomy, open drainage of pelvic abscess, placement of left internal jugular vein triple-lumen catheter  Gn septicemia - Too fastidious for Identification and susceptibility testing  RASH   thrombocytopenia plts inc  Stop atbx  Wound care  Watch rash     Off atbx  For tsering hd cath   Follow clinically   see prn     · Monitor labs    Imaging and labs were reviewed per medical records. The patient was educated about the diagnosis, prognosis, indications, risks and benefits of treatment. An opportunity to ask questions was given to the patient/FAMILY. Thank you for involving me in the care of Mely Kahn will continue to follow. Please do not hesitate to call for any questions or concerns.     Electronically signed by Marilyn Eldridge MD on 6/1/2021 at 11:20 AM

## 2021-06-01 NOTE — CONSULTS
Priyanka Muhammad M.D. [facility]  Dr. Yanet Steen M.D.,  Dr. Donnell Nugent M.D.,  Dr. Jose Edmondson D.O.,  Dr. Zayra Rae D.O. ,  Dr. Margaux Page M.D.,  Dr. Gabriel Litten, [facility]  68 y.o.  female      Re: EGD  Requesting physician: Dr. Minesh Azul  Date:11:47 AM 6/1/2021          HPI: 68-year-old female patient who has been admitted to the hospital since 9 May when she presented with abdominal pain. She was diagnosed with perforated diverticulitis and underwent exploratory laparotomy with sigmoid colectomy with end colostomy with open drainage of pelvic abscess. Patient has history of liver cirrhosis for which she follows with Dr. Esteban Pandey - she reports most recent upper endoscopy in March or April of this year however she is not sure of the details. Since admission patient has been noted to be anemic with hemoglobin initially of 13.6 decreasing to 10.6 on the day of admission. Patient hemoglobin showed continues decrease to 7.9 today. It appears that patient has received 1 unit transfusion on 24 May as well as transfusion of 2 units of FFP.       Information sources:   -Patient  -medical record  -health care team    PMHx:  Past Medical History:   Diagnosis Date    Anxiety     Cancer ([facility] [address] 2/11    right breast    Cirrhosis, non-alcoholic ([facility] [address]     COPD (chronic obstructive pulmonary disease) ([facility] [address] [facility]     Hemodialysis patient ([facility] [address] [facility]     History of blood transfusion     Hypertension        PSHx:  Past Surgical History:   Procedure Laterality Date    BREAST SURGERY  2011    right lumpectomy    COLECTOMY N/A 5/9/2021    OPEN BOWEL RESECTION WITH COLOSTOMY performed by Nba Post MD at [address] ARTHROSCOPY  09 09 2011    arthroscopy left shoulder rotator cuff repair, subacromial decompression, debridement labrum, synovectomy    TONSILLECTOMY         Meds:  Current Facility-Administered Medications (LOPRESSOR) tablet 25 mg  25 mg Oral BID Lita Boone MD   25 mg at 06/01/21 0818    rifaximin (XIFAXAN) tablet 550 mg  550 mg Oral BID Lita Boone MD   550 mg at 06/01/21 0818    ipratropium-albuterol (DUONEB) nebulizer solution 1 ampule  1 ampule Inhalation BID Lita Boone MD   1 ampule at 05/17/21 0532    [Held by provider] apixaban (ELIQUIS) tablet 5 mg  5 mg Oral BID Lita Boone MD   5 mg at 05/23/21 7838    perflutren lipid microspheres (DEFINITY) injection 1.65 mg  1.5 mL Intravenous ONCE PRN Lita Boone MD        fentaNYL (SUBLIMAZE) injection 50 mcg  50 mcg Intravenous Q2H PRN Lita Boone MD        cetirizine (ZYRTEC) tablet 10 mg  10 mg Oral Daily Kyrie Grant MD   10 mg at 06/01/21 0818    anastrozole (ARIMIDEX) tablet 1 mg  1 mg Oral Daily Kyrie Grant MD   1 mg at 06/01/21 0818    levothyroxine (SYNTHROID) tablet 50 mcg  50 mcg Oral Daily Kyrie Grant MD   50 mcg at 05/31/21 1211    montelukast (SINGULAIR) tablet 10 mg  10 mg Oral Nightly Kyrie Grant MD   10 mg at 05/31/21 2109    pantoprazole (PROTONIX) tablet 40 mg  40 mg Oral QAM AC Kyrie Grant MD   40 mg at 05/31/21 6414    sodium chloride flush 0.9 % injection 5-40 mL  5-40 mL Intravenous 2 times per day Kyrie Grant MD   10 mL at 06/01/21 0819    sodium chloride flush 0.9 % injection 5-40 mL  5-40 mL Intravenous PRN Kyrie Grant MD   10 mL at 05/25/21 1432    0.9 % sodium chloride infusion  25 mL Intravenous PRN Kyrie Grant MD        traMADol Uma Santacruz) tablet 50 mg  50 mg Oral Q6H PRN Kyrie Grant MD   50 mg at 05/30/21 5327       SocHx:  Social History     Socioeconomic History    Marital status:      Spouse name: Not on file    Number of children: Not on file    Years of education: Not on file    Highest education level: Not on file   Occupational History    Not on file   Tobacco Use    Smoking status: Never Smoker    Smokeless tobacco: Never Used   Vaping Use  Vaping Use: Never used   Substance and Sexual Activity    Alcohol use: Not Currently     Comment: 1-2 cups coffee daily    Drug use: Never    Sexual activity: Not Currently   Other Topics Concern    Not on file   Social History Narrative    Not on file     Social Determinants of Health     Financial Resource Strain:     Difficulty of Paying Living Expenses:    Food Insecurity:     Worried About Running Out of Food in the Last Year:     [address] in the Last Year:    Transportation Needs:     Lack of Transportation (Medical):      Lack of Transportation (Non-Medical):    Physical Activity:     Days of Exercise per Week:     Minutes of Exercise per Session:    Stress:     Feeling of Stress :    Social Connections:     Frequency of Communication with Friends and Family:     Frequency of Social Gatherings with Friends and Family:     Attends Jewish Services:     Active Member of Clubs or Organizations:     Attends Club or Organization Meetings:     Marital Status:    Intimate Partner Violence:     Fear of Current or Ex-Partner:     Emotionally Abused:     Physically Abused:     Sexually Abused:        FamHx:  Family History   Problem Relation Age of Onset    COPD Mother     Heart Failure Mother     Cancer Mother         leukemia    Heart Failure Father     COPD Father     Diabetes Father     Alzheimer's Disease Sister     Cancer Brother         esophageal       Allergy:  Allergies   Allergen Reactions    Diflucan [Fluconazole] Rash    Penicillins Anaphylaxis    Sulfa Antibiotics Anaphylaxis    Theophyllines Anaphylaxis     Liquid only    Codeine      Climb the walls    Iodine      IVP iodine  i get real hot    Tape [Adhesive Jane Huh my skin off    Vicodin [Hydrocodone-Acetaminophen]      Climb the walls         ROS: As described in the HPI and in addition is negative upon detailed review of systems or unobtainable unless otherwise stated in this dictation. PE:  BP (!) 106/52   Pulse 91   Temp 97.9 °F (36.6 °C) (Oral)   Resp 18   Ht 5' 4\" (1.626 m)   Wt 195 lb 9.6 oz (88.7 kg)   SpO2 99%   BMI 33.57 kg/m²     Gen.: NAD/elderly  female  Head: Atraumatic/normocephalic  Eyes: Anicteric sclera/no conjunctival erythema  ENT: Moist oral mucosa/no discharge from nose ears  Neck: Supple/trachea midline  Chest: CTA B/symmetrical excursions  Cor: Irregular  Abd.: Soft/postsurgical  Extr.:  Bilateral edema LE  Muscles: Decreased on above, consistent with age and condition  Skin: Warm and dry        DATA:  Stool (measured) : 150 mL  Lab Results   Component Value Date    WBC 5.7 06/01/2021    RBC 2.46 06/01/2021    HGB 7.9 06/01/2021    HCT 25.8 06/01/2021    .9 06/01/2021    MCH 32.1 06/01/2021    MCHC 30.6 06/01/2021    RDW 18.7 06/01/2021    PLT 98 06/01/2021    MPV 10.0 06/01/2021     Lab Results   Component Value Date     06/01/2021    K 4.2 06/01/2021     06/01/2021    CO2 28 06/01/2021    BUN 24 06/01/2021    CREATININE 3.5 06/01/2021    CALCIUM 8.5 06/01/2021    PROT 5.4 06/01/2021    LABALBU 3.0 06/01/2021    LABALBU 4.1 05/30/2012    BILITOT 1.6 06/01/2021    ALKPHOS 116 06/01/2021    AST 24 06/01/2021    ALT 6 06/01/2021     Lab Results   Component Value Date    LIPASE 43 05/09/2021     No results found for: AMYLASE      ASSESSMENT/PLAN:    1. Cirrhosis  -No evidence of acute decompensation  -[address]  -Review office records; obtain AFP  -Ultrasound in February of this year revealing no mass    2. Anemia  -Acute on chronic without clear evidence of overt bleed  -Iron deficient  -Consider further evaluation with upper endoscopy depending on clinical course/H&H dynamics    3. Perforated diverticulitis with sigmoid/left colectomy  -Status post laparotomy as above  -Diet and disposition per general surgery  -Antibiotics per ID    4. Renal failure  -Acute on chronic with HD  -Per admitting/nephrology    5.   Pelvic abscess  -Antibiotics per ID    Above has been discussed with the patient and all questions answered to her satisfaction. She is agreeable with the plan as delineated. Richard Carias MD  6/1/2021  11:47 AM    NOTE:  This report was transcribed using voice recognition software. Every effort was made to ensure accuracy; however, inadvertent computerized transcription errors may be present.

## 2021-06-01 NOTE — PROGRESS NOTES
nutrition/hydration via PO intake of the least restrictive oral diet with implementation of safe swallow/ compensatory strategies and decrease signs/symptoms of aspiration to less than 1 x/day. OTHER RECOMMENDATIONS:  A Video Swallow Study (MBSS) is recommended and requires a physician order (recommended once GI evaluation is completed)     Patient/family Goal:    Return to least restrictive diet. Plan of care discussed with Patient and Family   The Patient and Family understand(s) the diagnosis, prognosis and plan of care     Rehabilitation Potential/Prognosis: good                    ADMITTING DIAGNOSIS: Perforated viscus [R19.8]    VISIT DIAGNOSIS:   Visit Diagnoses       Codes    Perforated abdominal viscus    -  Primary R19.8    Chronic renal insufficiency, stage 2 (mild)     N18.2           PATIENT REPORT/COMPLAINT: increased coughing with intake     RN cleared patient for participation in assessment     yes     PRIOR LEVEL OF SWALLOW FUNCTION:    PAST HISTORY OF DYSPHAGIA?: yes  Patient and family report difficulty with solids and liquids which has been increasing recently    Home diet: Regular consistency solids with  thin liquids    PROCEDURE:  Consistencies Administered During the Evaluation   Liquids: thin liquid   Solids:  soft solid foods      Method of Intake:   straw  Self fed      Position:   Seated, upright    CLINICAL ASSESSMENT:  Oral Stage: The oral stage of swallowing was within functional limits      Pharyngeal Stage:    No signs of aspiration were noted during this evaluation however, silent aspiration cannot be ruled out at bedside. If silent aspiration is suspected, a Videofluoroscopic Study of Swallowing (MBS) is recommended and requires a physician order.     Cognition:   Within functional limits for this exam    Oral Peripheral Examination   Adequate lingual/labial strength     Current Respiratory Status    2 liters nasal cannula     Parameters of Speech Production Respiration:  Adequate for speech production  Quality:   Within functional limits  Intensity: Within functional limits    Volitional Swallow: present     Volitional Cough:   present     Pain: No pain reported. EDUCATION:   The Speech Language Pathologist (SLP) completed education regarding results of evaluation and that intervention is warranted at this time. Learner: Patient  Education: Reviewed results and recommendations of this evaluation  Evaluation of Education:  Himanshu Whalen understanding    This plan may be re-evaluated and revised as warranted. Evaluation Time includes thorough review of current medical information, gathering information on past medical history/social history and prior level of function, completion of standardized testing/informal observation of tasks, assessment of data and education on plan of care and goals. [x]The admitting diagnosis and active problem list, have been reviewed prior to initiation of this evaluation.         ACTIVE PROBLEM LIST:   Patient Active Problem List   Diagnosis    Glenoid labral tear    Synovitis    Biceps tendon tear    Rotator cuff tear    Subacromial impingement    Encephalopathy    Atrial fibrillation with rapid ventricular response (HCC)    Chronic atrial fibrillation (HCC)    Acute exacerbation of COPD with asthma ([address]    CAP (community acquired pneumonia)    Cirrhosis of liver not due to alcohol ([address]    Hypertension    Acute heart failure ([address]    Dyspnea    Non morbid obesity    Perforated viscus    Chronic renal impairment, stage 3a    Acute on chronic renal insufficiency    Chronic diastolic heart failure ([address]         CPT code:  17212  bedside swallow kenyetta Fletcher MSCCC/SLP  Speech Language Pathologist  [id number]

## 2021-06-01 NOTE — PROGRESS NOTES
Speech Language Pathology      NAME:  Maria Luisa Cardoso  :  1940  DATE: 2021  ROOM:  [facility]/8696-85    Patient seen for swallow therapy 15 minutes. patient educated regarding results of bedside swallow evaluation. Reviewed current solid/liquid consistency diet recommendation for   Regular consistency solids with  thin liquids, and discussed compensatory strategies to ensure safe PO intake. Reviewed aspiration precautions. Discussed use of compensatory strategy implementation to decrease risk of aspiration with implementation of strengthening exercises to improve swallow function as the long term goal.  Patient was able to verbalize the compensatory strategies during session. Patient and or family  indicated understanding of all information provided via satisfactory verbal response.     Patient Active Problem List   Diagnosis    Glenoid labral tear    Synovitis    Biceps tendon tear    Rotator cuff tear    Subacromial impingement    Encephalopathy    Atrial fibrillation with rapid ventricular response (HCC)    Chronic atrial fibrillation (HCC)    Acute exacerbation of COPD with asthma ([address]    CAP (community acquired pneumonia)    Cirrhosis of liver not due to alcohol ([address]    Hypertension    Acute heart failure ([address]    Dyspnea    Non morbid obesity    Perforated viscus    Chronic renal impairment, stage 3a    Acute on chronic renal insufficiency    Chronic diastolic heart failure ([address]       [id number]  dysphagia tx    Suzie Perez MSCCC/SLP  Speech Language Pathologist  [id number]

## 2021-06-01 NOTE — CARE COORDINATION
6-1-Cm note: ( covid neg 5-18) [address] is still following pt for dc , pt has an outpt chair time for [facility] at [facility] in [place] on T-TH-SAT at 11:15 am, However, pt's daughter Susan Grant [phone] called [facility] and wanted her mom to go there for HD . I spoke to Zhen Villafana [phone] at [facility], she called the dtr to let her know the pt's info is in central intake , if pt is dc'd prior to chair time approval for their facility [facility] as preferred by daughter ( pt was ok with [facility] in [place] when I spoke to her last week ).   Electronically signed by Ryan Villa RN on 6/1/2021 at 4:33 PM

## 2021-06-01 NOTE — PROGRESS NOTES
Progress Note  Date:2021       Room:[facility]4/0414-  Patient Howie Muniz     YOB: 1940     Age:80 y.o. Subjective    Subjective:  Symptoms:  Stable. She reports shortness of breath, malaise, weakness and anxiety. No cough, chest pain, headache, chest pressure, anorexia or diarrhea. Diet:  Adequate intake. Activity level: Impaired due to weakness. Pain:  She complains of pain that is mild. pt cont to be weak. She got dialysis today  Review of Systems   Respiratory: Positive for shortness of breath. Negative for cough. Cardiovascular: Negative for chest pain. Gastrointestinal: Negative for anorexia and diarrhea. Neurological: Positive for weakness. Objective         Vitals Last 24 Hours:  TEMPERATURE:  Temp  Av.7 °F (36.5 °C)  Min: 97.5 °F (36.4 °C)  Max: 98.3 °F (36.8 °C)  RESPIRATIONS RANGE: Resp  Av.5  Min: 16  Max: 18  PULSE OXIMETRY RANGE: SpO2  Av %  Min: 98 %  Max: 100 %  PULSE RANGE: Pulse  Av.3  Min: 98  Max: 140  BLOOD PRESSURE RANGE: Systolic (35SXL), AAI:187 , Min:106 , KHL:541   ; Diastolic (51QJZ), NZN:27, Min:49, Max:81    I/O (24Hr): Intake/Output Summary (Last 24 hours) at 2021 2233  Last data filed at 2021 1721  Gross per 24 hour   Intake 1020 ml   Output 3425 ml   Net -2405 ml     Objective:  General Appearance:  Comfortable. Vital signs: (most recent): Blood pressure (!) 120/54, pulse 104, temperature 98.3 °F (36.8 °C), temperature source Oral, resp. rate 18, height 5' 4\" (1.626 m), weight 195 lb 9.6 oz (88.7 kg), SpO2 100 %. Vital signs are normal.    Output: Producing urine. HEENT: Normal HEENT exam.    Heart: Regular rhythm. S1 normal and S2 normal.    Abdomen: Abdomen is soft. Bowel sounds are normal.   There is no abdominal tenderness. Extremities: Normal range of motion. Pulses: Distal pulses are intact.       Labs/Imaging/Diagnostics    Labs:  CBC:  Recent Labs     21  4208 05/30/21  0618 05/31/21  0620   WBC 5.3 5.9 6.1   RBC 2.62* 2.61* 2.55*   HGB 8.3* 8.3* 8.2*   HCT 26.7* 27.2* 26.6*   .9* 104.2* 104.3*   RDW 18.6* 18.8* 18.6*   PLT 74* 86* 101*     CHEMISTRIES:  Recent Labs     05/29/21  0626 05/30/21  0618 05/31/21  0620    136 137   K 4.0 4.1 4.0    101 102   CO2 27 27 26   BUN 21 28* 33*   CREATININE 2.7* 3.5* 4.0*   GLUCOSE 103* 106* 104*   PHOS 2.9 3.6 4.2     PT/INR:No results for input(s): PROTIME, INR in the last 72 hours. APTT:No results for input(s): APTT in the last 72 hours. LIVER PROFILE:  Recent Labs     05/29/21  0626 05/30/21  0618 05/31/21  0620   AST 22 24 20   ALT 6 6 7   BILITOT 1.5* 1.4* 1.3*   ALKPHOS 90 96 100       Imaging Last 24 Hours:  No results found. Assessment//Plan           Hospital Problems         Last Modified POA    Chronic atrial fibrillation ([address] 5/9/2021 Yes    Cirrhosis of liver not due to alcohol ([address] 5/9/2021 Yes    Perforated viscus 5/9/2021 Yes    Chronic renal impairment, stage 3a (Chronic) 5/9/2021 Yes    Acute on chronic renal insufficiency 5/9/2021 Yes    Chronic diastolic heart failure ([address] (Chronic) 5/9/2021 Yes        Assessment:  (Problem List as of 5/31/2021 Reviewed: 5/9/2021  6:12 PM by Patricia Gilmore MD    Chronic renal impairment, stage 3a    Chronic diastolic heart failure (HCC)    Glenoid labral tear    Synovitis    Biceps tendon tear    Rotator cuff tear    Subacromial impingement    Encephalopathy    Atrial fibrillation with rapid ventricular response (HCC)    Chronic atrial fibrillation (HCC)    Acute exacerbation of COPD with asthma ([address]    CAP (community acquired pneumonia)    Cirrhosis of liver not due to alcohol ([address]    Hypertension    Acute heart failure ([address]    Dyspnea    Non morbid obesity    Perforated viscus    Acute on chronic renal insufficiency    esrd). Plan:   (Cont with dialysis and abx).        Electronically signed by Danica He MD on 5/31/21 at 10:33 PM EDT

## 2021-06-01 NOTE — ANESTHESIA PRE PROCEDURE
06/01/21 0817    diphenhydrAMINE (BENADRYL) injection 25 mg  25 mg Intravenous Q6H PRN Amee Chaidez MD   25 mg at 06/01/21 0049    lidocaine 1 % injection 5 mL  5 mL Intradermal Once Amee Chaidez MD        sodium chloride flush 0.9 % injection 10 mL  10 mL Intravenous 2 times per day Amee Chaidez MD   10 mL at 06/01/21 0818    sodium chloride flush 0.9 % injection 10 mL  10 mL Intravenous PRN Amee Chaidez MD   10 mL at 05/30/21 2336    0.9 % sodium chloride infusion  25 mL Intravenous PRN Amee Chaidez MD        metoprolol tartrate (LOPRESSOR) tablet 25 mg  25 mg Oral BID Gege Tavarez MD   25 mg at 06/01/21 0818    rifaximin (XIFAXAN) tablet 550 mg  550 mg Oral BID Gege Tavarez MD   550 mg at 06/01/21 0818    ipratropium-albuterol (DUONEB) nebulizer solution 1 ampule  1 ampule Inhalation BID Gege Tavarez MD   1 ampule at 05/17/21 0532    [Held by provider] apixaban (ELIQUIS) tablet 5 mg  5 mg Oral BID Gege Tavarez MD   5 mg at 05/23/21 8234    perflutren lipid microspheres (DEFINITY) injection 1.65 mg  1.5 mL Intravenous ONCE PRN Gege Tavarez MD        fentaNYL (SUBLIMAZE) injection 50 mcg  50 mcg Intravenous Q2H PRN Gege Tavarez MD        cetirizine (ZYRTEC) tablet 10 mg  10 mg Oral Daily Miguel Figueredo MD   10 mg at 06/01/21 0818    anastrozole (ARIMIDEX) tablet 1 mg  1 mg Oral Daily Miguel Figueredo MD   1 mg at 06/01/21 0818    levothyroxine (SYNTHROID) tablet 50 mcg  50 mcg Oral Daily Miguel Figueredo MD   50 mcg at 05/31/21 0623    montelukast (SINGULAIR) tablet 10 mg  10 mg Oral Nightly Miguel Figueredo MD   10 mg at 05/31/21 2109    pantoprazole (PROTONIX) tablet 40 mg  40 mg Oral QAM AC Miguel Figueredo MD   40 mg at 05/31/21 0550    sodium chloride flush 0.9 % injection 5-40 mL  5-40 mL Intravenous 2 times per day Miguel Figueredo MD   10 mL at 06/01/21 0819    sodium chloride flush 0.9 % injection 5-40 mL  5-40 mL Intravenous PRN Miguel Figueredo MD   10 mL at 05/25/21 1432    0.9 % sodium chloride infusion  25 mL Intravenous PRN Kyrie Grant MD        traMADol Uma Santacruz) tablet 50 mg  50 mg Oral Q6H PRN Kyrie Grant MD   50 mg at 05/30/21 2337       Allergies:     Allergies   Allergen Reactions    Diflucan [Fluconazole] Rash    Penicillins Anaphylaxis    Sulfa Antibiotics Anaphylaxis    Theophyllines Anaphylaxis     Liquid only    Codeine      Climb the walls    Iodine      IVP iodine  i get real hot    Tape [Adhesive Tape]      Pulls my skin off    Vicodin [Hydrocodone-Acetaminophen]      Climb the walls       Problem List:    Patient Active Problem List   Diagnosis Code    Glenoid labral tear S43.439A    Synovitis M65.9    Biceps tendon tear S46.219A    Rotator cuff tear M75.100    Subacromial impingement M75.40    Encephalopathy G93.40    Atrial fibrillation with rapid ventricular response (HCC) I48.91    Chronic atrial fibrillation (HCC) I48.20    Acute exacerbation of COPD with asthma (HCC) J44.1, J45.901    CAP (community acquired pneumonia) J18.9    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    Hypertension I10    Acute heart failure (HCC) I50.9    Dyspnea R06.00    Non morbid obesity E66.9    Perforated viscus R19.8    Chronic renal impairment, stage 3a N18.31    Acute on chronic renal insufficiency N28.9, N18.9    Chronic diastolic heart failure (HCC) I50.32       Past Medical History:        Diagnosis Date    Anxiety     Cancer ([facility] 2/11    right breast    Cirrhosis, non-alcoholic (HCC)     COPD (chronic obstructive pulmonary disease) (HCC)     Hemodialysis patient ([facility]     History of blood transfusion     Hypertension        Past Surgical History:        Procedure Laterality Date    BREAST SURGERY  2011    right lumpectomy    COLECTOMY N/A 5/9/2021    OPEN BOWEL RESECTION WITH COLOSTOMY performed by Kyrie Grant MD at [address] ARTHROSCOPY  09 09 2011    arthroscopy left shoulder rotator cuff repair, subacromial decompression, debridement labrum, synovectomy    TONSILLECTOMY         Social History:    Social History     Tobacco Use    Smoking status: Never Smoker    Smokeless tobacco: Never Used   Substance Use Topics    Alcohol use: Not Currently     Comment: 1-2 cups coffee daily                                Counseling given: Not Answered      Vital Signs (Current):   Vitals:    06/01/21 0455 06/01/21 0742 06/01/21 1252 06/01/21 1907   BP: (!) 103/50 (!) 106/52  (!) 85/41   Pulse:  91  82   Resp:  18  18   Temp:  97.9 °F (36.6 °C)  97.3 °F (36.3 °C)   TempSrc:  Oral  Oral   SpO2:  99%  100%   Weight:       Height:   5' 4\" (1.626 m)                                               BP Readings from Last 3 Encounters:   06/01/21 (!) 85/41   05/09/21 101/70   05/07/21 110/70       NPO Status:                                                                                 BMI:   Wt Readings from Last 3 Encounters:   05/30/21 195 lb 9.6 oz (88.7 kg)   05/07/21 208 lb (94.3 kg)   05/03/21 211 lb (95.7 kg)     Body mass index is 33.57 kg/m². CBC:   Lab Results   Component Value Date    WBC 5.7 06/01/2021    RBC 2.46 06/01/2021    HGB 7.9 06/01/2021    HCT 25.8 06/01/2021    .9 06/01/2021    RDW 18.7 06/01/2021    PLT 98 06/01/2021       CMP:   Lab Results   Component Value Date     06/01/2021    K 4.2 06/01/2021     06/01/2021    CO2 28 06/01/2021    BUN 24 06/01/2021    CREATININE 3.5 06/01/2021    GFRAA 15 06/01/2021    LABGLOM 13 06/01/2021    GLUCOSE 103 06/01/2021    GLUCOSE 100 05/30/2012    PROT 5.4 06/01/2021    CALCIUM 8.5 06/01/2021    BILITOT 1.6 06/01/2021    ALKPHOS 116 06/01/2021    AST 24 06/01/2021    ALT 6 06/01/2021       POC Tests: No results for input(s): POCGLU, POCNA, POCK, POCCL, POCBUN, POCHEMO, POCHCT in the last 72 hours.     Coags:   Lab Results   Component Value Date    PROTIME 16.5 06/01/2021    INR 1.4 06/01/2021    APTT 30.3 05/28/2021       HCG (If Applicable): No results found for: PREGTESTUR, PREGSERUM, HCG, HCGQUANT     ABGs: No results found for: PHART, PO2ART, DYQ9KZF, MWP7IWD, BEART, K7ESJCFD     Type & Screen (If Applicable):  No results found for: LABABO, LABRH    Drug/Infectious Status (If Applicable):  No results found for: HIV, HEPCAB    COVID-19 Screening (If Applicable):   Lab Results   Component Value Date    COVID19 Not Detected 05/18/2021           Anesthesia Evaluation  Patient summary reviewed and Nursing notes reviewed no history of anesthetic complications:   Airway: Mallampati: III  TM distance: >3 FB   Neck ROM: full  Mouth opening: > = 3 FB Dental: normal exam   (+) partials      Pulmonary: breath sounds clear to auscultation  (+) pneumonia:  COPD:  shortness of breath: chronic,  asthma:     (-) not a current smoker                          ROS comment: Acute COPD exacerbation with underlying asthma   Cardiovascular:    (+) hypertension:, valvular problems/murmurs (TR):, dysrhythmias (RVR): atrial fibrillation, CHF: no interval change, WHITE: no interval change,         Rhythm: irregular  Rate: normal  Echocardiogram reviewed         Beta Blocker:  Order written and Dose within 24 Hrs      ROS comment: Atrial fibrillation   -Old anterior infarct. -Diffuse nonspecific T-abnormality. ABNORMAL     Neuro/Psych:   (+) depression/anxiety              ROS comment: Encephalopathy GI/Hepatic/Renal:   (+) GERD:, liver disease (Non alcoholic cirrhosis: INR 1.4): coagulopathy from hepatic dysfunction, renal disease (Stage IIIa CKD:  creatinine 3.5 & GFR 13): CRI and dialysis,          ROS comment: Perforated viscus/diverticulitis s/p sigmoid colectomy - pt. Admitted since 5/9/21. Endo/Other:    (+) hypothyroidism, blood dyscrasia (Eliquis): anticoagulation therapy, thrombocytopenia and anemia:., malignancy/cancer (R breast CA). Pt had no PAT visit        ROS comment: Hx.  Of PRBC transfusion and FFP Abdominal:   (+) obese,     Abdomen: soft. Vascular: negative vascular ROS. Anesthesia Plan      MAC     ASA 4       Induction: intravenous. MIPS: Prophylactic antiemetics administered. Anesthetic plan and risks discussed with patient. Plan discussed with CRNA. Jase Olson DO   6/1/2021    History, data, and pertinent studies from chart review. Above represents information available via the shared medical record including previous anesthetic, medication, and allergy history. Confirmation of above and final disposition per DOS anesthesiologist.    [facility] Andrew Olson, DO  Staff Anesthesiologist  June 1, 2021  7:17 PM    Pt seen questions answered plan outlined accepts MAC,Labs and H&P reviewed. Gladis Beverly M.d 06/02/2021.

## 2021-06-01 NOTE — PROGRESS NOTES
Nephrology Progress Note  [facility]    Reason for Consult: YANELIS  Date of Service: 6/1/2021     Subjective    Patient seen and examined  Just returned from [facility] [place], operated by [facility] placement. No chest pain, abdominal pain, nausea.   Dyspnea the same         Past Medical History:        Diagnosis Date    Anxiety     Cancer [facility]) 2/11    right breast    Cirrhosis, non-alcoholic (HCC)     COPD (chronic obstructive pulmonary disease) ([facility]     Hemodialysis patient ([facility]     History of blood transfusion     Hypertension        Past Surgical History:        Procedure Laterality Date    BREAST SURGERY  2011    right lumpectomy    COLECTOMY N/A 5/9/2021    OPEN BOWEL RESECTION WITH COLOSTOMY performed by Benjamin Seth MD at [address] ARTHROSCOPY  09 09 2011    arthroscopy left shoulder rotator cuff repair, subacromial decompression, debridement labrum, synovectomy    TONSILLECTOMY         Current Medications:    Current Facility-Administered Medications: albumin human 25 % IV solution 25 g, 25 g, Intravenous, Once  amiodarone (CORDARONE) tablet 200 mg, 200 mg, Oral, BID  albumin human 25 % IV solution 25 g, 25 g, Intravenous, PRN  [Held by provider] apixaban (ELIQUIS) tablet 2.5 mg, 2.5 mg, Oral, BID  citrate dextrose (ACD Formula A) 0.73-2.45-2.2 GM/100ML solution 3.1 mL, 3.1 mL, Intracatheter, PRN  ferrous sulfate (IRON 325) tablet 325 mg, 325 mg, Oral, BID   polycarbophil (FIBERCON) tablet 625 mg, 625 mg, Oral, Daily  lactulose (CHRONULAC) 10 GM/15ML solution 20 g, 20 g, Oral, Daily  trimethobenzamide (TIGAN) injection 200 mg, 200 mg, Intramuscular, Q6H PRN  ondansetron (ZOFRAN) injection 4 mg, 4 mg, Intravenous, Q6H PRN  midodrine (PROAMATINE) tablet 5 mg, 5 mg, Oral, BID   triamcinolone (KENALOG) 0.1 % cream, , Topical, BID  diphenhydrAMINE (BENADRYL) injection 25 mg, 25 mg, Intravenous, Q6H PRN  lidocaine 1 % injection 5 mL, 5 mL, Intradermal, Once sodium chloride flush 0.9 % injection 10 mL, 10 mL, Intravenous, 2 times per day  sodium chloride flush 0.9 % injection 10 mL, 10 mL, Intravenous, PRN  0.9 % sodium chloride infusion, 25 mL, Intravenous, PRN  metoprolol tartrate (LOPRESSOR) tablet 25 mg, 25 mg, Oral, BID  rifaximin (XIFAXAN) tablet 550 mg, 550 mg, Oral, BID  ipratropium-albuterol (DUONEB) nebulizer solution 1 ampule, 1 ampule, Inhalation, BID  [Held by provider] apixaban (ELIQUIS) tablet 5 mg, 5 mg, Oral, BID  perflutren lipid microspheres (DEFINITY) injection 1.65 mg, 1.5 mL, Intravenous, ONCE PRN  fentaNYL (SUBLIMAZE) injection 50 mcg, 50 mcg, Intravenous, Q2H PRN  cetirizine (ZYRTEC) tablet 10 mg, 10 mg, Oral, Daily  anastrozole (ARIMIDEX) tablet 1 mg, 1 mg, Oral, Daily  levothyroxine (SYNTHROID) tablet 50 mcg, 50 mcg, Oral, Daily  montelukast (SINGULAIR) tablet 10 mg, 10 mg, Oral, Nightly  pantoprazole (PROTONIX) tablet 40 mg, 40 mg, Oral, QAM AC  sodium chloride flush 0.9 % injection 5-40 mL, 5-40 mL, Intravenous, 2 times per day  sodium chloride flush 0.9 % injection 5-40 mL, 5-40 mL, Intravenous, PRN  0.9 % sodium chloride infusion, 25 mL, Intravenous, PRN  traMADol (ULTRAM) tablet 50 mg, 50 mg, Oral, Q6H PRN    Allergies:  Diflucan [fluconazole], Penicillins, Sulfa antibiotics, Theophyllines, Codeine, Iodine, Tape [adhesive tape], and Vicodin [hydrocodone-acetaminophen]      Physical exam:   Constitutional:  VITALS:  BP (!) 106/52   Pulse 91   Temp 97.9 °F (36.6 °C) (Oral)   Resp 18   Ht 5' 4\" (1.626 m)   Wt 195 lb 9.6 oz (88.7 kg)   SpO2 99%   BMI 33.57 kg/m²     Gen: alert, awake, no acute distress  Eyes: eomi  HEENT: atraumatic/normocephalic   Lungs: clear to ascultation bilaterally, equal lung expansion  CV: RRR, no murmurs  Abdomen: soft, nontender, nondistended, normoactive bowel sounds; +ostomy   Extremitiy: +edema  : no CVA tenderness  Skin: no rash, tugor wnl  Neuro: no focal deficits  Psych: cooperative and appropriate last 72 hours. Last 3 Lipase:  No results for input(s): LIPASE in the last 72 hours. Last 3 BNP:  No results for input(s): BNP in the last 72 hours. Assessment/Plan      1. Acute kidney injury  Likely secondary to changes in perfusion, possible ATN  HD started on 5/24  [facility] placed 6/1    Will plan for dialysis tomorrow      2. Hypotension  Follow blood pressure, on midodrine    3. Anemia  Hemoglobin below goal but stable   On oral iron   Follow hemoglobin and transfuse as needed    4. Hypokalemia  Controlled  Follow K and adjust dialysis bath as needed    5. Hyperphosphatemia   With renal failure  Acceptable on last check  Follow and treat as needed    6.  Edema  Complicated by hypoalbuminemia  UF as tolerated with HD      Thank you for the opportunity to participate in the care of Ms Yoana Pitt          ______________________________      Joel Albarran MD  6/1/2021  2:45 PM

## 2021-06-02 NOTE — PROGRESS NOTES
Nephrology Progress Note  [facility]    Reason for Consult:   YANELIS  Date of Service: 6/2/2021     Subjective    Patient seen and examined  Feels tired after EGD  No chest pain, sob, abd pain, nausea  Poor appetite, stating she lost sensation of taste after COVID19, but she is trying to eat more         Past Medical History:        Diagnosis Date    Anxiety     Cancer ([facility] 2/11    right breast    Cirrhosis, non-alcoholic (HCC)     COPD [facility]chronic obstructive pulmonary disease) ([facility]     Hemodialysis patient ([facility]     History of blood transfusion     Hypertension        Past Surgical History:        Procedure Laterality Date    BREAST SURGERY  2011    right lumpectomy    COLECTOMY N/A 5/9/2021    OPEN BOWEL RESECTION WITH COLOSTOMY performed by Tan Marti MD at [address] ARTHROSCOPY  09 09 2011    arthroscopy left shoulder rotator cuff repair, subacromial decompression, debridement labrum, synovectomy    TONSILLECTOMY         Current Medications:    Current Facility-Administered Medications: albumin human 25 % IV solution 25 g, 25 g, Intravenous, Once  amiodarone (CORDARONE) tablet 200 mg, 200 mg, Oral, BID  albumin human 25 % IV solution 25 g, 25 g, Intravenous, PRN  [Held by provider] apixaban (ELIQUIS) tablet 2.5 mg, 2.5 mg, Oral, BID  citrate dextrose (ACD Formula A) 0.73-2.45-2.2 GM/100ML solution 3.1 mL, 3.1 mL, Intracatheter, PRN  ferrous sulfate (IRON 325) tablet 325 mg, 325 mg, Oral, BID   polycarbophil (FIBERCON) tablet 625 mg, 625 mg, Oral, Daily  lactulose (CHRONULAC) 10 GM/15ML solution 20 g, 20 g, Oral, Daily  trimethobenzamide (TIGAN) injection 200 mg, 200 mg, Intramuscular, Q6H PRN  ondansetron (ZOFRAN) injection 4 mg, 4 mg, Intravenous, Q6H PRN  midodrine (PROAMATINE) tablet 5 mg, 5 mg, Oral, BID   triamcinolone (KENALOG) 0.1 % cream, , Topical, BID  diphenhydrAMINE (BENADRYL) injection 25 mg, 25 mg, Intravenous, Q6H PRN  lidocaine 1 % injection 5 mL, 5 mL, Intradermal, Once  sodium chloride flush 0.9 % injection 10 mL, 10 mL, Intravenous, 2 times per day  sodium chloride flush 0.9 % injection 10 mL, 10 mL, Intravenous, PRN  0.9 % sodium chloride infusion, 25 mL, Intravenous, PRN  rifaximin (XIFAXAN) tablet 550 mg, 550 mg, Oral, BID  ipratropium-albuterol (DUONEB) nebulizer solution 1 ampule, 1 ampule, Inhalation, BID  [Held by provider] apixaban (ELIQUIS) tablet 5 mg, 5 mg, Oral, BID  perflutren lipid microspheres (DEFINITY) injection 1.65 mg, 1.5 mL, Intravenous, ONCE PRN  fentaNYL (SUBLIMAZE) injection 50 mcg, 50 mcg, Intravenous, Q2H PRN  cetirizine (ZYRTEC) tablet 10 mg, 10 mg, Oral, Daily  anastrozole (ARIMIDEX) tablet 1 mg, 1 mg, Oral, Daily  levothyroxine (SYNTHROID) tablet 50 mcg, 50 mcg, Oral, Daily  montelukast (SINGULAIR) tablet 10 mg, 10 mg, Oral, Nightly  pantoprazole (PROTONIX) tablet 40 mg, 40 mg, Oral, QAM AC  sodium chloride flush 0.9 % injection 5-40 mL, 5-40 mL, Intravenous, 2 times per day  sodium chloride flush 0.9 % injection 5-40 mL, 5-40 mL, Intravenous, PRN  0.9 % sodium chloride infusion, 25 mL, Intravenous, PRN  traMADol (ULTRAM) tablet 50 mg, 50 mg, Oral, Q6H PRN    Allergies:  Diflucan [fluconazole], Penicillins, Sulfa antibiotics, Theophyllines, Codeine, Iodine, Tape [adhesive tape], and Vicodin [hydrocodone-acetaminophen]      Physical exam:   Constitutional:  VITALS:  BP (!) 131/55 Comment: RN notified  Pulse 99   Temp 97.6 °F (36.4 °C) (Oral)   Resp 20   Ht 5' 4\" (1.626 m)   Wt 196 lb 9.6 oz (89.2 kg)   SpO2 100%   BMI 33.75 kg/m²     Gen: alert, awake, no acute distress  Eyes: eomi  HEENT: atraumatic/normocephalic   Lungs: clear to ascultation bilaterally, equal lung expansion  CV: RRR, no murmur  Abdomen: soft, nontender, nondistended, normoactive bowel sounds; +ostomy   Extremitiy: +edema  : no CVA tenderness  Skin: no rash, tugor wnl  Neuro: no focal deficits  Psych: cooperative and in the last 72 hours. Last 3 Lipase:  No results for input(s): LIPASE in the last 72 hours. Last 3 BNP:  No results for input(s): BNP in the last 72 hours. Assessment/Plan      1. Acute kidney injury  Likely secondary to changes in perfusion, possible ATN  HD started on 5/24  [facility] placed 6/1  No evidence of renal recovery     For dialysis today for clearance and volume removal     2. Hypotension  Follow blood pressure, on midodrine  Blood pressure acceptable on last check    3. Anemia  Hemoglobin below goal but stable   On oral iron   Follow hemoglobin and transfuse as needed  S/P 6-2 EGD    4. Hypokalemia  Controlled  Follow K and adjust dialysis bath as needed    5. Hyperphosphatemia   With renal failure  Acceptable on last check  Follow and treat as needed    6.  Edema  Complicated by hypoalbuminemia  UF as tolerated with HD      Thank you for the opportunity to participate in the care of Ms Roge Rosa          ______________________________      Elena Ramon MD  6/2/2021  1:35 PM

## 2021-06-02 NOTE — INTERVAL H&P NOTE
Update History & Physical    Patient's H&P was reviewed from admission date    BP (!) 110/57   Pulse 82   Temp 97.3 °F (36.3 °C) (Oral)   Resp 18   Ht 5' 4\" (1.626 m)   Wt 196 lb 9.6 oz (89.2 kg)   SpO2 100%   BMI 33.75 kg/m²        Patient examined.     Plan:   EGD    Feroz Mayes,   6/2/2021  7:45 AM

## 2021-06-02 NOTE — ANESTHESIA POSTPROCEDURE EVALUATION
Department of Anesthesiology  Postprocedure Note    Patient: Analisa Ingram  MRN: [id number]  YOB: 1940  Date of evaluation: 6/2/2021  Time:  9:27 AM     Procedure Summary     Date: 06/02/21 Room / Location: [address] [place] [address] / [address] [place] [address]    Anesthesia Start: 7184 Anesthesia Stop: 0913    Procedures:       EGD BIOPSY (N/A )      EGD DILATION GASTRIC OUTLET Diagnosis: (ANEMIA)    Surgeons: Arsenio Salinas MD Responsible Provider: Charisma Raya MD    Anesthesia Type: MAC ASA Status: 4          Anesthesia Type: MAC    Jess Phase I: Jess Score: 9    Jess Phase II: Jess Score: 8    Last vitals: Reviewed and per EMR flowsheets.        Anesthesia Post Evaluation    Patient location during evaluation: bedside  Patient participation: complete - patient participated  Level of consciousness: awake  Pain score: 3  Airway patency: patent  Nausea & Vomiting: no nausea  Complications: no  Cardiovascular status: blood pressure returned to baseline  Respiratory status: acceptable  Hydration status: euvolemic

## 2021-06-02 NOTE — PROGRESS NOTES
Progress Note  Date:2021       Room:0414/0414-02  Patient Tino Osei     YOB: 1940     Age:80 y.o. Subjective    Subjective:  Symptoms:  Stable. She reports shortness of breath, malaise, weakness and anxiety. No cough, chest pain, headache, chest pressure, anorexia or diarrhea. Diet:  Adequate intake. Activity level: Impaired due to weakness. Pain:  She reports no pain. pt is complaining of choking and unable to swallow food  Review of Systems   Respiratory: Positive for shortness of breath. Negative for cough. Cardiovascular: Negative for chest pain. Gastrointestinal: Negative for anorexia and diarrhea. Neurological: Positive for weakness. Objective         Vitals Last 24 Hours:  TEMPERATURE:  Temp  Av.6 °F (36.4 °C)  Min: 97.3 °F (36.3 °C)  Max: 97.9 °F (36.6 °C)  RESPIRATIONS RANGE: Resp  Av  Min: 18  Max: 18  PULSE OXIMETRY RANGE: SpO2  Av.5 %  Min: 99 %  Max: 100 %  PULSE RANGE: Pulse  Av.5  Min: 82  Max: 91  BLOOD PRESSURE RANGE: Systolic (22JCF), UKE:67 , Min:85 , TQM:010   ; Diastolic (40JAX), OUS:53, Min:41, Max:55    I/O (24Hr): No intake or output data in the 24 hours ending 21 2250  Objective:  General Appearance:  Comfortable. Vital signs: (most recent): Blood pressure (!) 88/50, pulse 82, temperature 97.3 °F (36.3 °C), temperature source Oral, resp. rate 18, height 5' 4\" (1.626 m), weight 195 lb 9.6 oz (88.7 kg), SpO2 100 %. Vital signs are normal.    Output: Producing urine. HEENT: Normal HEENT exam.    Lungs:  Normal respiratory rate. She is not in respiratory distress. Heart: Regular rhythm. S1 normal and S2 normal.    Abdomen: Abdomen is soft. Bowel sounds are normal.   There is no abdominal tenderness. Extremities: Normal range of motion. Pulses: Distal pulses are intact. Neurological: Patient is alert and oriented to person, place and time.     Pupils:  Pupils are equal, round, and reactive to light. Skin:  Warm and dry. Labs/Imaging/Diagnostics    Labs:  CBC:  Recent Labs     05/30/21  0618 05/31/21  0620 06/01/21  0555   WBC 5.9 6.1 5.7   RBC 2.61* 2.55* 2.46*   HGB 8.3* 8.2* 7.9*   HCT 27.2* 26.6* 25.8*   .2* 104.3* 104.9*   RDW 18.8* 18.6* 18.7*   PLT 86* 101* 98*     CHEMISTRIES:  Recent Labs     05/30/21  0618 05/31/21  0620 06/01/21  0555    137 136   K 4.1 4.0 4.2    102 100   CO2 27 26 28   BUN 28* 33* 24*   CREATININE 3.5* 4.0* 3.5*   GLUCOSE 106* 104* 103*   PHOS 3.6 4.2 3.9     PT/INR:  Recent Labs     06/01/21  0945   PROTIME 16.5*   INR 1.4     APTT:No results for input(s): APTT in the last 72 hours. LIVER PROFILE:  Recent Labs     05/30/21  0618 05/31/21  0620 06/01/21  0555   AST 24 20 24   ALT 6 7 6   BILITOT 1.4* 1.3* 1.6*   ALKPHOS 96 100 116*       Imaging Last 24 Hours:  IR FLUORO GUIDED CVA DEVICE PLMT/REPLACE/REMOVAL    Result Date: 6/1/2021  EXAMINATION: Tunneled dialysis catheter placement with fluoroscopic guidance. 6/1/2021 1:18 pm HISTORY: ORDERING SYSTEM PROVIDED HISTORY: insertion of a tunneled hemodialysis catheter TECHNOLOGIST PROVIDED HISTORY: Reason for exam:->insertion of a tunneled hemodialysis catheter; ORDERING SYSTEM PROVIDED HISTORY: [facility] TECHNOLOGIST PROVIDED HISTORY: Reason for exam:->THD COMPARISON: Temporary dialysis catheter placement from May 24, 2021. FLUOROSCOPY DOSE AND TYPE OR TIME AND EXPOSURES: Fluoroscopy Time: 0.6 minutes Number of Images: 2 Air kerma: 13 mGy SEDATION: 0.5 mgversed and 50 mcg fentanyl were titrated intravenously for moderate sedation monitored under my direction. Total intraservice time of sedation was 11 minutes. The patient's vital signs were monitored throughout the procedure and recorded in the patient's medical record by the nurse. Sedation monitoring started at 1304 and ended at 1315. CONTRAST None.  TECHNIQUE: Informed consent was obtained after a detailed explanation of the procedure including risks, benefits, and alternatives. Universal protocol was observed. Initially, the indwelling right internal jugular vein temporary dialysis catheter was removed and hemostasis achieved with gentle manual pressure. After infiltrating the skin in the right neck with 1% lidocaine, access to the right internal jugular vein was gained with a micropuncture needle under ultrasound guidance. Needle was replaced with a micropuncture catheter over a guidewire. Appropriate measurements were made with a microwire and the catheter was capped off. Skin in the right upper chest was infiltrated with 1% lidocaine and a tunnel was created. The dialysis catheter was tunneled in a retrograde fashion through the skin incision site to the venotomy site. The micropuncture catheter was replaced with the with the peel-away sheath after serial dilatation. The dialysis catheter was advanced through the peel-away sheath and the sheath was peeled away. After confirming the appropriate positioning of the catheter with the tip distal to the cavoatrial junction, catheter was secured with 2-0 Prolene suture. At the end of the procedure, sterile occlusive dressing was placed at the chest incision site as well as the venotomy site. Patient tolerated the procedure well. No immediate complications. FINDINGS: The tip of the catheter is distal to the cavoatrial junction. Successful right sided tunneled dialysis placement under the under sonographic and fluoroscopic guidance. IR ULTRASOUND GUIDANCE VASCULAR ACCESS    Result Date: 6/1/2021  EXAMINATION: Tunneled dialysis catheter placement with fluoroscopic guidance.   6/1/2021 1:18 pm HISTORY: ORDERING SYSTEM PROVIDED HISTORY: insertion of a tunneled hemodialysis catheter TECHNOLOGIST PROVIDED HISTORY: Reason for exam:->insertion of a tunneled hemodialysis catheter; ORDERING SYSTEM PROVIDED HISTORY: [facility] TECHNOLOGIST PROVIDED HISTORY: Reason for exam:->THD COMPARISON: Temporary dialysis catheter placement from May 24, 2021. FLUOROSCOPY DOSE AND TYPE OR TIME AND EXPOSURES: Fluoroscopy Time: 0.6 minutes Number of Images: 2 Air kerma: 13 mGy SEDATION: 0.5 mgversed and 50 mcg fentanyl were titrated intravenously for moderate sedation monitored under my direction. Total intraservice time of sedation was 11 minutes. The patient's vital signs were monitored throughout the procedure and recorded in the patient's medical record by the nurse. Sedation monitoring started at 1304 and ended at 1315. CONTRAST None. TECHNIQUE: Informed consent was obtained after a detailed explanation of the procedure including risks, benefits, and alternatives. Universal protocol was observed. Initially, the indwelling right internal jugular vein temporary dialysis catheter was removed and hemostasis achieved with gentle manual pressure. After infiltrating the skin in the right neck with 1% lidocaine, access to the right internal jugular vein was gained with a micropuncture needle under ultrasound guidance. Needle was replaced with a micropuncture catheter over a guidewire. Appropriate measurements were made with a microwire and the catheter was capped off. Skin in the right upper chest was infiltrated with 1% lidocaine and a tunnel was created. The dialysis catheter was tunneled in a retrograde fashion through the skin incision site to the venotomy site. The micropuncture catheter was replaced with the with the peel-away sheath after serial dilatation. The dialysis catheter was advanced through the peel-away sheath and the sheath was peeled away. After confirming the appropriate positioning of the catheter with the tip distal to the cavoatrial junction, catheter was secured with 2-0 Prolene suture. At the end of the procedure, sterile occlusive dressing was placed at the chest incision site as well as the venotomy site. Patient tolerated the procedure well. No immediate complications.  FINDINGS: The tip of the catheter is distal to the cavoatrial junction. Successful right sided tunneled dialysis placement under the under sonographic and fluoroscopic guidance. Assessment//Plan           Hospital Problems         Last Modified POA    Chronic atrial fibrillation ([address] 5/9/2021 Yes    Cirrhosis of liver not due to alcohol ([address] 5/9/2021 Yes    Perforated viscus 5/9/2021 Yes    Chronic renal impairment, stage 3a (Chronic) 5/9/2021 Yes    Acute on chronic renal insufficiency 5/9/2021 Yes    Chronic diastolic heart failure ([address] (Chronic) 5/9/2021 Yes        Assessment:  (Problem List as of 6/1/2021 Reviewed: 5/9/2021  6:12 PM by Tiffanie Pina MD    Chronic renal impairment, stage 3a    Chronic diastolic heart failure (HCC)    Glenoid labral tear    Synovitis    Biceps tendon tear    Rotator cuff tear    Subacromial impingement    Encephalopathy    Atrial fibrillation with rapid ventricular response (HCC)    Chronic atrial fibrillation (HCC)    Acute exacerbation of COPD with asthma ([address]    CAP (community acquired pneumonia)    Cirrhosis of liver not due to alcohol ([address]    Hypertension    Acute heart failure ([address]    Dyspnea    Non morbid obesity    Perforated viscus    Acute on chronic renal insufficiency    dysphagia). Plan:   (Swallow eval and egd).        Electronically signed by Michelle Kaufman MD on 6/1/21 at 10:50 PM EDT

## 2021-06-02 NOTE — PROGRESS NOTES
Physical Therapy    Physical Therapy Treatment Note    Room #:  7973/8401-07  Patient Name: Kennedy Abbott  YOB: 1940  MRN: [id number]    Referring Provider:   Lita Boone MD     Date of Service: 6/2/2021    Evaluating Physical Therapist: Martine Aldridge, PT  #[id number]       Diagnosis:   Perforated viscus [R19.8]   Admitted with  Abdominal pain   Date of Procedure: 5/9/2021    Procedure(s):   OPEN BOWEL RESECTION WITH COLOSTOMY   Surgeon(s): Kyrie Grant MD  05/25/21 1115   PT eval and treat Start: 05/25/21 1115, End: 05/25/21 1115, ONE TIME, Standing Count: 1 Occurrences, Benedict Dill MD  trf to icu 5/23/21 uncontrolled bleeding from nose with emesis of clots;      Patient Active Problem List   Diagnosis    Glenoid labral tear    Synovitis    Biceps tendon tear    Rotator cuff tear    Subacromial impingement    Encephalopathy    Atrial fibrillation with rapid ventricular response (HCC)    Chronic atrial fibrillation (HCC)    Acute exacerbation of COPD with asthma ([address]    CAP (community acquired pneumonia)    Cirrhosis of liver not due to alcohol ([address]    Hypertension    Acute heart failure ([address]    Dyspnea    Non morbid obesity    Perforated viscus    Chronic renal impairment, stage 3a    Acute on chronic renal insufficiency    Chronic diastolic heart failure (HCC)        Tentative placement recommendation: Inpatient Rehab    Equipment recommendation: Wheelchair      Prior Level of Function: Patient ambulated independently cane  Rehab Potential: good    for baseline    Past medical history:   Past Medical History:   Diagnosis Date    Anxiety     Cancer ([address] 2/11    right breast    Cirrhosis, non-alcoholic ([address]     COPD (chronic obstructive pulmonary disease) ([address]     Hemodialysis patient ([address]     History of blood transfusion     Hypertension      Past Surgical History:   Procedure Laterality Date    BREAST SURGERY  2011    right lumpectomy    COLECTOMY N/A 5/9/2021    OPEN BOWEL RESECTION WITH COLOSTOMY performed by Miguel Figueredo MD at [address] ARTHROSCOPY  09 09 2011    arthroscopy left shoulder rotator cuff repair, subacromial decompression, debridement labrum, synovectomy    TONSILLECTOMY         Precautions: Up as tolerated, falls and alarm ,  02    SUBJECTIVE:    Social history: Patient lives alone   in a ranch home  with Ramp  to enter   Equipment owned: Cane, [address] and Toilet riser,       [address] [facility]   How much difficulty turning over in bed?: A Little  How much difficulty sitting down on / standing up from a chair with arms?: A Lot  How much difficulty moving from lying on back to sitting on side of bed?: A Lot  How much help from another person moving to and from a bed to a chair?: A Lot  How much help from another person needed to walk in hospital room?: A Lot  How much help from another person for climbing 3-5 steps with a railing?: Total  AM-PAC Inpatient Mobility Raw Score : 12  AM-PAC Inpatient T-Scale Score : 35.33  Mobility Inpatient CMS 0-100% Score: 68.66  Mobility Inpatient CMS G-Code Modifier : CL    Nursing cleared patient for PT treatment. OBJECTIVE;   Initial Evaluation  Date: 5/10/2021 Re eval  5/26/21 Treatment Date:    6/2/2021    Short Term/ Long Term   Goals   Was pt agreeable to Eval/treatment? Yes   yes Yes  To be met in 3 days   Pain level   4/10   abdomen none Little pain in abdomen    Bed Mobility    Rolling: Moderate assist of 1    Supine to sit: Moderate assist of 1    Sit to supine: Moderate assist of 1    Scooting: Moderate assist of 1   Rolling: Maximal assist of 1    Supine to sit: Maximal assist of 1    Sit to supine: Maximal assist of  2    Scooting: Maximal assist of 1    Rolling: Minimal assist of 1   Supine to sit: Minimal assist of 1   Sit to supine: Moderate assist of 1   Scooting:  Moderate assist of  2 backwards Yes Partial Yes      Treatment:  Patient practiced and was instructed/facilitated in the following treatment:   performed supine exercises. Assisted to edge of bed and sat edge of bed for 15 minutes for dynamic seated balance. Seated reaching and weight shifting activities. Educated throughout treatment. And performed pursed lip breathing 02 maintained at % 2 L via nasal cannula. Max x2 using chux to be closer to head of bed. Therapeutic Exercises:  ankle pumps, quad sets, glut sets and x 10-15 reps           At end of session, patient in bed with nursing present, instructions to use call light for needs, alarm activated  call light and phone within reach,   all lines and tubes intact. Family present. Patient's/ family goals   rehab with family support      ASSESSMENT: Patient exhibits decreased strength, balance and endurance, fatigue weeping 3rd spacing impairing functional mobility, transfers, gait , gait distance and tolerance to activity and are barriers to d/c and require skilled intervention during hospital stay. At edge of bed patients HR would increase to high 170s, however patient would be educated to relax and perform pursed lip breathing. Increased rest breaks for HR to decrease. Patient performed 2 sit to stands this treatment and was not able to tolerate second stand due to weakness in BLE and fatigue. Patient needing increased assist with scooting backwards on edge of bed  this treatment due to not being able to perform another sit to stand to adjust herself. Patient needs skilled intervention to increase indep and return to level of function prior to hospitalization.        Plan of Care:     -Bed Mobility: Lower extremity exercises  and Upper extremity exercises   -Standing Balance: Perform strengthening exercises in standing to promote motor control with or without upper extremity support  and Instruct patient on adequate base of support to maintain balance  -Transfers: Provide

## 2021-06-02 NOTE — PROGRESS NOTES
SPEECH/LANGUAGE PATHOLOGY  VIDEOFLUOROSCOPIC STUDY OF SWALLOWING (MBS)   and PLAN OF CARE    PATIENT NAME:  Penni Bosworth  (female)     MRN:  [id number]    :  1940  (68 y.o.)  STATUS:  Inpatient: Room 0416/0416-01    TODAY'S DATE:  2021  REFERRING PROVIDER/ORDER:  21 1115   SLP video swallow Start: 21 1115, End: 21 1115, ONE TIME, Standing Count: 1 Occurrences, R    Prakash Vergara MD    REASON FOR REFERRAL: choking with intake    EVALUATING THERAPIST: Alvarez Morales, SLP      RESULTS:      DYSPHAGIA DIAGNOSIS:  minimal pharyngeal phase dysphagia      DIET RECOMMENDATIONS:  Regular consistency solids with  thin liquids    FEEDING RECOMMENDATIONS:    Assistance level:  Not applicable     Compensatory strategies recommended: Double swallow, Small bites/sips and Alternate solids and liquids     Discussed recommendations with nursing and/or faxed report to referring provider: No secondary to no diet/liquid change recommended     SPEECH THERAPY  PLAN OF CARE   The dysphagia POC is established based on physician order and dysphagia diagnosis    Meal time assessment for 1-2 sessions to provide diet modification and compensatory strategy implementation due to patient report of dysphagia symptoms during meals and staff report of dysphagia symptoms during meals which are not fully explained by recent EGD and MBSS      Conditions Requiring Skilled Therapeutic Intervention for dysphagia:    Reduced pharyngeal clearing of the bolus  Coughing during PO intake      SPECIFIC DYSPHAGIA INTERVENTIONS TO INCLUDE:     patient may also benefit from an esophagram to further evaluate esophageal motility     Meal time assessment for 1-2 sessions to provide diet modification and compensatory strategy implementation due to patient report of dysphagia symptoms during meals and staff report of dysphagia symptoms during meals    Specific instructions for next treatment:  ongoing PO analysis to upgrade diet and evaluate tolerance of current PO recommendation  Treatment Goals:    Short Term Goals:    Pt will improve oropharyngeal swallow function to ensure airway protection during PO intake to maintain adequate nutrition/hydration and decrease signs/symptoms of aspiration to less than 1 x/day. Long Term Goals:   Pt will improve oropharyngeal swallow function to ensure airway protection during PO intake to maintain adequate nutrition/hydration and decrease signs/symptoms of aspiration to less than 1 x/day. Patient/family Goal:    Return to least restrictive diet. Plan of care discussed with Patient   The Patient understand(s) the diagnosis, prognosis and plan of care     Rehabilitation Potential/Prognosis: good                      ADMITTING DIAGNOSIS: Perforated viscus [R19.8]     VISIT DIAGNOSIS:   Visit Diagnoses       Codes    Perforated abdominal viscus    -  Primary R19.8    Chronic renal insufficiency, stage 2 (mild)     N18.2              PATIENT REPORT/COMPLAINT: choking and feeling food sticking    PRIOR LEVEL OF SWALLOW FUNCTION:    Past History of Dysphagia?:  Yes just recently     Home diet: Regular consistency solids with  thin liquids    PROCEDURE:  Consistencies Administered During the Evaluation   Liquids: thin liquid and nectar thick liquid   Solids:  pureed foods and solid foods      Method of Intake:   cup, straw, spoon  Self fed      Position:   Seated, upright, Lateral plane    CLINICAL ASSESSMENT:      MBSImP Results:   Lip closure for intraoral bolus containment resulted in no labial escape. Tongue control during bolus hold maintained a cohesive bolus held between tongue to palate seal. Bolus preparation and mastication resulted in timely and efficient chewing and mashing. Bolus transport/lingual motion was with brisk tongue motion. Oral residue was not observed. Initiation of the pharyngeal swallow occurred as the bolus head reached the valleculae.  Soft palate elevation resulted in no bolus between the soft palate and the pharyngeal wall. Laryngeal elevation demonstrated complete superior movement of the thyroid cartilage with complete approximation of the arytenoids to the epiglottic petiole. Anterior hyoid excursion demonstrated complete anterior movement. Epiglottic movement resulted in complete inversion. Laryngeal vestibule closure was complete, as indicated by no air or contrast within the laryngeal vestibule at the height of the swallow. Pharyngeal stripping wave was present and complete. Pharyngeal contraction could not be determined due to logistical reasons not related to physiologic impairment. Pharyngoesophageal segment opening was completely distended for complete duration with no obstruction of bolus flow. Tongue base retraction allowed no contrast between the retracted tongue base and the posterior pharyngeal wall. Pharyngeal residue was present--a very small area of barium residue was present once puree was administered at the level just posterior and/or lateral to the epiglottis (not within the valleculae). Esophageal clearance in the upright position could not be assessed due to logistical reasons not related to physiologic impairment. Patient with increased belching after intake as well as feeling very uncomfortable with swallowing when asked to swallow large volume rapidly. She does report at times needing to throw up after eating.    PENETRATION-ASPIRATION SCALE (PAS):  THIN 1 = Material does not enter the airway  MILDLY THICK 1 = Material does not enter the airway  MODERATELY THICK item not administered  PUREE 1 = Material does not enter the airway  HARD SOLID 1 = Material does not enter the airway       COMPENSATORY STRATEGIES    Compensatory strategies that were beneficial included Alternate solids and liquids      STRUCTURAL/FUNCTIONAL ANOMALIES   No structural/functional anomalies were noted    CERVICAL ESOPHAGEAL STAGE :     The cervical esophagus appeared adequate          ___________    Cognition:   Within functional limits for this exam    Oral Peripheral Examination   Adequate lingual/labial strength     Current Respiratory Status   2 liters nasal cannula     Parameters of Speech Production  Respiration:  Adequate for speech production  Quality:   Within functional limits  Intensity: Within functional limits    Pain: No pain reported. EDUCATION:   The Speech Language Pathologist (SLP) completed education regarding results of evaluation and that intervention is warranted at this time. Learner: Patient  Education: Reviewed results and recommendations of this evaluation  Evaluation of Education:  Mojgan Aguilera understanding    This plan may be re-evaluated and revised as warranted. Evaluation Time includes thorough review of current medical information, gathering information on past medical history/social history and prior level of function, completion of standardized testing/informal observation of tasks, assessment of data and education on plan of care and goals. [x]The admitting diagnosis and active problem list, have been reviewed prior to initiation of this evaluation.     CPT Code: 98465  dysphagia study    ACTIVE PROBLEM LIST:   Patient Active Problem List   Diagnosis    Glenoid labral tear    Synovitis    Biceps tendon tear    Rotator cuff tear    Subacromial impingement    Encephalopathy    Atrial fibrillation with rapid ventricular response (HCC)    Chronic atrial fibrillation (HCC)    Acute exacerbation of COPD with asthma ([address]    CAP (community acquired pneumonia)    Cirrhosis of liver not due to alcohol ([address]    Hypertension    Acute heart failure ([address]    Dyspnea    Non morbid obesity    Perforated viscus    Chronic renal impairment, stage 3a    Acute on chronic renal insufficiency    Chronic diastolic heart failure ([address]       Taiwo Vega MSCCC/SLP  Speech Language Pathologist  [id number]

## 2021-06-02 NOTE — PROGRESS NOTES
Progress Note  Date:2021       Room:0416/0416-01  Patient Kim Don     YOB: 1940     Age:80 y.o. Subjective    Subjective:  Symptoms:  Stable. She reports shortness of breath, malaise, weakness, anorexia and anxiety. No cough, chest pain, headache, chest pressure or diarrhea. Diet:  Poor intake. Activity level: Impaired due to weakness. Pain:  She complains of pain that is mild. Review of Systems   Respiratory: Positive for shortness of breath. Negative for cough. Cardiovascular: Negative for chest pain. Gastrointestinal: Positive for anorexia. Negative for diarrhea. Neurological: Positive for weakness. Objective         Vitals Last 24 Hours:  TEMPERATURE:  Temp  Av.9 °F (36.6 °C)  Min: 97.3 °F (36.3 °C)  Max: 98.5 °F (36.9 °C)  RESPIRATIONS RANGE: Resp  Av  Min: 18  Max: 20  PULSE OXIMETRY RANGE: SpO2  Av.5 %  Min: 97 %  Max: 100 %  PULSE RANGE: Pulse  Av.5  Min: 82  Max: 101  BLOOD PRESSURE RANGE: Systolic (72BRG), IBU:315 , Min:85 , SANCHEZ:205   ; Diastolic (22YFD), HFH:03, Min:41, Max:57    I/O (24Hr): Intake/Output Summary (Last 24 hours) at 2021 0828  Last data filed at 2021 7953  Gross per 24 hour   Intake    Output 200 ml   Net -200 ml     Objective:  General Appearance:  Comfortable. Vital signs: (most recent): Blood pressure (!) 136/54, pulse 101, temperature 98.5 °F (36.9 °C), temperature source Oral, resp. rate 20, height 5' 4\" (1.626 m), weight 196 lb 9.6 oz (89.2 kg), SpO2 97 %. Output: Producing urine. HEENT: Normal HEENT exam.    Heart: Regular rhythm. S1 normal and S2 normal.    Abdomen: Abdomen is soft. Bowel sounds are normal.     Extremities: Normal range of motion. Pulses: Distal pulses are intact. Neurological: Patient is alert and oriented to person, place and time. Normal strength. Pupils:  Pupils are equal, round, and reactive to light. Skin:  Warm and dry. and alternatives. Universal protocol was observed. Initially, the indwelling right internal jugular vein temporary dialysis catheter was removed and hemostasis achieved with gentle manual pressure. After infiltrating the skin in the right neck with 1% lidocaine, access to the right internal jugular vein was gained with a micropuncture needle under ultrasound guidance. Needle was replaced with a micropuncture catheter over a guidewire. Appropriate measurements were made with a microwire and the catheter was capped off. Skin in the right upper chest was infiltrated with 1% lidocaine and a tunnel was created. The dialysis catheter was tunneled in a retrograde fashion through the skin incision site to the venotomy site. The micropuncture catheter was replaced with the with the peel-away sheath after serial dilatation. The dialysis catheter was advanced through the peel-away sheath and the sheath was peeled away. After confirming the appropriate positioning of the catheter with the tip distal to the cavoatrial junction, catheter was secured with 2-0 Prolene suture. At the end of the procedure, sterile occlusive dressing was placed at the chest incision site as well as the venotomy site. Patient tolerated the procedure well. No immediate complications. FINDINGS: The tip of the catheter is distal to the cavoatrial junction. Successful right sided tunneled dialysis placement under the under sonographic and fluoroscopic guidance. IR ULTRASOUND GUIDANCE VASCULAR ACCESS    Result Date: 6/1/2021  EXAMINATION: Tunneled dialysis catheter placement with fluoroscopic guidance.   6/1/2021 1:18 pm HISTORY: ORDERING SYSTEM PROVIDED HISTORY: insertion of a tunneled hemodialysis catheter TECHNOLOGIST PROVIDED HISTORY: Reason for exam:->insertion of a tunneled hemodialysis catheter; ORDERING SYSTEM PROVIDED HISTORY: [facility] TECHNOLOGIST PROVIDED HISTORY: Reason for exam:->THD COMPARISON: Temporary dialysis catheter placement from May 24, 2021. FLUOROSCOPY DOSE AND TYPE OR TIME AND EXPOSURES: Fluoroscopy Time: 0.6 minutes Number of Images: 2 Air kerma: 13 mGy SEDATION: 0.5 mgversed and 50 mcg fentanyl were titrated intravenously for moderate sedation monitored under my direction. Total intraservice time of sedation was 11 minutes. The patient's vital signs were monitored throughout the procedure and recorded in the patient's medical record by the nurse. Sedation monitoring started at 1304 and ended at 1315. CONTRAST None. TECHNIQUE: Informed consent was obtained after a detailed explanation of the procedure including risks, benefits, and alternatives. Universal protocol was observed. Initially, the indwelling right internal jugular vein temporary dialysis catheter was removed and hemostasis achieved with gentle manual pressure. After infiltrating the skin in the right neck with 1% lidocaine, access to the right internal jugular vein was gained with a micropuncture needle under ultrasound guidance. Needle was replaced with a micropuncture catheter over a guidewire. Appropriate measurements were made with a microwire and the catheter was capped off. Skin in the right upper chest was infiltrated with 1% lidocaine and a tunnel was created. The dialysis catheter was tunneled in a retrograde fashion through the skin incision site to the venotomy site. The micropuncture catheter was replaced with the with the peel-away sheath after serial dilatation. The dialysis catheter was advanced through the peel-away sheath and the sheath was peeled away. After confirming the appropriate positioning of the catheter with the tip distal to the cavoatrial junction, catheter was secured with 2-0 Prolene suture. At the end of the procedure, sterile occlusive dressing was placed at the chest incision site as well as the venotomy site. Patient tolerated the procedure well. No immediate complications.  FINDINGS: The tip of the catheter is distal to the cavoatrial junction. Successful right sided tunneled dialysis placement under the under sonographic and fluoroscopic guidance. Assessment//Plan           Hospital Problems         Last Modified POA    Chronic atrial fibrillation ([address] 5/9/2021 Yes    Cirrhosis of liver not due to alcohol ([address] 5/9/2021 Yes    Perforated viscus 5/9/2021 Yes    Chronic renal impairment, stage 3a (Chronic) 5/9/2021 Yes    Acute on chronic renal insufficiency 5/9/2021 Yes    Chronic diastolic heart failure ([address] (Chronic) 5/9/2021 Yes        Assessment:  (Problem List as of 6/2/2021 Reviewed: 5/9/2021  6:12 PM by Nuvia Che MD    Chronic renal impairment, stage 3a    Chronic diastolic heart failure (HCC)    Glenoid labral tear    Synovitis    Biceps tendon tear    Rotator cuff tear    Subacromial impingement    Encephalopathy    Atrial fibrillation with rapid ventricular response (HCC)    Chronic atrial fibrillation (HCC)    Acute exacerbation of COPD with asthma ([address]    CAP (community acquired pneumonia)    Cirrhosis of liver not due to alcohol ([address]    Hypertension    Acute heart failure ([address]    Dyspnea    Non morbid obesity    Perforated viscus    Acute on chronic renal insufficiency    dysphagia  esrd  ). Plan:   (Egd).        Electronically signed by Felicia Helton MD on 6/2/21 at 8:28 AM EDT

## 2021-06-02 NOTE — PROCEDURES
Procedure:    Esophagogastroduodenoscopy    Indication:    Blood loss anemia and dysphagia    Estimated blood loss:  < 2 cc    Consent:   Informed consent was obtained from the patient including and not limited to risk of perforation, aspiration of gastric contents or teeth, bleeding, infection, dental breakage, ileus, need for surgery, or worst case death. Sedation  Endoscope was advanced easily through mouth to second portion of duodenum    Oropharynx:    views are limited but grossly normal.    Esophagus:   Mucosa is normal.  GEJ at ~40 cm. No esophageal varices. Patient dilated with [address]    Stomach:   Antrum is normal, biopsies of antrum obtained to r/o H. Pylori    Gastric with mild portal hypertensive gastropathy    Retroflexed views showed normal fundus and cardia. Duodenum: Bulb is normal.     Second portion of duodenum is normal.  No fresh of old blood. Biopsies taken to r/o celiac    IMPRESSION AND PLAN:     1. Mild gastric body portal hypertensive gastropathy. Biopsies of antrum and small bowel were obtained to r/o H.pylori and celiac disease    2. Esophagus dilated with [address]. Patient tolerated procedure well    No obvious source of patient's blood loss anemia identified on EGD. Suspect likely component of perioperative blood loss and myelosuppression/hypoproliferative state secondary to acute kidney injury. Continue oral iron and transfuse as needed. If patient were to have a precipitous drop in serial H&H recommend NM bleeding scan with recommendations for further endoscopic intervention to follow. Follow up as outpatient in office, call [phone] to schedule for appointment.       eFroz Mayes DO  2021  9:06 AM     I was present for entire duration of procedure; discussed findings with resident and agree with recommendations above    Don Aviles MD  Gastroenterology

## 2021-06-03 NOTE — PROGRESS NOTES
PROGRESS NOTE    By Mirlande Lai D.O., GI Fellow    [facility] and Amelie Villeda M.D., Dr. Adair Chun M.D., Dr. Toan Valverde., Dr. Sarai Hope M.D., Dr. Anita Iglesias  68 y.o.  female    SUBJECTIVE:    No acute events overnight. Patient states she is little fatigued this morning after getting out of bed multiple times to use the bathroom. Discussed findings on EGD and morning labs. All questions answered    OBJECTIVE:    BP (!) 120/56   Pulse 109   Temp 97.7 °F (36.5 °C) (Oral)   Resp 20   Ht 5' 4\" (1.626 m)   Wt 196 lb 9.6 oz (89.2 kg)   SpO2 99%   BMI 33.75 kg/m²   GEN: A&Ox3, friendly, NAD  HEENT:PEERL  Heart: RRR  Lungs: CTAB  Abd.: soft, NT, ND, BS +, no G/R, no HSM  Extr.: no C/C/E, no brusing      Stool (measured) : 150 mL  Lab Results   Component Value Date    WBC 5.4 06/03/2021    WBC 5.1 06/02/2021    WBC 5.7 06/01/2021    HGB 8.0 06/03/2021    HGB 8.0 06/02/2021    HGB 7.9 06/01/2021    HCT 26.3 06/03/2021    .2 06/03/2021    RDW 18.9 06/03/2021     06/03/2021     06/02/2021    PLT 98 06/01/2021     Lab Results   Component Value Date     06/03/2021    K 4.3 06/03/2021     06/03/2021    CO2 27 06/03/2021    BUN 21 06/03/2021    CREATININE 3.4 06/03/2021    CALCIUM 8.5 06/03/2021    PROT 5.6 06/03/2021    LABALBU 3.0 06/03/2021    LABALBU 4.1 05/30/2012    BILITOT 1.6 06/03/2021    BILITOT 1.5 06/02/2021    BILITOT 1.6 06/01/2021    ALKPHOS 111 06/03/2021    ALKPHOS 109 06/02/2021    ALKPHOS 116 06/01/2021    AST 22 06/03/2021    AST 22 06/02/2021    AST 24 06/01/2021    ALT 7 06/03/2021    ALT 6 06/02/2021    ALT 6 06/01/2021     Lab Results   Component Value Date    LIPASE 43 05/09/2021    LIPASE 47 07/26/2020    LIPASE 47 12/07/2019     No results found for: AMYLASE      ASSESSMENT/PLAN:  1.   Cirrhosis  -No evidence of acute decompensation  -MELD-NA 25  -AFP ordered and pending  -Ultrasound in February

## 2021-06-03 NOTE — PROGRESS NOTES
bilaterally, equal lung expansion  CV: RRR, no murmur  Abdomen: soft, nontender, nondistended, normoactive bowel sounds; +ostomy   Extremitiy: +edema  Skin: no rash, tugor wnl  Neuro: no focal deficits  Psych: cooperative and appropriate      Data:         Last 3 BMP  Recent Labs     06/01/21  0555 06/02/21  0525 06/03/21  0555    137 137   K 4.2 4.3 4.3    101 102   CO2 28 27 27   BUN 24* 32* 21   CREATININE 3.5* 4.4* 3.4*   GLUCOSE 103* 103* 105*   CALCIUM 8.5* 8.7 8.5*         Last 3 CMP:    Recent Labs     06/01/21  0555 06/02/21  0525 06/03/21  0555    137 137   K 4.2 4.3 4.3    101 102   CO2 28 27 27   BUN 24* 32* 21   CREATININE 3.5* 4.4* 3.4*   GLUCOSE 103* 103* 105*   CALCIUM 8.5* 8.7 8.5*   PROT 5.4* 5.4* 5.6*   LABALBU 3.0* 2.9* 3.0*   BILITOT 1.6* 1.5* 1.6*   ALKPHOS 116* 109* 111*   AST 24 22 22   ALT 6 6 7         Last 3 Glucose:     Recent Labs     06/01/21  0555 06/02/21  0525 06/03/21  0555   GLUCOSE 103* 103* 105*         Last 3 POC Glucose:     No results for input(s): POCGLU in the last 72 hours. Last 3 CK, CKMB, Troponin  No results for input(s): CKTOTAL, CKMB, TROPONINI in the last 72 hours. Last 3 CBC:  Recent Labs     06/01/21  0555 06/02/21  0525 06/03/21  0555   WBC 5.7 5.1 5.4   RBC 2.46* 2.48* 2.50*   HGB 7.9* 8.0* 8.0*   HCT 25.8* 26.1* 26.3*   .9* 105.2* 105.2*   MCH 32.1 32.3 32.0   MCHC 30.6* 30.7* 30.4*   RDW 18.7* 18.9* 18.9*   PLT 98* 112* 109*   MPV 10.0 10.5 10.5       Last 3 Hepatic Function Panel:    Recent Labs     06/01/21  0555 06/02/21  0525 06/03/21  0555   ALKPHOS 116* 109* 111*   ALT 6 6 7   AST 24 22 22   PROT 5.4* 5.4* 5.6*   BILITOT 1.6* 1.5* 1.6*   LABALBU 3.0* 2.9* 3.0*       Albumin:  Recent Labs     06/03/21  0555   LABALBU 3.0*       Calcium:  Recent Labs     06/03/21  0555   CALCIUM 8.5*       Ionized Calcium:  No results for input(s): IONCA in the last 72 hours.     Magnesium:    Recent Labs     06/02/21  0525   MG 1.8 ABGs:  No results for input(s): PHART, PO2ART, XRR6ZSF, QSA0LDM, BEART, B2PSCYVR in the last 72 hours. Lactic Acid:  No results for input(s): LACTA in the last 72 hours. Last 3 Amylase:  No results for input(s): AMYLASE in the last 72 hours. Last 3 Lipase:  No results for input(s): LIPASE in the last 72 hours. Last 3 BNP:  No results for input(s): BNP in the last 72 hours. Assessment/Plan      1. Acute kidney injury  Likely secondary to changes in perfusion, possible ATN  HD started on 5/24  [facility] placed 6/1  No evidence of renal recovery     Tolerated dialysis on 6-2  She is scheduled for dialysis on TTS schedule     2. Hypotension  Follow blood pressure, on midodrine  Blood pressure acceptable on last check    3. Anemia  Hemoglobin below goal but stable   On oral iron   Follow hemoglobin and transfuse as needed  S/P 6-2 EGD    4. Hypokalemia  Controlled  Follow K and adjust dialysis bath as needed    5. Hyperphosphatemia   With renal failure  Acceptable on last check  Follow and treat as needed    6.  Edema  Complicated by hypoalbuminemia  UF as tolerated with HD      Thank you for the opportunity to participate in the care of Ms Erika Harley          ______________________________      Evan Betts MD  6/3/2021  1:36 PM

## 2021-07-01 NOTE — DISCHARGE SUMMARY
due to her acute problem with sepsis but also to chronic disease most notably her liver cirrhosis. She had multiple consultants on board including gastroenterology, infectious disease, nephrology as she went into renal failure and necessitating hemodialysis. She was scoped by GI for persistent anemia and had intermittent GI bleed that resolved. She ultimately was discharged to skilled nursing facility after all above issues were stable, on dialysis. She is to follow-up with me in 2 weeks. Consults: as above    Significant Diagnostic Studies: CT abd/pel, labs    Treatments: as above. In process/preliminary results:  Outstanding Order Results     No orders found from 4/10/2021 to 5/10/2021. Patient Instructions:   Discharge Medication List as of 6/3/2021  3:40 PM      CONTINUE these medications which have NOT CHANGED    Details   metOLazone (ZAROXOLYN) 5 MG tablet TAKE 1 TABLET BY MOUTH DAILY FOR 5 DAYS AND THEN ONE HALF DAILY AS NEEDED FOR SWELLING, Disp-30 tablet, R-0Normal      amiodarone (CORDARONE) 200 MG tablet Take 200 mg by mouth dailyHistorical Med      bumetanide (BUMEX) 2 MG tablet Take 1 tablet by mouth 2 times daily, Disp-180 tablet, R-3Normal      digoxin (LANOXIN) 250 MCG tablet Take 1 tablet by mouth See Admin Instructions Take 2 by mouth today then 1 by mouth daily, Disp-90 tablet, R-1Normal      metoprolol tartrate (LOPRESSOR) 100 MG tablet Take 1 tablet by mouth 2 times daily, Disp-180 tablet, R-2Normal      apixaban (ELIQUIS) 5 MG TABS tablet Take 1 tablet by mouth 2 times daily, Disp-60 tablet, R-1Normal      potassium chloride (KLOR-CON M) 20 MEQ extended release tablet Take 1 tablet by mouth 3 times daily, Disp-60 tablet, R-3Normal      LORazepam (ATIVAN) 0.5 MG tablet Take 0.5 mg by mouth every 8 hours as needed for Anxiety. Historical Med      rifaximin (XIFAXAN) 550 MG tablet Take 1 tablet by mouth 2 times daily, Disp-60 tablet, R-0Normal      lactulose (CHRONULAC) 10 GM/15ML solution Take 30 mLs by mouth 3 times daily Take to achieve 2-3 bowel movement every day. Do not take if more than 3 bowel movement in a day, Disp-1892 mL, R-1Normal      levothyroxine (SYNTHROID) 50 MCG tablet Take 50 mcg by mouth DailyHistorical Med      omeprazole (PRILOSEC) 20 MG delayed release capsule Take 40 mg by mouth dailyHistorical Med      Biotin 1000 MCG TABS Take by mouthHistorical Med      loratadine (CLARITIN) 10 MG tablet Take 10 mg by mouth daily. Historical Med      Cholecalciferol (VITAMIN D3) 2000 UNIT CAPS Take  by mouth daily. Historical Med      montelukast (SINGULAIR) 10 MG tablet Take 10 mg by mouth nightly. Historical Med      anastrozole (ARIMIDEX) 1 MG tablet Take 1 mg by mouth daily. L.D. 9/2/11Historical Med             Discharge Exam:  General appearance: AAOx3, NAD  Head: NCAT, PERRLA, EOMI, red conjunctiva  Neck: supple, no masses  Lungs: Equal chest rise bilateral  Heart: Reg rate  Abdomen: soft, nondistended, tender appropriately, normotympanic, no guarding, no peritoneal signs, incision C/D/I, L sided ostomy with stool serosanguinous, dressing dry  Extremities: extremities normal, atraumatic, no cyanosis or edema    Disposition: SNF    Patient Instructions: Activity: activity as tolerated  Diet: regular diet  Wound Care: none needed    Follow-up with Dr King Abdalla in 2 weeks.     Signed:  Mauri Will MD  7/1/2021  1:13 PM

## 2024-07-22 NOTE — PROGRESS NOTES
Miguel Wilkinson MD [facility] Card Nurse Message Pool  Looks Ok  Stable results  Please notify patient    Echo 7/19/2024  Final Impressions    * Normal left ventricular size with mild to moderate basal septal  hypertrophy.    * Septal motion consistent with LBBB.  Other walls marce low normally.  Overall low normal estimated ejection fraction, 53%.    * Grade I diastolic dysfunction of the left ventricle (impaired relaxation  pattern).    * Normal size atrium and right ventricle, with normal RV systolic function.    * Structurally normal-appearing aortic valve without stenosis or  regurgitation seen.    * Structurally normal-appearing mitral valve without stenosis.  Trivial to  mild MV regurgitation.  Trivial TV and PV regurgitation.    * Normal estimated pulmonary artery systolic pressure; 25 mmHg.    * Compared to an echocardiogram report from 6/14/2022:  LVEF estimated minimally up from previous 50%.    Detailed voicemail left for patient w/ stable results (OK per Epic), office phone number provided for patient callback.           Progress Note  Date:2021       Room:18/0618-02  Patient Fantasma Elliott     YOB: 1940     Age:80 y.o. Subjective    Subjective:  Symptoms:  Stable. She reports shortness of breath, malaise, weakness, anorexia and anxiety. No cough, chest pain, headache, chest pressure or diarrhea. Diet:  Adequate intake. Activity level: Impaired due to weakness. Pain:  She reports no pain. pt is doing ok. Cardioverted today  Review of Systems   Respiratory: Positive for shortness of breath. Negative for cough. Cardiovascular: Negative for chest pain. Gastrointestinal: Positive for anorexia. Negative for diarrhea. Neurological: Positive for weakness. Objective         Vitals Last 24 Hours:  TEMPERATURE:  Temp  Av °F (36.7 °C)  Min: 97.4 °F (36.3 °C)  Max: 98.6 °F (37 °C)  RESPIRATIONS RANGE: Resp  Av.3  Min: 12  Max: 34  PULSE OXIMETRY RANGE: SpO2  Av.1 %  Min: 90 %  Max: 99 %  PULSE RANGE: Pulse  Av  Min: 61  Max: 113  BLOOD PRESSURE RANGE: Systolic (39PAX), LXZ:493 , Min:90 , CFI:741   ; Diastolic (32CVL), GPY:72, Min:50, Max:73    I/O (24Hr): Intake/Output Summary (Last 24 hours) at 2021 2130  Last data filed at 2021 1704  Gross per 24 hour   Intake 490 ml   Output    Net 490 ml     Objective:  General Appearance:  Comfortable. Vital signs: (most recent): Blood pressure (!) 107/53, pulse 76, temperature 97.4 °F (36.3 °C), temperature source Oral, resp. rate 14, height 5' 4\" (1.626 m), weight 216 lb 8 oz (98.2 kg), SpO2 92 %. Vital signs are normal.    Output: Producing urine. HEENT: Normal HEENT exam.    Lungs:  Normal respiratory rate. She is not in respiratory distress. Heart: Regular rhythm. S1 normal and S2 normal.    Abdomen: Abdomen is soft. Bowel sounds are normal.     Extremities: Normal range of motion. Pulses: Distal pulses are intact.       Labs/Imaging/Diagnostics    Labs:  CBC:  Recent Labs     21  1614 04/06/21  0512   WBC 4.1* 3.3*   RBC 3.92 3.52   HGB 12.8 11.2*   HCT 39.7 35.5   .3* 100.9*   RDW 14.1 14.0    114*     CHEMISTRIES:  Recent Labs     04/05/21  1614 04/06/21  0512 04/07/21  0734    141 135   K 3.1* 3.2* 3.6   CL 99 100 98   CO2 32* 33* 29   BUN 10 11 16   CREATININE 0.9 0.9 1.1*   GLUCOSE 130* 103* 155*   MG 1.5* 1.5*  --      PT/INR:  Recent Labs     04/05/21 1614   PROTIME 19.1*   INR 1.6     APTT:No results for input(s): APTT in the last 72 hours. LIVER PROFILE:  Recent Labs     04/05/21 1614 04/06/21  0512   AST 38* 32*   ALT 9 8   BILITOT 1.9* 1.5*   ALKPHOS 152* 123*       Imaging Last 24 Hours:  Echo Transesophageal    Result Date: 4/7/2021  Transesophageal Echocardiography Report (MARISELA)   Demographics   Patient Name       Hannah Wood  Gender              Female                     R   Medical Record     [id number]       Room Number         0618  Number   Account #          [de-identified]      Procedure Date      04/07/2021   Corporate ID                      Ordering Physician  Bebe Carlos MD   Accession Number   [id number]     Referring Physician   Date of Birth      1940     Sonographer         Bill [de-identified] [facility]   Age                68 year(s)     Interpreting        Cedric Negrete MD                                    Physician                                     Any Other  Procedure Type of Study   MARISELA procedure:Transesophageal Echo (MARISELA), Color Flow Velocity Mapping. Procedure Date Date: 04/07/2021 Start: 12:38 PM Study Location: Portable Technical Quality: Adequate visualization Indications:Atrial fibrillation and Left Atrial Thrombus. Patient Status: Routine Height: 64 inches Weight: 216 pounds BSA: 2.02 m^2 BMI: 37.08 kg/m^2 BP: 120/68 mmHg MARISELA Performed By: the attending and the sonographer  Type of Anesthesia: Moderate sedation   Findings   Left Ventricle  Normal left ventricular chamber size and systolic function.    Right Ventricle  Normal right ventricle size and function. Left Atrium  Left atrium is enlarged. Interatrial septum appears intact. No thrombus in left atrium or atrial appendage. Right Atrium  Right atrium is enlarged. Mitral Valve  Normal mitral valve structure. There is trace mitral regurgitation. No mitral valve prolapse. No vegetations. Tricuspid Valve  Normal tricuspid valve structure. There is trace tricuspid regurgitation. No vegetations. Aortic Valve  Normal aortic valve structure. No vegetations. Pulmonic Valve  The pulmonic valve was not well visualized. Pericardial Effusion  No evidence of pericardial effusion. Pericardium appears normal.   Pleural Effusion  No evidence of pleural effusion. Aorta  Normal aortic root size and ascending aorta. Miscellaneous  The inferior vena cava diameter is normal with normal respiratory  variation. Conclusions   Summary  No intra cardiac mass or thrombus. Normal left ventricular chamber size and systolic function. Left atrium is enlarged. Interatrial septum appears intact. No thrombus in left atrium or atrial appendage. Normal right ventricle size and function. There is trace mitral regurgitation. No mitral valve prolapse. There is trace tricuspid regurgitation. Normal aortic root size. No evidence of pericardial effusion. No comparison study available. Signature   ----------------------------------------------------------------  Electronically signed by Fabiola Patel MD(Interpreting  physician) on 04/07/2021 08:18 PM  ----------------------------------------------------------------  [email or web address] [id number]    Ct Chest Wo Contrast    Result Date: 4/6/2021  EXAMINATION: CT OF THE CHEST WITHOUT CONTRAST 4/6/2021 6:30 am TECHNIQUE: CT of the chest was performed without the administration of intravenous contrast. Multiplanar reformatted images are provided for review. Dose modulation, iterative reconstruction, and/or weight based adjustment of the mA/kV was utilized to reduce the radiation dose to as low as reasonably achievable. COMPARISON: Abdomen CT dated 12/07/2019 HISTORY: ORDERING SYSTEM PROVIDED HISTORY: COPD TECHNOLOGIST PROVIDED HISTORY: Reason for exam:->COPD FINDINGS: Mediastinum: The main pulmonary artery is mildly distended. Cardiac size and configuration is normal.  The esophagus is normal.  The trachea appears normal.  The aortic arch has normal morphology, course, and caliber. The origins of the great vessels are normal for non enhanced technique. A 10 mm morphologically normal lymph node lies in the precarinal mediastinum. There are no pathologically enlarged mediastinal nodes. Lungs/pleura: There is mild airspace opacity in the inferior central left upper lobe. The medial aspect of the right middle lobe is mildly atelectatic. The right lower lobe is mildly compressed from a small right pleural effusion. There is minimal left pleural fluid. There is moderate thickening of the central airways. Upper Abdomen: The gallbladder contains multiple calculi, there are no signs of wall thickening or pericholecystic fluid. The remaining abdominal visualized viscera appears unremarkable. Soft Tissues/Bones: There is mild, diffuse thoracic spondylosis and degenerative disc disease. The sternum and chest wall appear unremarkable. The axilla are free of masses or lymphadenopathy. Mild left upper lobe consolidation, pneumonitis versus atelectasis. Subsegmental right middle and lower lobe atelectasis. Moderate, chronic airway disease, asthma versus chronic bronchitis. Mildly prominent mediastinal lymph nodes. Mild, chronic pulmonary arterial hypertension. Cholelithiasis, no signs of cholecystitis.      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 Plan:   (Home tomorrow).        Electronically signed by Michelle Kaufman MD on 4/7/21 at 9:30 PM EDT

## (undated) DEVICE — GOWN,SIRUS,NONRNF,SETINSLV,XL,20/CS: Brand: MEDLINE

## (undated) DEVICE — GOWN ISOLATN REG YEL M WT MULTIPLY SIDETIE LEV 2

## (undated) DEVICE — GLOVE ORANGE PI 7 1/2   MSG9075

## (undated) DEVICE — CONTAINER SPEC COLL 960ML POLYPR TRIANG GRAD INTAKE/OUTPUT

## (undated) DEVICE — SET MAJOR INSTR HOUSE

## (undated) DEVICE — TRAP,MUCUS SPECIMEN, 80CC: Brand: MEDLINE

## (undated) DEVICE — Device: Brand: DEFENDO VALVE AND CONNECTOR KIT

## (undated) DEVICE — BLADE ES ELASTOMERIC COAT INSUL DURABLE BEND UPTO 90DEG

## (undated) DEVICE — STAPLER INT L75MM CUT LN L73MM STPL LN L77MM BLU B FRM 8

## (undated) DEVICE — SHEET,DRAPE,40X58,STERILE: Brand: MEDLINE

## (undated) DEVICE — TROCAR: Brand: KII SLEEVE

## (undated) DEVICE — NEEDLE HYPO 25GA L1.5IN BLU POLYPR HUB S STL REG BVL STR

## (undated) DEVICE — APPLICATOR MEDICATED 26 CC SOLUTION HI LT ORNG CHLORAPREP

## (undated) DEVICE — LUBRICANT SURG JELLY ST BACTER TUBE 4.25OZ

## (undated) DEVICE — COVER,TABLE,44X90,STERILE: Brand: MEDLINE

## (undated) DEVICE — SUTURE BAG: Brand: DEVON

## (undated) DEVICE — MASK,FACE,MAXFLUIDPROTECT,SHIELD/ERLPS: Brand: MEDLINE

## (undated) DEVICE — TUBING, SUCTION, 1/4" X 10', STRAIGHT: Brand: MEDLINE

## (undated) DEVICE — CAMERA STRYKER 1488 HD GEN

## (undated) DEVICE — PLUMEPORT LAPAROSCOPIC SMOKE FILTRATION DEVICE: Brand: PLUMEPORT ACTIV

## (undated) DEVICE — SEALER ENDOSCP NANO COAT OPN DIV CRV L JAW LIGASURE IMPACT

## (undated) DEVICE — PACK SURG LAP CHOLE CUSTOM

## (undated) DEVICE — BLOCK BITE 60FR CAREGUARD

## (undated) DEVICE — ELECTRODE PT RET AD L9FT HI MOIST COND ADH HYDRGEL CORDED

## (undated) DEVICE — SPONGE GZ 4IN 4IN 4 PLY N WVN AVANT

## (undated) DEVICE — GARMENT,MEDLINE,DVT,INT,CALF,MED, GEN2: Brand: MEDLINE

## (undated) DEVICE — INSUFFLATION NEEDLE TO ESTABLISH PNEUMOPERITONEUM.: Brand: INSUFFLATION NEEDLE

## (undated) DEVICE — COLOSTOMY/ILEOSTOMY KIT,FLEXWEAR: Brand: NEW IMAGE

## (undated) DEVICE — SYRINGE IRRIG 60ML SFT PLIABLE BLB EZ TO GRP 1 HND USE W/

## (undated) DEVICE — SYRINGE MED 10ML TRNSLUC BRL PLUNG BLK MRK POLYPR CTRL

## (undated) DEVICE — KIT BEDSIDE REVITAL OX 500ML

## (undated) DEVICE — COVER PRB W14XL147CM TELESCOPICALLY FLD EXT LEN CIV-FLEX

## (undated) DEVICE — SPONGE LAP W18XL18IN WHT COT 4 PLY FLD STRUNG RADPQ DISP ST

## (undated) DEVICE — TRAY PROCED CUSTOM GASTROINTESTINAL

## (undated) DEVICE — KENDALL 450 SERIES MONITORING FOAM ELECTRODE - RECTANGULAR SHAPE ( 3/PK): Brand: KENDALL

## (undated) DEVICE — KIT CVC AD 7FR L20CM POLYUR BLU FLEXTIP ANTIMIC MULTILUMEN

## (undated) DEVICE — [HIGH FLOW INSUFFLATOR,  DO NOT USE IF PACKAGE IS DAMAGED,  KEEP DRY,  KEEP AWAY FROM SUNLIGHT,  PROTECT FROM HEAT AND RADIOACTIVE SOURCES.]: Brand: PNEUMOSURE

## (undated) DEVICE — DOUBLE BASIN SET: Brand: MEDLINE INDUSTRIES, INC.

## (undated) DEVICE — BASIC SINGLE BASIN 1-LF: Brand: MEDLINE INDUSTRIES, INC.

## (undated) DEVICE — PMI PTFE COATED LAPAROSCOPIC WIRE L-HOOK 44 CM: Brand: PMI

## (undated) DEVICE — TROCAR: Brand: KII FIOS FIRST ENTRY

## (undated) DEVICE — Z DISCONTINUED PER MEDLINE USE 2741943 DRESSING AQUACEL 10 IN ALG W9XL25CM SIL CVR WTRPRF VIR BACT BARR ANTIMIC

## (undated) DEVICE — TOWEL,OR,DSP,ST,BLUE,STD,6/PK,12PK/CS: Brand: MEDLINE

## (undated) DEVICE — Device

## (undated) DEVICE — Z INACTIVE USE 2660664 SOLUTION IRRIG 3000ML 0.9% SOD CHL USP UROMATIC PLAS CONT

## (undated) DEVICE — NDL CNTR 40CT FM MAG: Brand: MEDLINE INDUSTRIES, INC.

## (undated) DEVICE — RELOAD STPL L75MM OPN H3.8MM CLS 1.5MM WIRE DIA0.2MM REG

## (undated) DEVICE — TOTAL TRAY, 16FR 10ML SIL FOLEY, URN: Brand: MEDLINE

## (undated) DEVICE — COVER,LIGHT HANDLE,FLX,1/PK: Brand: MEDLINE INDUSTRIES, INC.

## (undated) DEVICE — MARKER,SKIN,WI/RULER AND LABELS: Brand: MEDLINE

## (undated) DEVICE — 3M™ IOBAN™ 2 ANTIMICROBIAL INCISE DRAPE 6640EZ: Brand: IOBAN™ 2

## (undated) DEVICE — YANKAUER,BULB TIP,W/O VENT,RIGID,STERILE: Brand: MEDLINE

## (undated) DEVICE — FORCEPS BX L240CM JAW DIA2.8MM L CAP W/ NDL MIC MESH TOOTH

## (undated) DEVICE — 6 X 9  1.75MIL 4-WALL LABGUARD: Brand: MINIGRIP COMMERCIAL LLC